# Patient Record
Sex: MALE | Race: BLACK OR AFRICAN AMERICAN | NOT HISPANIC OR LATINO | Employment: OTHER | URBAN - NONMETROPOLITAN AREA
[De-identification: names, ages, dates, MRNs, and addresses within clinical notes are randomized per-mention and may not be internally consistent; named-entity substitution may affect disease eponyms.]

---

## 2023-09-18 ENCOUNTER — HOSPITAL ENCOUNTER (EMERGENCY)
Facility: HOSPITAL | Age: 63
Discharge: ANOTHER HOSPITAL | End: 2023-09-18
Payer: MEDICARE

## 2023-09-18 VITALS
WEIGHT: 237 LBS | HEART RATE: 63 BPM | OXYGEN SATURATION: 100 % | SYSTOLIC BLOOD PRESSURE: 123 MMHG | DIASTOLIC BLOOD PRESSURE: 86 MMHG | HEIGHT: 72 IN | TEMPERATURE: 98 F | BODY MASS INDEX: 32.1 KG/M2 | RESPIRATION RATE: 12 BRPM

## 2023-09-18 DIAGNOSIS — I96 NECROSIS: Primary | ICD-10-CM

## 2023-09-18 PROCEDURE — 99285 PR EMERGENCY DEPT VISIT,LEVEL V: ICD-10-PCS | Mod: ,,, | Performed by: REGISTERED NURSE

## 2023-09-18 PROCEDURE — 99285 EMERGENCY DEPT VISIT HI MDM: CPT | Mod: ,,, | Performed by: REGISTERED NURSE

## 2023-09-18 PROCEDURE — 99285 EMERGENCY DEPT VISIT HI MDM: CPT

## 2023-09-18 RX ORDER — ASPIRIN 81 MG/1
81 TABLET ORAL
Status: ON HOLD | COMMUNITY
End: 2023-10-09 | Stop reason: HOSPADM

## 2023-09-18 RX ORDER — CARVEDILOL 6.25 MG/1
6.25 TABLET ORAL
Status: ON HOLD | COMMUNITY
Start: 2023-09-15 | End: 2023-10-09 | Stop reason: HOSPADM

## 2023-09-18 RX ORDER — SEVELAMER CARBONATE 800 MG/1
1 TABLET, FILM COATED ORAL 3 TIMES DAILY
COMMUNITY
Start: 2023-08-01

## 2023-09-18 RX ORDER — AMIODARONE HYDROCHLORIDE 200 MG/1
2 TABLET ORAL DAILY
Status: ON HOLD | COMMUNITY
End: 2023-10-09 | Stop reason: HOSPADM

## 2023-09-18 RX ORDER — ATORVASTATIN CALCIUM 40 MG/1
40 TABLET, FILM COATED ORAL NIGHTLY
COMMUNITY
Start: 2023-06-29

## 2023-09-18 RX ORDER — MIDODRINE HYDROCHLORIDE 10 MG/1
10 TABLET ORAL
Status: ON HOLD | COMMUNITY
Start: 2023-09-15 | End: 2023-10-09 | Stop reason: HOSPADM

## 2023-09-18 RX ORDER — PANTOPRAZOLE SODIUM 40 MG/1
40 TABLET, DELAYED RELEASE ORAL DAILY
Status: ON HOLD | COMMUNITY
End: 2023-10-09 | Stop reason: HOSPADM

## 2023-09-18 RX ORDER — IPRATROPIUM BROMIDE AND ALBUTEROL SULFATE 2.5; .5 MG/3ML; MG/3ML
3 SOLUTION RESPIRATORY (INHALATION) 2 TIMES DAILY
COMMUNITY

## 2023-09-18 RX ORDER — MONTELUKAST SODIUM 10 MG/1
10 TABLET ORAL
Status: ON HOLD | COMMUNITY
Start: 2023-06-29 | End: 2023-10-09 | Stop reason: HOSPADM

## 2023-09-18 RX ORDER — OLANZAPINE 2.5 MG/1
2.5 TABLET ORAL NIGHTLY
Status: ON HOLD | COMMUNITY
End: 2023-10-09 | Stop reason: HOSPADM

## 2023-09-18 NOTE — ED TRIAGE NOTES
Presents via [facility] from [facility] for evaluation of necrotic area to right forearm.  Spoke with sister who states he was suppose to go to [facility] for a vascular consult.  Patient has dialysis tomorrow (Tuesday) in [place].

## 2023-09-18 NOTE — ED NOTES
Updated patients sister , Ms. Macedo with plan to transfer to [place] to Dr. Espinoza.  Family agreeable

## 2023-09-18 NOTE — ED PROVIDER NOTES
Encounter Date: 9/18/2023       History     Chief Complaint   Patient presents with    Wound Check     Necrotic area right forearm     Yvan Rollins is a 63 y.o. Black or  /male presenting to ED via [facility] from local nursing home for vascular consultation for necrotic wound that overlies right AV dialysis graft. He was recently hospitalized at [facility] for dyspnea progressing to cardiac arrest with ROSC. Noted trouble dialyzing through graft during hospitalization so temporary catheter was placed and graft not being used. [facility] notes do note 2 cm defect overlying right AV graft with blood drainage noted on dressing but no further documentation or pictures found. He was evaluated by Vascular at that time and planned for OP f/u in clinic. Today he was seen by wound care at NH and they recommended return to [facility] for vascular to evaluate wound. However, EMS brought him to this ED. Large wound noted over right AV graft with small amt of blood drainage. Distal neurovascular intact. Currently in NAD. Initial BP low but patient noted to have hx of hypotension and on Midrin. However, second BP check was WNL. Otherwise, VSS at this time.      The history is provided by the patient, the nursing home and the EMS personnel.     Review of patient's allergies indicates:   Allergen Reactions    Ceftriaxone Swelling    Lisinopril Swelling and Rash     Facial swelling   Facial swelling       Past Medical History:   Diagnosis Date    CHF (congestive heart failure)     COPD (chronic obstructive pulmonary disease)     Coronary artery disease     GERD (gastroesophageal reflux disease)     Hypotension     Renal disorder      Past Surgical History:   Procedure Laterality Date    arm surgery Right     INSERTION OF PERMANENT PACEMAKER N/A     JOINT REPLACEMENT       History reviewed. No pertinent family history.  Social History     Tobacco Use    Smoking status: Every Day     Current packs/day: 0.50     Types:  Cigarettes    Smokeless tobacco: Current     Types: Snuff, Chew   Substance Use Topics    Alcohol use: Not Currently    Drug use: Not Currently     Review of Systems   Constitutional:  Positive for fatigue. Negative for chills and fever.   Respiratory:  Negative for cough and shortness of breath.    Cardiovascular:  Negative for chest pain and palpitations.   Musculoskeletal:  Positive for myalgias. Negative for arthralgias.   Skin:  Positive for wound (right forearm, left forearm).   Neurological:  Positive for weakness (generalized).   All other systems reviewed and are negative.      Physical Exam     Initial Vitals [09/18/23 1300]   BP Pulse Resp Temp SpO2   (!) 89/58 62 (!) 21 97.9 °F (36.6 °C) 100 %      MAP       --         Physical Exam    Nursing note and vitals reviewed.  Constitutional: He appears well-developed and well-nourished. No distress.   Cardiovascular:  Normal rate, regular rhythm, normal heart sounds and intact distal pulses.           Pulmonary/Chest: No respiratory distress (occasional scattered wheezing). He has wheezes.   Musculoskeletal:         General: Normal range of motion.     Neurological: He is alert and oriented to person, place, and time. GCS score is 15. GCS eye subscore is 4. GCS verbal subscore is 5. GCS motor subscore is 6.   Skin: Skin is warm and dry. Capillary refill takes less than 2 seconds.        4 cm X 2.5 cm wound with centralized necrotic tissue overlying right AV graft. Graft with no thrill or bruit noted. Small amount of sanguinous oozing from wound. Distal neurovascular intact. 3 cm X 2 cm wound to left forearm without drainage.         Medical Screening Exam   See Full Note    ED Course   Procedures  Labs Reviewed - No data to display       Imaging Results    None          Medications - No data to display  Medical Decision Making  Patient will require tsf to Vascular Surgery for surgical debridement of necrotic wound overlying right AV graft. Dr Campbell at [facility] has accepted patient.     Amount and/or Complexity of Data Reviewed  Independent Historian: EMS     Details: Yvan Rollins is a 63 y.o. Black or  /male presenting to ED via [facility] from local nursing home for vascular consultation for necrotic wound that overlies right AV dialysis graft. He was recently hospitalized at [facility] for dyspnea progressing to cardiac arrest with ROSC. Noted trouble dialyzing through graft so temporary catheter was placed and graft not being used. [facility] notes do note 2 cm defect overlying right AV graft with blood drainage noted on dressing but no further documentation or pictures found. He was evaluated by Vascular at that time and planned for OP f/u in clinic. Today he was seen by wound care at NH and they recommended return to [facility] for vascular to evaluate wound. However, EMS brought him to this ED. Large wound noted over right AV graft with small amt of blood drainage. Distal neurovascular intact. Currently in NAD. Initial BP low but patient noted to have hx of hypotension and on Midrin. However, second BP check was WNL. Otherwise, VSS at this time.                 ED Course as of 09/18/23 2209   Mon Sep 18, 2023   1411 Contacted [facility] patient placement to discuss tsf. Notes that they are on med surg advisory. Took patient information and attempted to contact me with intake but no answer. States that she left message and will call me back.  [LP]   1418 Contacted Dr Campbell. He is currently scrubbed into case and will return my call.  [LP]   1423 [facility] returned call. They refused noting no bed availability.  [LP]   1548 Case discussed with Dr Campbell. He has agreed to accept patient. Admit ot hospital medicine [LP]   1740 Dr Brown, [facility] has accepted patient. [LP]      ED Course User Index  [LP] Kayleen Richard, FNP                    Clinical Impression:   Final diagnoses:  [I96] Necrosis - Necrotic wound overlying AV graft of right  arm (Primary)        ED Disposition Condition    Transfer to Another Facility Stable                Tanya Ocasio NP-C  09/18/23 [id number]

## 2023-09-19 ENCOUNTER — HOSPITAL ENCOUNTER (INPATIENT)
Facility: HOSPITAL | Age: 63
LOS: 20 days | Discharge: NURSING, REHAB OR LONG-TERM CARE | DRG: 264 | End: 2023-10-09
Attending: FAMILY MEDICINE | Admitting: FAMILY MEDICINE
Payer: MEDICARE

## 2023-09-19 DIAGNOSIS — I50.22 CHRONIC SYSTOLIC CONGESTIVE HEART FAILURE: ICD-10-CM

## 2023-09-19 DIAGNOSIS — N18.6 CHRONIC KIDNEY DISEASE WITH END STAGE RENAL FAILURE ON DIALYSIS: ICD-10-CM

## 2023-09-19 DIAGNOSIS — L08.9 WOUND INFECTION: ICD-10-CM

## 2023-09-19 DIAGNOSIS — S41.101A ARM WOUND, RIGHT, INITIAL ENCOUNTER: Primary | ICD-10-CM

## 2023-09-19 DIAGNOSIS — I50.42 CHRONIC COMBINED SYSTOLIC AND DIASTOLIC CONGESTIVE HEART FAILURE: ICD-10-CM

## 2023-09-19 DIAGNOSIS — Z99.2 CHRONIC KIDNEY DISEASE WITH END STAGE RENAL FAILURE ON DIALYSIS: ICD-10-CM

## 2023-09-19 DIAGNOSIS — Z41.9 SURGERY, ELECTIVE: ICD-10-CM

## 2023-09-19 DIAGNOSIS — G93.1 CHRONIC ANOXIC ENCEPHALOPATHY: ICD-10-CM

## 2023-09-19 DIAGNOSIS — I25.10 CAD (CORONARY ARTERY DISEASE): ICD-10-CM

## 2023-09-19 DIAGNOSIS — T14.8XXA WOUND INFECTION: ICD-10-CM

## 2023-09-19 DIAGNOSIS — R78.81 BACTEREMIA: ICD-10-CM

## 2023-09-19 DIAGNOSIS — I50.9 CHF (CONGESTIVE HEART FAILURE): ICD-10-CM

## 2023-09-19 DIAGNOSIS — R07.9 CHEST PAIN: ICD-10-CM

## 2023-09-19 DIAGNOSIS — I50.20 HFREF (HEART FAILURE WITH REDUCED EJECTION FRACTION): ICD-10-CM

## 2023-09-19 PROBLEM — K21.9 GERD (GASTROESOPHAGEAL REFLUX DISEASE): Status: ACTIVE | Noted: 2023-09-19

## 2023-09-19 PROBLEM — Z74.09 IMPAIRED MOBILITY: Status: RESOLVED | Noted: 2023-09-19 | Resolved: 2023-09-19

## 2023-09-19 PROBLEM — S31.000A SACRAL WOUND: Status: RESOLVED | Noted: 2023-09-19 | Resolved: 2023-09-19

## 2023-09-19 PROBLEM — Z95.810 PRESENCE OF AUTOMATIC CARDIOVERTER/DEFIBRILLATOR (AICD): Status: RESOLVED | Noted: 2023-09-19 | Resolved: 2023-09-19

## 2023-09-19 PROBLEM — Z86.74 HISTORY OF CARDIAC ARREST: Status: ACTIVE | Noted: 2023-09-19

## 2023-09-19 PROBLEM — R13.10 DYSPHAGIA: Status: RESOLVED | Noted: 2023-09-19 | Resolved: 2023-09-19

## 2023-09-19 PROBLEM — J44.9 COPD (CHRONIC OBSTRUCTIVE PULMONARY DISEASE): Status: RESOLVED | Noted: 2023-09-19 | Resolved: 2023-09-19

## 2023-09-19 PROBLEM — Z95.810 PRESENCE OF AUTOMATIC CARDIOVERTER/DEFIBRILLATOR (AICD): Status: ACTIVE | Noted: 2023-09-19

## 2023-09-19 PROBLEM — J44.9 COPD (CHRONIC OBSTRUCTIVE PULMONARY DISEASE): Status: ACTIVE | Noted: 2023-09-19

## 2023-09-19 PROBLEM — E11.9 DIABETES: Status: ACTIVE | Noted: 2023-09-19

## 2023-09-19 PROBLEM — B19.20 HEPATITIS C: Status: ACTIVE | Noted: 2023-09-19

## 2023-09-19 PROBLEM — R13.10 DYSPHAGIA: Status: ACTIVE | Noted: 2023-09-19

## 2023-09-19 PROBLEM — I95.9 HYPOTENSION: Status: ACTIVE | Noted: 2023-09-19

## 2023-09-19 PROBLEM — I95.9 HYPOTENSION: Status: RESOLVED | Noted: 2023-09-19 | Resolved: 2023-09-19

## 2023-09-19 PROBLEM — B18.2 CHRONIC HEPATITIS C: Status: ACTIVE | Noted: 2023-09-19

## 2023-09-19 PROBLEM — I48.91 ATRIAL FIBRILLATION: Status: ACTIVE | Noted: 2023-09-19

## 2023-09-19 PROBLEM — S31.000A SACRAL WOUND: Status: ACTIVE | Noted: 2023-09-19

## 2023-09-19 PROBLEM — Z74.09 IMPAIRED MOBILITY: Status: ACTIVE | Noted: 2023-09-19

## 2023-09-19 PROBLEM — S41.102A ARM WOUND, LEFT, INITIAL ENCOUNTER: Status: ACTIVE | Noted: 2023-09-19

## 2023-09-19 LAB
ABORH RETYPE: NORMAL
ALBUMIN SERPL BCP-MCNC: 2.8 G/DL (ref 3.5–5)
ALBUMIN/GLOB SERPL: 0.7 {RATIO}
ALP SERPL-CCNC: 125 U/L (ref 45–115)
ALT SERPL W P-5'-P-CCNC: 43 U/L (ref 16–61)
ANION GAP SERPL CALCULATED.3IONS-SCNC: 19 MMOL/L (ref 7–16)
AORTIC ROOT ANNULUS: 3.9 CM
AORTIC VALVE CUSP SEPERATION: 3.01 CM
APTT PPP: 25.3 SECONDS (ref 25.2–37.3)
AST SERPL W P-5'-P-CCNC: 53 U/L (ref 15–37)
AV INDEX (PROSTH): 0.59
AV MEAN GRADIENT: 5 MMHG
AV PEAK GRADIENT: 8 MMHG
AV REGURGITATION PRESSURE HALF TIME: 1558.9 MS
AV VALVE AREA BY VELOCITY RATIO: 3.58 CM²
AV VALVE AREA: 3.43 CM²
AV VELOCITY RATIO: 0.61
BASOPHILS # BLD AUTO: 0.03 K/UL (ref 0–0.2)
BASOPHILS NFR BLD AUTO: 0.5 % (ref 0–1)
BILIRUB SERPL-MCNC: 1.2 MG/DL (ref ?–1.2)
BUN SERPL-MCNC: 49 MG/DL (ref 7–18)
BUN/CREAT SERPL: 4 (ref 6–20)
CALCIUM SERPL-MCNC: 9 MG/DL (ref 8.5–10.1)
CHLORIDE SERPL-SCNC: 103 MMOL/L (ref 98–107)
CO2 SERPL-SCNC: 24 MMOL/L (ref 21–32)
CREAT SERPL-MCNC: 11.7 MG/DL (ref 0.7–1.3)
CV ECHO LV RWT: 0.66 CM
DIFFERENTIAL METHOD BLD: ABNORMAL
DOP CALC AO PEAK VEL: 1.44 M/S
DOP CALC AO VTI: 27.6 CM
DOP CALC LVOT AREA: 5.9 CM2
DOP CALC LVOT DIAMETER: 2.73 CM
DOP CALC LVOT PEAK VEL: 0.88 M/S
DOP CALC LVOT STROKE VOLUME: 94.78 CM3
DOP CALCLVOT PEAK VEL VTI: 16.2 CM
E WAVE DECELERATION TIME: 166.67 MSEC
E/A RATIO: 0.66
E/E' RATIO: 8 M/S
ECHO LV POSTERIOR WALL: 2.14 CM (ref 0.6–1.1)
EGFR (NO RACE VARIABLE) (RUSH/TITUS): 4 ML/MIN/1.73M2
EJECTION FRACTION: 30 %
EOSINOPHIL # BLD AUTO: 0.06 K/UL (ref 0–0.5)
EOSINOPHIL NFR BLD AUTO: 1 % (ref 1–4)
ERYTHROCYTE [DISTWIDTH] IN BLOOD BY AUTOMATED COUNT: 14.5 % (ref 11.5–14.5)
EST. AVERAGE GLUCOSE BLD GHB EST-MCNC: 90 MG/DL
FRACTIONAL SHORTENING: 19 % (ref 28–44)
GLOBULIN SER-MCNC: 4.2 G/DL (ref 2–4)
GLUCOSE SERPL-MCNC: 115 MG/DL (ref 70–105)
GLUCOSE SERPL-MCNC: 80 MG/DL (ref 74–106)
GLUCOSE SERPL-MCNC: 83 MG/DL (ref 70–105)
GLUCOSE SERPL-MCNC: 95 MG/DL (ref 70–105)
HAV IGM SER QL: ABNORMAL
HBA1C MFR BLD HPLC: 5.3 % (ref 4.5–6.6)
HBV CORE IGM SER QL: ABNORMAL
HBV SURFACE AG SERPL QL IA: ABNORMAL
HCT VFR BLD AUTO: 37.2 % (ref 40–54)
HCV AB SER QL: REACTIVE
HGB BLD-MCNC: 11.9 G/DL (ref 13.5–18)
IMM GRANULOCYTES # BLD AUTO: 0.06 K/UL (ref 0–0.04)
IMM GRANULOCYTES NFR BLD: 1 % (ref 0–0.4)
INDIRECT COOMBS: NORMAL
INR BLD: 1.05
INTERVENTRICULAR SEPTUM: 2.26 CM (ref 0.6–1.1)
IVC DIAMETER: 1.42 CM
LA MAJOR: 4.04 CM
LEFT ATRIUM SIZE: 3.83 CM
LEFT INTERNAL DIMENSION IN SYSTOLE: 5.23 CM (ref 2.1–4)
LEFT VENTRICLE DIASTOLIC VOLUME: 211.16 ML
LEFT VENTRICLE SYSTOLIC VOLUME: 131.02 ML
LEFT VENTRICULAR INTERNAL DIMENSION IN DIASTOLE: 6.44 CM (ref 3.5–6)
LEFT VENTRICULAR MASS: 838.15 G
LV LATERAL E/E' RATIO: 6.67 M/S
LV SEPTAL E/E' RATIO: 10 M/S
LVOT MG: 1.84 MMHG
LVOT MV: 0.65 CM/S
LYMPHOCYTES # BLD AUTO: 0.82 K/UL (ref 1–4.8)
LYMPHOCYTES NFR BLD AUTO: 14 % (ref 27–41)
MAGNESIUM SERPL-MCNC: 2.4 MG/DL (ref 1.7–2.3)
MCH RBC QN AUTO: 32.4 PG (ref 27–31)
MCHC RBC AUTO-ENTMCNC: 32 G/DL (ref 32–36)
MCV RBC AUTO: 101.4 FL (ref 80–96)
MONOCYTES # BLD AUTO: 0.7 K/UL (ref 0–0.8)
MONOCYTES NFR BLD AUTO: 11.9 % (ref 2–6)
MPC BLD CALC-MCNC: 9.5 FL (ref 9.4–12.4)
MV PEAK A VEL: 0.61 M/S
MV PEAK E VEL: 0.4 M/S
MV STENOSIS PRESSURE HALF TIME: 48.34 MS
MV VALVE AREA P 1/2 METHOD: 4.55 CM2
NEUTROPHILS # BLD AUTO: 4.2 K/UL (ref 1.8–7.7)
NEUTROPHILS NFR BLD AUTO: 71.6 % (ref 53–65)
NRBC # BLD AUTO: 0 X10E3/UL
NRBC, AUTO (.00): 0 %
PHOSPHATE SERPL-MCNC: 4.2 MG/DL (ref 2.5–4.5)
PISA AR MAX VEL: 2.41 M/S
PISA TR MAX VEL: 2.8 M/S
PLATELET # BLD AUTO: 278 K/UL (ref 150–400)
POTASSIUM SERPL-SCNC: 4.6 MMOL/L (ref 3.5–5.1)
PROT SERPL-MCNC: 7 G/DL (ref 6.4–8.2)
PROTHROMBIN TIME: 13.6 SECONDS (ref 11.7–14.7)
PV PEAK GRADIENT: 3 MMHG
PV PEAK VELOCITY: 0.86 M/S
RA MAJOR: 4.54 CM
RA PRESSURE ESTIMATED: 3 MMHG
RBC # BLD AUTO: 3.67 M/UL (ref 4.6–6.2)
RH BLD: NORMAL
RIGHT VENTRICULAR END-DIASTOLIC DIMENSION: 4.11 CM
RV TB RVSP: 6 MMHG
SODIUM SERPL-SCNC: 141 MMOL/L (ref 136–145)
SPECIMEN OUTDATE: NORMAL
TDI LATERAL: 0.06 M/S
TDI SEPTAL: 0.04 M/S
TDI: 0.05 M/S
TR MAX PG: 31 MMHG
TRICUSPID ANNULAR PLANE SYSTOLIC EXCURSION: 1.9 CM
TSH SERPL DL<=0.005 MIU/L-ACNC: 1.12 UIU/ML (ref 0.36–3.74)
TV REST PULMONARY ARTERY PRESSURE: 34 MMHG
WBC # BLD AUTO: 5.87 K/UL (ref 4.5–11)

## 2023-09-19 PROCEDURE — 63600175 PHARM REV CODE 636 W HCPCS: Performed by: HOSPITALIST

## 2023-09-19 PROCEDURE — 93010 ELECTROCARDIOGRAM REPORT: CPT | Mod: ,,, | Performed by: INTERNAL MEDICINE

## 2023-09-19 PROCEDURE — 93005 ELECTROCARDIOGRAM TRACING: CPT

## 2023-09-19 PROCEDURE — 99223 PR INITIAL HOSPITAL CARE,LEVL III: ICD-10-PCS | Mod: ,,, | Performed by: NURSE PRACTITIONER

## 2023-09-19 PROCEDURE — 93010 EKG 12-LEAD: ICD-10-PCS | Mod: ,,, | Performed by: INTERNAL MEDICINE

## 2023-09-19 PROCEDURE — 82962 GLUCOSE BLOOD TEST: CPT

## 2023-09-19 PROCEDURE — 80053 COMPREHEN METABOLIC PANEL: CPT

## 2023-09-19 PROCEDURE — 86900 BLOOD TYPING SEROLOGIC ABO: CPT

## 2023-09-19 PROCEDURE — 99232 PR SUBSEQUENT HOSPITAL CARE,LEVL II: ICD-10-PCS | Mod: ,,, | Performed by: HOSPITALIST

## 2023-09-19 PROCEDURE — 87070 CULTURE OTHR SPECIMN AEROBIC: CPT

## 2023-09-19 PROCEDURE — 99223 PR INITIAL HOSPITAL CARE,LEVL III: ICD-10-PCS | Mod: ,,, | Performed by: INTERNAL MEDICINE

## 2023-09-19 PROCEDURE — 85025 COMPLETE CBC W/AUTO DIFF WBC: CPT

## 2023-09-19 PROCEDURE — 27000940

## 2023-09-19 PROCEDURE — 99232 SBSQ HOSP IP/OBS MODERATE 35: CPT | Mod: ,,, | Performed by: HOSPITALIST

## 2023-09-19 PROCEDURE — 99223 1ST HOSP IP/OBS HIGH 75: CPT | Mod: ,,, | Performed by: NURSE PRACTITIONER

## 2023-09-19 PROCEDURE — 25000003 PHARM REV CODE 250

## 2023-09-19 PROCEDURE — 25000003 PHARM REV CODE 250: Performed by: HOSPITALIST

## 2023-09-19 PROCEDURE — 97166 OT EVAL MOD COMPLEX 45 MIN: CPT

## 2023-09-19 PROCEDURE — 85610 PROTHROMBIN TIME: CPT

## 2023-09-19 PROCEDURE — 84100 ASSAY OF PHOSPHORUS: CPT

## 2023-09-19 PROCEDURE — 87149 DNA/RNA DIRECT PROBE: CPT

## 2023-09-19 PROCEDURE — 25000003 PHARM REV CODE 250: Performed by: FAMILY MEDICINE

## 2023-09-19 PROCEDURE — 97162 PT EVAL MOD COMPLEX 30 MIN: CPT

## 2023-09-19 PROCEDURE — 99223 1ST HOSP IP/OBS HIGH 75: CPT | Mod: ,,, | Performed by: INTERNAL MEDICINE

## 2023-09-19 PROCEDURE — 80074 ACUTE HEPATITIS PANEL: CPT

## 2023-09-19 PROCEDURE — 83036 HEMOGLOBIN GLYCOSYLATED A1C: CPT

## 2023-09-19 PROCEDURE — 84443 ASSAY THYROID STIM HORMONE: CPT

## 2023-09-19 PROCEDURE — 83735 ASSAY OF MAGNESIUM: CPT

## 2023-09-19 PROCEDURE — 99223 PR INITIAL HOSPITAL CARE,LEVL III: ICD-10-PCS | Mod: ,,, | Performed by: REGISTERED NURSE

## 2023-09-19 PROCEDURE — 94640 AIRWAY INHALATION TREATMENT: CPT

## 2023-09-19 PROCEDURE — 85730 THROMBOPLASTIN TIME PARTIAL: CPT

## 2023-09-19 PROCEDURE — 63600175 PHARM REV CODE 636 W HCPCS

## 2023-09-19 PROCEDURE — 99223 1ST HOSP IP/OBS HIGH 75: CPT | Mod: ,,, | Performed by: REGISTERED NURSE

## 2023-09-19 PROCEDURE — 94761 N-INVAS EAR/PLS OXIMETRY MLT: CPT

## 2023-09-19 PROCEDURE — 25000242 PHARM REV CODE 250 ALT 637 W/ HCPCS: Performed by: HOSPITALIST

## 2023-09-19 PROCEDURE — 87040 BLOOD CULTURE FOR BACTERIA: CPT

## 2023-09-19 PROCEDURE — 11000001 HC ACUTE MED/SURG PRIVATE ROOM

## 2023-09-19 PROCEDURE — 92610 EVALUATE SWALLOWING FUNCTION: CPT

## 2023-09-19 PROCEDURE — 87522 HEPATITIS C REVRS TRNSCRPJ: CPT | Mod: 90

## 2023-09-19 RX ORDER — IBUPROFEN 200 MG
24 TABLET ORAL
Status: DISCONTINUED | OUTPATIENT
Start: 2023-09-19 | End: 2023-09-19

## 2023-09-19 RX ORDER — ONDANSETRON 2 MG/ML
4 INJECTION INTRAMUSCULAR; INTRAVENOUS EVERY 8 HOURS PRN
Status: DISCONTINUED | OUTPATIENT
Start: 2023-09-19 | End: 2023-10-09 | Stop reason: HOSPADM

## 2023-09-19 RX ORDER — IPRATROPIUM BROMIDE AND ALBUTEROL SULFATE 2.5; .5 MG/3ML; MG/3ML
3 SOLUTION RESPIRATORY (INHALATION) 2 TIMES DAILY
Status: DISCONTINUED | OUTPATIENT
Start: 2023-09-19 | End: 2023-10-09 | Stop reason: HOSPADM

## 2023-09-19 RX ORDER — MONTELUKAST SODIUM 10 MG/1
10 TABLET ORAL NIGHTLY
Status: DISCONTINUED | OUTPATIENT
Start: 2023-09-19 | End: 2023-10-09 | Stop reason: HOSPADM

## 2023-09-19 RX ORDER — INSULIN ASPART 100 [IU]/ML
0-5 INJECTION, SOLUTION INTRAVENOUS; SUBCUTANEOUS EVERY 6 HOURS PRN
Status: DISCONTINUED | OUTPATIENT
Start: 2023-09-19 | End: 2023-10-09 | Stop reason: HOSPADM

## 2023-09-19 RX ORDER — SODIUM CHLORIDE 0.9 % (FLUSH) 0.9 %
10 SYRINGE (ML) INJECTION EVERY 12 HOURS PRN
Status: DISCONTINUED | OUTPATIENT
Start: 2023-09-19 | End: 2023-10-09 | Stop reason: HOSPADM

## 2023-09-19 RX ORDER — GLUCAGON 1 MG
1 KIT INJECTION
Status: DISCONTINUED | OUTPATIENT
Start: 2023-09-19 | End: 2023-09-19

## 2023-09-19 RX ORDER — AMIODARONE HYDROCHLORIDE 200 MG/1
200 TABLET ORAL DAILY
Status: DISCONTINUED | OUTPATIENT
Start: 2023-09-30 | End: 2023-10-09 | Stop reason: HOSPADM

## 2023-09-19 RX ORDER — MIDODRINE HYDROCHLORIDE 5 MG/1
10 TABLET ORAL
Status: DISCONTINUED | OUTPATIENT
Start: 2023-09-19 | End: 2023-10-06

## 2023-09-19 RX ORDER — OLANZAPINE 2.5 MG/1
2.5 TABLET ORAL NIGHTLY
Status: DISCONTINUED | OUTPATIENT
Start: 2023-09-19 | End: 2023-10-09 | Stop reason: HOSPADM

## 2023-09-19 RX ORDER — MUPIROCIN 20 MG/G
OINTMENT TOPICAL 2 TIMES DAILY
Status: COMPLETED | OUTPATIENT
Start: 2023-09-19 | End: 2023-09-23

## 2023-09-19 RX ORDER — IBUPROFEN 200 MG
16 TABLET ORAL
Status: DISCONTINUED | OUTPATIENT
Start: 2023-09-19 | End: 2023-09-19

## 2023-09-19 RX ORDER — SEVELAMER CARBONATE 800 MG/1
800 TABLET, FILM COATED ORAL 3 TIMES DAILY
Status: DISCONTINUED | OUTPATIENT
Start: 2023-09-19 | End: 2023-10-09 | Stop reason: HOSPADM

## 2023-09-19 RX ORDER — ATORVASTATIN CALCIUM 40 MG/1
40 TABLET, FILM COATED ORAL NIGHTLY
Status: DISCONTINUED | OUTPATIENT
Start: 2023-09-19 | End: 2023-10-09 | Stop reason: HOSPADM

## 2023-09-19 RX ORDER — PANTOPRAZOLE SODIUM 40 MG/1
40 TABLET, DELAYED RELEASE ORAL DAILY
Status: DISCONTINUED | OUTPATIENT
Start: 2023-09-19 | End: 2023-10-09 | Stop reason: HOSPADM

## 2023-09-19 RX ORDER — GLUCAGON 1 MG
1 KIT INJECTION
Status: DISCONTINUED | OUTPATIENT
Start: 2023-09-19 | End: 2023-10-09 | Stop reason: HOSPADM

## 2023-09-19 RX ORDER — CLINDAMYCIN PHOSPHATE 600 MG/50ML
600 INJECTION, SOLUTION INTRAVENOUS
Status: DISCONTINUED | OUTPATIENT
Start: 2023-09-19 | End: 2023-09-21

## 2023-09-19 RX ORDER — ASPIRIN 81 MG/1
81 TABLET ORAL DAILY
Status: DISCONTINUED | OUTPATIENT
Start: 2023-09-19 | End: 2023-10-09 | Stop reason: HOSPADM

## 2023-09-19 RX ORDER — NALOXONE HCL 0.4 MG/ML
0.02 VIAL (ML) INJECTION
Status: DISCONTINUED | OUTPATIENT
Start: 2023-09-19 | End: 2023-10-09 | Stop reason: HOSPADM

## 2023-09-19 RX ORDER — HYDROCODONE BITARTRATE AND ACETAMINOPHEN 5; 325 MG/1; MG/1
1 TABLET ORAL EVERY 6 HOURS PRN
Status: DISCONTINUED | OUTPATIENT
Start: 2023-09-19 | End: 2023-10-09 | Stop reason: HOSPADM

## 2023-09-19 RX ORDER — ACETAMINOPHEN 325 MG/1
650 TABLET ORAL EVERY 4 HOURS PRN
Status: DISCONTINUED | OUTPATIENT
Start: 2023-09-19 | End: 2023-10-09 | Stop reason: HOSPADM

## 2023-09-19 RX ORDER — AMIODARONE HYDROCHLORIDE 200 MG/1
400 TABLET ORAL DAILY
Status: DISPENSED | OUTPATIENT
Start: 2023-09-19 | End: 2023-09-30

## 2023-09-19 RX ORDER — CARVEDILOL 6.25 MG/1
6.25 TABLET ORAL 2 TIMES DAILY WITH MEALS
Status: DISCONTINUED | OUTPATIENT
Start: 2023-09-19 | End: 2023-10-05

## 2023-09-19 RX ORDER — IPRATROPIUM BROMIDE AND ALBUTEROL SULFATE 2.5; .5 MG/3ML; MG/3ML
3 SOLUTION RESPIRATORY (INHALATION) 2 TIMES DAILY
Status: DISCONTINUED | OUTPATIENT
Start: 2023-09-19 | End: 2023-09-19

## 2023-09-19 RX ADMIN — SEVELAMER CARBONATE 800 MG: 800 TABLET, FILM COATED ORAL at 04:09

## 2023-09-19 RX ADMIN — MIDODRINE HYDROCHLORIDE 10 MG: 5 TABLET ORAL at 06:09

## 2023-09-19 RX ADMIN — MUPIROCIN: 20 OINTMENT TOPICAL at 09:09

## 2023-09-19 RX ADMIN — AZTREONAM 500 MG: 1 INJECTION, POWDER, LYOPHILIZED, FOR SOLUTION INTRAMUSCULAR; INTRAVENOUS at 08:09

## 2023-09-19 RX ADMIN — CLINDAMYCIN PHOSPHATE 600 MG: 600 INJECTION, SOLUTION INTRAVENOUS at 05:09

## 2023-09-19 RX ADMIN — MUPIROCIN: 20 OINTMENT TOPICAL at 08:09

## 2023-09-19 RX ADMIN — MONTELUKAST SODIUM 10 MG: 10 TABLET, FILM COATED ORAL at 08:09

## 2023-09-19 RX ADMIN — VANCOMYCIN HYDROCHLORIDE 2250 MG: 500 INJECTION, POWDER, LYOPHILIZED, FOR SOLUTION INTRAVENOUS at 09:09

## 2023-09-19 RX ADMIN — CLINDAMYCIN PHOSPHATE 600 MG: 600 INJECTION, SOLUTION INTRAVENOUS at 08:09

## 2023-09-19 RX ADMIN — MIDODRINE HYDROCHLORIDE 10 MG: 5 TABLET ORAL at 01:09

## 2023-09-19 RX ADMIN — MIDODRINE HYDROCHLORIDE 10 MG: 5 TABLET ORAL at 09:09

## 2023-09-19 RX ADMIN — CLINDAMYCIN PHOSPHATE 600 MG: 600 INJECTION, SOLUTION INTRAVENOUS at 01:09

## 2023-09-19 RX ADMIN — SEVELAMER CARBONATE 800 MG: 800 TABLET, FILM COATED ORAL at 08:09

## 2023-09-19 RX ADMIN — OLANZAPINE 2.5 MG: 2.5 TABLET, FILM COATED ORAL at 08:09

## 2023-09-19 RX ADMIN — PANTOPRAZOLE SODIUM 40 MG: 40 TABLET, DELAYED RELEASE ORAL at 09:09

## 2023-09-19 RX ADMIN — AZTREONAM 1000 MG: 1 INJECTION, POWDER, LYOPHILIZED, FOR SOLUTION INTRAMUSCULAR; INTRAVENOUS at 05:09

## 2023-09-19 RX ADMIN — CARVEDILOL 6.25 MG: 6.25 TABLET, FILM COATED ORAL at 06:09

## 2023-09-19 RX ADMIN — SEVELAMER CARBONATE 800 MG: 800 TABLET, FILM COATED ORAL at 09:09

## 2023-09-19 RX ADMIN — ATORVASTATIN CALCIUM 40 MG: 40 TABLET, FILM COATED ORAL at 08:09

## 2023-09-19 RX ADMIN — CARVEDILOL 6.25 MG: 6.25 TABLET, FILM COATED ORAL at 09:09

## 2023-09-19 RX ADMIN — IPRATROPIUM BROMIDE AND ALBUTEROL SULFATE 3 ML: 2.5; .5 SOLUTION RESPIRATORY (INHALATION) at 07:09

## 2023-09-19 RX ADMIN — AMIODARONE HYDROCHLORIDE 400 MG: 200 TABLET ORAL at 09:09

## 2023-09-19 RX ADMIN — ASPIRIN 81 MG: 81 TABLET, COATED ORAL at 09:09

## 2023-09-19 NOTE — ASSESSMENT & PLAN NOTE
Necrotic wound present on patient's flexor side of the LT wrist. Likely to be secondary to vasopressor infiltration of IV site (mentioned in D/C summary from [facility]).     - Wound care consulted - appreciate recommendations

## 2023-09-19 NOTE — PLAN OF CARE
Problem: Occupational Therapy  Goal: Occupational Therapy Goal  Description: STG:  Pt will perform grooming with setup   Pt will bathe self with min assist  Pt will perform UB dressing with SBA  Pt will perform LB dressing with min assist  Pt will sit EOB x 10 min with SBA   Pt will transfer toilet/BSC with min assist  Pt will tolerate 15 minutes of tx with minimal fatigue      LT. Pt will achieve max level of independence with self care.    Outcome: Ongoing, Progressing

## 2023-09-19 NOTE — H&P (VIEW-ONLY)
[facility]  General Surgery  Consult Note    Patient Name: Yvan Rollins  MRN: [id number]  Code Status: Full Code  Admission Date: 9/19/2023  Hospital Length of Stay: 0 days  Attending Physician: Malinda Fontaine DO  Primary Care Provider: Eugene Funez MD    Patient information was obtained from ER records.     Inpatient consult to Vascular Surgery  Consult performed by: Danielle Islas ACNP  Consult ordered by: Mirna Ewing MD  Assessment/Recommendations: Incision drainage right upper extremity hematoma debridement bilateral upper extremities        Subjective:     Principal Problem: Arm wound, right, initial encounter    History of Present Illness: 63-year-old male nursing home resident recently was at [facility] last month cardiac arrest  He has pacemaker AICD cardiomyopathy depressed ejection fraction 30%  He has end-stage renal disease required insertion of tunneled hemodialysis catheter by Dr. Carty due to right upper extremity malfunction of right AV access was to follow-up with vascular surgery outpatient  Referred from wound care to emergency department for right upper extremity necrotic wound over right upper extremity dialysis access  Vascular surgery consulted      Current Facility-Administered Medications on File Prior to Encounter   Medication    [DISCONTINUED] acetaminophen oral solution    [DISCONTINUED] albumin human 25% bottle    [DISCONTINUED] amiodarone tablet    [DISCONTINUED] aspirin chewable tablet    [DISCONTINUED] atorvastatin tablet    [DISCONTINUED] carvediloL tablet    [DISCONTINUED] dextrose 50 % in water (D50W) injection    [DISCONTINUED] GENERIC EXTERNAL MEDICATION    [DISCONTINUED] GENERIC EXTERNAL MEDICATION    [DISCONTINUED] glucagon injection    [DISCONTINUED] haloperidol lactate injection    [DISCONTINUED] heparin (porcine) injection    [DISCONTINUED] heparin (porcine) injection    [DISCONTINUED] hydrALAZINE injection     [DISCONTINUED] labetalol 20 mg/4 mL (5 mg/mL) IV syring    [DISCONTINUED] midodrine tablet    [DISCONTINUED] montelukast tablet    [DISCONTINUED] pantoprazole EC tablet     Current Outpatient Medications on File Prior to Encounter   Medication Sig    albuterol-ipratropium (DUO-NEB) 2.5 mg-0.5 mg/3 mL nebulizer solution Take 3 mLs by nebulization 2 (two) times a day.    amiodarone (PACERONE) 200 MG Tab Take 2 tablets by mouth once daily. X 13 days    aspirin (ECOTRIN) 81 MG EC tablet Take 81 mg by mouth.    atorvastatin (LIPITOR) 40 MG tablet Take 40 mg by mouth every evening.    carvediloL (COREG) 6.25 MG tablet 6.25 mg.    midodrine (PROAMATINE) 10 MG tablet Take 10 mg by mouth.    montelukast (SINGULAIR) 10 mg tablet Take 10 mg by mouth.    OLANZapine (ZYPREXA) 2.5 MG tablet Take 2.5 mg by mouth every evening.    pantoprazole (PROTONIX) 40 MG tablet Take 40 mg by mouth once daily.    sevelamer carbonate (RENVELA) 800 mg Tab Take 1 tablet by mouth 3 (three) times daily.       Review of patient's allergies indicates:   Allergen Reactions    Ceftriaxone Swelling    Lisinopril Swelling and Rash     Facial swelling   Facial swelling         Past Medical History:   Diagnosis Date    Cardiac arrest     CHF (congestive heart failure)     EF 25-30%    COPD (chronic obstructive pulmonary disease)     Coronary artery disease     Diabetes     GERD (gastroesophageal reflux disease)     Hepatitis C     Hypotension     Requiring Midodrine    Neuropathy     Substance abuse      Past Surgical History:   Procedure Laterality Date    arm surgery Right     INSERTION OF PERMANENT PACEMAKER N/A 08/15/2018    JOINT REPLACEMENT Right     Knee surgery    LUMBAR SPINE SURGERY       Family History    None       Tobacco Use    Smoking status: Every Day     Current packs/day: 0.50     Types: Cigarettes    Smokeless tobacco: Current     Types: Snuff, Chew   Substance and Sexual Activity    Alcohol use: Not  Currently    Drug use: Not Currently    Sexual activity: Not on file     Review of Systems   Unable to perform ROS: Other     Objective:     Vital Signs (Most Recent):  Temp: 97.2 °F (36.2 °C) (09/19/23 0418)  Pulse: 68 (09/19/23 0418)  Resp: 16 (09/19/23 0418)  BP: (!) 90/59 (09/19/23 0912)  SpO2: 100 % (09/19/23 0418) Vital Signs (24h Range):  Temp:  [97.2 °F (36.2 °C)-97.9 °F (36.6 °C)] 97.2 °F (36.2 °C)  Pulse:  [60-90] 68  Resp:  [11-22] 16  SpO2:  [95 %-100 %] 100 %  BP: ()/(54-86) 90/59     Weight: 107.5 kg (237 lb)  Body mass index is 32.14 kg/m².     Physical Exam  Vitals and nursing note reviewed.   Constitutional:       Appearance: Normal appearance.   HENT:      Head: Normocephalic.      Mouth/Throat:      Mouth: Mucous membranes are moist.   Eyes:      Conjunctiva/sclera: Conjunctivae normal.   Cardiovascular:      Rate and Rhythm: Normal rate and regular rhythm.   Pulmonary:      Effort: Pulmonary effort is normal.   Abdominal:      Palpations: Abdomen is soft.   Skin:     General: Skin is warm and dry.      Comments:  eschar over right upper extremity dialysis access with expression of old blood consistent with hematoma  Right upper extremity  hematoma    Left wrist area partially intact eschar   Neurological:      Mental Status: He is alert. Mental status is at baseline. He is disoriented.      Comments: Person place   Psychiatric:         Mood and Affect: Mood normal.            I have reviewed all pertinent lab results within the past 24 hours.  CBC:   Recent Labs   Lab 09/19/23 0414   WBC 5.87   RBC 3.67*   HGB 11.9*   HCT 37.2*      .4*   MCH 32.4*   MCHC 32.0     BMP:   Recent Labs   Lab 09/19/23  0525   GLU 80      K 4.6      CO2 24   BUN 49*   CREATININE 11.70*   CALCIUM 9.0   MG 2.4*     CMP:   Recent Labs   Lab 09/19/23  0525   GLU 80   CALCIUM 9.0   ALBUMIN 2.8*   PROT 7.0      K 4.6   CO2 24      BUN 49*   CREATININE 11.70*   ALKPHOS 125*  "  ALT 43   AST 53*   BILITOT 1.2     LFTs:   Recent Labs   Lab 09/19/23  0525   ALT 43   AST 53*   ALKPHOS 125*   BILITOT 1.2   PROT 7.0   ALBUMIN 2.8*     Coagulation:   Recent Labs   Lab 09/19/23  0414   LABPROT 13.6   INR 1.05   APTT 25.3     Cardiac markers: No results for input(s): "CKMB", "CPKMB", "TROPONINT", "TROPONINI", "MYOGLOBIN" in the last 168 hours.    Significant Diagnostics:  I have reviewed all pertinent imaging results/findings within the past 24 hours.      Assessment/Plan:     Hematoma  Right upper extremity hematoma with 3-4 cm skin necrosis over occluded dialysis access  To OR tomorrow for incision drainage hematoma with Dr. Campbell debridement eschar bilateral upper extremities      VTE Risk Mitigation (From admission, onward)         Ordered     IP VTE HIGH RISK PATIENT  Once         09/19/23 0147     Place sequential compression device  Until discontinued         09/19/23 0147     Reason for No Pharmacological VTE Prophylaxis  Once        Question:  Reasons:  Answer:  Physician Provided (leave comment)    09/19/23 0147                Thank you for your consult. I will follow-up with patient. Please contact us if you have any additional questions.    Danielle Islas, ARLEN  General Surgery  [facility]  "

## 2023-09-19 NOTE — HPI
Patient is a 63 year old  male who was transferred to [facility] from [place] ED for vascular consultation for a necrotic wound overlying an AV access site in the RT arm. He presented to [place] ED via [facility] from [facility] (referenced to be [facility] from recent hospitalization at [facility] (8/15/2023)). He was recently hospitalized at [facility] after having dyspnea during dialysis that progressed to cardiac arrest with ROSC. The patient was mechanically intubated, started on pressors, and managed in the ICU. According to the discharge summary, the patient had an adverse reaction to surface and infiltration anesthetic and intravenous infiltration.     During hospitalization at [facility], the RT AV access site was unable to be used and a tunneled RT dialysis catheter was placed. He was evaluated by Vascular surgery during that admission with outpatient follow-up. Today, he was seen by wound care at the NH and they recommended return to [facility] for vascular evaluation of the wound. However, [facility] was on Diversion and he ended up at [place] and subsequently [facility].     During his hospitalization at [facility], cardiology was consulted. ECHO demonstrated EF of 25-30% and there was a reported 1 episode of nonsustained v-tach. Cardiology recommended resuming goal directed medical therapy, and there was no identified cardiac etiology for his arrest. He was also evaluated by neurology due to concern for anoxic brain injury.  CT of the brain demonstrated chronic ischemic microangiopathy. Neurology diagnosed his encephalopathy to be multifactorial - anoxic insult from prolonged cardiac arrest with metabolic derangement. His mental status improved, but not to baseline. Today, at [facility] he   is Aox1 and he is a poor historian. He has difficulty answering questions. History is obtained from medical records.     The patient has a PMH of nonischemic cardiomyopathy with EF 20-25%, S/P AICD placement, ESRD on HD TTS, COPD, HTN,  polysubstance abuse, and hepatitis C.

## 2023-09-19 NOTE — ASSESSMENT & PLAN NOTE
Necrotic wound present on patient's flexor side of the LT wrist. Likely to be secondary to vasopressor infiltration of IV site (mentioned in D/C summary from [facility]).     - Wound care consulted - appreciate recommendations    9/19: Per Vascular Surgery and wound care; on Clindamycin and Aztreonam; debridement pending; cultures pending

## 2023-09-19 NOTE — ASSESSMENT & PLAN NOTE
Patient has a necrotic area present over AV access site for dialysis. Patient was to follow-up outpatient at [facility] with vascular surgery, but hospital was on diversion     - Vascular surgery consulted - appreciate recommendations  - Cardiology consulted - appreciate recommendations for pre-op clearance   - Type and screen pending   - CXR pending   - EKG demonstrated: Atrial paced rhythmn with prolonged AV conduction, HR 65  - CBC and CMP pending   - Coagulation factors pending   - Wound culture pending   - Blood cultures pending   - Vancomycin IV, Clindamycin IV, and Aztreonam IV (to cover for MRSA, anaerobes, and gram - bacteria)     9/19: Patient is followed by Vascular Surgery; debridement pending; on Clindamycin and Aztreonam; cultures pending

## 2023-09-19 NOTE — PLAN OF CARE
Problem: Adult Inpatient Plan of Care  Goal: Plan of Care Review  Outcome: Ongoing, Progressing  Goal: Patient-Specific Goal (Individualized)  Outcome: Ongoing, Progressing  Goal: Absence of Hospital-Acquired Illness or Injury  Outcome: Ongoing, Progressing  Goal: Optimal Comfort and Wellbeing  Outcome: Ongoing, Progressing  Goal: Readiness for Transition of Care  Outcome: Ongoing, Progressing     Problem: Impaired Wound Healing  Goal: Optimal Wound Healing  Outcome: Ongoing, Progressing     Problem: Infection  Goal: Absence of Infection Signs and Symptoms  Outcome: Ongoing, Progressing     Problem: Skin Injury Risk Increased  Goal: Skin Health and Integrity  Outcome: Ongoing, Progressing     Problem: Diabetes Comorbidity  Goal: Blood Glucose Level Within Targeted Range  Outcome: Ongoing, Progressing

## 2023-09-19 NOTE — CONSULTS
[facility]  General Surgery  Consult Note    Patient Name: Yvan Rollins  MRN: [id number]  Code Status: Full Code  Admission Date: 9/19/2023  Hospital Length of Stay: 0 days  Attending Physician: Malinda Fontaine DO  Primary Care Provider: Eugene Funez MD    Patient information was obtained from ER records.     Inpatient consult to Vascular Surgery  Consult performed by: Danielle Islsa ACNP  Consult ordered by: Mirna Ewing MD  Assessment/Recommendations: Incision drainage right upper extremity hematoma debridement bilateral upper extremities        Subjective:     Principal Problem: Arm wound, right, initial encounter    History of Present Illness: 63-year-old male nursing home resident recently was at [facility] last month cardiac arrest  He has pacemaker AICD cardiomyopathy depressed ejection fraction 30%  He has end-stage renal disease required insertion of tunneled hemodialysis catheter by Dr. Carty due to right upper extremity malfunction of right AV access was to follow-up with vascular surgery outpatient  Referred from wound care to emergency department for right upper extremity necrotic wound over right upper extremity dialysis access  Vascular surgery consulted      Current Facility-Administered Medications on File Prior to Encounter   Medication    [DISCONTINUED] acetaminophen oral solution    [DISCONTINUED] albumin human 25% bottle    [DISCONTINUED] amiodarone tablet    [DISCONTINUED] aspirin chewable tablet    [DISCONTINUED] atorvastatin tablet    [DISCONTINUED] carvediloL tablet    [DISCONTINUED] dextrose 50 % in water (D50W) injection    [DISCONTINUED] GENERIC EXTERNAL MEDICATION    [DISCONTINUED] GENERIC EXTERNAL MEDICATION    [DISCONTINUED] glucagon injection    [DISCONTINUED] haloperidol lactate injection    [DISCONTINUED] heparin (porcine) injection    [DISCONTINUED] heparin (porcine) injection    [DISCONTINUED] hydrALAZINE injection     [DISCONTINUED] labetalol 20 mg/4 mL (5 mg/mL) IV syring    [DISCONTINUED] midodrine tablet    [DISCONTINUED] montelukast tablet    [DISCONTINUED] pantoprazole EC tablet     Current Outpatient Medications on File Prior to Encounter   Medication Sig    albuterol-ipratropium (DUO-NEB) 2.5 mg-0.5 mg/3 mL nebulizer solution Take 3 mLs by nebulization 2 (two) times a day.    amiodarone (PACERONE) 200 MG Tab Take 2 tablets by mouth once daily. X 13 days    aspirin (ECOTRIN) 81 MG EC tablet Take 81 mg by mouth.    atorvastatin (LIPITOR) 40 MG tablet Take 40 mg by mouth every evening.    carvediloL (COREG) 6.25 MG tablet 6.25 mg.    midodrine (PROAMATINE) 10 MG tablet Take 10 mg by mouth.    montelukast (SINGULAIR) 10 mg tablet Take 10 mg by mouth.    OLANZapine (ZYPREXA) 2.5 MG tablet Take 2.5 mg by mouth every evening.    pantoprazole (PROTONIX) 40 MG tablet Take 40 mg by mouth once daily.    sevelamer carbonate (RENVELA) 800 mg Tab Take 1 tablet by mouth 3 (three) times daily.       Review of patient's allergies indicates:   Allergen Reactions    Ceftriaxone Swelling    Lisinopril Swelling and Rash     Facial swelling   Facial swelling         Past Medical History:   Diagnosis Date    Cardiac arrest     CHF (congestive heart failure)     EF 25-30%    COPD (chronic obstructive pulmonary disease)     Coronary artery disease     Diabetes     GERD (gastroesophageal reflux disease)     Hepatitis C     Hypotension     Requiring Midodrine    Neuropathy     Substance abuse      Past Surgical History:   Procedure Laterality Date    arm surgery Right     INSERTION OF PERMANENT PACEMAKER N/A 08/15/2018    JOINT REPLACEMENT Right     Knee surgery    LUMBAR SPINE SURGERY       Family History    None       Tobacco Use    Smoking status: Every Day     Current packs/day: 0.50     Types: Cigarettes    Smokeless tobacco: Current     Types: Snuff, Chew   Substance and Sexual Activity    Alcohol use: Not  Currently    Drug use: Not Currently    Sexual activity: Not on file     Review of Systems   Unable to perform ROS: Other     Objective:     Vital Signs (Most Recent):  Temp: 97.2 °F (36.2 °C) (09/19/23 0418)  Pulse: 68 (09/19/23 0418)  Resp: 16 (09/19/23 0418)  BP: (!) 90/59 (09/19/23 0912)  SpO2: 100 % (09/19/23 0418) Vital Signs (24h Range):  Temp:  [97.2 °F (36.2 °C)-97.9 °F (36.6 °C)] 97.2 °F (36.2 °C)  Pulse:  [60-90] 68  Resp:  [11-22] 16  SpO2:  [95 %-100 %] 100 %  BP: ()/(54-86) 90/59     Weight: 107.5 kg (237 lb)  Body mass index is 32.14 kg/m².     Physical Exam  Vitals and nursing note reviewed.   Constitutional:       Appearance: Normal appearance.   HENT:      Head: Normocephalic.      Mouth/Throat:      Mouth: Mucous membranes are moist.   Eyes:      Conjunctiva/sclera: Conjunctivae normal.   Cardiovascular:      Rate and Rhythm: Normal rate and regular rhythm.   Pulmonary:      Effort: Pulmonary effort is normal.   Abdominal:      Palpations: Abdomen is soft.   Skin:     General: Skin is warm and dry.      Comments:  eschar over right upper extremity dialysis access with expression of old blood consistent with hematoma  Right upper extremity  hematoma    Left wrist area partially intact eschar   Neurological:      Mental Status: He is alert. Mental status is at baseline. He is disoriented.      Comments: Person place   Psychiatric:         Mood and Affect: Mood normal.            I have reviewed all pertinent lab results within the past 24 hours.  CBC:   Recent Labs   Lab 09/19/23 0414   WBC 5.87   RBC 3.67*   HGB 11.9*   HCT 37.2*      .4*   MCH 32.4*   MCHC 32.0     BMP:   Recent Labs   Lab 09/19/23  0525   GLU 80      K 4.6      CO2 24   BUN 49*   CREATININE 11.70*   CALCIUM 9.0   MG 2.4*     CMP:   Recent Labs   Lab 09/19/23  0525   GLU 80   CALCIUM 9.0   ALBUMIN 2.8*   PROT 7.0      K 4.6   CO2 24      BUN 49*   CREATININE 11.70*   ALKPHOS 125*  "  ALT 43   AST 53*   BILITOT 1.2     LFTs:   Recent Labs   Lab 09/19/23  0525   ALT 43   AST 53*   ALKPHOS 125*   BILITOT 1.2   PROT 7.0   ALBUMIN 2.8*     Coagulation:   Recent Labs   Lab 09/19/23  0414   LABPROT 13.6   INR 1.05   APTT 25.3     Cardiac markers: No results for input(s): "CKMB", "CPKMB", "TROPONINT", "TROPONINI", "MYOGLOBIN" in the last 168 hours.    Significant Diagnostics:  I have reviewed all pertinent imaging results/findings within the past 24 hours.      Assessment/Plan:     Hematoma  Right upper extremity hematoma with 3-4 cm skin necrosis over occluded dialysis access  To OR tomorrow for incision drainage hematoma with Dr. Campbell debridement eschar bilateral upper extremities      VTE Risk Mitigation (From admission, onward)         Ordered     IP VTE HIGH RISK PATIENT  Once         09/19/23 0147     Place sequential compression device  Until discontinued         09/19/23 0147     Reason for No Pharmacological VTE Prophylaxis  Once        Question:  Reasons:  Answer:  Physician Provided (leave comment)    09/19/23 0147                Thank you for your consult. I will follow-up with patient. Please contact us if you have any additional questions.    Danielle Islas, ARLEN  General Surgery  [facility]  "

## 2023-09-19 NOTE — NURSING
Spoke with TY Chávez at [place]. Informed of patient transfer to [facility] Room 455 when bed was ready.

## 2023-09-19 NOTE — PLAN OF CARE
[facility]  Initial Discharge Assessment       Primary Care Provider: Eugene Funez MD    Admission Diagnosis: Wound infection [T14.8XXA, L08.9]    Admission Date: 9/19/2023  Expected Discharge Date:     Transition of Care Barriers: None    Payor: [facility] MCARE / Plan: [facility] DUAL COMPLETE HMO SNP / Product Type: Medicare Advantage /     Extended Emergency Contact Information  Primary Emergency Contact: Eugene Macedo  Mobile Phone: [phone]  Relation: Sister    Discharge Plan A: Home with family  Discharge Plan B: Home with family, Home Health      [facility] [address]  [address] [facility] [address]  Phone: [phone] Fax: [phone]      Initial Assessment (most recent)       Adult Discharge Assessment - 09/19/23 1516          Discharge Assessment    Assessment Type Discharge Planning Assessment     Confirmed/corrected address, phone number and insurance Yes     Source of Information patient     Communicated SANDRA with patient/caregiver Date not available/Unable to determine     People in Home sibling(s)     Do you expect to return to your current living situation? Yes     Do you have help at home or someone to help you manage your care at home? Yes     Who are your caregiver(s) and their phone number(s)? sister     Prior to hospitilization cognitive status: Unable to Assess     Equipment Currently Used at Home none     Patient currently being followed by outpatient case management? No     Do you currently have service(s) that help you manage your care at home? No     Do you take prescription medications? Yes     Do you have prescription coverage? Yes     Coverage [facility]     Do you have any problems affording any of your prescribed medications? No     Is the patient taking medications as prescribed? yes     Who is going to help you get home at discharge? sister     How do you get to doctors appointments? car, drives self     Are you  on dialysis? Yes     Dialysis Name and Scheduled days t-t-[id number]     DME Needed Upon Discharge  none     Discharge Plan discussed with: Patient     Transition of Care Barriers None     Discharge Plan A Home with family     Discharge Plan B Home with family;Home Health        Physical Activity    On average, how many days per week do you engage in moderate to strenuous exercise (like a brisk walk)? 1 day     On average, how many minutes do you engage in exercise at this level? 10 min        Financial Resource Strain    How hard is it for you to pay for the very basics like food, housing, medical care, and heating? Not hard at all        Housing Stability    In the last 12 months, was there a time when you were not able to pay the mortgage or rent on time? No     In the last 12 months, how many places have you lived? 1     In the last 12 months, was there a time when you did not have a steady place to sleep or slept in a shelter (including now)? No        Transportation Needs    In the past 12 months, has lack of transportation kept you from medical appointments or from getting medications? No     In the past 12 months, has lack of transportation kept you from meetings, work, or from getting things needed for daily living? No        Food Insecurity    Within the past 12 months, you worried that your food would run out before you got the money to buy more. Never true     Within the past 12 months, the food you bought just didn't last and you didn't have money to get more. Never true        Stress    Do you feel stress - tense, restless, nervous, or anxious, or unable to sleep at night because your mind is troubled all the time - these days? Not at all        Social Connections    In a typical week, how many times do you talk on the phone with family, friends, or neighbors? More than three times a week     How often do you get together with friends or relatives? More than three times a week     Do you belong to any clubs or  organizations such as [employer] groups, unions, fraternal or athletic groups, or school groups? No     How often do you attend meetings of the clubs or organizations you belong to? Never                      Ss spoke with pt and pt stated he lives at home with sister and plans to return at d/c. No d/c needs stated at this time. Ss following.

## 2023-09-19 NOTE — PROGRESS NOTES
Pharmacist Renal Dose Adjustment Note    Yvan Rollins is a 63 y.o. male being treated with the medication azactam    Patient Data:    Vital Signs (Most Recent):  Temp: 97.3 °F (36.3 °C) (09/19/23 1155)  Pulse: 62 (09/19/23 1155)  Resp: 16 (09/19/23 1155)  BP: (!) 90/55 (09/19/23 1155)  SpO2: 100 % (09/19/23 1155) Vital Signs (72h Range):  Temp:  [97.2 °F (36.2 °C)-97.9 °F (36.6 °C)]   Pulse:  [60-90]   Resp:  [11-22]   BP: ()/(54-86)   SpO2:  [95 %-100 %]      Recent Labs   Lab 09/19/23  0525   CREATININE 11.70*     Serum creatinine: 11.7 mg/dL (H) 09/19/23 0525  Estimated creatinine clearance: 8.4 mL/min (A)    Medication:azactam dose: 1000mg frequency Q12H will be changed to medication:azactam dose:500mg frequency:Q8H    Pharmacist's Name: Emma Pelaez  Pharmacist's Extension: [phone]

## 2023-09-19 NOTE — PT/OT/SLP EVAL
Occupational Therapy   Evaluation    Name: Yvan Rollins  MRN: [id number]  Admitting Diagnosis: Arm wound, right, initial encounter  Recent Surgery: Procedure(s) (LRB):  INCISION AND DRAINAGE, HEMATOMA (Right)  IRRIGATION AND DEBRIDEMENT (Left)      Recommendations:     Discharge Recommendations: nursing facility, skilled  Discharge Equipment Recommendations:  to be determined by next level of care  Barriers to discharge:  Inaccessible home environment, Decreased caregiver support    Assessment:     Yvan Rollins is a 63 y.o. male with a medical diagnosis of Arm wound, right, initial encounter.  He presents with the following performance deficits affecting function: weakness, impaired endurance, impaired self care skills, impaired functional mobility, gait instability, impaired balance, decreased coordination.  Patient supine in bed on therapist arrival and agreeable to OT eval. Patient reports he was living at home by himself and was independent with ADLs and mobility prior to recent medical decline. Patient appears to present with some confusion, was oriented to day of the week but not oriented to year or location (states we are in [place]) and is unable to answer additional orientation questions. Per medical record, patient was previously at SNF for rehab and was nonambulatory and required total care for ADLs.     Rehab Prognosis: Fair; patient would benefit from acute skilled OT services to address these deficits and reach maximum level of function.       Plan:     Patient to be seen 5 x/week to address the above listed problems via self-care/home management, therapeutic activities, therapeutic exercises, neuromuscular re-education  Plan of Care Expires:    Plan of Care Reviewed with: patient    Subjective     Chief Complaint: R arm wound  Patient/Family Comments/goals: Agreeable to OT eval    Occupational Profile:  Living Environment: unclear at time of eval, patient is a poor historian  Previous level of  function: Patient reports he was independent with ADLs and functional mobility  Equipment Used at Home: none  Assistance upon Discharge: Unknown at time of eval    Pain/Comfort:  Pain Rating 1: 0/10    Patients cultural, spiritual, Advent conflicts given the current situation:      Objective:     Communicated with: CHANDA Solorzano RN prior to session.  Patient found supine with SCD, peripheral IV, telemetry upon OT entry to room.    General Precautions: Standard, fall  Orthopedic Precautions:    Braces:    Respiratory Status: Room air    Occupational Performance:    Bed Mobility:    Patient completed Supine to Sit with minimum assistance  Patient completed Sit to Supine with minimum assistance    Functional Mobility/Transfers:  DNT     Activities of Daily Living:  Upper Body Dressing: minimum assistance    Lower Body Dressing: moderate assistance      Cognitive/Visual Perceptual:  Cognitive/Psychosocial Skills:     -       Oriented to: Person   -       Follows Commands/attention:Follows one-step commands  -       Safety awareness/insight to disability: impaired     Physical Exam:  Upper Extremity Range of Motion:     -       Right Upper Extremity: WNL  -       Left Upper Extremity: WNL  Upper Extremity Strength:    -       Right Upper Extremity: 3+/5  -       Left Upper Extremity: 3+/5    AMPAC 6 Click ADL:  AMPAC Total Score: 15    Treatment & Education:  Pt educated on OT role/POC.   Importance of OOB activity with staff assistance.  Importance of sitting up in the chair throughout the day as tolerated, especially for meals   Safety during functional t/f and mobility  Importance of assisting with self-care activities    Patient left supine with all lines intact and call button in reach, RN notified    GOALS:   Multidisciplinary Problems       Occupational Therapy Goals          Problem: Occupational Therapy    Goal Priority Disciplines Outcome Interventions   Occupational Therapy Goal     OT, PT/OT Ongoing,  Progressing    Description: STG:  Pt will perform grooming with setup   Pt will bathe self with min assist  Pt will perform UB dressing with SBA  Pt will perform LB dressing with min assist  Pt will sit EOB x 10 min with SBA   Pt will transfer toilet/BSC with min assist  Pt will tolerate 15 minutes of tx with minimal fatigue      LT. Pt will achieve max level of independence with self care.                         History:     Past Medical History:   Diagnosis Date    Cardiac arrest     CHF (congestive heart failure)     EF 25-30%    COPD (chronic obstructive pulmonary disease)     Coronary artery disease     Diabetes     GERD (gastroesophageal reflux disease)     Hepatitis C     Hypotension     Requiring Midodrine    Neuropathy     Substance abuse          Past Surgical History:   Procedure Laterality Date    arm surgery Right     INSERTION OF PERMANENT PACEMAKER N/A 08/15/2018    JOINT REPLACEMENT Right     Knee surgery    LUMBAR SPINE SURGERY         Time Tracking:     OT Date of Treatment: 23  OT Start Time: 928  OT Stop Time: 938  OT Total Time (min): 10 min    Billable Minutes:Evaluation 10    2023

## 2023-09-19 NOTE — ASSESSMENT & PLAN NOTE
Patient has a necrotic area present over AV access site for dialysis. Patient was to follow-up outpatient at [facility] with vascular surgery, but hospital was on diversion     - Vascular surgery consulted - appreciate recommendations  - Cardiology consulted - appreciate recommendations for pre-op clearance   - Type and screen pending   - CXR pending   - EKG demonstrated: Atrial paced rhythmn with prolonged AV conduction, HR 65  - CBC and CMP pending   - Coagulation factors pending

## 2023-09-19 NOTE — PLAN OF CARE
Problem: Physical Therapy  Goal: Physical Therapy Goal  Description: Short Term Goals  Ambulate Mod - 10 feet with rolling walker assistive device.   Supine to sit contact guard  Sit to stand contact guard  SPT contact guard  5. Independent with HEP    Long Term Goals  Ambulate CGA - 20 feet with rolling walker assistive device.   Supine to sit stand-by  Sit to stand stand-by  SPT stand-by  Needed equipment for home.         Outcome: Ongoing, Progressing

## 2023-09-19 NOTE — ASSESSMENT & PLAN NOTE
Patient has a history of hypotension requiring Midodrine 10mg TID. Continue medication     - Continue to monitor BP

## 2023-09-19 NOTE — CONSULTS
Pharmacy consulted to dose Vancomycin for necrotic wound.    No renal labs yet resulted at time of consult.    Will do Vancomcyin 2250 mg IV x 1 now this AM and schedule further doses as indicated based on labs.    Pharmacy to follow daily and make necessary adjustments.    Thank you.

## 2023-09-19 NOTE — ASSESSMENT & PLAN NOTE
Right upper extremity hematoma with 3-4 cm skin necrosis over occluded dialysis access  To OR tomorrow for incision drainage hematoma with Dr. Campbell debridement eschar bilateral upper extremities

## 2023-09-19 NOTE — ASSESSMENT & PLAN NOTE
Patient was recently taken from dialysis to [facility] in [place], [place] due to SOB. Patient went into cardiac arrest and was hospitalized in the ICU (8/15/2023)    - Continue home medication ASA 81mg QD  - Continue home medication Atorvastatin 40mg QHS   - Carvedilol 6.25mg BID   - Cardiac monitoring   - Cardiology consulted - appreciate recommendations     9/19: has AICD; continue current meds; BP in the 90's; will monitor

## 2023-09-19 NOTE — PROGRESS NOTES
[facility]  [facility] Medicine  Progress Note    Patient Name: Yvan Rollins  MRN: [id number]  Patient Class: IP- Inpatient   Admission Date: 9/19/2023  Length of Stay: 0 days  Attending Physician: Malinda Fontaine DO  Primary Care Provider: Eugene Funez MD        Subjective:     Principal Problem:Arm wound, right, initial encounter        HPI:  Patient is a 63 year old  male who was transferred to [facility] from [place] [facility] for vascular consultation for a necrotic wound overlying an AV access site in the RT arm. He presented to [place] ED via [facility] from [facility] (referenced to be [facility] from recent hospitalization at [facility] (8/15/2023)). He was recently hospitalized at [facility] after having dyspnea during dialysis that progressed to cardiac arrest with ROSC. The patient was mechanically intubated, started on pressors, and managed in the ICU. According to the discharge summary, the patient had an adverse reaction to surface and infiltration anesthetic and intravenous infiltration.     During hospitalization at [facility], the RT AV access site was unable to be used and a tunneled RT dialysis catheter was placed. He was evaluated by Vascular surgery during that admission with outpatient follow-up. Today, he was seen by wound care at the NH and they recommended return to [facility] for vascular evaluation of the wound. However, [facility] was on Diversion and he ended up at [place] and subsequently [facility].     During his hospitalization at [facility], cardiology was consulted. ECHO demonstrated EF of 25-30% and there was a reported 1 episode of nonsustained v-tach. Cardiology recommended resuming goal directed medical therapy, and there was no identified cardiac etiology for his arrest. He was also evaluated by neurology due to concern for anoxic brain injury.  CT of the brain demonstrated chronic ischemic microangiopathy. Neurology diagnosed his encephalopathy to be multifactorial -  anoxic insult from prolonged cardiac arrest with metabolic derangement. His mental status improved, but not to baseline. Today, at [facility] he   is Aox1 and he is a poor historian. He has difficulty answering questions. History is obtained from medical records.     The patient has a PMH of nonischemic cardiomyopathy with EF 20-25%, S/P AICD placement, ESRD on HD TTS, COPD, HTN, polysubstance abuse, and hepatitis C.         Overview/Hospital Course:  9/19: Patient presents with necrotic wound of AV fistula site; debridement pending per Vascular; Patient denies chest pain, shortness of breath, nausea, vomiting, or diarrhea.        Vitals:    09/19/23 1155   BP: (!) 90/55   Pulse: 62   Resp: 16   Temp: 97.3 °F (36.3 °C)         PHYSICAL EXAM:    GEN: NAD; alert and oriented x 3  CVS: irregular rate and rhythm; no murmurs  RESP: clear to auscultation bilaterally; no rhonchi, rales, or wheezes noted  GI: soft, non-tender, non-distended; + bowel sounds  EXTR: no clubbing, cyanosis, or edema  SKIN: wounds as stated in H&P      Assessment/Plan:      * Arm wound, right, initial encounter  Patient has a necrotic area present over AV access site for dialysis. Patient was to follow-up outpatient at [facility] with vascular surgery, but hospital was on diversion     - Vascular surgery consulted - appreciate recommendations  - Cardiology consulted - appreciate recommendations for pre-op clearance   - Type and screen pending   - CXR pending   - EKG demonstrated: Atrial paced rhythmn with prolonged AV conduction, HR 65  - CBC and CMP pending   - Coagulation factors pending   - Wound culture pending   - Blood cultures pending   - Vancomycin IV, Clindamycin IV, and Aztreonam IV (to cover for MRSA, anaerobes, and gram - bacteria)     9/19: Patient is followed by Vascular Surgery; debridement pending; on Clindamycin and Aztreonam; cultures pending      Arm wound, left, initial encounter  Necrotic wound present on patient's flexor side of the  LT wrist. Likely to be secondary to vasopressor infiltration of IV site (mentioned in D/C summary from [facility]).     - Wound care consulted - appreciate recommendations    9/19: Per Vascular Surgery and wound care; on Clindamycin and Aztreonam; debridement pending; cultures pending      History of cardiac arrest  Patient was recently taken from dialysis to [facility] in [place], [place] due to SOB. Patient went into cardiac arrest and was hospitalized in the ICU (8/15/2023)    - Continue home medication ASA 81mg QD  - Continue home medication Atorvastatin 40mg QHS   - Carvedilol 6.25mg BID   - Cardiac monitoring   - Cardiology consulted - appreciate recommendations     9/19: has AICD; continue current meds; BP in the 90's; will monitor    Diabetes  Patient's FSGs are uncontrolled due to hyperglycemia on current medication regimen.  Current correctional scale  Low  Maintain anti-hyperglycemic dose as follows-   Antihyperglycemics (From admission, onward)    Start     Stop Route Frequency Ordered    09/19/23 0355  insulin aspart U-100 injection 0-5 Units         -- SubQ Every 6 hours PRN 09/19/23 0255        Hold Oral hypoglycemics while patient is in the hospital.    Patient was recently in the hospital where the diabetic educator and diabetic management team was consulted. No HBA1c on file and patient does not come from the NH with an DM medications     - HBA1c pending       9/19: Does not appear to be on any home meds for DM; will monitor BS levels; BS stable    Atrial fibrillation  Patient with atrial fibrillation which is uncontrolled currently with Beta Blocker and Amiodarone. Patient is currently in atrial fibrillation.[id number] Score: 2. HASBLED Score: 2. Anticoagulation not indicated due to impending surgery .    - TSH pending   - Per orders from [place], patient is to have Amiodarone 400mg QD until 9/29/2023  - Then Amiodarone 200mg QD there after    9/19: On Amiodarone and Coreg; appreciate Cardiology consult; TSH  1.12    HFrEF (heart failure with reduced ejection fraction)  According to last ECHO at [facility], EF 25-30%.     - Repeat ECHO pending   - Cardiology consulted for pre-op clearance   - Carvedilol 6.25mg BID  - No ACE/ARB/ARNI secondary to hypotension requiring Milrinone     ECHO from [facility]  The left ventricular ejection fraction is severely decreased (25 - 30%).  The left ventricle is moderately dilated.  The left atrium is severely dilated.  The right atrium is moderately dilated.  There is mild aortic valve regurgitation.  There is trace mitral regurgitation present.  There is mild tricuspid regurgitation present.  The estimated central venous pressure is markedly elevated (15 mm Hg).    9/19: s/p cardiac arrest at OSH; will monitor; has afib and AICD; on Coreg      Chronic hepatitis C  Patient has a history of substance use. Diagnosis listed on recent D/C summary from [facility] 8/2023    - Hepatitis panel pending     9/19: Hepatitis panel + Hep C    GERD (gastroesophageal reflux disease)  - PPI (home medication)     9/19: On Protonix      ESRD (end stage renal disease)  Patient has a tunneled dialysis catheter in the RT chest. Patient has AV access in the RT forearm but necrotic tissue is overlying the area. Dialysis T/TH/SAT    - Nephrology consulted for dialysis  - Mg and Phos daily   - Continue home medication Sevelamer    9/19: Nephrology consulted for dialysis; has AV fistula with necrotic wound; continue home meds      VTE Risk Mitigation (From admission, onward)         Ordered     IP VTE HIGH RISK PATIENT  Once         09/19/23 0147     Place sequential compression device  Until discontinued         09/19/23 0147     Reason for No Pharmacological VTE Prophylaxis  Once        Question:  Reasons:  Answer:  Physician Provided (leave comment)    09/19/23 0147                Discharge Planning   SANDRA:      Code Status: Full Code   Is the patient medically ready for discharge?:     Reason for patient still in  hospital (select all that apply): Treatment                     Malinda Fontaine, DO  Department of Hospital Medicine   [facility]

## 2023-09-19 NOTE — ASSESSMENT & PLAN NOTE
Altered skin integrity of sacral area; no open wound or infection noted     - Wound care consulted - appreciate recommendations

## 2023-09-19 NOTE — SUBJECTIVE & OBJECTIVE
Past Medical History:   Diagnosis Date    Cardiac arrest     CHF (congestive heart failure)     COPD (chronic obstructive pulmonary disease)     Coronary artery disease     GERD (gastroesophageal reflux disease)     Hepatitis C     Hypotension     MI (myocardial infarction)     Neuropathy     Renal disorder     Substance abuse        Past Surgical History:   Procedure Laterality Date    arm surgery Right     INSERTION OF PERMANENT PACEMAKER N/A 08/15/2018    JOINT REPLACEMENT Right     Knee surgery    LUMBAR SPINE SURGERY         Review of patient's allergies indicates:   Allergen Reactions    Ceftriaxone Swelling    Lisinopril Swelling and Rash     Facial swelling   Facial swelling         Current Facility-Administered Medications on File Prior to Encounter   Medication    [DISCONTINUED] acetaminophen oral solution    [DISCONTINUED] albumin human 25% bottle    [DISCONTINUED] amiodarone tablet    [DISCONTINUED] aspirin chewable tablet    [DISCONTINUED] atorvastatin tablet    [DISCONTINUED] carvediloL tablet    [DISCONTINUED] dextrose 50 % in water (D50W) injection    [DISCONTINUED] GENERIC EXTERNAL MEDICATION    [DISCONTINUED] GENERIC EXTERNAL MEDICATION    [DISCONTINUED] glucagon injection    [DISCONTINUED] haloperidol lactate injection    [DISCONTINUED] heparin (porcine) injection    [DISCONTINUED] heparin (porcine) injection    [DISCONTINUED] hydrALAZINE injection    [DISCONTINUED] labetalol 20 mg/4 mL (5 mg/mL) IV syring    [DISCONTINUED] midodrine tablet    [DISCONTINUED] montelukast tablet    [DISCONTINUED] pantoprazole EC tablet     Current Outpatient Medications on File Prior to Encounter   Medication Sig    albuterol-ipratropium (DUO-NEB) 2.5 mg-0.5 mg/3 mL nebulizer solution Take 3 mLs by nebulization 2 (two) times a day.    amiodarone (PACERONE) 200 MG Tab Take 2 tablets by mouth once daily. X 13 days    aspirin (ECOTRIN) 81 MG EC tablet Take 81 mg by mouth.    atorvastatin (LIPITOR) 40 MG tablet Take 40  mg by mouth every evening.    carvediloL (COREG) 6.25 MG tablet 6.25 mg.    midodrine (PROAMATINE) 10 MG tablet Take 10 mg by mouth.    montelukast (SINGULAIR) 10 mg tablet Take 10 mg by mouth.    OLANZapine (ZYPREXA) 2.5 MG tablet Take 2.5 mg by mouth every evening.    pantoprazole (PROTONIX) 40 MG tablet Take 40 mg by mouth once daily.    sevelamer carbonate (RENVELA) 800 mg Tab Take 1 tablet by mouth 3 (three) times daily.     Family History    None       Tobacco Use    Smoking status: Every Day     Current packs/day: 0.50     Types: Cigarettes    Smokeless tobacco: Current     Types: Snuff, Chew   Substance and Sexual Activity    Alcohol use: Not Currently    Drug use: Not Currently    Sexual activity: Not on file     Review of Systems   Unable to perform ROS: Other     Objective:     Vital Signs (Most Recent):  Temp: 97.5 °F (36.4 °C) (09/18/23 2315)  Pulse: 90 (09/18/23 2315)  Resp: (!) 22 (09/18/23 2315)  BP: 115/75 (09/18/23 2315)  SpO2: 97 % (09/18/23 2315) Vital Signs (24h Range):  Temp:  [97.5 °F (36.4 °C)-97.9 °F (36.6 °C)] 97.5 °F (36.4 °C)  Pulse:  [60-90] 90  Resp:  [11-22] 22  SpO2:  [95 %-100 %] 97 %  BP: ()/(54-86) 115/75     Weight: 107.5 kg (237 lb)  Body mass index is 32.14 kg/m².     Physical Exam  Constitutional:       General: He is not in acute distress.     Appearance: Normal appearance.   HENT:      Head: Normocephalic and atraumatic.      Right Ear: External ear normal.      Left Ear: External ear normal.      Nose: Nose normal.      Mouth/Throat:      Mouth: Mucous membranes are moist.   Eyes:      Extraocular Movements: Extraocular movements intact.      Conjunctiva/sclera: Conjunctivae normal.      Pupils: Pupils are equal, round, and reactive to light.   Cardiovascular:      Rate and Rhythm: Normal rate and regular rhythm.      Pulses: Normal pulses.      Heart sounds: Normal heart sounds.   Pulmonary:      Effort: Pulmonary effort is normal.      Breath sounds: Normal breath  sounds.   Abdominal:      General: Bowel sounds are normal.      Palpations: Abdomen is soft.   Musculoskeletal:         General: Normal range of motion.      Right lower leg: No edema.      Left lower leg: No edema.   Skin:     General: Skin is warm.             Comments: AICD present in the LT anterior chest     Tunneled dialysis catheter in RT anterior chest     Necrotic wound over AV acess in RT arm     Necrotic wound over flexor surface of LT wrist    Skin breakdown to sacral area     Hyperpigmentation of bilateral lower extremities    Neurological:      General: No focal deficit present.      Mental Status: He is alert.      Cranial Nerves: Cranial nerves 2-12 are intact.      Sensory: Sensation is intact.      Motor: Motor function is intact.      Comments: RT and LT arm are neurovascularly intact     Sensation and Motor present in bilateral UE    Patient's mentation is slowed and he is sometimes unable to answer questions. AOx1   Psychiatric:         Mood and Affect: Mood normal.         Behavior: Behavior normal.         Thought Content: Thought content normal.         Judgment: Judgment normal.              CRANIAL NERVES     CN III, IV, VI   Pupils are equal, round, and reactive to light.       Significant Labs: All pertinent labs within the past 24 hours have been reviewed.    Significant Imaging: I have reviewed all pertinent imaging results/findings within the past 24 hours.

## 2023-09-19 NOTE — PT/OT/SLP EVAL
Physical Therapy Evaluation    Patient Name:  Yvan Rollins   MRN:  [id number]    Recommendations:     Discharge Recommendations:     Discharge Equipment Recommendations:     Barriers to discharge:  limited mobility,     Assessment:     Yvan Rollins is a 63 y.o. male admitted with a medical diagnosis of Arm wound, right, initial encounter.  He presents with the following impairments/functional limitations:   Patient moving all extremities. He is able to come to sit with min assist. He is pleasantly confused. Oriented to self. Patient following commands. Patient unable to stand due to lack of motor planning. Will try to facilitate standing and ambulation by increasing LE coordination. .    Rehab Prognosis: Fair; patient would benefit from acute skilled PT services to address these deficits and reach maximum level of function.    Recent Surgery: Procedure(s) (LRB):  INCISION AND DRAINAGE, HEMATOMA (Right)  IRRIGATION AND DEBRIDEMENT (Left)      Plan:     During this hospitalization, patient to be seen   to address the identified rehab impairments via   and progress toward the following goals:    Plan of Care Expires:       Subjective     Chief Complaint: ams  Patient/Family Comments/goals: Patient answers questions appropriately but only oriented x self  Pain/Comfort:       Patients cultural, spiritual, [employer] conflicts given the current situation:      Living Environment:    Prior to admission, patients level of function was independent.  Equipment used at home:  .  DME owned (not currently used): none.  Upon discharge, patient will have assistance from unknown.    Objective:     Communicated with nurse prior to session.  Patient found supine with    upon PT entry to room.    General Precautions: Standard,    Orthopedic Precautions:    Braces:    Respiratory Status: Room air    Exams:  RLE ROM: WNL  RLE Strength: 3/5  LLE ROM: WFL  LLE Strength: 3/5    Functional Mobility:  Bed Mobility:     Supine to  Sit: minimum assistance  Sit to Supine: minimum assistance  Transfers:     Sit to Stand:  unable with no AD  Gait: unable      AM-PAC 6 CLICK MOBILITY  Total Score:12       Treatment & Education:  Sat eob x 5 minutes cga    Patient left HOB elevated with all lines intact.    GOALS:   Multidisciplinary Problems       Physical Therapy Goals          Problem: Physical Therapy    Goal Priority Disciplines Outcome Goal Variances Interventions   Physical Therapy Goal     PT, PT/OT Ongoing, Progressing     Description: Short Term Goals  Ambulate Mod - 10 feet with rolling walker assistive device.   Supine to sit contact guard  Sit to stand contact guard  SPT contact guard  5. Independent with HEP    Long Term Goals  Ambulate CGA - 20 feet with rolling walker assistive device.   Supine to sit stand-by  Sit to stand stand-by  SPT stand-by  Needed equipment for home.                              History:     Past Medical History:   Diagnosis Date    Cardiac arrest     CHF (congestive heart failure)     EF 25-30%    COPD (chronic obstructive pulmonary disease)     Coronary artery disease     Diabetes     GERD (gastroesophageal reflux disease)     Hepatitis C     Hypotension     Requiring Midodrine    Neuropathy     Substance abuse        Past Surgical History:   Procedure Laterality Date    arm surgery Right     INSERTION OF PERMANENT PACEMAKER N/A 08/15/2018    JOINT REPLACEMENT Right     Knee surgery    LUMBAR SPINE SURGERY         Time Tracking:     PT Received On: 09/19/23  PT Start Time: 1105     PT Stop Time: 1125  PT Total Time (min): 20 min     Billable Minutes: Evaluation 20 09/19/2023

## 2023-09-19 NOTE — HPI
62 y/o male with a past medical hx of recent cardiac arrest (8/15/23), CHF, CAD, NICM (EF 20-25%, s/p AICD), ESRD, Hep C, polysubstance use who is seen in consult today for preoperative clearance and risk stratification. Patient was admitted for a necrotic wound of his right AV fistula. Mr. Rollins had a recent hospitalization at [facility] and suffered a cardiac arrest. According to old records this was a PEA arrest with ROSC after 10 minutes.  Cardiology saw the patient in consult. ECHO demonstrated EF of 25-30% and there was a reported 1 episode of nonsustained v-tach. Cardiology recommended resuming goal directed medical therapy, and there was no identified cardiac etiology for his arrest.

## 2023-09-19 NOTE — ASSESSMENT & PLAN NOTE
Patient has a history of substance use. Diagnosis listed on recent D/C summary from [facility] 8/2023    - Hepatitis panel pending

## 2023-09-19 NOTE — PROGRESS NOTES
09/19/23 1354   Wound Care Follow Up   Wound Care Follow-up? No     Surgical debridement of LUE.  Superficial ASI buttocks.

## 2023-09-19 NOTE — CONSULTS
[facility]  Cardiology  Consult Note    Patient Name: Yvan Rollins  MRN: [id number]  Admission Date: 9/19/2023  Hospital Length of Stay: 0 days  Code Status: Full Code   Attending Provider: Malinda Fontaine DO   Consulting Provider: ARLEN Garcia  Primary Care Physician: Eugene Funez MD  Principal Problem:Arm wound, right, initial encounter    Patient information was obtained from past medical records and ER records.     Inpatient consult to Cardiology  Consult performed by: Edgardo Cole ACNP  Consult ordered by: Mirna Ewing MD  Reason for consult: preoperative clearance and risk stratification.        Subjective:     Chief Complaint:  Necrotic wound right AV fistula     HPI:   64 y/o male with a past medical hx of recent cardiac arrest (8/15/23), CHF, CAD, NICM (EF 20-25%, s/p AICD), ESRD, Hep C, polysubstance use who is seen in consult today for preoperative clearance and risk stratification. Patient was admitted for a necrotic wound of his right AV fistula. Mr. Rollins had a recent hospitalization at [facility] and suffered a cardiac arrest. According to old records this was a PEA arrest with ROSC after 10 minutes.  Cardiology saw the patient in consult. ECHO demonstrated EF of 25-30% and there was a reported 1 episode of nonsustained v-tach. Cardiology recommended resuming goal directed medical therapy, and there was no identified cardiac etiology for his arrest.      Past Medical History:   Diagnosis Date    Cardiac arrest     CHF (congestive heart failure)     EF 25-30%    COPD (chronic obstructive pulmonary disease)     Coronary artery disease     Diabetes     GERD (gastroesophageal reflux disease)     Hepatitis C     Hypotension     Requiring Midodrine    Neuropathy     Substance abuse        Past Surgical History:   Procedure Laterality Date    arm surgery Right     INSERTION OF PERMANENT PACEMAKER N/A 08/15/2018    JOINT REPLACEMENT Right      Knee surgery    LUMBAR SPINE SURGERY         Review of patient's allergies indicates:   Allergen Reactions    Ceftriaxone Swelling    Lisinopril Swelling and Rash     Facial swelling   Facial swelling         No current facility-administered medications on file prior to encounter.     Current Outpatient Medications on File Prior to Encounter   Medication Sig    albuterol-ipratropium (DUO-NEB) 2.5 mg-0.5 mg/3 mL nebulizer solution Take 3 mLs by nebulization 2 (two) times a day.    amiodarone (PACERONE) 200 MG Tab Take 2 tablets by mouth once daily. X 13 days    aspirin (ECOTRIN) 81 MG EC tablet Take 81 mg by mouth.    atorvastatin (LIPITOR) 40 MG tablet Take 40 mg by mouth every evening.    carvediloL (COREG) 6.25 MG tablet 6.25 mg.    midodrine (PROAMATINE) 10 MG tablet Take 10 mg by mouth.    montelukast (SINGULAIR) 10 mg tablet Take 10 mg by mouth.    OLANZapine (ZYPREXA) 2.5 MG tablet Take 2.5 mg by mouth every evening.    pantoprazole (PROTONIX) 40 MG tablet Take 40 mg by mouth once daily.    sevelamer carbonate (RENVELA) 800 mg Tab Take 1 tablet by mouth 3 (three) times daily.     Family History    None       Tobacco Use    Smoking status: Every Day     Current packs/day: 0.50     Types: Cigarettes    Smokeless tobacco: Current     Types: Snuff, Chew   Substance and Sexual Activity    Alcohol use: Not Currently    Drug use: Not Currently    Sexual activity: Not on file     Review of Systems   Unable to perform ROS: Other (answers simple questions. recent cardiac arrest)     Objective:     Vital Signs (Most Recent):  Temp: 97.3 °F (36.3 °C) (09/19/23 1155)  Pulse: 62 (09/19/23 1155)  Resp: 16 (09/19/23 1155)  BP: (!) 90/55 (09/19/23 1155)  SpO2: 100 % (09/19/23 1155) Vital Signs (24h Range):  Temp:  [97.2 °F (36.2 °C)-97.5 °F (36.4 °C)] 97.3 °F (36.3 °C)  Pulse:  [60-90] 62  Resp:  [11-22] 16  SpO2:  [95 %-100 %] 100 %  BP: ()/(54-86) 90/55     Weight: 107.5 kg (237 lb)  Body mass index  is 32.14 kg/m².    SpO2: 100 %       No intake or output data in the 24 hours ending 09/19/23 1319    Lines/Drains/Airways       Central Venous Catheter Line  Duration                  Hemodialysis AV Graft 09/19/23 0022 Right forearm <1 day         Hemodialysis Catheter 09/19/23 0020 right subclavian <1 day              Peripheral Intravenous Line  Duration                  Peripheral IV - Single Lumen 09/19/23 0430 22 G Left;Posterior Hand <1 day         Peripheral IV - Single Lumen 09/19/23 0445 20 G Anterior;Left;Proximal Forearm <1 day                     Physical Exam  Vitals reviewed.   Constitutional:       General: He is not in acute distress.     Appearance: He is obese.   HENT:      Head: Normocephalic and atraumatic.      Mouth/Throat:      Mouth: Mucous membranes are moist.   Eyes:      Extraocular Movements: Extraocular movements intact.   Cardiovascular:      Rate and Rhythm: Normal rate and regular rhythm.      Pulses: Normal pulses.      Heart sounds: Normal heart sounds.   Pulmonary:      Effort: Pulmonary effort is normal.   Abdominal:      General: Bowel sounds are normal. There is no distension.      Palpations: Abdomen is soft.      Tenderness: There is no abdominal tenderness.   Musculoskeletal:         General: No swelling. Normal range of motion.      Cervical back: Normal range of motion and neck supple.   Skin:     General: Skin is warm and dry.      Capillary Refill: Capillary refill takes less than 2 seconds.      Comments: AICD present in the LT anterior chest      Tunneled dialysis catheter in RT anterior chest      Necrotic wound over AV acess in RT arm      Necrotic wound over flexor surface of LT wrist     Skin breakdown to sacral area    Neurological:      General: No focal deficit present.      Mental Status: He is alert.      Cranial Nerves: No cranial nerve deficit.      Sensory: No sensory deficit.      Comments: Mentation is poor   Psychiatric:         Mood and Affect: Mood  normal.          Significant Labs: All pertinent lab results from the last 24 hours have been reviewed.    Significant Imaging: Echocardiogram: Transthoracic echo (TTE) complete (Cupid Only):   Results for orders placed or performed during the hospital encounter of 09/19/23   Echo   Result Value Ref Range    LVOT stroke volume 94.78 cm3    LVIDd 6.44 (A) 3.5 - 6.0 cm    LV Systolic Volume 131.02 mL    LVIDs 5.23 (A) 2.1 - 4.0 cm    LV Diastolic Volume 211.16 mL    IVS 2.26 (A) 0.6 - 1.1 cm    LVOT diameter 2.73 cm    LVOT area 5.9 cm2    FS 19 (A) 28 - 44 %    Left Ventricle Relative Wall Thickness 0.66 cm    Posterior Wall 2.14 (A) 0.6 - 1.1 cm    LV mass 838.15 g    MV Peak E Sadiq 0.40 m/s    TDI LATERAL 0.06 m/s    TDI SEPTAL 0.04 m/s    E/E' ratio 8.00 m/s    MV Peak A Sadiq 0.61 m/s    TR Max Sadiq 2.80 m/s    E/A ratio 0.66     E wave deceleration time 166.67 msec    LV SEPTAL E/E' RATIO 10.00 m/s    LV LATERAL E/E' RATIO 6.67 m/s    LVOT peak sadiq 0.88 m/s    Left Ventricular Outflow Tract Mean Velocity 0.65 cm/s    Left Ventricular Outflow Tract Mean Gradient 1.84 mmHg    LA size 3.83 cm    Left Atrium Major Axis 4.04 cm    RVDD 4.11 cm    TAPSE 1.90 cm    RA Major Axis 4.54 cm    AV regurgitation pressure 1/2 time 1,558.691225542277038 ms    AR Max Sadiq 2.41 m/s    AV mean gradient 5 mmHg    AV peak gradient 8 mmHg    Ao peak sadiq 1.44 m/s    Ao VTI 27.60 cm    LVOT peak VTI 16.20 cm    AV valve area 3.43 cm²    AV Velocity Ratio 0.61     AV index (prosthetic) 0.59     TRAVIS by Velocity Ratio 3.58 cm²    MV stenosis pressure 1/2 time 48.34 ms    MV valve area p 1/2 method 4.55 cm2    Triscuspid Valve Regurgitation Peak Gradient 31 mmHg    PV PEAK VELOCITY 0.86 m/s    PV peak gradient 3 mmHg    Ao root annulus 3.90 cm    IVC diameter 1.42 cm    Mean e' 0.05 m/s    AORTIC VALVE CUSP SEPERATION 3.01 cm    TV resting pulmonary artery pressure 34 mmHg    RV TB RVSP 6 mmHg    Est. RA pres 3 mmHg     Assessment and Plan:      History of cardiac arrest  9/19/23  - past medical hx of recent cardiac arrest (8/15/23), CHF, CAD, NICM (EF 20-25%, s/p AICD), ESRD, Hep C, polysubstance use - No cardiac etiology identified as cause of arrest.  - Beltran 7.6% - Cardiac risk index >15%  - Patient is a moderate to high risk surgical risk given his hx  - Given his present risk in setting of necrotic wound and risk for sepsis it is prudent to go forward with surgery  - resume GDMT as soon as possible after surgical intervention        VTE Risk Mitigation (From admission, onward)         Ordered     IP VTE HIGH RISK PATIENT  Once         09/19/23 0147     Place sequential compression device  Until discontinued         09/19/23 0147     Reason for No Pharmacological VTE Prophylaxis  Once        Question:  Reasons:  Answer:  Physician Provided (leave comment)    09/19/23 0147                Thank you for your consult. I will sign off. Please contact us if you have any additional questions.    Edgardo Cole, YENNIP  Cardiology   [facility]

## 2023-09-19 NOTE — ASSESSMENT & PLAN NOTE
Patient has a tunneled dialysis catheter in the RT chest. Patient has AV access in the RT forearm but necrotic tissue is overlying the area. Dialysis T/TH/SAT    - Nephrology consulted for dialysis  - Mg and Phos daily   - Continue home medication Sevelamer    9/19: Nephrology consulted for dialysis; has AV fistula with necrotic wound; continue home meds

## 2023-09-19 NOTE — ASSESSMENT & PLAN NOTE
Patient's FSGs are uncontrolled due to hyperglycemia on current medication regimen.  Current correctional scale  Low  Maintain anti-hyperglycemic dose as follows-   Antihyperglycemics (From admission, onward)    Start     Stop Route Frequency Ordered    09/19/23 0355  insulin aspart U-100 injection 0-5 Units         -- SubQ Every 6 hours PRN 09/19/23 0255        Hold Oral hypoglycemics while patient is in the hospital.    Patient was recently in the hospital where the diabetic educator and diabetic management team was consulted. No HBA1c on file and patient does not come from the NH with an DM medications     - HBA1c pending

## 2023-09-19 NOTE — ASSESSMENT & PLAN NOTE
According to last ECHO at [facility], EF 25-30%.     - Repeat ECHO pending   - Cardiology consulted for pre-op clearance   - Carvedilol 6.25mg BID  - No ACE/ARB/ARNI secondary to hypotension requiring Milrinone     ECHO from [facility]  The left ventricular ejection fraction is severely decreased (25 - 30%).  The left ventricle is moderately dilated.  The left atrium is severely dilated.  The right atrium is moderately dilated.  There is mild aortic valve regurgitation.  There is trace mitral regurgitation present.  There is mild tricuspid regurgitation present.  The estimated central venous pressure is markedly elevated (15 mm Hg).    9/19: s/p cardiac arrest at OSH; will monitor; has afib and AICD; on Coreg

## 2023-09-19 NOTE — ASSESSMENT & PLAN NOTE
Patient with atrial fibrillation which is uncontrolled currently with Beta Blocker and Amiodarone. Patient is currently in atrial fibrillation.[id number] Score: 1. HASBLED Score: 2. Anticoagulation not indicated due to impending surgery .    - TSH pending   - Per orders from [facility], patient is to have Amiodarone 400mg QD until 9/29/2023  - Then Amiodarone 200mg QD there after

## 2023-09-19 NOTE — HPI
63-year-old male nursing home resident recently was at [facility] last month cardiac arrest  He has pacemaker AICD cardiomyopathy depressed ejection fraction 30%  He has end-stage renal disease required insertion of tunneled hemodialysis catheter by Dr. Carty due to right upper extremity malfunction of right AV access was to follow-up with vascular surgery outpatient  Referred from wound care to emergency department for right upper extremity necrotic wound over right upper extremity dialysis access  Vascular surgery consulted

## 2023-09-19 NOTE — ASSESSMENT & PLAN NOTE
Patient was recently taken from dialysis to [facility] in [place], [place] due to SOB. Patient went into cardiac arrest and was hospitalized in the ICU (8/15/2023)    - Continue home medication ASA 81mg QD  - Continue home medication Atorvastatin 40mg QHS   - Carvedilol 6.25mg BID   - Cardiac monitoring   - Cardiology consulted - appreciate recommendations

## 2023-09-19 NOTE — SUBJECTIVE & OBJECTIVE
Current Facility-Administered Medications on File Prior to Encounter   Medication    [DISCONTINUED] acetaminophen oral solution    [DISCONTINUED] albumin human 25% bottle    [DISCONTINUED] amiodarone tablet    [DISCONTINUED] aspirin chewable tablet    [DISCONTINUED] atorvastatin tablet    [DISCONTINUED] carvediloL tablet    [DISCONTINUED] dextrose 50 % in water (D50W) injection    [DISCONTINUED] GENERIC EXTERNAL MEDICATION    [DISCONTINUED] GENERIC EXTERNAL MEDICATION    [DISCONTINUED] glucagon injection    [DISCONTINUED] haloperidol lactate injection    [DISCONTINUED] heparin (porcine) injection    [DISCONTINUED] heparin (porcine) injection    [DISCONTINUED] hydrALAZINE injection    [DISCONTINUED] labetalol 20 mg/4 mL (5 mg/mL) IV syring    [DISCONTINUED] midodrine tablet    [DISCONTINUED] montelukast tablet    [DISCONTINUED] pantoprazole EC tablet     Current Outpatient Medications on File Prior to Encounter   Medication Sig    albuterol-ipratropium (DUO-NEB) 2.5 mg-0.5 mg/3 mL nebulizer solution Take 3 mLs by nebulization 2 (two) times a day.    amiodarone (PACERONE) 200 MG Tab Take 2 tablets by mouth once daily. X 13 days    aspirin (ECOTRIN) 81 MG EC tablet Take 81 mg by mouth.    atorvastatin (LIPITOR) 40 MG tablet Take 40 mg by mouth every evening.    carvediloL (COREG) 6.25 MG tablet 6.25 mg.    midodrine (PROAMATINE) 10 MG tablet Take 10 mg by mouth.    montelukast (SINGULAIR) 10 mg tablet Take 10 mg by mouth.    OLANZapine (ZYPREXA) 2.5 MG tablet Take 2.5 mg by mouth every evening.    pantoprazole (PROTONIX) 40 MG tablet Take 40 mg by mouth once daily.    sevelamer carbonate (RENVELA) 800 mg Tab Take 1 tablet by mouth 3 (three) times daily.       Review of patient's allergies indicates:   Allergen Reactions    Ceftriaxone Swelling    Lisinopril Swelling and Rash     Facial swelling   Facial swelling         Past Medical History:   Diagnosis Date    Cardiac arrest     CHF (congestive heart failure)     EF  25-30%    COPD (chronic obstructive pulmonary disease)     Coronary artery disease     Diabetes     GERD (gastroesophageal reflux disease)     Hepatitis C     Hypotension     Requiring Midodrine    Neuropathy     Substance abuse      Past Surgical History:   Procedure Laterality Date    arm surgery Right     INSERTION OF PERMANENT PACEMAKER N/A 08/15/2018    JOINT REPLACEMENT Right     Knee surgery    LUMBAR SPINE SURGERY       Family History    None       Tobacco Use    Smoking status: Every Day     Current packs/day: 0.50     Types: Cigarettes    Smokeless tobacco: Current     Types: Snuff, Chew   Substance and Sexual Activity    Alcohol use: Not Currently    Drug use: Not Currently    Sexual activity: Not on file     Review of Systems   Unable to perform ROS: Other     Objective:     Vital Signs (Most Recent):  Temp: 97.2 °F (36.2 °C) (09/19/23 0418)  Pulse: 68 (09/19/23 0418)  Resp: 16 (09/19/23 0418)  BP: (!) 90/59 (09/19/23 0912)  SpO2: 100 % (09/19/23 0418) Vital Signs (24h Range):  Temp:  [97.2 °F (36.2 °C)-97.9 °F (36.6 °C)] 97.2 °F (36.2 °C)  Pulse:  [60-90] 68  Resp:  [11-22] 16  SpO2:  [95 %-100 %] 100 %  BP: ()/(54-86) 90/59     Weight: 107.5 kg (237 lb)  Body mass index is 32.14 kg/m².     Physical Exam  Vitals and nursing note reviewed.   Constitutional:       Appearance: Normal appearance.   HENT:      Head: Normocephalic.      Mouth/Throat:      Mouth: Mucous membranes are moist.   Eyes:      Conjunctiva/sclera: Conjunctivae normal.   Cardiovascular:      Rate and Rhythm: Normal rate and regular rhythm.   Pulmonary:      Effort: Pulmonary effort is normal.   Abdominal:      Palpations: Abdomen is soft.   Skin:     General: Skin is warm and dry.      Comments:  eschar over right upper extremity dialysis access with expression of old blood consistent with hematoma  Right upper extremity  hematoma    Left wrist area partially intact eschar   Neurological:      Mental Status: He is alert. Mental  "status is at baseline. He is disoriented.      Comments: Person place   Psychiatric:         Mood and Affect: Mood normal.            I have reviewed all pertinent lab results within the past 24 hours.  CBC:   Recent Labs   Lab 09/19/23 0414   WBC 5.87   RBC 3.67*   HGB 11.9*   HCT 37.2*      .4*   MCH 32.4*   MCHC 32.0     BMP:   Recent Labs   Lab 09/19/23  0525   GLU 80      K 4.6      CO2 24   BUN 49*   CREATININE 11.70*   CALCIUM 9.0   MG 2.4*     CMP:   Recent Labs   Lab 09/19/23  0525   GLU 80   CALCIUM 9.0   ALBUMIN 2.8*   PROT 7.0      K 4.6   CO2 24      BUN 49*   CREATININE 11.70*   ALKPHOS 125*   ALT 43   AST 53*   BILITOT 1.2     LFTs:   Recent Labs   Lab 09/19/23  0525   ALT 43   AST 53*   ALKPHOS 125*   BILITOT 1.2   PROT 7.0   ALBUMIN 2.8*     Coagulation:   Recent Labs   Lab 09/19/23 0414   LABPROT 13.6   INR 1.05   APTT 25.3     Cardiac markers: No results for input(s): "CKMB", "CPKMB", "TROPONINT", "TROPONINI", "MYOGLOBIN" in the last 168 hours.    Significant Diagnostics:  I have reviewed all pertinent imaging results/findings within the past 24 hours.    "

## 2023-09-19 NOTE — SUBJECTIVE & OBJECTIVE
Past Medical History:   Diagnosis Date    Cardiac arrest     CHF (congestive heart failure)     EF 25-30%    COPD (chronic obstructive pulmonary disease)     Coronary artery disease     Diabetes     GERD (gastroesophageal reflux disease)     Hepatitis C     Hypotension     Requiring Midodrine    Neuropathy     Substance abuse        Past Surgical History:   Procedure Laterality Date    arm surgery Right     INSERTION OF PERMANENT PACEMAKER N/A 08/15/2018    JOINT REPLACEMENT Right     Knee surgery    LUMBAR SPINE SURGERY         Review of patient's allergies indicates:   Allergen Reactions    Ceftriaxone Swelling    Lisinopril Swelling and Rash     Facial swelling   Facial swelling         No current facility-administered medications on file prior to encounter.     Current Outpatient Medications on File Prior to Encounter   Medication Sig    albuterol-ipratropium (DUO-NEB) 2.5 mg-0.5 mg/3 mL nebulizer solution Take 3 mLs by nebulization 2 (two) times a day.    amiodarone (PACERONE) 200 MG Tab Take 2 tablets by mouth once daily. X 13 days    aspirin (ECOTRIN) 81 MG EC tablet Take 81 mg by mouth.    atorvastatin (LIPITOR) 40 MG tablet Take 40 mg by mouth every evening.    carvediloL (COREG) 6.25 MG tablet 6.25 mg.    midodrine (PROAMATINE) 10 MG tablet Take 10 mg by mouth.    montelukast (SINGULAIR) 10 mg tablet Take 10 mg by mouth.    OLANZapine (ZYPREXA) 2.5 MG tablet Take 2.5 mg by mouth every evening.    pantoprazole (PROTONIX) 40 MG tablet Take 40 mg by mouth once daily.    sevelamer carbonate (RENVELA) 800 mg Tab Take 1 tablet by mouth 3 (three) times daily.     Family History    None       Tobacco Use    Smoking status: Every Day     Current packs/day: 0.50     Types: Cigarettes    Smokeless tobacco: Current     Types: Snuff, Chew   Substance and Sexual Activity    Alcohol use: Not Currently    Drug use: Not Currently    Sexual activity: Not on file     Review of Systems   Unable to perform ROS: Other (answers  simple questions. recent cardiac arrest)     Objective:     Vital Signs (Most Recent):  Temp: 97.3 °F (36.3 °C) (09/19/23 1155)  Pulse: 62 (09/19/23 1155)  Resp: 16 (09/19/23 1155)  BP: (!) 90/55 (09/19/23 1155)  SpO2: 100 % (09/19/23 1155) Vital Signs (24h Range):  Temp:  [97.2 °F (36.2 °C)-97.5 °F (36.4 °C)] 97.3 °F (36.3 °C)  Pulse:  [60-90] 62  Resp:  [11-22] 16  SpO2:  [95 %-100 %] 100 %  BP: ()/(54-86) 90/55     Weight: 107.5 kg (237 lb)  Body mass index is 32.14 kg/m².    SpO2: 100 %       No intake or output data in the 24 hours ending 09/19/23 1319    Lines/Drains/Airways       Central Venous Catheter Line  Duration                  Hemodialysis AV Graft 09/19/23 0022 Right forearm <1 day         Hemodialysis Catheter 09/19/23 0020 right subclavian <1 day              Peripheral Intravenous Line  Duration                  Peripheral IV - Single Lumen 09/19/23 0430 22 G Left;Posterior Hand <1 day         Peripheral IV - Single Lumen 09/19/23 0445 20 G Anterior;Left;Proximal Forearm <1 day                     Physical Exam  Vitals reviewed.   Constitutional:       General: He is not in acute distress.     Appearance: He is obese.   HENT:      Head: Normocephalic and atraumatic.      Mouth/Throat:      Mouth: Mucous membranes are moist.   Eyes:      Extraocular Movements: Extraocular movements intact.   Cardiovascular:      Rate and Rhythm: Normal rate and regular rhythm.      Pulses: Normal pulses.      Heart sounds: Normal heart sounds.   Pulmonary:      Effort: Pulmonary effort is normal.   Abdominal:      General: Bowel sounds are normal. There is no distension.      Palpations: Abdomen is soft.      Tenderness: There is no abdominal tenderness.   Musculoskeletal:         General: No swelling. Normal range of motion.      Cervical back: Normal range of motion and neck supple.   Skin:     General: Skin is warm and dry.      Capillary Refill: Capillary refill takes less than 2 seconds.      Comments:  AICD present in the LT anterior chest      Tunneled dialysis catheter in RT anterior chest      Necrotic wound over AV acess in RT arm      Necrotic wound over flexor surface of LT wrist     Skin breakdown to sacral area    Neurological:      General: No focal deficit present.      Mental Status: He is alert.      Cranial Nerves: No cranial nerve deficit.      Sensory: No sensory deficit.      Comments: Mentation is poor   Psychiatric:         Mood and Affect: Mood normal.          Significant Labs: All pertinent lab results from the last 24 hours have been reviewed.    Significant Imaging: Echocardiogram: Transthoracic echo (TTE) complete (Cupid Only):   Results for orders placed or performed during the hospital encounter of 09/19/23   Echo   Result Value Ref Range    LVOT stroke volume 94.78 cm3    LVIDd 6.44 (A) 3.5 - 6.0 cm    LV Systolic Volume 131.02 mL    LVIDs 5.23 (A) 2.1 - 4.0 cm    LV Diastolic Volume 211.16 mL    IVS 2.26 (A) 0.6 - 1.1 cm    LVOT diameter 2.73 cm    LVOT area 5.9 cm2    FS 19 (A) 28 - 44 %    Left Ventricle Relative Wall Thickness 0.66 cm    Posterior Wall 2.14 (A) 0.6 - 1.1 cm    LV mass 838.15 g    MV Peak E Sadiq 0.40 m/s    TDI LATERAL 0.06 m/s    TDI SEPTAL 0.04 m/s    E/E' ratio 8.00 m/s    MV Peak A Sadiq 0.61 m/s    TR Max Sadiq 2.80 m/s    E/A ratio 0.66     E wave deceleration time 166.67 msec    LV SEPTAL E/E' RATIO 10.00 m/s    LV LATERAL E/E' RATIO 6.67 m/s    LVOT peak sadiq 0.88 m/s    Left Ventricular Outflow Tract Mean Velocity 0.65 cm/s    Left Ventricular Outflow Tract Mean Gradient 1.84 mmHg    LA size 3.83 cm    Left Atrium Major Axis 4.04 cm    RVDD 4.11 cm    TAPSE 1.90 cm    RA Major Axis 4.54 cm    AV regurgitation pressure 1/2 time 1,558.364377709951288 ms    AR Max Sadiq 2.41 m/s    AV mean gradient 5 mmHg    AV peak gradient 8 mmHg    Ao peak sadiq 1.44 m/s    Ao VTI 27.60 cm    LVOT peak VTI 16.20 cm    AV valve area 3.43 cm²    AV Velocity Ratio 0.61     AV index  (prosthetic) 0.59     TRAVIS by Velocity Ratio 3.58 cm²    MV stenosis pressure 1/2 time 48.34 ms    MV valve area p 1/2 method 4.55 cm2    Triscuspid Valve Regurgitation Peak Gradient 31 mmHg    PV PEAK VELOCITY 0.86 m/s    PV peak gradient 3 mmHg    Ao root annulus 3.90 cm    IVC diameter 1.42 cm    Mean e' 0.05 m/s    AORTIC VALVE CUSP SEPERATION 3.01 cm    TV resting pulmonary artery pressure 34 mmHg    RV TB RVSP 6 mmHg    Est. RA pres 3 mmHg

## 2023-09-19 NOTE — ASSESSMENT & PLAN NOTE
Patient has a tunneled dialysis catheter in the RT chest. Patient has AV access in the RT forearm but necrotic tissue is overlying the area. Dialysis T/TH/SAT    - Nephrology consulted for dialysis  - Mg and Phos daily   - Continue home medication Sevelamer

## 2023-09-19 NOTE — H&P
[facility]  [facility] Medicine  History & Physical    Patient Name: Yvan Rollins  MRN: [id number]  Patient Class: IP- Inpatient  Admission Date: 9/19/2023  Attending Physician: Danielle Castro DO   Primary Care Provider: Eugene Funez MD         Patient information was obtained from patient, past medical records and ER records.     Subjective:     Principal Problem:Arm wound, right, initial encounter    Chief Complaint:   Chief Complaint   Patient presents with    Wound Infection        HPI: Patient is a 63 year old  male who was transferred to [facility] from [place] ED for vascular consultation for a necrotic wound overlying an AV access site in the RT arm. He presented to [place] ED via [facility] from [facility] (referenced to be [facility] from recent hospitalization at [facility] (8/15/2023)). He was recently hospitalized at [facility] after having dyspnea during dialysis that progressed to cardiac arrest with ROSC. The patient was mechanically intubated, started on pressors, and managed in the ICU. According to the discharge summary, the patient had an adverse reaction to surface and infiltration anesthetic and intravenous infiltration.     During hospitalization at [facility], the RT AV access site was unable to be used and a tunneled RT dialysis catheter was placed. He was evaluated by Vascular surgery during that admission with outpatient follow-up. Today, he was seen by wound care at the NH and they recommended return to [facility] for vascular evaluation of the wound. However, [facility] was on Diversion and he ended up at [place] and subsequently [facility].     During his hospitalization at [facility], cardiology was consulted. ECHO demonstrated EF of 25-30% and there was a reported 1 episode of nonsustained v-tach. Cardiology recommended resuming goal directed medical therapy, and there was no identified cardiac etiology for his arrest. He was also evaluated by neurology due to concern for  anoxic brain injury.  CT of the brain demonstrated chronic ischemic microangiopathy. Neurology diagnosed his encephalopathy to be multifactorial - anoxic insult from prolonged cardiac arrest with metabolic derangement. His mental status improved, but not to baseline. Today, at [facility] he   is Aox1 and he is a poor historian. He has difficulty answering questions. History is obtained from medical records.     The patient has a PMH of nonischemic cardiomyopathy with EF 20-25%, S/P AICD placement, ESRD on HD TTS, COPD, HTN, polysubstance abuse, and hepatitis C.         Past Medical History:   Diagnosis Date    Cardiac arrest     CHF (congestive heart failure)     COPD (chronic obstructive pulmonary disease)     Coronary artery disease     GERD (gastroesophageal reflux disease)     Hepatitis C     Hypotension     MI (myocardial infarction)     Neuropathy     Renal disorder     Substance abuse        Past Surgical History:   Procedure Laterality Date    arm surgery Right     INSERTION OF PERMANENT PACEMAKER N/A 08/15/2018    JOINT REPLACEMENT Right     Knee surgery    LUMBAR SPINE SURGERY         Review of patient's allergies indicates:   Allergen Reactions    Ceftriaxone Swelling    Lisinopril Swelling and Rash     Facial swelling   Facial swelling         Current Facility-Administered Medications on File Prior to Encounter   Medication    [DISCONTINUED] acetaminophen oral solution    [DISCONTINUED] albumin human 25% bottle    [DISCONTINUED] amiodarone tablet    [DISCONTINUED] aspirin chewable tablet    [DISCONTINUED] atorvastatin tablet    [DISCONTINUED] carvediloL tablet    [DISCONTINUED] dextrose 50 % in water (D50W) injection    [DISCONTINUED] GENERIC EXTERNAL MEDICATION    [DISCONTINUED] GENERIC EXTERNAL MEDICATION    [DISCONTINUED] glucagon injection    [DISCONTINUED] haloperidol lactate injection    [DISCONTINUED] heparin (porcine) injection    [DISCONTINUED] heparin (porcine) injection    [DISCONTINUED] hydrALAZINE  injection    [DISCONTINUED] labetalol 20 mg/4 mL (5 mg/mL) IV syring    [DISCONTINUED] midodrine tablet    [DISCONTINUED] montelukast tablet    [DISCONTINUED] pantoprazole EC tablet     Current Outpatient Medications on File Prior to Encounter   Medication Sig    albuterol-ipratropium (DUO-NEB) 2.5 mg-0.5 mg/3 mL nebulizer solution Take 3 mLs by nebulization 2 (two) times a day.    amiodarone (PACERONE) 200 MG Tab Take 2 tablets by mouth once daily. X 13 days    aspirin (ECOTRIN) 81 MG EC tablet Take 81 mg by mouth.    atorvastatin (LIPITOR) 40 MG tablet Take 40 mg by mouth every evening.    carvediloL (COREG) 6.25 MG tablet 6.25 mg.    midodrine (PROAMATINE) 10 MG tablet Take 10 mg by mouth.    montelukast (SINGULAIR) 10 mg tablet Take 10 mg by mouth.    OLANZapine (ZYPREXA) 2.5 MG tablet Take 2.5 mg by mouth every evening.    pantoprazole (PROTONIX) 40 MG tablet Take 40 mg by mouth once daily.    sevelamer carbonate (RENVELA) 800 mg Tab Take 1 tablet by mouth 3 (three) times daily.     Family History    None       Tobacco Use    Smoking status: Every Day     Current packs/day: 0.50     Types: Cigarettes    Smokeless tobacco: Current     Types: Snuff, Chew   Substance and Sexual Activity    Alcohol use: Not Currently    Drug use: Not Currently    Sexual activity: Not on file     Review of Systems   Unable to perform ROS: Other     Objective:     Vital Signs (Most Recent):  Temp: 97.5 °F (36.4 °C) (09/18/23 2315)  Pulse: 90 (09/18/23 2315)  Resp: (!) 22 (09/18/23 2315)  BP: 115/75 (09/18/23 2315)  SpO2: 97 % (09/18/23 2315) Vital Signs (24h Range):  Temp:  [97.5 °F (36.4 °C)-97.9 °F (36.6 °C)] 97.5 °F (36.4 °C)  Pulse:  [60-90] 90  Resp:  [11-22] 22  SpO2:  [95 %-100 %] 97 %  BP: ()/(54-86) 115/75     Weight: 107.5 kg (237 lb)  Body mass index is 32.14 kg/m².     Physical Exam  Constitutional:       General: He is not in acute distress.     Appearance: Normal appearance.   HENT:      Head: Normocephalic and  atraumatic.      Right Ear: External ear normal.      Left Ear: External ear normal.      Nose: Nose normal.      Mouth/Throat:      Mouth: Mucous membranes are moist.   Eyes:      Extraocular Movements: Extraocular movements intact.      Conjunctiva/sclera: Conjunctivae normal.      Pupils: Pupils are equal, round, and reactive to light.   Cardiovascular:      Rate and Rhythm: Normal rate and regular rhythm.      Pulses: Normal pulses.      Heart sounds: Normal heart sounds.   Pulmonary:      Effort: Pulmonary effort is normal.      Breath sounds: Normal breath sounds.   Abdominal:      General: Bowel sounds are normal.      Palpations: Abdomen is soft.   Musculoskeletal:         General: Normal range of motion.      Right lower leg: No edema.      Left lower leg: No edema.   Skin:     General: Skin is warm.             Comments: AICD present in the LT anterior chest     Tunneled dialysis catheter in RT anterior chest     Necrotic wound over AV acess in RT arm     Necrotic wound over flexor surface of LT wrist    Skin breakdown to sacral area     Hyperpigmentation of bilateral lower extremities    Neurological:      General: No focal deficit present.      Mental Status: He is alert.      Cranial Nerves: Cranial nerves 2-12 are intact.      Sensory: Sensation is intact.      Motor: Motor function is intact.      Comments: RT and LT arm are neurovascularly intact     Sensation and Motor present in bilateral UE    Patient's mentation is slowed and he is sometimes unable to answer questions. AOx1   Psychiatric:         Mood and Affect: Mood normal.         Behavior: Behavior normal.         Thought Content: Thought content normal.         Judgment: Judgment normal.              CRANIAL NERVES     CN III, IV, VI   Pupils are equal, round, and reactive to light.       Significant Labs: All pertinent labs within the past 24 hours have been reviewed.    Significant Imaging: I have reviewed all pertinent imaging  results/findings within the past 24 hours.    Assessment/Plan:     * Arm wound, right, initial encounter  Patient has a necrotic area present over AV access site for dialysis. Patient was to follow-up outpatient at [facility] with vascular surgery, but hospital was on diversion     - Vascular surgery consulted - appreciate recommendations  - Cardiology consulted - appreciate recommendations for pre-op clearance   - Type and screen pending   - CXR pending   - EKG demonstrated: Atrial paced rhythmn with prolonged AV conduction, HR 65  - CBC and CMP pending   - Coagulation factors pending   - Wound culture pending   - Blood cultures pending   - Vancomycin IV, Clindamycin IV, and Aztreonam IV (to cover for MRSA, anaerobes, and gram - bacteria)         Arm wound, left, initial encounter  Necrotic wound present on patient's flexor side of the LT wrist. Likely to be secondary to vasopressor infiltration of IV site (mentioned in D/C summary from [facility]).     - Wound care consulted - appreciate recommendations      HFrEF (heart failure with reduced ejection fraction)  According to last ECHO at [facility], EF 25-30%.     - Repeat ECHO pending   - Cardiology consulted for pre-op clearance   - Carvedilol 6.25mg BID  - No ACE/ARB/ARNI secondary to hypotension requiring Milrinone     ECHO from [facility]  The left ventricular ejection fraction is severely decreased (25 - 30%).  The left ventricle is moderately dilated.  The left atrium is severely dilated.  The right atrium is moderately dilated.  There is mild aortic valve regurgitation.  There is trace mitral regurgitation present.  There is mild tricuspid regurgitation present.  The estimated central venous pressure is markedly elevated (15 mm Hg).      ESRD (end stage renal disease)  Patient has a tunneled dialysis catheter in the RT chest. Patient has AV access in the RT forearm but necrotic tissue is overlying the area. Dialysis T/TH/SAT    - Nephrology consulted for dialysis  - Mg  and Phos daily   - Continue home medication Sevelamer      History of cardiac arrest  Patient was recently taken from dialysis to [facility] in [place], [place] due to SOB. Patient went into cardiac arrest and was hospitalized in the ICU (8/15/2023)    - Continue home medication ASA 81mg QD  - Continue home medication Atorvastatin 40mg QHS   - Carvedilol 6.25mg BID   - Cardiac monitoring   - Cardiology consulted - appreciate recommendations     Chronic hepatitis C  Patient has a history of substance use. Diagnosis listed on recent D/C summary from [facility] 8/2023    - Hepatitis panel pending     Atrial fibrillation  Patient with atrial fibrillation which is uncontrolled currently with Beta Blocker and Amiodarone. Patient is currently in atrial fibrillation.[id number] Score: 1. HASBLED Score: 2. Anticoagulation not indicated due to impending surgery .    - TSH pending   - Per orders from [facility], patient is to have Amiodarone 400mg QD until 9/29/2023  - Then Amiodarone 200mg QD there after    Diabetes  Patient's FSGs are uncontrolled due to hyperglycemia on current medication regimen.  Current correctional scale  Low  Maintain anti-hyperglycemic dose as follows-   Antihyperglycemics (From admission, onward)      Start     Stop Route Frequency Ordered    09/19/23 0355  insulin aspart U-100 injection 0-5 Units         -- SubQ Every 6 hours PRN 09/19/23 0255          Hold Oral hypoglycemics while patient is in the hospital.    Patient was recently in the hospital where the diabetic educator and diabetic management team was consulted. No HBA1c on file and patient does not come from the NH with an DM medications     - HBA1c pending     Hypotension  Patient has a history of hypotension requiring Midodrine 10mg TID. Continue medication     - Continue to monitor BP     Presence of automatic cardioverter/defibrillator (AICD)  Patient has a history of AICD placement. Can be palpated in LT anterior chest       COPD (chronic obstructive  pulmonary disease)  Not in exacerbation.     - Duonebs  - Montelukast      Dysphagia  - Swallow eval pending   - Patient is currently NPO due to possible surgery       Sacral wound  Altered skin integrity of sacral area; no open wound or infection noted     - Wound care consulted - appreciate recommendations       GERD (gastroesophageal reflux disease)  - PPI (home medication)       Impaired mobility  Patient is dependent on a wheelchair for mobility.     - PT/OT consulted         VTE Risk Mitigation (From admission, onward)           Ordered     IP VTE HIGH RISK PATIENT  Once         09/19/23 0147     Place sequential compression device  Until discontinued         09/19/23 0147     Reason for No Pharmacological VTE Prophylaxis  Once        Question:  Reasons:  Answer:  Physician Provided (leave comment)    09/19/23 0147                               Mirna Ewing MD  Department of Hospital Medicine  [facility]

## 2023-09-19 NOTE — CONSULTS
[facility]  Adult Nutrition  Consult Note         Reason for Assessment  Reason For Assessment: consult (wounds/ difficulty chewing and swalowing)   Nutrition Risk Screen: large or nonhealing wound, burn or pressure injury, difficulty chewing/swallowing     Consult received and appreciated. Consult for wounds chewing/swallowing issues. ST to eval today. Recommend advance diet per ST recommendations to a renal high protein diabetic diet.   Recommend addition of Novasource Renal with poor po intake.     Recommend Charlie BID to aid in wound healing. Consider addition of MVI daily, Vit C 500mg BID and ZnS04 220mg BID.       RD visited patient this morning to perform NFPE.     Patient is a weight loss of 30#/11.2% x 1 month, 63#/21% x 8 months and 77#/24.5% x 1 year. Weight loss is severe.     Patient meets ASPEN criteria for Moderate-severe protein calorie malnutrition.     Unhealed wounds, weight loss and muscle and fat wasting noted.     See muscle and fat loss severity below.     Malnutrition  Is Patient Malnourished: Yes Malnutrition Assessment  Malnutrition Context: chronic illness  Malnutrition Level: moderate (moderate to severe)  Skin (Micronutrient): wounds unhealed       Weight Loss (Malnutrition): greater than 20% in 1 year  Subcutaneous Fat (Malnutrition): moderate depletion  Muscle Mass (Malnutrition): severe depletion   Orbital Region (Subcutaneous Fat Loss): mild depletion  Upper Arm Region (Subcutaneous Fat Loss): severe depletion  Thoracic and Lumbar Region: moderate depletion   [place] Region (Muscle Loss): severe depletion  Clavicle Bone Region (Muscle Loss): severe depletion  Clavicle and Acromion Bone Region (Muscle Loss): moderate depletion  Scapular Bone Region (Muscle Loss): moderate depletion  Patellar Region (Muscle Loss): moderate depletion  Anterior Thigh Region (Muscle Loss): moderate depletion                 Skin Integrity  Delfino Risk Assessment  Sensory Perception:  3-->slightly limited  Moisture: 3-->occasionally moist  Activity: 1-->bedfast  Mobility: 2-->very limited  Nutrition: 3-->adequate  Friction and Shear: 2-->potential problem  Delfino Score: 14    Nutrition Diagnosis  Malnutrition (Moderate)   related to Fat wasting and Muscle wasting as evidenced by sig weight loss with visible muscle and fat wasting    Nutrition Diagnosis Status: Chronic/ continues      Nutrition Risk  Level of Risk/Frequency of Follow-up: high    Recent Labs   Lab 09/19/23  0525   GLU 80     Comments on Glucose: dx of diabetes  Nutrition Prescription / Recommendations  Recommendation/Intervention: Recommend ST be consulted for difficulty swallowing and chewing and advance diet as medically appropriate per ST recommendations. Recommend addition of Novasource Renal TID with meals. Recommend adddition of Charlie BID to aid in wound healing. Consider addition of MVI daily, Vit C 500mg BID and ZnS04 220 mg BID to aid in wound healing.  Goals: weight maintenance, wound healng  Nutrition Goal Status: new  Current Diet Order: NPO  Chewing or Swallowing Difficulty?: pending ST eval  Recommended Diet: renal diabetic  Recommended Oral Supplement: Novasource renal  Is Nutrition Support Recommended: No  Is Education Recommended: No  Monitor and Evaluation  % current Intake: Unknown/ No P.O. intake documented  % intake to meet estimated needs: P.O. + Supplements  Food and Nutrient Intake: energy intake  Anthropometric Measurements: weight, weight change, body mass index  Biochemical Data, Medical Tests and Procedures: electrolyte and renal panel, gastrointestinal profile, glucose/endocrine profile, inflammatory profile, lipid profile  Nutrition-Focused Physical Findings: overall appearance, extremities, muscles and bones, head and eyes, skin     Current Medical Diagnosis and Past Medical History     Past Medical History:   Diagnosis Date    Cardiac arrest     CHF (congestive heart failure)     EF 25-30%    COPD  (chronic obstructive pulmonary disease)     Coronary artery disease     Diabetes     GERD (gastroesophageal reflux disease)     Hepatitis C     Hypotension     Requiring Midodrine    Neuropathy     Substance abuse      Nutrition/Diet History  Food Allergies: NKFA  Factors Affecting Nutritional Intake: NPO, difficulty/impaired swallowing  Lab/Procedures/Meds  Recent Labs   Lab 09/19/23  0525      K 4.6   BUN 49*   CREATININE 11.70*   CALCIUM 9.0   ALBUMIN 2.8*      ALT 43   AST 53*   PHOS 4.2     Last A1c:   Lab Results   Component Value Date    HGBA1C 5.3 09/19/2023     Lab Results   Component Value Date    RBC 3.67 (L) 09/19/2023    HGB 11.9 (L) 09/19/2023    HCT 37.2 (L) 09/19/2023    .4 (H) 09/19/2023    MCH 32.4 (H) 09/19/2023    MCHC 32.0 09/19/2023     Pertinent Labs Reviewed: reviewed  Pertinent Labs Comments: Sodium: 141  Potassium: 4.6  Chloride: 103  CO2: 24  Anion Gap: 19 (H)  BUN: 49 (H)  Creatinine: 11.70 (H)  BUN/CREAT RATIO: 4 (L)  eGFR: 4 (L)  Glucose: 80  Calcium: 9.0  Phosphorus: 4.2  Magnesium : 2.4 (H)  Alkaline Phosphatase: 125 (H)  PROTEIN TOTAL: 7.0  Albumin: 2.8 (L)  Albumin/Globulin Ratio: 0.7  BILIRUBIN TOTAL: 1.2  AST: 53 (H)  ALT: 43  Globulin, Total: 4.2 (H)      (H): Data is abnormally high  (L): Data is abnormally low  Pertinent Medications Reviewed: reviewed  Pertinent Medications Comments: amiodarone, albuterol, vanc, carvedilol, azacatem, clindamycin, atrovastatin, monetlusk, olanzapine  Anthropometrics  Temp: 97.2 °F (36.2 °C)  Height: 6' (182.9 cm)  Height (inches): 72 in  Weight Method: Bed Scale  Weight: 107.5 kg (237 lb)  Weight (lb): 237 lb  Ideal Body Weight (IBW), Male: 178 lb  % Ideal Body Weight, Male (lb): 133.15 %  BMI (Calculated): 32.1  BMI Grade: 30 - 34.9- obesity - grade I     Estimated/Assessed Needs  Weight Used For Calorie Calculations: 87.6 kg (193 lb 2 oz)   Energy Need Method: Kcal/kg Energy Calorie Requirements (kcal):  8322-6467  Weight Used For Protein Calculations: 87.6 kg (193 lb 2 oz)  Protein Requirements: 105-122  Estimated Fluid Requirement Method: RDA Method    RDA Method (mL): 2190     Nutrition by Nursing              Last Bowel Movement: 09/18/23              Nutrition Follow-Up  RD Follow-up?: Yes

## 2023-09-19 NOTE — ASSESSMENT & PLAN NOTE
Patient's FSGs are uncontrolled due to hyperglycemia on current medication regimen.  Current correctional scale  Low  Maintain anti-hyperglycemic dose as follows-   Antihyperglycemics (From admission, onward)    Start     Stop Route Frequency Ordered    09/19/23 0355  insulin aspart U-100 injection 0-5 Units         -- SubQ Every 6 hours PRN 09/19/23 0255        Hold Oral hypoglycemics while patient is in the hospital.    Patient was recently in the hospital where the diabetic educator and diabetic management team was consulted. No HBA1c on file and patient does not come from the NH with an DM medications     - HBA1c pending       9/19: Does not appear to be on any home meds for DM; will monitor BS levels; BS stable

## 2023-09-19 NOTE — PT/OT/SLP EVAL
Speech Language Pathology Evaluation  Bedside Swallow    Patient Name:  Yvan Rollins   MRN:  [id number]  Admitting Diagnosis: Arm wound, right, initial encounter    Recommendations:                 General Recommendations:  Follow-up not indicated  Diet recommendations:  Regular, Thin   Aspiration Precautions:  Assistance with meal setup, 1 bite/sip at a time, Alternating bites/sips, HOB to 90 degrees, Remain upright 30 minutes post meal, Small bites/sips, and Standard aspiration precautions   General Precautions: Standard,    Communication strategies:  provide increased time to answer    Assessment:     Yvan Rollins is a 63 y.o. male with an SLP diagnosis of  possible dysphagia .  He presents with no difficulties during BEDSIDE SWALLOW EVALUATION.    History:     Past Medical History:   Diagnosis Date    Cardiac arrest     CHF (congestive heart failure)     EF 25-30%    COPD (chronic obstructive pulmonary disease)     Coronary artery disease     Diabetes     GERD (gastroesophageal reflux disease)     Hepatitis C     Hypotension     Requiring Midodrine    Neuropathy     Substance abuse        Past Surgical History:   Procedure Laterality Date    arm surgery Right     INSERTION OF PERMANENT PACEMAKER N/A 08/15/2018    JOINT REPLACEMENT Right     Knee surgery    LUMBAR SPINE SURGERY         Social History: Patient currently lives in a nursing home .    Prior Intubation HX:  n/a    Modified Barium Swallow: n/a    Chest X-Rays: see chart    Prior diet: Patient reports regular consistency.    Occupation/hobbies/homemaking: not stated.    Subjective     Patient lying in bed awake and alert. Patient's nurse present at bedside changing dressing on patient's arm. Patient agreeable to BEDSIDE SWALLOW EVALUATION.  Patient goals: not stated     Pain/Comfort:       Respiratory Status: Room air    Objective:     Oral Musculature Evaluation  Oral Musculature: WFL  Dentition: present and adequate  Secretion Management:  adequate  Mucosal Quality: adequate  Oral Labial Strength and Mobility: WFL  Lingual Strength and Mobility: WFL    Bedside Swallow Eval:   Consistencies Assessed:  Thin liquids No difficulties noted with sips of water via a straw. No overt s/s of aspiration noted on any trials.   Puree No difficulties noted with small bites of pudding. No overt s/s of aspiration noted with any trials given.   Soft solids No difficulties noted with small bites of a jeronimo cracker. No overt s/s of aspiration noted with any trial.       Oral Phase:   WFL    Pharyngeal Phase:   no overt clinical signs/symptoms of aspiration  no overt clinical signs/symptoms of pharyngeal dysphagia    Compensatory Strategies  None    Treatment: Rec a regular consistency diet with thin liquids as tolerated. Skilled SPEECH THERAPY not indicated.     Goals:   Multidisciplinary Problems       SLP Goals       Not on file                    Plan:     Patient to be seen:      Plan of Care expires:     Plan of Care reviewed with:      SLP Follow-Up:  No       Discharge recommendations:      Barriers to Discharge:  Level of Skilled Assistance Needed nursing home    Time Tracking:     SLP Treatment Date:      Speech Start Time:  1015  Speech Stop Time:  1035     Speech Total Time (min):  20 min    Billable Minutes: Eval Swallow and Oral Function 20 09/19/2023

## 2023-09-19 NOTE — PLAN OF CARE
Problem: Adult Inpatient Plan of Care  Goal: Plan of Care Review  Outcome: Ongoing, Not Progressing  Goal: Patient-Specific Goal (Individualized)  Outcome: Ongoing, Not Progressing  Goal: Absence of Hospital-Acquired Illness or Injury  Outcome: Ongoing, Not Progressing  Goal: Optimal Comfort and Wellbeing  Outcome: Ongoing, Not Progressing  Goal: Readiness for Transition of Care  Outcome: Ongoing, Not Progressing     Problem: Impaired Wound Healing  Goal: Optimal Wound Healing  Outcome: Ongoing, Not Progressing     Problem: Infection  Goal: Absence of Infection Signs and Symptoms  Outcome: Ongoing, Not Progressing     Problem: Skin Injury Risk Increased  Goal: Skin Health and Integrity  Outcome: Ongoing, Not Progressing

## 2023-09-19 NOTE — ASSESSMENT & PLAN NOTE
Patient with atrial fibrillation which is uncontrolled currently with Beta Blocker and Amiodarone. Patient is currently in atrial fibrillation.[id number] Score: 2. HASBLED Score: 2. Anticoagulation not indicated due to impending surgery .    - TSH pending   - Per orders from [facility], patient is to have Amiodarone 400mg QD until 9/29/2023  - Then Amiodarone 200mg QD there after    9/19: On Amiodarone and Coreg; appreciate Cardiology consult; TSH 1.12

## 2023-09-19 NOTE — ASSESSMENT & PLAN NOTE
9/19/23  - past medical hx of recent cardiac arrest (8/15/23), CHF, CAD, NICM (EF 20-25%, s/p AICD), ESRD, Hep C, polysubstance use - No cardiac etiology identified as cause of arrest.  - Beltran 7.6% - Cardiac risk index >15%  - Patient is a moderate to high risk surgical risk given his hx  - Given his present risk in setting of necrotic wound and risk for sepsis it is prudent to go forward with surgery  - resume GDMT as soon as possible after surgical intervention

## 2023-09-19 NOTE — HOSPITAL COURSE
63 year old male presents with necrotic wound of AV fistula site s/p debridement.  He is being treated for a Staph bacteremia.  He is alert.  Patient denies chest pain, shortness of breath, nausea, vomiting, or diarrhea.    Chief Complaint   Patient presents with    Wound Infection         HPI: Patient is a 63 year old  male who was transferred to [facility] from [facility] for vascular consultation for a necrotic wound overlying an AV access site in the RT arm. He presented to [facility] ED via [facility] from [facility] (referenced to be [facility] from recent hospitalization at [facility] (8/15/2023)). He was recently hospitalized at [facility] after having dyspnea during dialysis that progressed to cardiac arrest with ROSC. The patient was mechanically intubated, started on pressors, and managed in the ICU. According to the discharge summary, the patient had an adverse reaction to surface and infiltration anesthetic and intravenous infiltration.      During hospitalization at [facility], the RT AV access site was unable to be used and a tunneled RT dialysis catheter was placed. He was evaluated by Vascular surgery during that admission with outpatient follow-up. Today, he was seen by wound care at the NH and they recommended return to [facility] for vascular evaluation of the wound. However, [facility] was on Diversion and he ended up at [facility] and subsequently [facility].      During his hospitalization at [facility], cardiology was consulted. ECHO demonstrated EF of 25-30% and there was a reported 1 episode of nonsustained v-tach. Cardiology recommended resuming goal directed medical therapy, and there was no identified cardiac etiology for his arrest. He was also evaluated by neurology due to concern for anoxic brain injury.  CT of the brain demonstrated chronic ischemic microangiopathy. Neurology diagnosed his encephalopathy to be multifactorial - anoxic insult from prolonged cardiac arrest with metabolic derangement. His mental  status improved, but not to baseline. Today, at [facility] he   is Aox1 and he is a poor historian. He has difficulty answering questions. History is obtained from medical records.      The patient has a PMH of nonischemic cardiomyopathy with EF 20-25%, S/P AICD placement, ESRD on HD TTS, COPD, HTN, polysubstance abuse, and hepatitis C.                 Past Medical History:   Diagnosis Date    Cardiac arrest      CHF (congestive heart failure)      COPD (chronic obstructive pulmonary disease)      Coronary artery disease      GERD (gastroesophageal reflux disease)      Hepatitis C      Hypotension      MI (myocardial infarction)      Neuropathy      Renal disorder      Substance abuse                 Past Surgical History:   Procedure Laterality Date    arm surgery Right      INSERTION OF PERMANENT PACEMAKER N/A 08/15/2018    JOINT REPLACEMENT Right       Knee surgery    LUMBAR SPINE SURGERY                   Review of patient's allergies indicates:   Allergen Reactions    Ceftriaxone Swelling    Lisinopril Swelling and Rash       Facial swelling   Facial swelling                Current Facility-Administered Medications on File Prior to Encounter   Medication    [DISCONTINUED] acetaminophen oral solution    [DISCONTINUED] albumin human 25% bottle    [DISCONTINUED] amiodarone tablet    [DISCONTINUED] aspirin chewable tablet    [DISCONTINUED] atorvastatin tablet    [DISCONTINUED] carvediloL tablet    [DISCONTINUED] dextrose 50 % in water (D50W) injection    [DISCONTINUED] GENERIC EXTERNAL MEDICATION    [DISCONTINUED] GENERIC EXTERNAL MEDICATION    [DISCONTINUED] glucagon injection    [DISCONTINUED] haloperidol lactate injection    [DISCONTINUED] heparin (porcine) injection    [DISCONTINUED] heparin (porcine) injection    [DISCONTINUED] hydrALAZINE injection    [DISCONTINUED] labetalol 20 mg/4 mL (5 mg/mL) IV syring    [DISCONTINUED] midodrine tablet    [DISCONTINUED] montelukast tablet    [DISCONTINUED] pantoprazole EC  tablet           Current Outpatient Medications on File Prior to Encounter   Medication Sig    albuterol-ipratropium (DUO-NEB) 2.5 mg-0.5 mg/3 mL nebulizer solution Take 3 mLs by nebulization 2 (two) times a day.    amiodarone (PACERONE) 200 MG Tab Take 2 tablets by mouth once daily. X 13 days    aspirin (ECOTRIN) 81 MG EC tablet Take 81 mg by mouth.    atorvastatin (LIPITOR) 40 MG tablet Take 40 mg by mouth every evening.    carvediloL (COREG) 6.25 MG tablet 6.25 mg.    midodrine (PROAMATINE) 10 MG tablet Take 10 mg by mouth.    montelukast (SINGULAIR) 10 mg tablet Take 10 mg by mouth.    OLANZapine (ZYPREXA) 2.5 MG tablet Take 2.5 mg by mouth every evening.    pantoprazole (PROTONIX) 40 MG tablet Take 40 mg by mouth once daily.    sevelamer carbonate (RENVELA) 800 mg Tab Take 1 tablet by mouth 3 (three) times daily.      Family History    None               Tobacco Use    Smoking status: Every Day       Current packs/day: 0.50       Types: Cigarettes    Smokeless tobacco: Current       Types: Snuff, Chew   Substance and Sexual Activity    Alcohol use: Not Currently    Drug use: Not Currently    Sexual activity: Not on file      Review of Systems   Unable to perform ROS: Other     Hospital course:  Records reviewed.  See detailed HPI above.  Patient previously dialyzing in [facility].  Patient transferred here because of hematoma with eschar over old access site in patient's right forearm.  Now status post drainage of hematoma and excision of eschar.  Continues to be dialyzed in right IJ access.  Patient patient now accepted to swing bed.  Continue to monitor right forearm wound and to receive rehab from therapy team.  Patient is able to converse but confused.  Told me the year was 1920, was 40 years old and he was at hospital in [place].  No family in room.  With patient not looking acutely ill assume his confusion is related to previous not sick injury from code.  As stated above has seen Neurology in [place].    Echo  noted with combined systolic and diastolic dysfunction.  No evidence of decompensation at this time.  Allergic to ACE inhibitor and will not be able to give ARB.  Use guided target therapy for his heart failure as much as able with his allergy to ACE inhibitors and his renal failure.  Will have him follow-up with Cardiology outpatient.    Discharged.

## 2023-09-20 ENCOUNTER — ANESTHESIA EVENT (OUTPATIENT)
Dept: SURGERY | Facility: HOSPITAL | Age: 63
DRG: 264 | End: 2023-09-20
Payer: MEDICARE

## 2023-09-20 ENCOUNTER — ANESTHESIA (OUTPATIENT)
Dept: SURGERY | Facility: HOSPITAL | Age: 63
DRG: 264 | End: 2023-09-20
Payer: MEDICARE

## 2023-09-20 PROBLEM — R78.81 BACTEREMIA: Status: ACTIVE | Noted: 2023-09-20

## 2023-09-20 PROBLEM — R36.9 PENILE DISCHARGE: Status: ACTIVE | Noted: 2023-09-20

## 2023-09-20 PROBLEM — I50.20 HFREF (HEART FAILURE WITH REDUCED EJECTION FRACTION): Status: RESOLVED | Noted: 2023-09-19 | Resolved: 2023-09-20

## 2023-09-20 LAB
GLUCOSE SERPL-MCNC: 103 MG/DL (ref 70–105)
GLUCOSE SERPL-MCNC: 107 MG/DL (ref 70–105)
GLUCOSE SERPL-MCNC: 76 MG/DL (ref 70–105)
GLUCOSE SERPL-MCNC: 82 MG/DL (ref 70–105)
HCV RNA SERPL NAA+PROBE-ACNC: ABNORMAL IU/ML
VERIGENE RESULT: ABNORMAL

## 2023-09-20 PROCEDURE — 11000001 HC ACUTE MED/SURG PRIVATE ROOM

## 2023-09-20 PROCEDURE — 35860 PR EXPLOR POSTOP BLEED,INFEC,CLOT-EXTR: ICD-10-PCS | Mod: ,,, | Performed by: SURGERY

## 2023-09-20 PROCEDURE — 27000510 HC BLANKET BAIR HUGGER ANY SIZE: Performed by: NURSE ANESTHETIST, CERTIFIED REGISTERED

## 2023-09-20 PROCEDURE — 63600175 PHARM REV CODE 636 W HCPCS

## 2023-09-20 PROCEDURE — 25000242 PHARM REV CODE 250 ALT 637 W/ HCPCS: Performed by: HOSPITALIST

## 2023-09-20 PROCEDURE — D9220A PRA ANESTHESIA: ICD-10-PCS | Mod: CRNA,,, | Performed by: NURSE ANESTHETIST, CERTIFIED REGISTERED

## 2023-09-20 PROCEDURE — 71000033 HC RECOVERY, INTIAL HOUR: Performed by: SURGERY

## 2023-09-20 PROCEDURE — 82962 GLUCOSE BLOOD TEST: CPT

## 2023-09-20 PROCEDURE — 63600175 PHARM REV CODE 636 W HCPCS: Performed by: SURGERY

## 2023-09-20 PROCEDURE — 37000008 HC ANESTHESIA 1ST 15 MINUTES: Performed by: SURGERY

## 2023-09-20 PROCEDURE — 97110 THERAPEUTIC EXERCISES: CPT

## 2023-09-20 PROCEDURE — 25000003 PHARM REV CODE 250: Performed by: FAMILY MEDICINE

## 2023-09-20 PROCEDURE — 27000284 HC CANNULA NASAL: Performed by: NURSE ANESTHETIST, CERTIFIED REGISTERED

## 2023-09-20 PROCEDURE — 88304 TISSUE EXAM BY PATHOLOGIST: CPT | Mod: 26,,, | Performed by: PATHOLOGY

## 2023-09-20 PROCEDURE — D9220A PRA ANESTHESIA: ICD-10-PCS | Mod: ANES,,, | Performed by: ANESTHESIOLOGY

## 2023-09-20 PROCEDURE — 97112 NEUROMUSCULAR REEDUCATION: CPT

## 2023-09-20 PROCEDURE — 35860 EXPLORE LIMB VESSELS: CPT | Mod: ,,, | Performed by: SURGERY

## 2023-09-20 PROCEDURE — D9220A PRA ANESTHESIA: Mod: CRNA,,, | Performed by: NURSE ANESTHETIST, CERTIFIED REGISTERED

## 2023-09-20 PROCEDURE — 27000221 HC OXYGEN, UP TO 24 HOURS

## 2023-09-20 PROCEDURE — 94640 AIRWAY INHALATION TREATMENT: CPT

## 2023-09-20 PROCEDURE — 99232 PR SUBSEQUENT HOSPITAL CARE,LEVL II: ICD-10-PCS | Mod: ,,, | Performed by: HOSPITALIST

## 2023-09-20 PROCEDURE — 25000003 PHARM REV CODE 250

## 2023-09-20 PROCEDURE — 94761 N-INVAS EAR/PLS OXIMETRY MLT: CPT

## 2023-09-20 PROCEDURE — D9220A PRA ANESTHESIA: Mod: ANES,,, | Performed by: ANESTHESIOLOGY

## 2023-09-20 PROCEDURE — 99900035 HC TECH TIME PER 15 MIN (STAT)

## 2023-09-20 PROCEDURE — 87491 CHLMYD TRACH DNA AMP PROBE: CPT | Performed by: HOSPITALIST

## 2023-09-20 PROCEDURE — 97116 GAIT TRAINING THERAPY: CPT

## 2023-09-20 PROCEDURE — 99232 SBSQ HOSP IP/OBS MODERATE 35: CPT | Mod: ,,, | Performed by: HOSPITALIST

## 2023-09-20 PROCEDURE — 36000706: Performed by: SURGERY

## 2023-09-20 PROCEDURE — 88304 TISSUE EXAM BY PATHOLOGIST: CPT | Mod: TC,SUR | Performed by: SURGERY

## 2023-09-20 PROCEDURE — 63600175 PHARM REV CODE 636 W HCPCS: Performed by: NURSE ANESTHETIST, CERTIFIED REGISTERED

## 2023-09-20 PROCEDURE — 36000707: Performed by: SURGERY

## 2023-09-20 PROCEDURE — 25000003 PHARM REV CODE 250: Performed by: NURSE ANESTHETIST, CERTIFIED REGISTERED

## 2023-09-20 PROCEDURE — 88304 SURGICAL PATHOLOGY: ICD-10-PCS | Mod: 26,,, | Performed by: PATHOLOGY

## 2023-09-20 PROCEDURE — 27000716 HC OXISENSOR PROBE, ANY SIZE: Performed by: NURSE ANESTHETIST, CERTIFIED REGISTERED

## 2023-09-20 PROCEDURE — 37000009 HC ANESTHESIA EA ADD 15 MINS: Performed by: SURGERY

## 2023-09-20 RX ORDER — ONDANSETRON 2 MG/ML
4 INJECTION INTRAMUSCULAR; INTRAVENOUS DAILY PRN
Status: DISCONTINUED | OUTPATIENT
Start: 2023-09-20 | End: 2023-09-20 | Stop reason: HOSPADM

## 2023-09-20 RX ORDER — MORPHINE SULFATE 10 MG/ML
4 INJECTION INTRAMUSCULAR; INTRAVENOUS; SUBCUTANEOUS EVERY 5 MIN PRN
Status: DISCONTINUED | OUTPATIENT
Start: 2023-09-20 | End: 2023-09-20 | Stop reason: HOSPADM

## 2023-09-20 RX ORDER — FENTANYL CITRATE 50 UG/ML
INJECTION, SOLUTION INTRAMUSCULAR; INTRAVENOUS
Status: DISCONTINUED | OUTPATIENT
Start: 2023-09-20 | End: 2023-09-20

## 2023-09-20 RX ORDER — ONDANSETRON 2 MG/ML
INJECTION INTRAMUSCULAR; INTRAVENOUS
Status: DISCONTINUED | OUTPATIENT
Start: 2023-09-20 | End: 2023-09-20

## 2023-09-20 RX ORDER — DIPHENHYDRAMINE HYDROCHLORIDE 50 MG/ML
25 INJECTION INTRAMUSCULAR; INTRAVENOUS EVERY 6 HOURS PRN
Status: DISCONTINUED | OUTPATIENT
Start: 2023-09-20 | End: 2023-09-20 | Stop reason: HOSPADM

## 2023-09-20 RX ORDER — PROPOFOL 10 MG/ML
INJECTION, EMULSION INTRAVENOUS
Status: DISCONTINUED | OUTPATIENT
Start: 2023-09-20 | End: 2023-09-20

## 2023-09-20 RX ORDER — LIDOCAINE HYDROCHLORIDE 20 MG/ML
INJECTION, SOLUTION EPIDURAL; INFILTRATION; INTRACAUDAL; PERINEURAL
Status: DISCONTINUED | OUTPATIENT
Start: 2023-09-20 | End: 2023-09-20

## 2023-09-20 RX ORDER — BUPIVACAINE HYDROCHLORIDE 2.5 MG/ML
INJECTION, SOLUTION EPIDURAL; INFILTRATION; INTRACAUDAL
Status: DISCONTINUED | OUTPATIENT
Start: 2023-09-20 | End: 2023-09-20 | Stop reason: HOSPADM

## 2023-09-20 RX ORDER — PHENYLEPHRINE HYDROCHLORIDE 10 MG/ML
INJECTION INTRAVENOUS
Status: DISCONTINUED | OUTPATIENT
Start: 2023-09-20 | End: 2023-09-20

## 2023-09-20 RX ORDER — GLYCOPYRROLATE 0.2 MG/ML
INJECTION INTRAMUSCULAR; INTRAVENOUS
Status: DISCONTINUED | OUTPATIENT
Start: 2023-09-20 | End: 2023-09-20

## 2023-09-20 RX ORDER — HYDROCODONE BITARTRATE AND ACETAMINOPHEN 10; 325 MG/1; MG/1
1 TABLET ORAL EVERY 6 HOURS PRN
Status: DISCONTINUED | OUTPATIENT
Start: 2023-09-20 | End: 2023-10-09 | Stop reason: HOSPADM

## 2023-09-20 RX ORDER — ETOMIDATE 2 MG/ML
INJECTION INTRAVENOUS
Status: DISCONTINUED | OUTPATIENT
Start: 2023-09-20 | End: 2023-09-20

## 2023-09-20 RX ORDER — IPRATROPIUM BROMIDE AND ALBUTEROL SULFATE 2.5; .5 MG/3ML; MG/3ML
3 SOLUTION RESPIRATORY (INHALATION) ONCE
Status: DISCONTINUED | OUTPATIENT
Start: 2023-09-20 | End: 2023-09-20 | Stop reason: HOSPADM

## 2023-09-20 RX ORDER — HYDROMORPHONE HYDROCHLORIDE 2 MG/ML
0.5 INJECTION, SOLUTION INTRAMUSCULAR; INTRAVENOUS; SUBCUTANEOUS EVERY 5 MIN PRN
Status: DISCONTINUED | OUTPATIENT
Start: 2023-09-20 | End: 2023-09-20 | Stop reason: HOSPADM

## 2023-09-20 RX ORDER — MEPERIDINE HYDROCHLORIDE 25 MG/ML
25 INJECTION INTRAMUSCULAR; INTRAVENOUS; SUBCUTANEOUS EVERY 10 MIN PRN
Status: DISCONTINUED | OUTPATIENT
Start: 2023-09-20 | End: 2023-09-20 | Stop reason: HOSPADM

## 2023-09-20 RX ADMIN — MONTELUKAST SODIUM 10 MG: 10 TABLET, FILM COATED ORAL at 08:09

## 2023-09-20 RX ADMIN — AZTREONAM 500 MG: 1 INJECTION, POWDER, LYOPHILIZED, FOR SOLUTION INTRAMUSCULAR; INTRAVENOUS at 03:09

## 2023-09-20 RX ADMIN — PHENYLEPHRINE HYDROCHLORIDE 100 MCG: 10 INJECTION INTRAVENOUS at 02:09

## 2023-09-20 RX ADMIN — PHENYLEPHRINE HYDROCHLORIDE 150 MCG: 10 INJECTION INTRAVENOUS at 02:09

## 2023-09-20 RX ADMIN — GLYCOPYRROLATE 0.2 MG: 0.2 INJECTION INTRAMUSCULAR; INTRAVENOUS at 02:09

## 2023-09-20 RX ADMIN — ETOMIDATE 2 MG: 2 INJECTION, SOLUTION INTRAVENOUS at 02:09

## 2023-09-20 RX ADMIN — AZTREONAM 500 MG: 1 INJECTION, POWDER, LYOPHILIZED, FOR SOLUTION INTRAMUSCULAR; INTRAVENOUS at 08:09

## 2023-09-20 RX ADMIN — FENTANYL CITRATE 25 MCG: 50 INJECTION INTRAMUSCULAR; INTRAVENOUS at 02:09

## 2023-09-20 RX ADMIN — MIDODRINE HYDROCHLORIDE 10 MG: 5 TABLET ORAL at 05:09

## 2023-09-20 RX ADMIN — CLINDAMYCIN PHOSPHATE 600 MG: 600 INJECTION, SOLUTION INTRAVENOUS at 04:09

## 2023-09-20 RX ADMIN — MUPIROCIN: 20 OINTMENT TOPICAL at 08:09

## 2023-09-20 RX ADMIN — IPRATROPIUM BROMIDE AND ALBUTEROL SULFATE 3 ML: 2.5; .5 SOLUTION RESPIRATORY (INHALATION) at 08:09

## 2023-09-20 RX ADMIN — SEVELAMER CARBONATE 800 MG: 800 TABLET, FILM COATED ORAL at 08:09

## 2023-09-20 RX ADMIN — PROPOFOL 20 MG: 10 INJECTION, EMULSION INTRAVENOUS at 02:09

## 2023-09-20 RX ADMIN — MUPIROCIN: 20 OINTMENT TOPICAL at 10:09

## 2023-09-20 RX ADMIN — IPRATROPIUM BROMIDE AND ALBUTEROL SULFATE 3 ML: 2.5; .5 SOLUTION RESPIRATORY (INHALATION) at 07:09

## 2023-09-20 RX ADMIN — ONDANSETRON 4 MG: 2 INJECTION INTRAMUSCULAR; INTRAVENOUS at 02:09

## 2023-09-20 RX ADMIN — ETOMIDATE 4 MG: 2 INJECTION, SOLUTION INTRAVENOUS at 02:09

## 2023-09-20 RX ADMIN — CARVEDILOL 6.25 MG: 6.25 TABLET, FILM COATED ORAL at 05:09

## 2023-09-20 RX ADMIN — AZTREONAM 500 MG: 1 INJECTION, POWDER, LYOPHILIZED, FOR SOLUTION INTRAMUSCULAR; INTRAVENOUS at 12:09

## 2023-09-20 RX ADMIN — SODIUM CHLORIDE: 9 INJECTION, SOLUTION INTRAVENOUS at 02:09

## 2023-09-20 RX ADMIN — MIDODRINE HYDROCHLORIDE 10 MG: 5 TABLET ORAL at 12:09

## 2023-09-20 RX ADMIN — ETOMIDATE 6 MG: 2 INJECTION, SOLUTION INTRAVENOUS at 02:09

## 2023-09-20 RX ADMIN — OLANZAPINE 2.5 MG: 2.5 TABLET, FILM COATED ORAL at 08:09

## 2023-09-20 RX ADMIN — LIDOCAINE HYDROCHLORIDE 50 MG: 20 INJECTION, SOLUTION INTRAVENOUS at 02:09

## 2023-09-20 RX ADMIN — ATORVASTATIN CALCIUM 40 MG: 40 TABLET, FILM COATED ORAL at 08:09

## 2023-09-20 RX ADMIN — PROPOFOL 30 MG: 10 INJECTION, EMULSION INTRAVENOUS at 02:09

## 2023-09-20 NOTE — ASSESSMENT & PLAN NOTE
According to last ECHO at [facility], EF 25-30%.     - Repeat ECHO pending   - Cardiology consulted for pre-op clearance   - Carvedilol 6.25mg BID  - No ACE/ARB/ARNI secondary to hypotension requiring Milrinone     ECHO from [facility]  The left ventricular ejection fraction is severely decreased (25 - 30%).  The left ventricle is moderately dilated.  The left atrium is severely dilated.  The right atrium is moderately dilated.  There is mild aortic valve regurgitation.  There is trace mitral regurgitation present.  There is mild tricuspid regurgitation present.  The estimated central venous pressure is markedly elevated (15 mm Hg).    9/19: s/p cardiac arrest at OSH; will monitor; has afib and AICD; on Coreg    9/20: s/p cardiac arrest at OSH; will monitor; has afib and AICD; on Coreg

## 2023-09-20 NOTE — ASSESSMENT & PLAN NOTE
Patient has a history of substance use. Diagnosis listed on recent D/C summary from [facility] 8/2023    - Hepatitis panel pending     9/20: no active issues; stable

## 2023-09-20 NOTE — ASSESSMENT & PLAN NOTE
Patient with atrial fibrillation which is uncontrolled currently with Beta Blocker and Amiodarone. Patient is currently in atrial fibrillation.[id number] Score: 2. HASBLED Score: 2. Anticoagulation not indicated due to impending surgery .    - TSH pending   - Per orders from [place], patient is to have Amiodarone 400mg QD until 9/29/2023  - Then Amiodarone 200mg QD there after    9/19: On Amiodarone and Coreg; appreciate Cardiology consult; TSH 1.12    9/20: On Amiodarone and Coreg; followed by Cardiology

## 2023-09-20 NOTE — ASSESSMENT & PLAN NOTE
Patient has a necrotic area present over AV access site for dialysis. Patient was to follow-up outpatient at [facility] with vascular surgery, but hospital was on diversion     - Vascular surgery consulted - appreciate recommendations  - Cardiology consulted - appreciate recommendations for pre-op clearance   - Type and screen pending   - CXR pending   - EKG demonstrated: Atrial paced rhythmn with prolonged AV conduction, HR 65  - CBC and CMP pending   - Coagulation factors pending   - Wound culture pending   - Blood cultures pending   - Vancomycin IV, Clindamycin IV, and Aztreonam IV (to cover for MRSA, anaerobes, and gram - bacteria)     9/19: Patient is followed by Vascular Surgery; debridement pending; on Clindamycin and Aztreonam; cultures pending  9/20: debridement pending; continue abx

## 2023-09-20 NOTE — TRANSFER OF CARE
Anesthesia Transfer of Care Note    Patient: Yvan Rollins    Procedure(s) Performed: Procedure(s) (LRB):  INCISION AND DRAINAGE, HEMATOMA (Right)  IRRIGATION AND DEBRIDEMENT (Left)    Patient location: PACU    Anesthesia Type: general and MAC    Transport from OR: Transported from OR on 2-3 L/min O2 by NC with adequate spontaneous ventilation    Post pain: adequate analgesia    Post assessment: no apparent anesthetic complications and tolerated procedure well    Post vital signs: stable    Level of consciousness: alert and awake    Nausea/Vomiting: no nausea/vomiting    Complications: none    Transfer of care protocol was followed      Last vitals:   Visit Vitals  BP 97/60   Pulse 71   Temp 36.3 °C (97.4 °F)   Resp 13   Ht 6' (1.829 m)   Wt 107.5 kg (237 lb)   SpO2 96%   BMI 32.14 kg/m²

## 2023-09-20 NOTE — OP NOTE
[facility] - Periop Services  Surgery Department  Operative Note    SUMMARY     Date of Procedure: 9/20/2023     Procedure: Procedure(s) (LRB):  INCISION AND DRAINAGE, HEMATOMA (Right)  IRRIGATION AND DEBRIDEMENT (Left)     Surgeon(s) and Role:     * Lexis Campbell MD - Primary    Assisting Surgeon: None    Pre-Operative Diagnosis: Arm wound, right, initial encounter [S41.101A]    Post-Operative Diagnosis: Post-Op Diagnosis Codes:     * Arm wound, right, initial encounter [S41.101A]    Anesthesia: General/MAC    Operative Findings (including complications, if any):  Large hematoma pride 300 +cc of old blood in the mid forearm right arm, necrotic eschar 4x4s cm left volar wrist    Description of Technical Procedures:  Patient was taken operative suite started with right arm.  We excised proxy 4 x 4 cm worth of scab and eschar the medial warm on the right entered a large hematoma this was evacuated.  Just superior to this there was a area that was very fluctuant med small 1 cm incision opened it not brisk but did have some red blood this was closed with running nylon did not appear to communicate with the hematoma hematoma was copiously irrigated packed with Kerlix and then wrapped with Kerlix and Ace wrap.  We next used scalpel excise full-thickness 4x4s cm with a scalpel down to the level of the fascia of the left rest excisional biopsy down to fascial level    Significant Surgical Tasks Conducted by the Assistant(s), if Applicable:  None    Estimated Blood Loss (EBL): 30 mL           Implants: * No implants in log *    Specimens:   Specimen (24h ago, onward)       Start     Ordered    09/20/23 1455  Surgical Pathology  RELEASE UPON ORDERING         09/20/23 1455                            Condition: Good    Disposition: PACU - hemodynamically stable.    Attestation: I was present and scrubbed for the entire procedure.

## 2023-09-20 NOTE — PLAN OF CARE
Problem: Adult Inpatient Plan of Care  Goal: Optimal Comfort and Wellbeing  Outcome: Ongoing, Progressing     Problem: Impaired Wound Healing  Goal: Optimal Wound Healing  Outcome: Ongoing, Progressing     Problem: Infection  Goal: Absence of Infection Signs and Symptoms  Outcome: Ongoing, Progressing

## 2023-09-20 NOTE — ASSESSMENT & PLAN NOTE
9/20:  Continue current meds; followed by Cardiology    ECHO 9/19:    Left Ventricle: The left ventricle is moderately dilated. Moderately increased wall thickness. Septal motion is consistent with pacing. There is moderately reduced systolic function. Ejection fraction by visual approximation is 30%. There is diastolic dysfunction.    Left Atrium: Left atrium is mildly dilated.    Right Ventricle: Mild right ventricular enlargement.    Right Atrium: Right atrium is mildly dilated.    Aortic Valve: The aortic valve is a trileaflet valve. There is mild aortic valve sclerosis. Mildly calcified cusps. There is mild aortic regurgitation with an eccentrically directed jet.    Tricuspid Valve: There is mild regurgitation.    IVC/SVC: Normal venous pressure at 3 mmHg.    Pericardium: There is a trivial effusion.

## 2023-09-20 NOTE — ASSESSMENT & PLAN NOTE
Patient was recently taken from dialysis to [facility] in [place], [place] due to SOB. Patient went into cardiac arrest and was hospitalized in the ICU (8/15/2023)    - Continue home medication ASA 81mg QD  - Continue home medication Atorvastatin 40mg QHS   - Carvedilol 6.25mg BID   - Cardiac monitoring   - Cardiology consulted - appreciate recommendations     9/19: has AICD; continue current meds; BP in the 90's; will monitor    9/20: has AICD; continue current meds; BP stable; will monitor

## 2023-09-20 NOTE — PROGRESS NOTES
[facility]  [facility] Medicine  Progress Note    Patient Name: Yvan Rollins  MRN: [id number]  Patient Class: IP- Inpatient   Admission Date: 9/19/2023  Length of Stay: 1 days  Attending Physician: Malinda Fontaine DO  Primary Care Provider: Eugene Funez MD        Subjective:     Principal Problem:Bacteremia        HPI:  Patient is a 63 year old  male who was transferred to [facility] from [place] ED for vascular consultation for a necrotic wound overlying an AV access site in the RT arm. He presented to [place] ED via [facility] from [facility] (referenced to be [facility] from recent hospitalization at [facility] [place], operated by [facility] (8/15/2023)). He was recently hospitalized at [facility] [place], operated by [facility] after having dyspnea during dialysis that progressed to cardiac arrest with ROSC. The patient was mechanically intubated, started on pressors, and managed in the ICU. According to the discharge summary, the patient had an adverse reaction to surface and infiltration anesthetic and intravenous infiltration.     During hospitalization at [facility] [place], operated by [facility], the RT AV access site was unable to be used and a tunneled RT dialysis catheter was placed. He was evaluated by Vascular surgery during that admission with outpatient follow-up. Today, he was seen by wound care at the NH and they recommended return to [facility] [place], operated by [facility] for vascular evaluation of the wound. However, [facility] [place] operated by [facility] was on Diversion and he ended up at [place] and subsequently [facility].     During his hospitalization at [facility] [place] operated by [facility], cardiology was consulted. ECHO demonstrated EF of 25-30% and there was a reported 1 episode of nonsustained v-tach. Cardiology recommended resuming goal directed medical therapy, and there was no identified cardiac etiology for his arrest. He was also evaluated by neurology due to concern for anoxic brain injury.  CT of the brain demonstrated chronic ischemic microangiopathy. Neurology diagnosed his encephalopathy to be multifactorial - anoxic insult from  prolonged cardiac arrest with metabolic derangement. His mental status improved, but not to baseline. Today, at [facility] he   is Aox1 and he is a poor historian. He has difficulty answering questions. History is obtained from medical records.     The patient has a PMH of nonischemic cardiomyopathy with EF 20-25%, S/P AICD placement, ESRD on HD TTS, COPD, HTN, polysubstance abuse, and hepatitis C.         Overview/Hospital Course:  9/19: Patient presents with necrotic wound of AV fistula site; debridement pending per Vascular; Patient denies chest pain, shortness of breath, nausea, vomiting, or diarrhea.    9/20: Patient denies chest pain, shortness of breath, nausea, vomiting, or diarrhea.        Vitals:    09/20/23 0746   BP:    Pulse: 60   Resp: 18   Temp:          Assessment/Plan:      * Bacteremia  9/20: Blood culture shows MRSE; on Clindamycin      Arm wound, left, initial encounter  Necrotic wound present on patient's flexor side of the LT wrist. Likely to be secondary to vasopressor infiltration of IV site (mentioned in D/C summary from [facility]).     - Wound care consulted - appreciate recommendations    9/19: Per Vascular Surgery and wound care; on Clindamycin and Aztreonam; debridement pending; cultures pending    9/20: debridement pending; continue abx      Arm wound, right, initial encounter  Patient has a necrotic area present over AV access site for dialysis. Patient was to follow-up outpatient at [facility] with vascular surgery, but hospital was on diversion     - Vascular surgery consulted - appreciate recommendations  - Cardiology consulted - appreciate recommendations for pre-op clearance   - Type and screen pending   - CXR pending   - EKG demonstrated: Atrial paced rhythmn with prolonged AV conduction, HR 65  - CBC and CMP pending   - Coagulation factors pending   - Wound culture pending   - Blood cultures pending   - Vancomycin IV, Clindamycin IV, and Aztreonam IV (to cover for MRSA, anaerobes, and gram  - bacteria)     9/19: Patient is followed by Vascular Surgery; debridement pending; on Clindamycin and Aztreonam; cultures pending  9/20: debridement pending; continue abx      History of cardiac arrest  Patient was recently taken from dialysis to [facility] in [place], [place] due to SOB. Patient went into cardiac arrest and was hospitalized in the ICU (8/15/2023)    - Continue home medication ASA 81mg QD  - Continue home medication Atorvastatin 40mg QHS   - Carvedilol 6.25mg BID   - Cardiac monitoring   - Cardiology consulted - appreciate recommendations     9/19: has AICD; continue current meds; BP in the 90's; will monitor    9/20: has AICD; continue current meds; BP stable; will monitor      Penile discharge  9/20: will check Chlamydia and N. gonorrhea      Chronic combined systolic and diastolic congestive heart failure  9/20:  Continue current meds; followed by Cardiology    ECHO 9/19:    Left Ventricle: The left ventricle is moderately dilated. Moderately increased wall thickness. Septal motion is consistent with pacing. There is moderately reduced systolic function. Ejection fraction by visual approximation is 30%. There is diastolic dysfunction.    Left Atrium: Left atrium is mildly dilated.    Right Ventricle: Mild right ventricular enlargement.    Right Atrium: Right atrium is mildly dilated.    Aortic Valve: The aortic valve is a trileaflet valve. There is mild aortic valve sclerosis. Mildly calcified cusps. There is mild aortic regurgitation with an eccentrically directed jet.    Tricuspid Valve: There is mild regurgitation.    IVC/SVC: Normal venous pressure at 3 mmHg.    Pericardium: There is a trivial effusion.      Diabetes  Patient's FSGs are uncontrolled due to hyperglycemia on current medication regimen.  Current correctional scale  Low  Maintain anti-hyperglycemic dose as follows-   Antihyperglycemics (From admission, onward)    Start     Stop Route Frequency Ordered    09/19/23 0945  insulin  aspart U-100 injection 0-5 Units         -- SubQ Every 6 hours PRN 09/19/23 0255        Hold Oral hypoglycemics while patient is in the hospital.    Patient was recently in the hospital where the diabetic educator and diabetic management team was consulted. No HBA1c on file and patient does not come from the NH with an DM medications     - HBA1c pending       9/19: Does not appear to be on any home meds for DM; will monitor BS levels; BS stable    Atrial fibrillation  Patient with atrial fibrillation which is uncontrolled currently with Beta Blocker and Amiodarone. Patient is currently in atrial fibrillation.[id number] Score: 2. HASBLED Score: 2. Anticoagulation not indicated due to impending surgery .    - TSH pending   - Per orders from [facility], patient is to have Amiodarone 400mg QD until 9/29/2023  - Then Amiodarone 200mg QD there after    9/19: On Amiodarone and Coreg; appreciate Cardiology consult; TSH 1.12    9/20: On Amiodarone and Coreg; followed by Cardiology      Chronic hepatitis C  Patient has a history of substance use. Diagnosis listed on recent D/C summary from [facility] 8/2023    - Hepatitis panel pending     9/20: no active issues; stable    GERD (gastroesophageal reflux disease)  - PPI (home medication)     9/20: on Protonix      ESRD (end stage renal disease)  Patient has a tunneled dialysis catheter in the RT chest. Patient has AV access in the RT forearm but necrotic tissue is overlying the area. Dialysis T/TH/SAT    - Nephrology consulted for dialysis  - Mg and Phos daily   - Continue home medication Sevelamer    9/19: Nephrology consulted for dialysis; has AV fistula with necrotic wound; continue home meds      VTE Risk Mitigation (From admission, onward)         Ordered     IP VTE HIGH RISK PATIENT  Once         09/19/23 0147     Place sequential compression device  Until discontinued         09/19/23 0147     Reason for No Pharmacological VTE Prophylaxis  Once        Question:  Reasons:   Answer:  Physician Provided (leave comment)    09/19/23 0147                Discharge Planning   SANDRA:      Code Status: Full Code   Is the patient medically ready for discharge?:     Reason for patient still in hospital (select all that apply): Treatment  Discharge Plan A: Home with family                  Malinda Fontaine DO  Department of Hospital Medicine   [facility]

## 2023-09-20 NOTE — PROGRESS NOTES
1503 RECEIVED TO RR ASLEEP, EASILY AROUSED. O2 VIA FM. NO RESP. DISTRESS NOTED. DIALYSIS CATH RIGHT UPPER CHEST. DRESSING RIGHT ARM D/I, RIGHT RADIAL PULSE STRONG, HAND WARM. DRESSING LEFT WRIST D/I. IV INFUSING WELL LEFT HAND 22G. CATH. INT LINE LEFT FOREARM, CAPPED. NO DISTRESS NOTED, NO C/O PAIN. BLOOD SUGAR 76. OBSERVING CLOSELY. SEE FLOW SHEET.    1530 B/P LOW FLUID BOLUS IN PROGRESS.      1545 MORE AWAKE, B/P BETTER SEE FLOW SHEET. DRESSINGS D/I TO BOTH ARMS. RADIAL PULSE POSITIVE BILATERALLY. NO DISTRESS NOTED. TRANSFERRED TO ROOM.

## 2023-09-20 NOTE — PLAN OF CARE
1000 Chart reviewed. SS following for any dc needs.     [facility]. Updates faxed per Junaid's request.

## 2023-09-20 NOTE — PT/OT/SLP PROGRESS
Occupational Therapy   Treatment    Name: Yvan Rollins  MRN: [id number]  Admitting Diagnosis:  Bacteremia  Day of Surgery    Recommendations:     Discharge Recommendations: nursing facility, skilled  Discharge Equipment Recommendations:  to be determined by next level of care  Barriers to discharge:  Inaccessible home environment, Decreased caregiver support    Assessment:     Yvan Rollins is a 63 y.o. male with a medical diagnosis of Bacteremia.  He presents with the following performance deficits affecting function are weakness, impaired endurance, impaired self care skills, impaired functional mobility, gait instability, impaired balance, decreased coordination.     Rehab Prognosis:  Good; patient would benefit from acute skilled OT services to address these deficits and reach maximum level of function.       Plan:     Patient to be seen 5 x/week to address the above listed problems via self-care/home management, therapeutic activities, therapeutic exercises, neuromuscular re-education  Plan of Care Expires:    Plan of Care Reviewed with: patient    Subjective     Chief Complaint: arm wound, right  Patient/Family Comments/goals: Agreeable to OT tx  Pain/Comfort:  Pain Rating 1: 0/10    Objective:     Communicated with: CHANDA Solorzano RN prior to session.  Patient found supine with peripheral IV, SCD upon OT entry to room.    General Precautions: Standard, fall    Orthopedic Precautions:   Braces:    Respiratory Status: Room air     Occupational Performance:     Bed Mobility:    Patient completed Supine to Sit with minimum assistance     Functional Mobility/Transfers:  Patient completed Sit <> Stand Transfer with minimum assistance, moderate assistance, and of 2 persons  with  rolling walker   Functional Mobility: Patient ambulated approx 5 feet x 2 trials with mod-max assist x 2 persons with rolling walker secondary to ataxia    Activities of Daily Living:        [facility] 6 Click ADL: 12    Treatment &  Education:  Patient performed activities as depicted above. While seated in recliner patient performed functional reaching neuro re-ed activity focusing on grasp and release of items, crossing midline, hand to hand transfer and locating/reaching for items through all planes. Patient required significant verbal and visual cues and intermittent [place] assistance secondary to ataxia.     Patient left up in chair with all lines intact, call button in reach, and RN notified    GOALS:   Multidisciplinary Problems       Occupational Therapy Goals          Problem: Occupational Therapy    Goal Priority Disciplines Outcome Interventions   Occupational Therapy Goal     OT, PT/OT Ongoing, Progressing    Description: STG:  Pt will perform grooming with setup   Pt will bathe self with min assist  Pt will perform UB dressing with SBA  Pt will perform LB dressing with min assist  Pt will sit EOB x 10 min with SBA   Pt will transfer toilet/BSC with min assist  Pt will tolerate 15 minutes of tx with minimal fatigue      LT. Pt will achieve max level of independence with self care.                         Time Tracking:     OT Date of Treatment: 23  OT Start Time: 1115  OT Stop Time: 1139  OT Total Time (min): 24 min    Billable Minutes:Neuromuscular Re-education 16               2023

## 2023-09-20 NOTE — PT/OT/SLP PROGRESS
Physical Therapy Treatment    Patient Name:  Yvan Rollins   MRN:  [id number]    Recommendations:     Discharge Recommendations:  snf, rehab facility  Discharge Equipment Recommendations:    Barriers to discharge:  limited self care ability    Assessment:     Yvan Rollins is a 63 y.o. male admitted with a medical diagnosis of Bacteremia.  He presents with the following impairments/functional limitations:   Patient able to follow 1 step commands today. Patient remains oriented x 1. Able to progress to limited ambulation. Very ataxic. .    Rehab Prognosis: Good; patient would benefit from acute skilled PT services to address these deficits and reach maximum level of function.    Recent Surgery: Procedure(s) (LRB):  INCISION AND DRAINAGE, HEMATOMA (Right)  IRRIGATION AND DEBRIDEMENT (Left) Day of Surgery    Plan:     During this hospitalization, patient to be seen   to address the identified rehab impairments via   and progress toward the following goals:    Plan of Care Expires:       Subjective     Chief Complaint: ams  Patient/Family Comments/goals: Patient with minimal verbalization, States he is in a hospital.   Pain/Comfort:  Pain Rating 1: 0/10      Objective:     Communicated with nurse prior to session.  Patient found supine with   upon PT entry to room.     General Precautions: Standard,    Orthopedic Precautions:    Braces:    Respiratory Status: Room air     Functional Mobility:  Bed Mobility:     Supine to Sit: minimum assistance  Sit to Supine: minimum assistance  Transfers:     Sit to Stand:  moderate assistance with rolling walker  Gait: ambulated 5 feet max assist x 2, ataxic gait,  rashard a few times needing assist to prevent fall, difficulty with le advancement right>left.      AM-PAC 6 CLICK MOBILITY  Turning over in bed (including adjusting bedclothes, sheets and blankets)?: 3  Sitting down on and standing up from a chair with arms (e.g., wheelchair, bedside commode, etc.): 3  Moving from  lying on back to sitting on the side of the bed?: 3  Moving to and from a bed to a chair (including a wheelchair)?: 3  Need to walk in hospital room?: 3  Climbing 3-5 steps with a railing?: 3  Basic Mobility Total Score: 18       Treatment & Education:  BLE: aps, hs, saq, abd-add, slr aarom 3x10,   Sat up in chair at bedside x 1 hour.     Patient left supine with call button in reach..    GOALS:   Multidisciplinary Problems       Physical Therapy Goals          Problem: Physical Therapy    Goal Priority Disciplines Outcome Goal Variances Interventions   Physical Therapy Goal     PT, PT/OT Ongoing, Progressing     Description: Short Term Goals  Ambulate Mod - 10 feet with rolling walker assistive device.   Supine to sit contact guard  Sit to stand contact guard  SPT contact guard  5. Independent with HEP    Long Term Goals  Ambulate CGA - 20 feet with rolling walker assistive device.   Supine to sit stand-by  Sit to stand stand-by  SPT stand-by  Needed equipment for home.                              Time Tracking:     PT Received On: 09/20/23  PT Start Time: 1045     PT Stop Time: 1110  PT Total Time (min): 25 min     Billable Minutes: Gait Training 10 and Therapeutic Exercise 15    Treatment Type: Treatment  PT/PTA: PT           09/20/2023

## 2023-09-20 NOTE — ASSESSMENT & PLAN NOTE
Necrotic wound present on patient's flexor side of the LT wrist. Likely to be secondary to vasopressor infiltration of IV site (mentioned in D/C summary from [facility]).     - Wound care consulted - appreciate recommendations    9/19: Per Vascular Surgery and wound care; on Clindamycin and Aztreonam; debridement pending; cultures pending    9/20: debridement pending; continue abx

## 2023-09-20 NOTE — ANESTHESIA PREPROCEDURE EVALUATION
09/20/2023  Yvan Rollins is a 63 y.o., male.      Pre-op Assessment    I have reviewed the Patient Summary Reports.     I have reviewed the Nursing Notes. I have reviewed the NPO Status.   I have reviewed the Medications.     Review of Systems  Anesthesia Hx:  Denies Family Hx of Anesthesia complications.   Denies Personal Hx of Anesthesia complications.   Social:  Non-Smoker, No Alcohol Use    Hematology/Oncology:  Hematology Normal   Oncology Normal     EENT/Dental:EENT/Dental Normal   Cardiovascular:   Exercise tolerance: poor Pacemaker CAD asymptomatic  CHF ECG has been reviewed.    Pulmonary:   COPD    Renal/:   Chronic Renal Disease, ESRD    Hepatic/GI:   GERD Hepatitis, C    Musculoskeletal:  Musculoskeletal Normal    Neurological:  Neurology Normal    Endocrine:   Diabetes    Dermatological:  Skin Normal    Psych:  Psychiatric Normal           Physical Exam  General: Well nourished, Cooperative, Alert and Oriented    Airway:  Mallampati: II / II  Mouth Opening: Normal  TM Distance: Normal  Neck ROM: Normal ROM    Dental:  Intact    Chest/Lungs:  Clear to auscultation    Heart:  Rate: Normal  Rhythm: Regular Rhythm  Sounds: Normal        Chemistry        Component Value Date/Time     09/19/2023 0525    K 4.6 09/19/2023 0525     09/19/2023 0525    CO2 24 09/19/2023 0525    BUN 49 (H) 09/19/2023 0525    BUN 35.0 (H) 04/12/2021 1548    CREATININE 11.70 (H) 09/19/2023 0525    GLU 80 09/19/2023 0525        Component Value Date/Time    CALCIUM 9.0 09/19/2023 0525    ALKPHOS 125 (H) 09/19/2023 0525    AST 53 (H) 09/19/2023 0525    ALT 43 09/19/2023 0525    BILITOT 1.2 09/19/2023 0525        Lab Results   Component Value Date    WBC 5.87 09/19/2023    HGB 11.9 (L) 09/19/2023    HCT 37.2 (L) 09/19/2023     09/19/2023     Results for orders placed or performed during the hospital  encounter of 09/19/23   EKG 12-lead    Collection Time: 09/19/23  1:03 AM    Narrative    Test Reason : Z41.9,    Vent. Rate : 065 BPM     Atrial Rate : 065 BPM     P-R Int : 224 ms          QRS Dur : 170 ms      QT Int : 550 ms       P-R-T Axes : 000 -47 016 degrees     QTc Int : 572 ms    Atrial-paced rhythm with prolonged AV conduction  Right bundle branch block  Left anterior fascicular block   Bifascicular block   T wave abnormality, consider lateral ischemia  Abnormal ECG  No previous ECGs available  Confirmed by Idalia MALIK, Sophia AGUSTIN ([id number]) on 9/19/2023 10:31:15 AM    Referred By: JIMMY FERNANDEZ           Confirmed By:Sophia Friedman MD   Echo Summary 2023         Left Ventricle: The left ventricle is moderately dilated. Moderately increased wall thickness. Septal motion is consistent with pacing. There is moderately reduced systolic function. Ejection fraction by visual approximation is 30%. There is diastolic dysfunction.    Left Atrium: Left atrium is mildly dilated.    Right Ventricle: Mild right ventricular enlargement.    Right Atrium: Right atrium is mildly dilated.    Aortic Valve: The aortic valve is a trileaflet valve. There is mild aortic valve sclerosis. Mildly calcified cusps. There is mild aortic regurgitation with an eccentrically directed jet.    Tricuspid Valve: There is mild regurgitation.    IVC/SVC: Normal venous pressure at 3 mmHg.    Pericardium: There is a trivial effusion.           Anesthesia Plan  Type of Anesthesia, risks & benefits discussed:    Anesthesia Type: MAC  Informed Consent: Informed consent signed with the Patient and all parties understand the risks and agree with anesthesia plan.  All questions answered.   ASA Score: 4  Day of Surgery Review of History & Physical: H&P Update referred to the surgeon/provider.I have interviewed and examined the patient. I have reviewed the patient's H&P dated:     Ready For Surgery From Anesthesia Perspective.     .

## 2023-09-21 LAB
ESTROGEN SERPL-MCNC: NORMAL PG/ML
GLUCOSE SERPL-MCNC: 106 MG/DL (ref 70–105)
GLUCOSE SERPL-MCNC: 153 MG/DL (ref 70–105)
GLUCOSE SERPL-MCNC: 166 MG/DL (ref 70–105)
INSULIN SERPL-ACNC: NORMAL U[IU]/ML
LAB AP GROSS DESCRIPTION: NORMAL
LAB AP LABORATORY NOTES: NORMAL
MICROORGANISM SPEC CULT: ABNORMAL
T3RU NFR SERPL: NORMAL %
VANCOMYCIN SERPL-MCNC: 30.8 ΜG/ML (ref 0–20)

## 2023-09-21 PROCEDURE — 90935 HEMODIALYSIS ONE EVALUATION: CPT

## 2023-09-21 PROCEDURE — 80202 ASSAY OF VANCOMYCIN: CPT | Performed by: HOSPITALIST

## 2023-09-21 PROCEDURE — 25000003 PHARM REV CODE 250

## 2023-09-21 PROCEDURE — 87086 URINE CULTURE/COLONY COUNT: CPT | Performed by: HOSPITALIST

## 2023-09-21 PROCEDURE — 82962 GLUCOSE BLOOD TEST: CPT

## 2023-09-21 PROCEDURE — 27000221 HC OXYGEN, UP TO 24 HOURS

## 2023-09-21 PROCEDURE — 87491 CHLMYD TRACH DNA AMP PROBE: CPT | Performed by: HOSPITALIST

## 2023-09-21 PROCEDURE — 94761 N-INVAS EAR/PLS OXIMETRY MLT: CPT

## 2023-09-21 PROCEDURE — 99232 SBSQ HOSP IP/OBS MODERATE 35: CPT | Mod: ,,, | Performed by: HOSPITALIST

## 2023-09-21 PROCEDURE — 63600175 PHARM REV CODE 636 W HCPCS

## 2023-09-21 PROCEDURE — 99232 PR SUBSEQUENT HOSPITAL CARE,LEVL II: ICD-10-PCS | Mod: ,,, | Performed by: HOSPITALIST

## 2023-09-21 PROCEDURE — 87040 BLOOD CULTURE FOR BACTERIA: CPT | Performed by: HOSPITALIST

## 2023-09-21 PROCEDURE — 87591 N.GONORRHOEAE DNA AMP PROB: CPT | Performed by: HOSPITALIST

## 2023-09-21 PROCEDURE — 25000242 PHARM REV CODE 250 ALT 637 W/ HCPCS: Performed by: HOSPITALIST

## 2023-09-21 PROCEDURE — 99900035 HC TECH TIME PER 15 MIN (STAT)

## 2023-09-21 PROCEDURE — 11000001 HC ACUTE MED/SURG PRIVATE ROOM

## 2023-09-21 PROCEDURE — 25500020 PHARM REV CODE 255: Performed by: HOSPITALIST

## 2023-09-21 PROCEDURE — 94640 AIRWAY INHALATION TREATMENT: CPT

## 2023-09-21 RX ORDER — SODIUM CHLORIDE 9 MG/ML
INJECTION, SOLUTION INTRAVENOUS
Status: DISCONTINUED | OUTPATIENT
Start: 2023-09-21 | End: 2023-10-09 | Stop reason: HOSPADM

## 2023-09-21 RX ORDER — HEPARIN SODIUM 1000 [USP'U]/ML
4000 INJECTION, SOLUTION INTRAVENOUS; SUBCUTANEOUS
Status: DISCONTINUED | OUTPATIENT
Start: 2023-09-21 | End: 2023-10-09 | Stop reason: HOSPADM

## 2023-09-21 RX ORDER — SODIUM CHLORIDE 9 MG/ML
INJECTION, SOLUTION INTRAVENOUS
Status: COMPLETED
Start: 2023-09-21 | End: 2023-09-21

## 2023-09-21 RX ADMIN — IPRATROPIUM BROMIDE AND ALBUTEROL SULFATE 3 ML: 2.5; .5 SOLUTION RESPIRATORY (INHALATION) at 08:09

## 2023-09-21 RX ADMIN — MIDODRINE HYDROCHLORIDE 10 MG: 5 TABLET ORAL at 08:09

## 2023-09-21 RX ADMIN — CLINDAMYCIN PHOSPHATE 600 MG: 600 INJECTION, SOLUTION INTRAVENOUS at 08:09

## 2023-09-21 RX ADMIN — MUPIROCIN: 20 OINTMENT TOPICAL at 10:09

## 2023-09-21 RX ADMIN — AMIODARONE HYDROCHLORIDE 400 MG: 200 TABLET ORAL at 08:09

## 2023-09-21 RX ADMIN — PANTOPRAZOLE SODIUM 40 MG: 40 TABLET, DELAYED RELEASE ORAL at 08:09

## 2023-09-21 RX ADMIN — AZTREONAM 500 MG: 1 INJECTION, POWDER, LYOPHILIZED, FOR SOLUTION INTRAMUSCULAR; INTRAVENOUS at 03:09

## 2023-09-21 RX ADMIN — SODIUM CHLORIDE: 9 INJECTION, SOLUTION INTRAVENOUS at 12:09

## 2023-09-21 RX ADMIN — ATORVASTATIN CALCIUM 40 MG: 40 TABLET, FILM COATED ORAL at 10:09

## 2023-09-21 RX ADMIN — CLINDAMYCIN PHOSPHATE 600 MG: 600 INJECTION, SOLUTION INTRAVENOUS at 12:09

## 2023-09-21 RX ADMIN — IPRATROPIUM BROMIDE AND ALBUTEROL SULFATE 3 ML: 2.5; .5 SOLUTION RESPIRATORY (INHALATION) at 07:09

## 2023-09-21 RX ADMIN — SEVELAMER CARBONATE 800 MG: 800 TABLET, FILM COATED ORAL at 08:09

## 2023-09-21 RX ADMIN — ASPIRIN 81 MG: 81 TABLET, COATED ORAL at 08:09

## 2023-09-21 RX ADMIN — MUPIROCIN: 20 OINTMENT TOPICAL at 08:09

## 2023-09-21 RX ADMIN — MONTELUKAST SODIUM 10 MG: 10 TABLET, FILM COATED ORAL at 10:09

## 2023-09-21 RX ADMIN — SEVELAMER CARBONATE 800 MG: 800 TABLET, FILM COATED ORAL at 10:09

## 2023-09-21 RX ADMIN — AZTREONAM 500 MG: 1 INJECTION, POWDER, LYOPHILIZED, FOR SOLUTION INTRAMUSCULAR; INTRAVENOUS at 08:09

## 2023-09-21 RX ADMIN — OLANZAPINE 2.5 MG: 2.5 TABLET, FILM COATED ORAL at 10:09

## 2023-09-21 RX ADMIN — IOPAMIDOL 100 ML: 755 INJECTION, SOLUTION INTRAVENOUS at 10:09

## 2023-09-21 RX ADMIN — MIDODRINE HYDROCHLORIDE 10 MG: 5 TABLET ORAL at 06:09

## 2023-09-21 NOTE — PROGRESS NOTES
Patient not seen this date secondary to patient off the floor for CT and dialysis. Will follow up 9/22/23

## 2023-09-21 NOTE — PROGRESS NOTES
[facility]  [facility] Medicine  Progress Note    Patient Name: Yvan Rollins  MRN: [id number]  Patient Class: IP- Inpatient   Admission Date: 9/19/2023  Length of Stay: 2 days  Attending Physician: Malinda Fontaine DO  Primary Care Provider: Eugene Funez MD        Subjective:     Principal Problem:Bacteremia        HPI:  Patient is a 63 year old  male who was transferred to [facility] from [place] ED for vascular consultation for a necrotic wound overlying an AV access site in the RT arm. He presented to [place] ED via [facility] from [facility] (referenced to be [facility] from recent hospitalization at [facility] (8/15/2023)). He was recently hospitalized at [facility] after having dyspnea during dialysis that progressed to cardiac arrest with ROSC. The patient was mechanically intubated, started on pressors, and managed in the ICU. According to the discharge summary, the patient had an adverse reaction to surface and infiltration anesthetic and intravenous infiltration.     During hospitalization at [facility], the RT AV access site was unable to be used and a tunneled RT dialysis catheter was placed. He was evaluated by Vascular surgery during that admission with outpatient follow-up. Today, he was seen by wound care at the NH and they recommended return to [facility] for vascular evaluation of the wound. However, [facility] was on Diversion and he ended up at [place] and subsequently [facility].     During his hospitalization at [facility], cardiology was consulted. ECHO demonstrated EF of 25-30% and there was a reported 1 episode of nonsustained v-tach. Cardiology recommended resuming goal directed medical therapy, and there was no identified cardiac etiology for his arrest. He was also evaluated by neurology due to concern for anoxic brain injury.  CT of the brain demonstrated chronic ischemic microangiopathy. Neurology diagnosed his encephalopathy to be multifactorial - anoxic insult from  prolonged cardiac arrest with metabolic derangement. His mental status improved, but not to baseline. Today, at [facility] he   is Aox1 and he is a poor historian. He has difficulty answering questions. History is obtained from medical records.     The patient has a PMH of nonischemic cardiomyopathy with EF 20-25%, S/P AICD placement, ESRD on HD TTS, COPD, HTN, polysubstance abuse, and hepatitis C.         Overview/Hospital Course:  63 year old male with an extensive PMH presents with bacteremia and arm wounds; he is not very talkative    Vitals:    09/21/23 1330   BP: (!) 100/57   Pulse: 60   Resp: 17   Temp:        ECHO 9/19:    Left Ventricle: The left ventricle is moderately dilated. Moderately increased wall thickness. Septal motion is consistent with pacing. There is moderately reduced systolic function. Ejection fraction by visual approximation is 30%. There is diastolic dysfunction.    Left Atrium: Left atrium is mildly dilated.    Right Ventricle: Mild right ventricular enlargement.    Right Atrium: Right atrium is mildly dilated.    Aortic Valve: The aortic valve is a trileaflet valve. There is mild aortic valve sclerosis. Mildly calcified cusps. There is mild aortic regurgitation with an eccentrically directed jet.    Tricuspid Valve: There is mild regurgitation.    IVC/SVC: Normal venous pressure at 3 mmHg.    Pericardium: There is a trivial effusion.      Assessment/Plan:      * Bacteremia  Blood culture shows MRSE; on Clindamycin      Arm wound, left and right, initial encounter  S/P debridement; this was due to vasopressor infiltration at [facility]; on Clindamycin and Aztreonam    History of cardiac arrest  has AICD; continue current meds; BP low at times;he is on Coreg and Amiodarone; he is s/p cardiac arrest from dialysis at [facility] in [place], [place] in August      Penile discharge  Pending Chlamydia and N. Gonorrhea results      Chronic combined systolic and diastolic congestive heart failure  9/20:   Continue current meds; followed by Cardiology      Diabetes  Does not appear to be on any home meds for DM; will monitor BS levels; BS stable    Atrial fibrillation  On Amiodarone and Coreg; followed by Cardiology      Chronic hepatitis C  no active issues; stable    GERD (gastroesophageal reflux disease)  on Protonix      ESRD (end stage renal disease)  Appreciate consult from Nephrology-- consulted for dialysis          Discharge Planning   SANDRA:      Code Status: Full Code   Is the patient medically ready for discharge?:     Reason for patient still in hospital (select all that apply): Treatment  Discharge Plan A: Home with family                  Malinda Fontaine DO  Department of Hospital Medicine   [facility]

## 2023-09-21 NOTE — ANESTHESIA POSTPROCEDURE EVALUATION
Anesthesia Post Evaluation    Patient: Yvan Rollins    Procedure(s) Performed: Procedure(s) (LRB):  INCISION AND DRAINAGE, HEMATOMA (Right)  IRRIGATION AND DEBRIDEMENT (Left)    Final Anesthesia Type: general      Patient location during evaluation: PACU  Patient participation: Yes- Able to Participate  Level of consciousness: awake and alert  Post-procedure vital signs: reviewed and stable  Pain management: adequate  Airway patency: patent  MIGUE mitigation strategies: Multimodal analgesia  PONV status at discharge: No PONV  Anesthetic complications: no      Cardiovascular status: blood pressure returned to baseline  Respiratory status: unassisted  Hydration status: euvolemic  Follow-up not needed.          Vitals Value Taken Time   BP 77/50 09/21/23 0850   Temp 37 °C (98.6 °F) 09/21/23 0600   Pulse 87 09/21/23 0736   Resp 20 09/21/23 0736   SpO2 93 % 09/21/23 0736         Event Time   Out of Recovery 09/20/2023 15:45:00         Pain/Cirilo Score: Cirilo Score: 9 (9/20/2023  3:45 PM)

## 2023-09-21 NOTE — CONSULTS
[facility]  Nephrology  Consult Note    Patient Name: Yvan Rollins  MRN: [id number]  Admission Date: 9/19/2023  Hospital Length of Stay: 2 days  Attending Provider: Malinda Fontaine DO   Primary Care Physician: Eugene Funez MD  Principal Problem:Bacteremia    Consults  Subjective:     HPI: Consult received today for Mr. Rollins who has ESRD and dialyzes in [place]. He was in [facility] in [place] following a cardiac arrest, had to abandon an unusable  right forearm dialysis access and a catheter was placed in the right IJ. He's here now after developing a hematoma with eschar over the old access site. He's now s/p drainage of the hematoma and excision of the eschar.   Called today regarding the need for ongoing dialysis.  Post cardiac arrest he has an encephalopathy and history from him is unavailable though he is awake and offers somewhat inaccurate responses to questions.    Past Medical History:   Diagnosis Date    Cardiac arrest     CHF (congestive heart failure)     EF 25-30%    COPD (chronic obstructive pulmonary disease)     Coronary artery disease     Diabetes     GERD (gastroesophageal reflux disease)     Hepatitis C     Hypotension     Requiring Midodrine    Neuropathy     Substance abuse        Past Surgical History:   Procedure Laterality Date    arm surgery Right     INCISION AND DRAINAGE OF HEMATOMA Right 9/20/2023    Procedure: INCISION AND DRAINAGE, HEMATOMA;  Surgeon: Lexis Campbell MD;  Location: [facility];  Service: General;  Laterality: Right;    INSERTION OF PERMANENT PACEMAKER N/A 08/15/2018    IRRIGATION AND DEBRIDEMENT Left 9/20/2023    Procedure: IRRIGATION AND DEBRIDEMENT;  Surgeon: Lexis Campbell MD;  Location: [facility];  Service: General;  Laterality: Left;  LUE    JOINT REPLACEMENT Right     Knee surgery    LUMBAR SPINE SURGERY         Review of patient's allergies indicates:   Allergen Reactions    Ceftriaxone Swelling    Lisinopril  Swelling and Rash     Facial swelling   Facial swelling       Current Facility-Administered Medications   Medication Frequency    0.9%  NaCl infusion PRN    acetaminophen tablet 650 mg Q4H PRN    albuterol-ipratropium 2.5 mg-0.5 mg/3 mL nebulizer solution 3 mL BID    [START ON 9/30/2023] amiodarone tablet 200 mg Daily    amiodarone tablet 400 mg Daily    aspirin EC tablet 81 mg Daily    atorvastatin tablet 40 mg QHS    aztreonam (AZACTAM) 500 mg in dextrose 5 % (D5W) 100 mL IVPB Q8H    carvediloL tablet 6.25 mg BID WM    clindamycin in D5W 600 mg/50 mL IVPB 600 mg Q8H    dextrose 10% bolus 125 mL 125 mL PRN    dextrose 10% bolus 250 mL 250 mL PRN    glucagon (human recombinant) injection 1 mg PRN    heparin (porcine) injection 4,000 Units PRN    HYDROcodone-acetaminophen  mg per tablet 1 tablet Q6H PRN    HYDROcodone-acetaminophen 5-325 mg per tablet 1 tablet Q6H PRN    insulin aspart U-100 injection 0-5 Units Q6H PRN    midodrine tablet 10 mg TID WM    montelukast tablet 10 mg QHS    mupirocin 2 % ointment BID    naloxone 0.4 mg/mL injection 0.02 mg PRN    OLANZapine tablet 2.5 mg QHS    ondansetron injection 4 mg Q8H PRN    pantoprazole EC tablet 40 mg Daily    sevelamer carbonate tablet 800 mg TID    sodium chloride 0.9% flush 10 mL Q12H PRN     Family History    None       Tobacco Use    Smoking status: Every Day     Current packs/day: 0.50     Types: Cigarettes    Smokeless tobacco: Current     Types: Snuff, Chew   Substance and Sexual Activity    Alcohol use: Not Currently    Drug use: Not Currently    Sexual activity: Not on file     Review of Systems  Objective:     Vital Signs (Most Recent):  Temp: 97.7 °F (36.5 °C) (09/21/23 1115)  Pulse: 63 (09/21/23 1200)  Resp: 18 (09/21/23 1200)  BP: 136/84 (09/21/23 1200)  SpO2: 99 % (09/21/23 1100) Vital Signs (24h Range):  Temp:  [96.5 °F (35.8 °C)-98.7 °F (37.1 °C)] 97.7 °F (36.5 °C)  Pulse:  [59-93] 63  Resp:  [11-20] 18  SpO2:  [91 %-100 %] 99 %  BP:  ()/(50-84) 136/84     Weight: 107.5 kg (237 lb) (09/18/23 2315)  Body mass index is 32.14 kg/m².  Body surface area is 2.34 meters squared.    I/O last 3 completed shifts:  In: 760 [P.O.:360; I.V.:100; IV Piggyback:300]  Out: 30 [Blood:30]    Physical Exam Awake and alert. Inaccurate answers to questions though he is not agitated. No edema. Chest clear    Significant Labs:  BMP:   Recent Labs   Lab 09/19/23  0525   GLU 80      K 4.6      CO2 24   BUN 49*   CREATININE 11.70*   CALCIUM 9.0   MG 2.4*     CBC:   Recent Labs   Lab 09/19/23  0414   WBC 5.87   RBC 3.67*   HGB 11.9*   HCT 37.2*      .4*   MCH 32.4*   MCHC 32.0     All labs within the past 24 hours have been reviewed.    Significant Imaging:  Labs: Reviewed    Assessment/Plan:     Active Diagnoses:    Diagnosis Date Noted POA    PRINCIPAL PROBLEM:  Bacteremia [R78.81] 09/20/2023 Unknown    Penile discharge [R36.9] 09/20/2023 Unknown    ESRD (end stage renal disease) [N18.6] 09/19/2023 Yes    GERD (gastroesophageal reflux disease) [K21.9] 09/19/2023 Yes    Chronic hepatitis C [B18.2] 09/19/2023 Yes    History of cardiac arrest [Z86.74] 09/19/2023 Not Applicable    Atrial fibrillation [I48.91] 09/19/2023 Yes    Arm wound, right, initial encounter [S41.101A] 09/19/2023 Yes    Arm wound, left, initial encounter [S41.102A] 09/19/2023 Yes    Diabetes [E11.9] 09/19/2023 Yes    Chronic combined systolic and diastolic congestive heart failure [I50.42] 09/19/2023 Yes      Problems Resolved During this Admission:    Diagnosis Date Noted Date Resolved POA    Dysphagia [R13.10] 09/19/2023 09/19/2023 Yes    Presence of automatic cardioverter/defibrillator (AICD) [Z95.810] 09/19/2023 09/19/2023 Yes    CAD (coronary artery disease) [I25.10] 09/19/2023 09/19/2023 Unknown    HFrEF (heart failure with reduced ejection fraction) [I50.20] 09/19/2023 09/20/2023 Yes    Hypotension [I95.9] 09/19/2023 09/19/2023 Yes    Impaired mobility [Z74.09]  09/19/2023 09/19/2023 Yes    Sacral wound [S31.000A] 09/19/2023 09/19/2023 Yes    COPD (chronic obstructive pulmonary disease) [J44.9] 09/19/2023 09/19/2023 Yes    Hematoma [T14.8XXA] 09/19/2023 09/19/2023 Yes       Continue hemodialysis while here.    Thank you for your consult. I will follow-up with patient. Please contact us if you have any additional questions.    Alfred Valdez MD  Nephrology  [facility]

## 2023-09-21 NOTE — PLAN OF CARE
Problem: Adult Inpatient Plan of Care  Goal: Absence of Hospital-Acquired Illness or Injury  Outcome: Ongoing, Not Progressing     Problem: Impaired Wound Healing  Goal: Optimal Wound Healing  Outcome: Ongoing, Not Progressing     Problem: Infection  Goal: Absence of Infection Signs and Symptoms  Outcome: Ongoing, Not Progressing

## 2023-09-21 NOTE — PT/OT/SLP PROGRESS
Physical Therapy      Patient Name:  Yvan Rollins   MRN:  [id number]    Patient not seen today secondary to gone to CT then gone to dialyisis . Will follow-up 9/22/23.

## 2023-09-21 NOTE — PROGRESS NOTES
[facility]  Adult Nutrition  Consult Note         Reason for Assessment  Reason For Assessment: RD follow-up   Nutrition Risk Screen: no indicators present     9/21/2023 RD follow up. Patient seen by ST and recommended a regular diet. Current diet is Renal diet and appropriate for patient due to ESRD. Recommend Charlie BID to aid in wound healing. Consider addition of MVI daily, Vit C 500mg BID and ZnS04 220mg BID. Recommend addition of Novasource Renal due to poor po intake at 25% per flow sheets.       9/19/2023 RD note:Consult received and appreciated. Consult for wounds chewing/swallowing issues. ST to eval today. Recommend advance diet per ST recommendations to a renal high protein diabetic diet.   Recommend addition of Novasource Renal with poor po intake.       RD visited patient this morning to perform NFPE.     Patient is a weight loss of 30#/11.2% x 1 month, 63#/21% x 8 months and 77#/24.5% x 1 year. Weight loss is severe.     Patient meets ASPEN criteria for Moderate-severe protein calorie malnutrition.     Unhealed wounds, weight loss and muscle and fat wasting noted.     See muscle and fat loss severity below.     Malnutrition  Is Patient Malnourished: Yes Malnutrition Assessment  Malnutrition Context: chronic illness  Malnutrition Level: moderate (moderate to severe)  Skin (Micronutrient): wounds unhealed       Weight Loss (Malnutrition): greater than 20% in 1 year  Subcutaneous Fat (Malnutrition): moderate depletion  Muscle Mass (Malnutrition): severe depletion   Orbital Region (Subcutaneous Fat Loss): mild depletion  Upper Arm Region (Subcutaneous Fat Loss): severe depletion  Thoracic and Lumbar Region: moderate depletion   [place] Region (Muscle Loss): severe depletion  Clavicle Bone Region (Muscle Loss): severe depletion  Clavicle and Acromion Bone Region (Muscle Loss): moderate depletion  Scapular Bone Region (Muscle Loss): moderate depletion  Patellar Region (Muscle Loss):  moderate depletion  Anterior Thigh Region (Muscle Loss): moderate depletion                 Skin Integrity  Delfino Risk Assessment  Sensory Perception: 3-->slightly limited  Moisture: 3-->occasionally moist  Activity: 1-->bedfast  Mobility: 2-->very limited  Nutrition: 3-->adequate  Friction and Shear: 2-->potential problem  Delfino Score: 14    Nutrition Diagnosis  Malnutrition (Moderate)   related to Fat wasting and Muscle wasting as evidenced by sig weight loss with visible muscle and fat wasting    Nutrition Diagnosis Status: Chronic/ continues      Nutrition Risk  Level of Risk/Frequency of Follow-up: high    Recent Labs   Lab 09/19/23  0525 09/19/23  0851 09/20/23 2028   GLU 80  --   --    POCGLU  --    < > 103    < > = values in this interval not displayed.       Comments on Glucose: dx of diabetes  Nutrition Prescription / Recommendations  Recommendation/Intervention: Recommend continue with Renal diet. Recommend addition of Novasource renal and Charlie BID to aid in wound helaing and increase protein intake.  Goals: weight maintenance, wound healng  Nutrition Goal Status: progressing towards goal  Current Diet Order: Renal diet  Chewing or Swallowing Difficulty?: pending ST eval  Recommended Diet: renal diabetic  Recommended Oral Supplement:  Novasource renal  Is Nutrition Support Recommended: No  Is Education Recommended: No  Monitor and Evaluation  % current Intake: Unknown/ No P.O. intake documented  % intake to meet estimated needs: P.O. + Supplements  Food and Nutrient Intake: energy intake  Food and Nutrient Adminstration: diet order  Anthropometric Measurements: weight, weight change, body mass index  Biochemical Data, Medical Tests and Procedures: electrolyte and renal panel, gastrointestinal profile, glucose/endocrine profile, inflammatory profile  Nutrition-Focused Physical Findings: overall appearance, extremities, muscles and bones, head and eyes, skin     Current Medical Diagnosis and Past  Medical History     Past Medical History:   Diagnosis Date    Cardiac arrest     CHF (congestive heart failure)     EF 25-30%    COPD (chronic obstructive pulmonary disease)     Coronary artery disease     Diabetes     GERD (gastroesophageal reflux disease)     Hepatitis C     Hypotension     Requiring Midodrine    Neuropathy     Substance abuse      Nutrition/Diet History  Spiritual, Cultural Beliefs, Rastafarian Practices, Values that Affect Care: no  Food Allergies: NKFA  Factors Affecting Nutritional Intake: NPO, difficulty/impaired swallowing  Lab/Procedures/Meds  Recent Labs   Lab 09/19/23  0525      K 4.6   BUN 49*   CREATININE 11.70*   CALCIUM 9.0   ALBUMIN 2.8*      ALT 43   AST 53*   PHOS 4.2     Note: BUN and crea elevated with ESRD, albumin low- wounds and poor intake with wound infection.  Last A1c:   Lab Results   Component Value Date    HGBA1C 5.3 09/19/2023     Lab Results   Component Value Date    RBC 3.67 (L) 09/19/2023    HGB 11.9 (L) 09/19/2023    HCT 37.2 (L) 09/19/2023    .4 (H) 09/19/2023    MCH 32.4 (H) 09/19/2023    MCHC 32.0 09/19/2023     Pertinent Labs Reviewed: reviewed  Pertinent Labs Comments: Sodium: 141  Potassium: 4.6  Chloride: 103  CO2: 24  Anion Gap: 19 (H)  BUN: 49 (H)  Creatinine: 11.70 (H)  BUN/CREAT RATIO: 4 (L)  eGFR: 4 (L)  Glucose: 80  Calcium: 9.0  Phosphorus: 4.2  Magnesium : 2.4 (H)  Alkaline Phosphatase: 125 (H)  PROTEIN TOTAL: 7.0  Albumin: 2.8 (L)  Albumin/Globulin Ratio: 0.7  BILIRUBIN TOTAL: 1.2  AST: 53 (H)  ALT: 43  Globulin, Total: 4.2 (H)      (H): Data is abnormally high  (L): Data is abnormally low  Pertinent Medications Reviewed: reviewed  Pertinent Medications Comments: amiodarone, albuterol, vanc, carvedilol, azacatem, clindamycin, atrovastatin, monetlusk, olanzapine  Anthropometrics  Temp: 97.7 °F (36.5 °C)  Height: 6' (182.9 cm)  Height (inches): 72 in  Weight Method: Bed Scale  Weight: 107.5 kg (237 lb)  Weight (lb): 237 lb  Ideal Body  Weight (IBW), Male: 178 lb  % Ideal Body Weight, Male (lb): 133.15 %  BMI (Calculated): 32.1  BMI Grade: 30 - 34.9- obesity - grade I     Estimated/Assessed Needs  Weight Used For Calorie Calculations: 87.6 kg (193 lb 2 oz)   Energy Need Method: Kcal/kg Energy Calorie Requirements (kcal): 8505-6826  Weight Used For Protein Calculations: 87.6 kg (193 lb 2 oz)  Protein Requirements: 105-122  Estimated Fluid Requirement Method: RDA Method    RDA Method (mL): 2190     Nutrition by Nursing     Intake (%): 25%        Last Bowel Movement: 09/20/23              Nutrition Follow-Up  RD Follow-up?: Yes

## 2023-09-22 PROBLEM — G93.41 ACUTE METABOLIC ENCEPHALOPATHY: Status: ACTIVE | Noted: 2023-09-22

## 2023-09-22 LAB
AMMONIA PLAS-SCNC: 22 ΜMOL/L (ref 11–32)
BACTERIA BLD CULT: ABNORMAL
GLUCOSE SERPL-MCNC: 132 MG/DL (ref 70–105)
GLUCOSE SERPL-MCNC: 176 MG/DL (ref 70–105)
GLUCOSE SERPL-MCNC: 91 MG/DL (ref 70–105)
GLUCOSE SERPL-MCNC: 93 MG/DL (ref 70–105)
GRAM STN SPEC: ABNORMAL
HCO3 UR-SCNC: 25.4 MMOL/L (ref 21–28)
PCO2 BLDA: 54 MMHG (ref 35–48)
PH SMN: 7.28 [PH] (ref 7.35–7.45)
PO2 BLDA: 82 MMHG (ref 83–108)
POC BASE EXCESS: -2 MMOL/L (ref -2–3)
POC SATURATED O2: 94 % (ref 95–98)

## 2023-09-22 PROCEDURE — 82962 GLUCOSE BLOOD TEST: CPT

## 2023-09-22 PROCEDURE — 99900035 HC TECH TIME PER 15 MIN (STAT)

## 2023-09-22 PROCEDURE — 63600175 PHARM REV CODE 636 W HCPCS

## 2023-09-22 PROCEDURE — 99232 PR SUBSEQUENT HOSPITAL CARE,LEVL II: ICD-10-PCS | Mod: ,,, | Performed by: HOSPITALIST

## 2023-09-22 PROCEDURE — 82140 ASSAY OF AMMONIA: CPT | Performed by: HOSPITALIST

## 2023-09-22 PROCEDURE — 97530 THERAPEUTIC ACTIVITIES: CPT | Mod: CQ

## 2023-09-22 PROCEDURE — 25000003 PHARM REV CODE 250: Performed by: FAMILY MEDICINE

## 2023-09-22 PROCEDURE — 99232 SBSQ HOSP IP/OBS MODERATE 35: CPT | Mod: ,,, | Performed by: HOSPITALIST

## 2023-09-22 PROCEDURE — 94761 N-INVAS EAR/PLS OXIMETRY MLT: CPT

## 2023-09-22 PROCEDURE — 11000001 HC ACUTE MED/SURG PRIVATE ROOM

## 2023-09-22 PROCEDURE — 25000242 PHARM REV CODE 250 ALT 637 W/ HCPCS: Performed by: HOSPITALIST

## 2023-09-22 PROCEDURE — 27000221 HC OXYGEN, UP TO 24 HOURS

## 2023-09-22 PROCEDURE — 97530 THERAPEUTIC ACTIVITIES: CPT

## 2023-09-22 PROCEDURE — 97110 THERAPEUTIC EXERCISES: CPT | Mod: CQ

## 2023-09-22 PROCEDURE — 25000003 PHARM REV CODE 250

## 2023-09-22 PROCEDURE — 82803 BLOOD GASES ANY COMBINATION: CPT

## 2023-09-22 PROCEDURE — 94640 AIRWAY INHALATION TREATMENT: CPT

## 2023-09-22 PROCEDURE — 25000003 PHARM REV CODE 250: Performed by: NURSE PRACTITIONER

## 2023-09-22 PROCEDURE — 36600 WITHDRAWAL OF ARTERIAL BLOOD: CPT

## 2023-09-22 PROCEDURE — 97112 NEUROMUSCULAR REEDUCATION: CPT

## 2023-09-22 RX ADMIN — SEVELAMER CARBONATE 800 MG: 800 TABLET, FILM COATED ORAL at 09:09

## 2023-09-22 RX ADMIN — AZTREONAM 500 MG: 1 INJECTION, POWDER, LYOPHILIZED, FOR SOLUTION INTRAMUSCULAR; INTRAVENOUS at 11:09

## 2023-09-22 RX ADMIN — MONTELUKAST SODIUM 10 MG: 10 TABLET, FILM COATED ORAL at 08:09

## 2023-09-22 RX ADMIN — ATORVASTATIN CALCIUM 40 MG: 40 TABLET, FILM COATED ORAL at 08:09

## 2023-09-22 RX ADMIN — SEVELAMER CARBONATE 800 MG: 800 TABLET, FILM COATED ORAL at 08:09

## 2023-09-22 RX ADMIN — HYDROCODONE BITARTRATE AND ACETAMINOPHEN 1 TABLET: 10; 325 TABLET ORAL at 08:09

## 2023-09-22 RX ADMIN — ASPIRIN 81 MG: 81 TABLET, COATED ORAL at 09:09

## 2023-09-22 RX ADMIN — MIDODRINE HYDROCHLORIDE 10 MG: 5 TABLET ORAL at 05:09

## 2023-09-22 RX ADMIN — SEVELAMER CARBONATE 800 MG: 800 TABLET, FILM COATED ORAL at 04:09

## 2023-09-22 RX ADMIN — AZTREONAM 500 MG: 1 INJECTION, POWDER, LYOPHILIZED, FOR SOLUTION INTRAMUSCULAR; INTRAVENOUS at 04:09

## 2023-09-22 RX ADMIN — MUPIROCIN: 20 OINTMENT TOPICAL at 09:09

## 2023-09-22 RX ADMIN — PANTOPRAZOLE SODIUM 40 MG: 40 TABLET, DELAYED RELEASE ORAL at 09:09

## 2023-09-22 RX ADMIN — MUPIROCIN: 20 OINTMENT TOPICAL at 08:09

## 2023-09-22 RX ADMIN — CARVEDILOL 6.25 MG: 6.25 TABLET, FILM COATED ORAL at 09:09

## 2023-09-22 RX ADMIN — MIDODRINE HYDROCHLORIDE 10 MG: 5 TABLET ORAL at 11:09

## 2023-09-22 RX ADMIN — AMIODARONE HYDROCHLORIDE 400 MG: 200 TABLET ORAL at 09:09

## 2023-09-22 RX ADMIN — IPRATROPIUM BROMIDE AND ALBUTEROL SULFATE 3 ML: 2.5; .5 SOLUTION RESPIRATORY (INHALATION) at 07:09

## 2023-09-22 RX ADMIN — AZTREONAM 500 MG: 1 INJECTION, POWDER, LYOPHILIZED, FOR SOLUTION INTRAMUSCULAR; INTRAVENOUS at 08:09

## 2023-09-22 RX ADMIN — OLANZAPINE 2.5 MG: 2.5 TABLET, FILM COATED ORAL at 08:09

## 2023-09-22 RX ADMIN — MIDODRINE HYDROCHLORIDE 10 MG: 5 TABLET ORAL at 09:09

## 2023-09-22 NOTE — ASSESSMENT & PLAN NOTE
Has AICD; continue current meds; BP low at times;he is on Coreg and Amiodarone; he is s/p cardiac arrest from dialysis at [facility] in [place], [place] in August

## 2023-09-22 NOTE — PROGRESS NOTES
Vascular surgery      Status post drainage of right upper extremity hematoma approximally 300 cc old blood evacuated at area of cephalic radial AV fistula with debridement of necrotic eschar left wrist  Dr. Campbell reviewed CT a of right upper extremity 3.3 cm pseudoaneurysm at cephalic vein  Repack right upper extremity daily wet-to-dry Kerlix done today no bleeding   Dr. Campbell [facility] due to high risk of bleeding  Clean left wrist wound with Vashe redressed with bandages daily

## 2023-09-22 NOTE — PT/OT/SLP PROGRESS
Occupational Therapy   Treatment    Name: Yvan Rollins  MRN: [id number]  Admitting Diagnosis:  Bacteremia  2 Days Post-Op    Recommendations:     Discharge Recommendations: nursing facility, skilled  Discharge Equipment Recommendations:  to be determined by next level of care  Barriers to discharge:  Inaccessible home environment, Decreased caregiver support    Assessment:     Yvan Rollins is a 63 y.o. male with a medical diagnosis of Bacteremia.  He presents with the following performance deficits affecting function are weakness, impaired endurance, impaired self care skills, impaired functional mobility, gait instability, impaired balance, decreased coordination. Patient presents with a significant improvement in motor planning, following commands and visual motor coordination this date. Patient presents with decreased ataxic movement patterns this date compared to previous tx session.     Rehab Prognosis:  Good; patient would benefit from acute skilled OT services to address these deficits and reach maximum level of function.       Plan:     Patient to be seen 5 x/week to address the above listed problems via self-care/home management, therapeutic activities, therapeutic exercises, neuromuscular re-education  Plan of Care Expires:    Plan of Care Reviewed with: patient    Subjective     Chief Complaint: Bacteremia  Patient/Family Comments/goals: Agreeable to OT tx   Pain/Comfort:  Pain Rating 1: 0/10    Objective:     Communicated with: RN prior to session.  Patient found up in chair with peripheral IV, SCD upon OT entry to room.    General Precautions: Standard, fall    Orthopedic Precautions:   Braces:    Respiratory Status: Room air     Occupational Performance:     Bed Mobility:         Functional Mobility/Transfers:  Patient completed Sit <> Stand Transfer with minimum assistance  with  rolling walker     Activities of Daily Living:      AMPAC 6 Click ADL: 15    Treatment & Education:  Patient  performed sit to stand transfers x 4 reps with min assist and verbal cues for technique. Patient required SBA-CGA for static stand x 10-15 seconds each rep.  While seated in recliner patient performed functional reaching neuro re-ed activity focusing on grasp and release of items, crossing midline, hand to hand transfer and locating/reaching for items through all planes. Patient required significantly fewer verbal and tactile cues this date compared to previous tx session.     Patient left up in chair with all lines intact, call button in reach, and chair alarm on    GOALS:   Multidisciplinary Problems       Occupational Therapy Goals          Problem: Occupational Therapy    Goal Priority Disciplines Outcome Interventions   Occupational Therapy Goal     OT, PT/OT Ongoing, Progressing    Description: STG:  Pt will perform grooming with setup   Pt will bathe self with min assist  Pt will perform UB dressing with SBA  Pt will perform LB dressing with min assist  Pt will sit EOB x 10 min with SBA   Pt will transfer toilet/BSC with min assist  Pt will tolerate 15 minutes of tx with minimal fatigue      LT. Pt will achieve max level of independence with self care.                         Time Tracking:     OT Date of Treatment: 23  OT Start Time: 1120  OT Stop Time: 1143  OT Total Time (min): 23 min    Billable Minutes:Therapeutic Activity 10  Neuromuscular Re-education 13            2023

## 2023-09-22 NOTE — PLAN OF CARE
YOVANA consulted for M. [employer] Medicare Adv is not in network [facility]. Physician informed, YOVANA will continue to follow for dc needs.

## 2023-09-22 NOTE — PT/OT/SLP PROGRESS
"Physical Therapy Treatment    Patient Name:  Yvan Rollins   MRN:  [id number]    Recommendations:     Discharge Recommendations: nursing facility, skilled  Discharge Equipment Recommendations: to be determined by next level of care  Barriers to discharge:  ongoing medical treatment    Assessment:     Yvan Rollins is a 63 y.o. male admitted with a medical diagnosis of Bacteremia.  He presents with the following impairments/functional limitations: weakness, impaired endurance, impaired self care skills, impaired functional mobility, gait instability, edema, impaired skin.    Pt able to assist with activities much better than anticipation. Pt does present with increased "jerky" motions during EOB sitting and transfers with knees buckling during transfer to recliner chair    PT POC discussed with Anne Marie Bobo DPT    Rehab Prognosis: Fair; patient would benefit from acute skilled PT services to address these deficits and reach maximum level of function.    Recent Surgery: Procedure(s) (LRB):  INCISION AND DRAINAGE, HEMATOMA (Right)  IRRIGATION AND DEBRIDEMENT (Left) 2 Days Post-Op    Plan:     During this hospitalization, patient to be seen 5 x/week to address the identified rehab impairments via gait training, therapeutic activities, therapeutic exercises, neuromuscular re-education and progress toward the following goals:    Plan of Care Expires:       Subjective     Chief Complaint: bacteremia  Patient/Family Comments/goals: pt agreeable  Pain/Comfort:         Objective:     Communicated with Dea Mosher RN prior to session.  Patient found HOB elevated with telemetry, SCD, peripheral IV upon PT entry to room.     General Precautions: Standard, fall  Orthopedic Precautions:    Braces:    Respiratory Status: Nasal cannula, flow 2 L/min     Functional Mobility:  Bed Mobility:     Supine to Sit: minimum assistance and of 1-2 persons  Transfers:     Sit to Stand:  minimum assistance and of 2 persons with rolling " walker  Bed to Chair: moderate assistance, maximal assistance, and of 2 persons with  rolling walker  using  Step Transfer and with knees buckling  Gait: 4-5 steps around to recliner chair with RW and moderate assist x 2 to maximum assist x 2 due to knees buckling      AM-PAC 6 CLICK MOBILITY  Turning over in bed (including adjusting bedclothes, sheets and blankets)?: 3  Sitting down on and standing up from a chair with arms (e.g., wheelchair, bedside commode, etc.): 3  Moving from lying on back to sitting on the side of the bed?: 3  Moving to and from a bed to a chair (including a wheelchair)?: 2  Need to walk in hospital room?: 2  Climbing 3-5 steps with a railing?: 1  Basic Mobility Total Score: 14       Treatment & Education:  Pt performed 2 x 15 reps (B) LE exercises: ap, quad set, glut set, straight leg raise, hip ab/adduction, long arc quad, heel slide with assist and rest as needed    Contact guard assist to standby assist sitting EOB x 5 minutes    Minimum assist x 2 sit to stand x 2 trials with RW    Patient left up in chair with all lines intact, call button in reach, and CNA present..    GOALS:   Multidisciplinary Problems       Physical Therapy Goals          Problem: Physical Therapy    Goal Priority Disciplines Outcome Goal Variances Interventions   Physical Therapy Goal     PT, PT/OT Ongoing, Progressing     Description: Short Term Goals  Ambulate Mod - 10 feet with rolling walker assistive device.   Supine to sit contact guard  Sit to stand contact guard  SPT contact guard  5. Independent with HEP    Long Term Goals  Ambulate CGA - 20 feet with rolling walker assistive device.   Supine to sit stand-by  Sit to stand stand-by  SPT stand-by  Needed equipment for home.                              Time Tracking:     PT Received On: 09/22/23  PT Start Time: 1003     PT Stop Time: 1033  PT Total Time (min): 30 min     Billable Minutes: Therapeutic Activity 10 and Therapeutic Exercise 15    Treatment Type:  Treatment  PT/PTA: PTA     Number of PTA visits since last PT visit: 1 09/22/2023

## 2023-09-22 NOTE — PLAN OF CARE
Problem: Adult Inpatient Plan of Care  Goal: Plan of Care Review  Outcome: Ongoing, Progressing  Goal: Patient-Specific Goal (Individualized)  Outcome: Ongoing, Progressing  Goal: Absence of Hospital-Acquired Illness or Injury  Outcome: Ongoing, Progressing  Goal: Optimal Comfort and Wellbeing  Outcome: Ongoing, Progressing  Goal: Readiness for Transition of Care  Outcome: Ongoing, Progressing     Problem: Impaired Wound Healing  Goal: Optimal Wound Healing  Outcome: Ongoing, Progressing     Problem: Infection  Goal: Absence of Infection Signs and Symptoms  Outcome: Ongoing, Progressing     Problem: Skin Injury Risk Increased  Goal: Skin Health and Integrity  Outcome: Ongoing, Progressing     Problem: Diabetes Comorbidity  Goal: Blood Glucose Level Within Targeted Range  Outcome: Ongoing, Progressing     Problem: Device-Related Complication Risk (Hemodialysis)  Goal: Safe, Effective Therapy Delivery  Outcome: Ongoing, Progressing     Problem: Hemodynamic Instability (Hemodialysis)  Goal: Effective Tissue Perfusion  Outcome: Ongoing, Progressing     Problem: Infection (Hemodialysis)  Goal: Absence of Infection Signs and Symptoms  Outcome: Ongoing, Progressing     Problem: Gas Exchange Impaired  Goal: Optimal Gas Exchange  Outcome: Ongoing, Progressing     Problem: Airway Clearance Ineffective  Goal: Effective Airway Clearance  Outcome: Ongoing, Progressing

## 2023-09-22 NOTE — ASSESSMENT & PLAN NOTE
He is more alert today but confused; probably at baseline; CT brain did not show acute abnormality

## 2023-09-22 NOTE — ASSESSMENT & PLAN NOTE
S/P I&D right hematoma and left I&D of wounds in upper extremities; followed by Surgery; this was due to vasopressor infiltration at [facility]; on Aztreonam and Vancomycin

## 2023-09-22 NOTE — INTERVAL H&P NOTE
The patient has been examined and the H&P has been reviewed:    I concur with the findings and no changes have occurred since H&P was written.    Anesthesia/Surgery risks, benefits and alternative options discussed and understood by patient/family.          Active Hospital Problems    Diagnosis  POA    *Bacteremia [R78.81]  Unknown    Penile discharge [R36.9]  Unknown    ESRD (end stage renal disease) [N18.6]  Yes    GERD (gastroesophageal reflux disease) [K21.9]  Yes    Chronic hepatitis C [B18.2]  Yes    History of cardiac arrest [Z86.74]  Not Applicable    Atrial fibrillation [I48.91]  Yes    Arm wound, right, initial encounter [S41.101A]  Yes    Arm wound, left, initial encounter [S41.102A]  Yes    Diabetes [E11.9]  Yes    Chronic combined systolic and diastolic congestive heart failure [I50.42]  Yes      Resolved Hospital Problems    Diagnosis Date Resolved POA    Dysphagia [R13.10] 09/19/2023 Yes    Presence of automatic cardioverter/defibrillator (AICD) [Z95.810] 09/19/2023 Yes    CAD (coronary artery disease) [I25.10] 09/19/2023 Unknown    HFrEF (heart failure with reduced ejection fraction) [I50.20] 09/20/2023 Yes    Hypotension [I95.9] 09/19/2023 Yes    Impaired mobility [Z74.09] 09/19/2023 Yes    Sacral wound [S31.000A] 09/19/2023 Yes    COPD (chronic obstructive pulmonary disease) [J44.9] 09/19/2023 Yes    Hematoma [T14.8XXA] 09/19/2023 Yes

## 2023-09-22 NOTE — PROGRESS NOTES
[facility]  [facility] Medicine  Progress Note    Patient Name: Yvan Rollins  MRN: [id number]  Patient Class: IP- Inpatient   Admission Date: 9/19/2023  Length of Stay: 3 days  Attending Physician: Malinda Fontaine DO  Primary Care Provider: Eugene Funez MD        Subjective:     Principal Problem:Bacteremia        HPI:  Patient is a 63 year old  male who was transferred to [facility] from [place] [facility] ED for vascular consultation for a necrotic wound overlying an AV access site in the RT arm. He presented to [place] [facility] ED via [facility] from [facility] (referenced to be [facility] from recent hospitalization at [facility] (8/15/2023)). He was recently hospitalized at [facility] after having dyspnea during dialysis that progressed to cardiac arrest with ROSC. The patient was mechanically intubated, started on pressors, and managed in the ICU. According to the discharge summary, the patient had an adverse reaction to surface and infiltration anesthetic and intravenous infiltration.     During hospitalization at [facility], the RT AV access site was unable to be used and a tunneled RT dialysis catheter was placed. He was evaluated by Vascular surgery during that admission with outpatient follow-up. Today, he was seen by wound care at the NH and they recommended return to [facility] for vascular evaluation of the wound. However, [facility] was on Diversion and he ended up at [facility] and subsequently [facility].     During his hospitalization at [facility], cardiology was consulted. ECHO demonstrated EF of 25-30% and there was a reported 1 episode of nonsustained v-tach. Cardiology recommended resuming goal directed medical therapy, and there was no identified cardiac etiology for his arrest. He was also evaluated by neurology due to concern for anoxic brain injury.  CT of the brain demonstrated chronic ischemic microangiopathy. Neurology diagnosed his encephalopathy to be multifactorial - anoxic insult from  prolonged cardiac arrest with metabolic derangement. His mental status improved, but not to baseline. Today, at [facility] he   is Aox1 and he is a poor historian. He has difficulty answering questions. History is obtained from medical records.     The patient has a PMH of nonischemic cardiomyopathy with EF 20-25%, S/P AICD placement, ESRD on HD TTS, COPD, HTN, polysubstance abuse, and hepatitis C.         Overview/Hospital Course:  63 year old male presents with necrotic wound of AV fistula site s/p debridement.  He is being treated for a Staph bacteremia.  He is asleep on rounds.      Vital Signs (Most Recent):  Temp: 97.5 °F (36.4 °C) (09/18/23 2315)  Pulse: 90 (09/18/23 2315)  Resp: (!) 22 (09/18/23 2315)  BP: 115/75 (09/18/23 2315)  SpO2: 97 % (09/18/23 2315) Vital Signs (24h Range):  Temp:  [97.5 °F (36.4 °C)-97.9 °F (36.6 °C)] 97.5 °F (36.4 °C)  Pulse:  [60-90] 90  Resp:  [11-22] 22  SpO2:  [95 %-100 %] 97 %  BP: ()/(54-86) 115/75         Assessment/Plan:      * Bacteremia  Blood culture shows MRSE; on Vancomycin; repeat blood cultures pending      Arm wound, left, initial encounter  S/P I&D right hematoma and left I&D of wounds in upper extremities; followed by Surgery; this was due to vasopressor infiltration at [facility]; on Aztreonam and Vancomycin      Arm wound, right, initial encounter  S/P I&D right hematoma and left I&D of wounds in upper extremities; followed by Surgery; this was due to vasopressor infiltration at [facility]; on Aztreonam and Vancomycin    History of cardiac arrest  Has AICD; continue current meds; BP low at times;he is on Coreg and Amiodarone; he is s/p cardiac arrest from dialysis at [facility] in [place], [place] in August    Acute metabolic encephalopathy  Unknown baseline; will check CT brain, ABG, ammonia level; urine culture pending    Penile discharge  Pending Chlamydia and N. gonorrhea      Chronic combined systolic and diastolic congestive heart failure  Continue current  meds      Diabetes  Does not appear to be on any home meds for DM; will monitor BS levels; BS stable    Atrial fibrillation  On Amiodarone and Coreg; followed by Cardiology      Chronic hepatitis C  Stable    GERD (gastroesophageal reflux disease)  On Protonix      ESRD (end stage renal disease)  On dialysis               Discharge Planning   SANDRA:      Code Status: Full Code   Is the patient medically ready for discharge?:     Reason for patient still in hospital (select all that apply): Treatment  Discharge Plan A: Home with family                  Malinda Fontaine DO  Department of Hospital Medicine   [facility]

## 2023-09-22 NOTE — PROGRESS NOTES
[facility]  Nephrology  Progress Note    Patient Name: Yvan Rollins  MRN: [id number]  Admission Date: 9/19/2023  Hospital Length of Stay: 3 days  Attending Provider: Malinda Fontaine DO   Primary Care Physician: Eugene Funez MD  Principal Problem:Bacteremia    Consults  Subjective:     Interval History: Mr. Rollins is seen in f/u of his ESRD. He is easily awakened today. Dressing over right forearm is dry. He complains of no pain. Dialyzed yesterday.    Review of patient's allergies indicates:   Allergen Reactions    Ceftriaxone Swelling    Lisinopril Swelling and Rash     Facial swelling   Facial swelling       Current Facility-Administered Medications   Medication Frequency    0.9%  NaCl infusion PRN    acetaminophen tablet 650 mg Q4H PRN    albuterol-ipratropium 2.5 mg-0.5 mg/3 mL nebulizer solution 3 mL BID    [START ON 9/30/2023] amiodarone tablet 200 mg Daily    amiodarone tablet 400 mg Daily    aspirin EC tablet 81 mg Daily    atorvastatin tablet 40 mg QHS    aztreonam (AZACTAM) 500 mg in dextrose 5 % (D5W) 100 mL IVPB Q8H    carvediloL tablet 6.25 mg BID WM    dextrose 10% bolus 125 mL 125 mL PRN    dextrose 10% bolus 250 mL 250 mL PRN    glucagon (human recombinant) injection 1 mg PRN    heparin (porcine) injection 4,000 Units PRN    HYDROcodone-acetaminophen  mg per tablet 1 tablet Q6H PRN    HYDROcodone-acetaminophen 5-325 mg per tablet 1 tablet Q6H PRN    insulin aspart U-100 injection 0-5 Units Q6H PRN    midodrine tablet 10 mg TID WM    montelukast tablet 10 mg QHS    mupirocin 2 % ointment BID    naloxone 0.4 mg/mL injection 0.02 mg PRN    OLANZapine tablet 2.5 mg QHS    ondansetron injection 4 mg Q8H PRN    pantoprazole EC tablet 40 mg Daily    sevelamer carbonate tablet 800 mg TID    sodium chloride 0.9% flush 10 mL Q12H PRN    vancomycin - pharmacy to dose pharmacy to manage frequency       Objective:     Vital Signs (Most Recent):  Temp: 97.8 °F (36.6 °C)  (09/22/23 0609)  Pulse: 60 (09/22/23 0737)  Resp: 20 (09/22/23 0737)  BP: 115/72 (09/22/23 0609)  SpO2: 96 % (09/22/23 0737) Vital Signs (24h Range):  Temp:  [97.5 °F (36.4 °C)-98.7 °F (37.1 °C)] 97.8 °F (36.6 °C)  Pulse:  [48-92] 60  Resp:  [16-20] 20  SpO2:  [92 %-99 %] 96 %  BP: ()/(53-84) 115/72     Weight: 107.5 kg (237 lb) (09/18/23 2315)  Body mass index is 32.14 kg/m².  Body surface area is 2.34 meters squared.    I/O last 3 completed shifts:  In: -   Out: 1800 [Other:1800]    Physical Exam No edema and chest clear.     Significant Labs:sureBMP:   Recent Labs   Lab 09/19/23  0525   GLU 80      K 4.6      CO2 24   BUN 49*   CREATININE 11.70*   CALCIUM 9.0   MG 2.4*     CBC:   Recent Labs   Lab 09/19/23  0414   WBC 5.87   RBC 3.67*   HGB 11.9*   HCT 37.2*      .4*   MCH 32.4*   MCHC 32.0     All labs within the past 24 hours have been reviewed.    Significant Imaging:  Labs: Reviewed    Assessment/Plan:     Active Diagnoses:    Diagnosis Date Noted POA    PRINCIPAL PROBLEM:  Bacteremia [R78.81] 09/20/2023 Unknown    Penile discharge [R36.9] 09/20/2023 Unknown    ESRD (end stage renal disease) [N18.6] 09/19/2023 Yes    GERD (gastroesophageal reflux disease) [K21.9] 09/19/2023 Yes    Chronic hepatitis C [B18.2] 09/19/2023 Yes    History of cardiac arrest [Z86.74] 09/19/2023 Not Applicable    Atrial fibrillation [I48.91] 09/19/2023 Yes    Arm wound, right, initial encounter [S41.101A] 09/19/2023 Yes    Arm wound, left, initial encounter [S41.102A] 09/19/2023 Yes    Diabetes [E11.9] 09/19/2023 Yes    Chronic combined systolic and diastolic congestive heart failure [I50.42] 09/19/2023 Yes      Problems Resolved During this Admission:    Diagnosis Date Noted Date Resolved POA    Dysphagia [R13.10] 09/19/2023 09/19/2023 Yes    Presence of automatic cardioverter/defibrillator (AICD) [Z95.810] 09/19/2023 09/19/2023 Yes    CAD (coronary artery disease) [I25.10] 09/19/2023 09/19/2023  Unknown    HFrEF (heart failure with reduced ejection fraction) [I50.20] 09/19/2023 09/20/2023 Yes    Hypotension [I95.9] 09/19/2023 09/19/2023 Yes    Impaired mobility [Z74.09] 09/19/2023 09/19/2023 Yes    Sacral wound [S31.000A] 09/19/2023 09/19/2023 Yes    COPD (chronic obstructive pulmonary disease) [J44.9] 09/19/2023 09/19/2023 Yes    Hematoma [T14.8XXA] 09/19/2023 09/19/2023 Yes       Dialysis tomorrow is planned. Should be able to go back to [place] soon and continue dialysis in [place] as before.    Thank you for your consult. I will follow-up with patient. Please contact us if you have any additional questions.    Alfred Valdez MD  Nephrology  [facility]

## 2023-09-22 NOTE — ASSESSMENT & PLAN NOTE
S/P I&D right hematoma and left I&D of wounds in upper extremities; followed by Surgery; this was due to vasopressor infiltration at [facility]; on Aztreonam and Vancomycin

## 2023-09-23 LAB
CHLAMYDIA BY PCR: NEGATIVE
GLUCOSE SERPL-MCNC: 112 MG/DL (ref 70–105)
GLUCOSE SERPL-MCNC: 136 MG/DL (ref 70–105)
GLUCOSE SERPL-MCNC: 84 MG/DL (ref 70–105)
N. GONORRHOEAE (GC) BY PCR: NEGATIVE
UA COMPLETE W REFLEX CULTURE PNL UR: NO GROWTH
VANCOMYCIN SERPL-MCNC: 26.8 ΜG/ML (ref 0–20)

## 2023-09-23 PROCEDURE — 25000242 PHARM REV CODE 250 ALT 637 W/ HCPCS: Performed by: HOSPITALIST

## 2023-09-23 PROCEDURE — 80202 ASSAY OF VANCOMYCIN: CPT | Performed by: HOSPITALIST

## 2023-09-23 PROCEDURE — 99232 PR SUBSEQUENT HOSPITAL CARE,LEVL II: ICD-10-PCS | Mod: ,,, | Performed by: HOSPITALIST

## 2023-09-23 PROCEDURE — 11000001 HC ACUTE MED/SURG PRIVATE ROOM

## 2023-09-23 PROCEDURE — 99900035 HC TECH TIME PER 15 MIN (STAT)

## 2023-09-23 PROCEDURE — 63600175 PHARM REV CODE 636 W HCPCS: Performed by: INTERNAL MEDICINE

## 2023-09-23 PROCEDURE — 63600175 PHARM REV CODE 636 W HCPCS

## 2023-09-23 PROCEDURE — 25000003 PHARM REV CODE 250: Performed by: INTERNAL MEDICINE

## 2023-09-23 PROCEDURE — 25000003 PHARM REV CODE 250: Performed by: FAMILY MEDICINE

## 2023-09-23 PROCEDURE — 94640 AIRWAY INHALATION TREATMENT: CPT

## 2023-09-23 PROCEDURE — 27000221 HC OXYGEN, UP TO 24 HOURS

## 2023-09-23 PROCEDURE — 25000003 PHARM REV CODE 250

## 2023-09-23 PROCEDURE — 99232 SBSQ HOSP IP/OBS MODERATE 35: CPT | Mod: ,,, | Performed by: HOSPITALIST

## 2023-09-23 PROCEDURE — 90935 HEMODIALYSIS ONE EVALUATION: CPT

## 2023-09-23 PROCEDURE — 94761 N-INVAS EAR/PLS OXIMETRY MLT: CPT

## 2023-09-23 PROCEDURE — 82962 GLUCOSE BLOOD TEST: CPT

## 2023-09-23 RX ADMIN — ATORVASTATIN CALCIUM 40 MG: 40 TABLET, FILM COATED ORAL at 10:09

## 2023-09-23 RX ADMIN — MIDODRINE HYDROCHLORIDE 10 MG: 5 TABLET ORAL at 01:09

## 2023-09-23 RX ADMIN — MUPIROCIN: 20 OINTMENT TOPICAL at 09:09

## 2023-09-23 RX ADMIN — CARVEDILOL 6.25 MG: 6.25 TABLET, FILM COATED ORAL at 06:09

## 2023-09-23 RX ADMIN — AMIODARONE HYDROCHLORIDE 400 MG: 200 TABLET ORAL at 09:09

## 2023-09-23 RX ADMIN — AZTREONAM 500 MG: 1 INJECTION, POWDER, LYOPHILIZED, FOR SOLUTION INTRAMUSCULAR; INTRAVENOUS at 01:09

## 2023-09-23 RX ADMIN — AZTREONAM 500 MG: 1 INJECTION, POWDER, LYOPHILIZED, FOR SOLUTION INTRAMUSCULAR; INTRAVENOUS at 04:09

## 2023-09-23 RX ADMIN — SODIUM CHLORIDE: 9 INJECTION, SOLUTION INTRAVENOUS at 09:09

## 2023-09-23 RX ADMIN — HEPARIN SODIUM 4000 UNITS: 1000 INJECTION, SOLUTION INTRAVENOUS; SUBCUTANEOUS at 12:09

## 2023-09-23 RX ADMIN — AZTREONAM 500 MG: 1 INJECTION, POWDER, LYOPHILIZED, FOR SOLUTION INTRAMUSCULAR; INTRAVENOUS at 10:09

## 2023-09-23 RX ADMIN — IPRATROPIUM BROMIDE AND ALBUTEROL SULFATE 3 ML: 2.5; .5 SOLUTION RESPIRATORY (INHALATION) at 08:09

## 2023-09-23 RX ADMIN — OLANZAPINE 2.5 MG: 2.5 TABLET, FILM COATED ORAL at 10:09

## 2023-09-23 RX ADMIN — MONTELUKAST SODIUM 10 MG: 10 TABLET, FILM COATED ORAL at 10:09

## 2023-09-23 RX ADMIN — MIDODRINE HYDROCHLORIDE 10 MG: 5 TABLET ORAL at 06:09

## 2023-09-23 RX ADMIN — SEVELAMER CARBONATE 800 MG: 800 TABLET, FILM COATED ORAL at 02:09

## 2023-09-23 RX ADMIN — ASPIRIN 81 MG: 81 TABLET, COATED ORAL at 09:09

## 2023-09-23 RX ADMIN — SEVELAMER CARBONATE 800 MG: 800 TABLET, FILM COATED ORAL at 10:09

## 2023-09-23 RX ADMIN — PANTOPRAZOLE SODIUM 40 MG: 40 TABLET, DELAYED RELEASE ORAL at 09:09

## 2023-09-23 RX ADMIN — MUPIROCIN: 20 OINTMENT TOPICAL at 10:09

## 2023-09-23 RX ADMIN — IPRATROPIUM BROMIDE AND ALBUTEROL SULFATE 3 ML: 2.5; .5 SOLUTION RESPIRATORY (INHALATION) at 07:09

## 2023-09-23 RX ADMIN — SEVELAMER CARBONATE 800 MG: 800 TABLET, FILM COATED ORAL at 09:09

## 2023-09-23 NOTE — PROGRESS NOTES
Vancomycin random level 26.8 this morning. Patient is on dialysis. Will check another random on Tuesday.

## 2023-09-23 NOTE — PROGRESS NOTES
[facility]  [facility] Medicine  Progress Note    Patient Name: Yvan Rollins  MRN: [id number]  Patient Class: IP- Inpatient   Admission Date: 9/19/2023  Length of Stay: 4 days  Attending Physician: Malinda Fontaine DO  Primary Care Provider: Eugene Funez MD        Subjective:     Principal Problem:Bacteremia        HPI:  Patient is a 63 year old  male who was transferred to [facility] from [place] ED for vascular consultation for a necrotic wound overlying an AV access site in the RT arm. He presented to [place] ED via [facility] from [facility] (referenced to be [facility] from recent hospitalization at [facility] (8/15/2023)). He was recently hospitalized at [facility] after having dyspnea during dialysis that progressed to cardiac arrest with ROSC. The patient was mechanically intubated, started on pressors, and managed in the ICU. According to the discharge summary, the patient had an adverse reaction to surface and infiltration anesthetic and intravenous infiltration.     During hospitalization at [facility], the RT AV access site was unable to be used and a tunneled RT dialysis catheter was placed. He was evaluated by Vascular surgery during that admission with outpatient follow-up. Today, he was seen by wound care at the NH and they recommended return to [facility] for vascular evaluation of the wound. However, [facility] was on Diversion and he ended up at [place] and subsequently [facility].     During his hospitalization at [facility], cardiology was consulted. ECHO demonstrated EF of 25-30% and there was a reported 1 episode of nonsustained v-tach. Cardiology recommended resuming goal directed medical therapy, and there was no identified cardiac etiology for his arrest. He was also evaluated by neurology due to concern for anoxic brain injury.  CT of the brain demonstrated chronic ischemic microangiopathy. Neurology diagnosed his encephalopathy to be multifactorial - anoxic insult from  prolonged cardiac arrest with metabolic derangement. His mental status improved, but not to baseline. Today, at [facility] he   is Aox1 and he is a poor historian. He has difficulty answering questions. History is obtained from medical records.     The patient has a PMH of nonischemic cardiomyopathy with EF 20-25%, S/P AICD placement, ESRD on HD TTS, COPD, HTN, polysubstance abuse, and hepatitis C.         Overview/Hospital Course:  63 year old male presents with necrotic wound of AV fistula site s/p debridement.  He is being treated for a Staph bacteremia.  He is asleep on rounds.      Vitals:    09/23/23 0036 09/23/23 0422 09/23/23 0649 09/23/23 0809   BP: 123/73 130/87 125/75    Pulse: 60 62 61 63   Resp: 16 16 16 20   Temp: 97 °F (36.1 °C) 97.2 °F (36.2 °C) 98 °F (36.7 °C)    TempSrc:       SpO2: 100% 99% 96% 96%   Weight:       Height:           GEN: NAD; alert but confused      Assessment/Plan:      * Bacteremia  Blood culture shows MRSE; on Vancomycin; repeat blood cultures pending      Acute metabolic encephalopathy  He is more alert today but confused; probably at baseline; CT brain did not show acute abnormality      Arm wound, left, initial encounter  S/P I&D right hematoma and left I&D of wounds in upper extremities; followed by Surgery; this was due to vasopressor infiltration at [facility]; on Aztreonam and Vancomycin      Arm wound, right, initial encounter  S/P I&D right hematoma and left I&D of wounds in upper extremities; followed by Surgery; this was due to vasopressor infiltration at [facility]; on Aztreonam and Vancomycin    History of cardiac arrest  Has AICD; continue current meds; BP low at times;he is on Coreg and Amiodarone; he is s/p cardiac arrest from dialysis at [facility] in [place], [place] in August    Penile discharge  Pending Chlamydia and N. gonorrhea      Chronic combined systolic and diastolic congestive heart failure  Continue current meds      Diabetes  Does not appear to be on any home meds for  DM; will monitor BS levels; BS stable    Atrial fibrillation  On Amiodarone and Coreg; followed by Cardiology      Chronic hepatitis C  Stable    GERD (gastroesophageal reflux disease)  On Protonix      ESRD (end stage renal disease)  On dialysis             Discharge Planning   SANDRA:      Code Status: Full Code   Is the patient medically ready for discharge?:     Reason for patient still in hospital (select all that apply): Treatment  Discharge Plan A: Home with family                  Malinda Fontaine DO  Department of Hospital Medicine   [facility]

## 2023-09-23 NOTE — PROGRESS NOTES
Patient is seen on hemodialysis, he is tolerating this well, we will continue his treatment unchanged.    Assessment/plan 1.  ESRD-continue HD support   2. Remote cardiac arrest  3.  Anoxic brain injury-patient was interacting in a fairly appropriate fashion today greeting me appropriately  4.  Av fistula hematoma  5.  Anemia-this is mild patient's hematocrit was 37% a few days ago   6.  Chronic Hypotension-patient's systolic blood pressure around 95, he is taking midodrine.  7.  Hyperlipidemia-continue Lipitor  8.  Secondary hyperparathyroidism-continue Renvela

## 2023-09-24 LAB
BACTERIA BLD CULT: NORMAL
GLUCOSE SERPL-MCNC: 144 MG/DL (ref 70–105)
GLUCOSE SERPL-MCNC: 173 MG/DL (ref 70–105)
GLUCOSE SERPL-MCNC: 288 MG/DL (ref 70–105)
VANCOMYCIN SERPL-MCNC: 24.9 ΜG/ML (ref 0–20)

## 2023-09-24 PROCEDURE — 99900035 HC TECH TIME PER 15 MIN (STAT)

## 2023-09-24 PROCEDURE — 94761 N-INVAS EAR/PLS OXIMETRY MLT: CPT

## 2023-09-24 PROCEDURE — 82962 GLUCOSE BLOOD TEST: CPT

## 2023-09-24 PROCEDURE — 80202 ASSAY OF VANCOMYCIN: CPT | Performed by: HOSPITALIST

## 2023-09-24 PROCEDURE — 11000001 HC ACUTE MED/SURG PRIVATE ROOM

## 2023-09-24 PROCEDURE — 25000003 PHARM REV CODE 250: Performed by: HOSPITALIST

## 2023-09-24 PROCEDURE — 94640 AIRWAY INHALATION TREATMENT: CPT

## 2023-09-24 PROCEDURE — 63600175 PHARM REV CODE 636 W HCPCS: Performed by: HOSPITALIST

## 2023-09-24 PROCEDURE — 25000242 PHARM REV CODE 250 ALT 637 W/ HCPCS: Performed by: HOSPITALIST

## 2023-09-24 PROCEDURE — 25000003 PHARM REV CODE 250

## 2023-09-24 PROCEDURE — 27000221 HC OXYGEN, UP TO 24 HOURS

## 2023-09-24 PROCEDURE — 63600175 PHARM REV CODE 636 W HCPCS

## 2023-09-24 PROCEDURE — 99232 PR SUBSEQUENT HOSPITAL CARE,LEVL II: ICD-10-PCS | Mod: ,,, | Performed by: HOSPITALIST

## 2023-09-24 PROCEDURE — 99232 SBSQ HOSP IP/OBS MODERATE 35: CPT | Mod: ,,, | Performed by: HOSPITALIST

## 2023-09-24 RX ADMIN — IPRATROPIUM BROMIDE AND ALBUTEROL SULFATE 3 ML: 2.5; .5 SOLUTION RESPIRATORY (INHALATION) at 07:09

## 2023-09-24 RX ADMIN — AZTREONAM 500 MG: 1 INJECTION, POWDER, LYOPHILIZED, FOR SOLUTION INTRAMUSCULAR; INTRAVENOUS at 05:09

## 2023-09-24 RX ADMIN — MIDODRINE HYDROCHLORIDE 10 MG: 5 TABLET ORAL at 09:09

## 2023-09-24 RX ADMIN — CARVEDILOL 6.25 MG: 6.25 TABLET, FILM COATED ORAL at 09:09

## 2023-09-24 RX ADMIN — SEVELAMER CARBONATE 800 MG: 800 TABLET, FILM COATED ORAL at 09:09

## 2023-09-24 RX ADMIN — MIDODRINE HYDROCHLORIDE 10 MG: 5 TABLET ORAL at 11:09

## 2023-09-24 RX ADMIN — SEVELAMER CARBONATE 800 MG: 800 TABLET, FILM COATED ORAL at 02:09

## 2023-09-24 RX ADMIN — CARVEDILOL 6.25 MG: 6.25 TABLET, FILM COATED ORAL at 04:09

## 2023-09-24 RX ADMIN — PANTOPRAZOLE SODIUM 40 MG: 40 TABLET, DELAYED RELEASE ORAL at 09:09

## 2023-09-24 RX ADMIN — ASPIRIN 81 MG: 81 TABLET, COATED ORAL at 09:09

## 2023-09-24 RX ADMIN — INSULIN ASPART 3 UNITS: 100 INJECTION, SOLUTION INTRAVENOUS; SUBCUTANEOUS at 04:09

## 2023-09-24 RX ADMIN — OLANZAPINE 2.5 MG: 2.5 TABLET, FILM COATED ORAL at 09:09

## 2023-09-24 RX ADMIN — ATORVASTATIN CALCIUM 40 MG: 40 TABLET, FILM COATED ORAL at 09:09

## 2023-09-24 RX ADMIN — MONTELUKAST SODIUM 10 MG: 10 TABLET, FILM COATED ORAL at 09:09

## 2023-09-24 RX ADMIN — AMIODARONE HYDROCHLORIDE 400 MG: 200 TABLET ORAL at 09:09

## 2023-09-24 RX ADMIN — MEROPENEM 500 MG: 500 INJECTION, POWDER, FOR SOLUTION INTRAVENOUS at 04:09

## 2023-09-24 RX ADMIN — MIDODRINE HYDROCHLORIDE 10 MG: 5 TABLET ORAL at 04:09

## 2023-09-24 NOTE — ASSESSMENT & PLAN NOTE
Blood culture x 1 from 9/19 shows MRSE; on Vancomycin; repeat blood cultures from 9/21 negative to date but pending

## 2023-09-24 NOTE — ASSESSMENT & PLAN NOTE
S/P I&D right hematoma and left I&D of wounds in upper extremities; followed by Surgery; this was due to vasopressor infiltration at [facility]; on Merrem

## 2023-09-24 NOTE — PROGRESS NOTES
Patient denies shortness of breath.  Review of systems GI he denies nausea or vomiting     Physical exam general patient in no acute distress, he has no pitting edema.    Assessment/plan 1.  ESRD-continue HD support   2. Remote cardiac arrest  3.  Anoxic brain injury-patient seem to respond to questions appropriately.  4.  Av fistula hematoma  5.  Anemia-this is mild patient's hematocrit was 37% a few days ago   6.  Chronic Hypotension-patient's systolic blood pressure around 95, he is taking midodrine.  7.  Hyperlipidemia-continue Lipitor  8.  Secondary hyperparathyroidism-continue Renvela

## 2023-09-24 NOTE — PROGRESS NOTES
[facility]  [facility] Medicine  Progress Note    Patient Name: Yvan Rollins  MRN: [id number]  Patient Class: IP- Inpatient   Admission Date: 9/19/2023  Length of Stay: 5 days  Attending Physician: Malinda Fontaine DO  Primary Care Provider: Eugene Funez MD        Subjective:     Principal Problem:Bacteremia        HPI:  Patient is a 63 year old  male who was transferred to [facility] from [place] [facility] for vascular consultation for a necrotic wound overlying an AV access site in the RT arm. He presented to [place] ED via [facility] from [facility] (referenced to be [facility] from recent hospitalization at [facility] (8/15/2023)). He was recently hospitalized at [facility] after having dyspnea during dialysis that progressed to cardiac arrest with ROSC. The patient was mechanically intubated, started on pressors, and managed in the ICU. According to the discharge summary, the patient had an adverse reaction to surface and infiltration anesthetic and intravenous infiltration.     During hospitalization at [facility], the RT AV access site was unable to be used and a tunneled RT dialysis catheter was placed. He was evaluated by Vascular surgery during that admission with outpatient follow-up. Today, he was seen by wound care at the NH and they recommended return to [facility] for vascular evaluation of the wound. However, [facility] was on Diversion and he ended up at [place] and subsequently [facility].     During his hospitalization at [facility], cardiology was consulted. ECHO demonstrated EF of 25-30% and there was a reported 1 episode of nonsustained v-tach. Cardiology recommended resuming goal directed medical therapy, and there was no identified cardiac etiology for his arrest. He was also evaluated by neurology due to concern for anoxic brain injury.  CT of the brain demonstrated chronic ischemic microangiopathy. Neurology diagnosed his encephalopathy to be multifactorial - anoxic insult from  prolonged cardiac arrest with metabolic derangement. His mental status improved, but not to baseline. Today, at [facility] he   is Aox1 and he is a poor historian. He has difficulty answering questions. History is obtained from medical records.     The patient has a PMH of nonischemic cardiomyopathy with EF 20-25%, S/P AICD placement, ESRD on HD TTS, COPD, HTN, polysubstance abuse, and hepatitis C.         Overview/Hospital Course:  63 year old male presents with necrotic wound of AV fistula site s/p debridement.  He is being treated for a Staph bacteremia.  He is alert.  Patient denies chest pain, shortness of breath, nausea, vomiting, or diarrhea.        Vitals:    09/24/23 0000 09/24/23 0400 09/24/23 0710 09/24/23 0754   BP: 129/78 110/71 121/75    Pulse: 63 60 63 60   Resp: 18 18 16 17   Temp: 98 °F (36.7 °C) 98 °F (36.7 °C) 97.7 °F (36.5 °C)    TempSrc:   Oral    SpO2: 96% 97% 96% 95%   Weight:       Height:           PHYSICAL EXAM:    GEN: NAD; alert   CVS: irregular rate and rhythm; no murmurs  RESP: clear to auscultation bilaterally; no rhonchi, rales, or wheezes noted  GI: soft, non-tender, non-distended; + bowel sounds  EXTR: no clubbing, cyanosis, or edema  SKIN: wounds as stated in H&P    Assessment/Plan:      * Bacteremia  Blood culture x 1 from 9/19 shows MRSE; on Vancomycin; repeat blood cultures from 9/21 negative to date but pending      Acute metabolic encephalopathy  He is more alert today but confused; probably at baseline; CT brain did not show acute abnormality      Arm wound, left, initial encounter  S/P I&D right hematoma and left I&D of wounds in upper extremities; followed by Surgery; this was due to vasopressor infiltration at [facility]; on Merrem      Arm wound, right, initial encounter  S/P I&D right hematoma and left I&D of wounds in upper extremities; followed by Surgery; this was due to vasopressor infiltration at [facility]; was placed on Aztreonam on admission--will change this to Merrem; wound  culture shows Klebsiella    History of cardiac arrest  Has AICD; continue current meds; BP low at times;he is on Coreg and Amiodarone; he is s/p cardiac arrest from dialysis at [facility] in [place], [place] in August    Penile discharge  Negative Chlamydia and N. gonorrhea; negative urine culture      Chronic combined systolic and diastolic congestive heart failure  Continue current meds      Diabetes  Does not appear to be on any home meds for DM; will monitor BS levels; BS stable    Atrial fibrillation  On Amiodarone and Coreg; followed by Cardiology      Chronic hepatitis C  Stable    GERD (gastroesophageal reflux disease)  On Protonix      ESRD (end stage renal disease)  On dialysis        Discharge Planning     Reason for patient still in hospital (select all that apply): Treatment  Discharge Plan A: Home with family                  Malinda Fontaine DO  Department of Hospital Medicine   [facility]

## 2023-09-24 NOTE — ASSESSMENT & PLAN NOTE
S/P I&D right hematoma and left I&D of wounds in upper extremities; followed by Surgery; this was due to vasopressor infiltration at [facility]; was placed on Aztreonam on admission--will change this to Merrem; wound culture shows Klebsiella

## 2023-09-24 NOTE — PLAN OF CARE
Problem: Adult Inpatient Plan of Care  Goal: Plan of Care Review  Outcome: Ongoing, Progressing  Goal: Patient-Specific Goal (Individualized)  Outcome: Ongoing, Progressing  Goal: Absence of Hospital-Acquired Illness or Injury  Outcome: Ongoing, Progressing  Goal: Optimal Comfort and Wellbeing  Outcome: Ongoing, Progressing  Intervention: Monitor Pain and Promote Comfort  Flowsheets (Taken 9/24/2023 1434)  Pain Management Interventions:   relaxation techniques promoted   quiet environment facilitated   pillow support provided   position adjusted   care clustered  Intervention: Provide Person-Centered Care  Flowsheets (Taken 9/24/2023 1434)  Trust Relationship/Rapport:   care explained   reassurance provided   choices provided   thoughts/feelings acknowledged   emotional support provided   empathic listening provided   questions answered   questions encouraged  Goal: Readiness for Transition of Care  Outcome: Ongoing, Progressing     Problem: Impaired Wound Healing  Goal: Optimal Wound Healing  Outcome: Ongoing, Progressing     Problem: Infection  Goal: Absence of Infection Signs and Symptoms  Outcome: Ongoing, Progressing     Problem: Skin Injury Risk Increased  Goal: Skin Health and Integrity  Outcome: Ongoing, Progressing     Problem: Diabetes Comorbidity  Goal: Blood Glucose Level Within Targeted Range  Outcome: Ongoing, Progressing  Intervention: Monitor and Manage Glycemia  Flowsheets (Taken 9/24/2023 1434)  Glycemic Management: blood glucose monitored     Problem: Device-Related Complication Risk (Hemodialysis)  Goal: Safe, Effective Therapy Delivery  Outcome: Ongoing, Progressing     Problem: Hemodynamic Instability (Hemodialysis)  Goal: Effective Tissue Perfusion  Outcome: Ongoing, Progressing     Problem: Infection (Hemodialysis)  Goal: Absence of Infection Signs and Symptoms  Outcome: Ongoing, Progressing     Problem: Gas Exchange Impaired  Goal: Optimal Gas Exchange  Outcome: Ongoing, Progressing      Problem: Airway Clearance Ineffective  Goal: Effective Airway Clearance  Outcome: Ongoing, Progressing

## 2023-09-25 LAB
GLUCOSE SERPL-MCNC: 128 MG/DL (ref 70–105)
GLUCOSE SERPL-MCNC: 144 MG/DL (ref 70–105)
GLUCOSE SERPL-MCNC: 87 MG/DL (ref 70–105)
GLUCOSE SERPL-MCNC: 91 MG/DL (ref 70–105)
GLUCOSE SERPL-MCNC: 97 MG/DL (ref 70–105)
VANCOMYCIN SERPL-MCNC: 23.9 ΜG/ML (ref 0–20)

## 2023-09-25 PROCEDURE — 25000003 PHARM REV CODE 250

## 2023-09-25 PROCEDURE — 99900035 HC TECH TIME PER 15 MIN (STAT)

## 2023-09-25 PROCEDURE — 94761 N-INVAS EAR/PLS OXIMETRY MLT: CPT

## 2023-09-25 PROCEDURE — 99232 PR SUBSEQUENT HOSPITAL CARE,LEVL II: ICD-10-PCS | Mod: ,,, | Performed by: FAMILY MEDICINE

## 2023-09-25 PROCEDURE — 63600175 PHARM REV CODE 636 W HCPCS: Performed by: HOSPITALIST

## 2023-09-25 PROCEDURE — 97110 THERAPEUTIC EXERCISES: CPT

## 2023-09-25 PROCEDURE — 27000221 HC OXYGEN, UP TO 24 HOURS

## 2023-09-25 PROCEDURE — 80202 ASSAY OF VANCOMYCIN: CPT | Performed by: HOSPITALIST

## 2023-09-25 PROCEDURE — 82962 GLUCOSE BLOOD TEST: CPT

## 2023-09-25 PROCEDURE — 97530 THERAPEUTIC ACTIVITIES: CPT | Mod: CQ

## 2023-09-25 PROCEDURE — 25000242 PHARM REV CODE 250 ALT 637 W/ HCPCS: Performed by: HOSPITALIST

## 2023-09-25 PROCEDURE — 11000001 HC ACUTE MED/SURG PRIVATE ROOM

## 2023-09-25 PROCEDURE — 94640 AIRWAY INHALATION TREATMENT: CPT

## 2023-09-25 PROCEDURE — 25000003 PHARM REV CODE 250: Performed by: HOSPITALIST

## 2023-09-25 PROCEDURE — 99232 SBSQ HOSP IP/OBS MODERATE 35: CPT | Mod: ,,, | Performed by: FAMILY MEDICINE

## 2023-09-25 PROCEDURE — C1752 CATH,HEMODIALYSIS,SHORT-TERM: HCPCS

## 2023-09-25 RX ADMIN — IPRATROPIUM BROMIDE AND ALBUTEROL SULFATE 3 ML: 2.5; .5 SOLUTION RESPIRATORY (INHALATION) at 08:09

## 2023-09-25 RX ADMIN — ATORVASTATIN CALCIUM 40 MG: 40 TABLET, FILM COATED ORAL at 09:09

## 2023-09-25 RX ADMIN — CARVEDILOL 6.25 MG: 6.25 TABLET, FILM COATED ORAL at 04:09

## 2023-09-25 RX ADMIN — CARVEDILOL 6.25 MG: 6.25 TABLET, FILM COATED ORAL at 09:09

## 2023-09-25 RX ADMIN — SEVELAMER CARBONATE 800 MG: 800 TABLET, FILM COATED ORAL at 02:09

## 2023-09-25 RX ADMIN — ASPIRIN 81 MG: 81 TABLET, COATED ORAL at 09:09

## 2023-09-25 RX ADMIN — OLANZAPINE 2.5 MG: 2.5 TABLET, FILM COATED ORAL at 09:09

## 2023-09-25 RX ADMIN — MIDODRINE HYDROCHLORIDE 10 MG: 5 TABLET ORAL at 12:09

## 2023-09-25 RX ADMIN — SEVELAMER CARBONATE 800 MG: 800 TABLET, FILM COATED ORAL at 09:09

## 2023-09-25 RX ADMIN — MONTELUKAST SODIUM 10 MG: 10 TABLET, FILM COATED ORAL at 09:09

## 2023-09-25 RX ADMIN — IPRATROPIUM BROMIDE AND ALBUTEROL SULFATE 3 ML: 2.5; .5 SOLUTION RESPIRATORY (INHALATION) at 07:09

## 2023-09-25 RX ADMIN — MIDODRINE HYDROCHLORIDE 10 MG: 5 TABLET ORAL at 04:09

## 2023-09-25 RX ADMIN — MEROPENEM 500 MG: 500 INJECTION, POWDER, FOR SOLUTION INTRAVENOUS at 04:09

## 2023-09-25 RX ADMIN — PANTOPRAZOLE SODIUM 40 MG: 40 TABLET, DELAYED RELEASE ORAL at 09:09

## 2023-09-25 RX ADMIN — AMIODARONE HYDROCHLORIDE 400 MG: 200 TABLET ORAL at 09:09

## 2023-09-25 NOTE — PROGRESS NOTES
[facility]  Nephrology  Progress Note    Patient Name: Yvan Rollins  MRN: [id number]  Admission Date: 9/19/2023  Hospital Length of Stay: 6 days  Attending Provider: Phil Hernandez Jr., MD   Primary Care Physician: Eugene Funez MD  Principal Problem:Bacteremia    Consults  Subjective:     Interval History: Mr. Rollins is seen in f/u of his ESRD. He's    Review of patient's allergies indicates:   Allergen Reactions    Ceftriaxone Swelling    Lisinopril Swelling and Rash     Facial swelling   Facial swelling       Current Facility-Administered Medications   Medication Frequency    0.9%  NaCl infusion PRN    acetaminophen tablet 650 mg Q4H PRN    albuterol-ipratropium 2.5 mg-0.5 mg/3 mL nebulizer solution 3 mL BID    [START ON 9/30/2023] amiodarone tablet 200 mg Daily    amiodarone tablet 400 mg Daily    aspirin EC tablet 81 mg Daily    atorvastatin tablet 40 mg QHS    carvediloL tablet 6.25 mg BID WM    dextrose 10% bolus 125 mL 125 mL PRN    dextrose 10% bolus 250 mL 250 mL PRN    glucagon (human recombinant) injection 1 mg PRN    heparin (porcine) injection 4,000 Units PRN    HYDROcodone-acetaminophen  mg per tablet 1 tablet Q6H PRN    HYDROcodone-acetaminophen 5-325 mg per tablet 1 tablet Q6H PRN    insulin aspart U-100 injection 0-5 Units Q6H PRN    meropenem (MERREM) 500 mg in sodium chloride 0.9 % 100 mL IVPB (MB+) Daily    midodrine tablet 10 mg TID WM    montelukast tablet 10 mg QHS    naloxone 0.4 mg/mL injection 0.02 mg PRN    OLANZapine tablet 2.5 mg QHS    ondansetron injection 4 mg Q8H PRN    pantoprazole EC tablet 40 mg Daily    sevelamer carbonate tablet 800 mg TID    sodium chloride 0.9% flush 10 mL Q12H PRN    vancomycin - pharmacy to dose pharmacy to manage frequency       Objective:     Vital Signs (Most Recent):  Temp: 98.1 °F (36.7 °C) (09/25/23 1200)  Pulse: 60 (09/25/23 1200)  Resp: 20 (09/25/23 1200)  BP: 113/73 (09/25/23 1200)  SpO2: (!) 94 % (09/25/23  1200) Vital Signs (24h Range):  Temp:  [97 °F (36.1 °C)-98.1 °F (36.7 °C)] 98.1 °F (36.7 °C)  Pulse:  [60-72] 60  Resp:  [16-20] 20  SpO2:  [94 %-99 %] 94 %  BP: (113-134)/(66-82) 113/73     Weight: 107.5 kg (237 lb) (09/18/23 2315)  Body mass index is 32.14 kg/m².  Body surface area is 2.34 meters squared.    I/O last 3 completed shifts:  In: 240 [P.O.:240]  Out: -     Physical Exam Not alert today. Chest clear.     Significant Labs:sureBMP:   Recent Labs   Lab 09/19/23  0525   GLU 80      K 4.6      CO2 24   BUN 49*   CREATININE 11.70*   CALCIUM 9.0   MG 2.4*     CBC:   Recent Labs   Lab 09/19/23  0414   WBC 5.87   RBC 3.67*   HGB 11.9*   HCT 37.2*      .4*   MCH 32.4*   MCHC 32.0     Microbiology Results (last 7 days)       Procedure Component Value Units Date/Time    Blood culture (site 1) [[id number]] Collected: 09/21/23 1452    Order Status: Completed Specimen: Blood Updated: 09/25/23 0853     Culture, Blood No Growth At 72 Hours    Blood culture (site 2) [[id number]] Collected: 09/21/23 1459    Order Status: Completed Specimen: Blood Updated: 09/25/23 0853     Culture, Blood No Growth At 72 Hours    Blood culture (site 2) [[id number]] Collected: 09/19/23 0423    Order Status: Completed Specimen: Blood Updated: 09/24/23 0824     Culture, Blood No Growth at 5 days    Chlamydia/GC, PCR [[id number]]  (Normal) Collected: 09/21/23 0954    Order Status: Completed Specimen: Urine Updated: 09/23/23 1419     Chlamydia by PCR Negative     N. gonorrhoeae (GC) by PCR Negative    Urine culture [[id number]] Collected: 09/21/23 0954    Order Status: Completed Specimen: Urine, Catheterized Updated: 09/23/23 0656     Culture, Urine No Growth    Blood culture (site 1) [[id number]]  (Abnormal)  (Susceptibility) Collected: 09/19/23 0414    Order Status: Completed Specimen: Blood Updated: 09/22/23 0813     Culture, Blood Staphylococcus epidermidis     Gram Stain Result Gram positive cocci     Comment:  From Aerobic Bottle       Culture, Wound [[id number]]  (Abnormal)  (Susceptibility) Collected: 09/19/23 1053    Order Status: Completed Specimen: Wound from Arm, Right Updated: 09/21/23 0956     Culture, Wound/Abscess Light Growth Klebsiella pneumoniae    Chlamydia/GC, PCR [[id number]] Collected: 09/20/23 1652    Order Status: Canceled Specimen: Genital from Penis Updated: 09/20/23 1719    Verigene Gram-positive Blood Culture Test [[id number]]  (Abnormal) Collected: 09/19/23 0414    Order Status: Completed Specimen: Blood Updated: 09/20/23 1152     Verigene Result Methicillin Resistant Staphylococcus epidermidis    Culture, Wound [[id number]] Collected: 09/19/23 0435    Order Status: Canceled Specimen: Wound from Arm, Right Updated: 09/19/23 0443          All labs within the past 24 hours have been reviewed.    Significant Imaging:  Labs: Reviewed    Assessment/Plan:     Active Diagnoses:    Diagnosis Date Noted POA    PRINCIPAL PROBLEM:  Bacteremia [R78.81] 09/20/2023 Unknown    Acute metabolic encephalopathy [G93.41] 09/22/2023 Unknown    Penile discharge [R36.9] 09/20/2023 Unknown    ESRD (end stage renal disease) [N18.6] 09/19/2023 Yes    GERD (gastroesophageal reflux disease) [K21.9] 09/19/2023 Yes    Chronic hepatitis C [B18.2] 09/19/2023 Yes    History of cardiac arrest [Z86.74] 09/19/2023 Not Applicable    Atrial fibrillation [I48.91] 09/19/2023 Yes    Arm wound, right, initial encounter [S41.101A] 09/19/2023 Yes    Arm wound, left, initial encounter [S41.102A] 09/19/2023 Yes    Diabetes [E11.9] 09/19/2023 Yes    Chronic combined systolic and diastolic congestive heart failure [I50.42] 09/19/2023 Yes      Problems Resolved During this Admission:    Diagnosis Date Noted Date Resolved POA    Dysphagia [R13.10] 09/19/2023 09/19/2023 Yes    Presence of automatic cardioverter/defibrillator (AICD) [Z95.810] 09/19/2023 09/19/2023 Yes    CAD (coronary artery disease) [I25.10] 09/19/2023 09/19/2023 Unknown    HFrEF  (heart failure with reduced ejection fraction) [I50.20] 09/19/2023 09/20/2023 Yes    Hypotension [I95.9] 09/19/2023 09/19/2023 Yes    Impaired mobility [Z74.09] 09/19/2023 09/19/2023 Yes    Sacral wound [S31.000A] 09/19/2023 09/19/2023 Yes    COPD (chronic obstructive pulmonary disease) [J44.9] 09/19/2023 09/19/2023 Yes    Hematoma [T14.8XXA] 09/19/2023 09/19/2023 Yes       On Vanc for MRSE in one of two blood cultures from 9-19. ? Contaminate. Cultures negative since.  Plan dialysis tomorrow.    Thank you for your consult. I will follow-up with patient. Please contact us if you have any additional questions.    Alfred Valdez MD  Nephrology  [facility]

## 2023-09-25 NOTE — PROGRESS NOTES
"Pharmacokinetic Assessment Follow Up: IV Vancomycin    Vancomycin serum concentration assessment(s):    The random level was drawn correctly and can be used to guide therapy at this time. The measurement is above the desired definitive target range of 15 to 20 mcg/mL.    Vancomycin Regimen Plan:    Pharmacy will not re-dose vanc at this time.  A random vanc level has been ordered for 9/26 at 0400.    Drug levels (last 3 results):  Recent Labs   Lab Result Units 09/23/23  0451 09/24/23  0619 09/25/23  0632   Vancomycin, Random µg/mL 26.8* 24.9* 23.9*       Pharmacy will continue to follow and monitor vancomycin.    Please contact pharmacy at extension [phone] for questions regarding this assessment.    Patient brief summary:  Yvan Rollisn is a 63 y.o. male initiated on antimicrobial therapy with IV Vancomycin for treatment of skin & soft tissue infection    Drug Allergies:   Review of patient's allergies indicates:   Allergen Reactions    Ceftriaxone Swelling    Lisinopril Swelling and Rash     Facial swelling   Facial swelling         Actual Body Weight:   107.5 kg    Renal Function:   Estimated Creatinine Clearance: 8.4 mL/min (A) (based on SCr of 11.7 mg/dL (H)).,     Dialysis Method (if applicable):  intermittent HD    CBC (last 72 hours):  No results for input(s): "WHITE BLOOD CELL COUNT", "HEMOGLOBIN", "HEMATOCRIT", "PLATELETS", "GRAN%", "LYMPH%", "MONO%", "EOSINOPHIL%", "BASOPHIL%", "DIFFERENTIAL METHOD" in the last 72 hours.    Metabolic Panel (last 72 hours):  No results for input(s): "SODIUM", "POTASSIUM", "CHLORIDE", "CO2", "GLUCOSE", "BUN BLD", "CREATININE", "ALBUMIN", "BILIRUBIN TOTAL", "ALK PHOS", "AST", "ALT", "MAGNESIUM", "PHOSPHORUS" in the last 72 hours.    Vancomycin Administrations:  vancomycin given in the last 96 hours        No antibiotic orders with administrations found.                    Microbiologic Results:  Microbiology Results (last 7 days)       Procedure Component Value Units " Date/Time    Blood culture (site 1) [[id number]] Collected: 09/21/23 1452    Order Status: Completed Specimen: Blood Updated: 09/25/23 0853     Culture, Blood No Growth At 72 Hours    Blood culture (site 2) [[id number]] Collected: 09/21/23 1459    Order Status: Completed Specimen: Blood Updated: 09/25/23 0853     Culture, Blood No Growth At 72 Hours    Blood culture (site 2) [[id number]] Collected: 09/19/23 0423    Order Status: Completed Specimen: Blood Updated: 09/24/23 0824     Culture, Blood No Growth at 5 days    Chlamydia/GC, PCR [[id number]]  (Normal) Collected: 09/21/23 0954    Order Status: Completed Specimen: Urine Updated: 09/23/23 1419     Chlamydia by PCR Negative     N. gonorrhoeae (GC) by PCR Negative    Urine culture [[id number]] Collected: 09/21/23 0954    Order Status: Completed Specimen: Urine, Catheterized Updated: 09/23/23 0656     Culture, Urine No Growth    Blood culture (site 1) [[id number]]  (Abnormal)  (Susceptibility) Collected: 09/19/23 0414    Order Status: Completed Specimen: Blood Updated: 09/22/23 0813     Culture, Blood Staphylococcus epidermidis     Gram Stain Result Gram positive cocci     Comment: From Aerobic Bottle       Culture, Wound [[id number]]  (Abnormal)  (Susceptibility) Collected: 09/19/23 1053    Order Status: Completed Specimen: Wound from Arm, Right Updated: 09/21/23 0956     Culture, Wound/Abscess Light Growth Klebsiella pneumoniae    Chlamydia/GC, PCR [[id number]] Collected: 09/20/23 1652    Order Status: Canceled Specimen: Genital from Penis Updated: 09/20/23 1719    Verigene Gram-positive Blood Culture Test [[id number]]  (Abnormal) Collected: 09/19/23 0414    Order Status: Completed Specimen: Blood Updated: 09/20/23 1152     Verigene Result Methicillin Resistant Staphylococcus epidermidis    Culture, Wound [[id number]] Collected: 09/19/23 0435    Order Status: Canceled Specimen: Wound from Arm, Right Updated: 09/19/23 0443

## 2023-09-25 NOTE — ASSESSMENT & PLAN NOTE
Blood culture x 1 from 9/19 shows MRSE; on Vancomycin; repeat blood cultures from 9/21 negative to date but pending.     Completing Vanco

## 2023-09-25 NOTE — PT/OT/SLP PROGRESS
Occupational Therapy   Treatment    Name: Yvan Rollins  MRN: [id number]  Admitting Diagnosis:  Bacteremia  5 Days Post-Op    Recommendations:     Discharge Recommendations: nursing facility, skilled  Discharge Equipment Recommendations:   (to be determined)  Barriers to discharge:  None    Assessment:     Yvan Rollins is a 63 y.o. male with a medical diagnosis of Bacteremia.  He presents with no complaints. Pt agreed to OT treatment. Performance deficits affecting function are weakness, impaired endurance, decreased upper extremity function, decreased coordination, impaired skin.     Rehab Prognosis:  Good; patient would benefit from acute skilled OT services to address these deficits and reach maximum level of function.       Plan:     Patient to be seen 5 x/week to address the above listed problems via self-care/home management, therapeutic activities, therapeutic exercises  Plan of Care Expires:    Plan of Care Reviewed with: patient    Subjective     Chief Complaint: Bacteremia  Patient/Family Comments/goals: Swing Bed  Pain/Comfort:  Pain Rating 1: 0/10  Pain Rating Post-Intervention 1: 0/10    Objective:     Communicated with: TY Hudson prior to session.  Patient found HOB elevated with pressure relief boots, peripheral IV, telemetry upon OT entry to room.    General Precautions: Standard, fall    Orthopedic Precautions:N/A  Braces: N/A  Respiratory Status: Nasal cannula, flow 2 L/min     Occupational Performance:     Bed Mobility:         Functional Mobility/Transfers:    Functional Mobility:     Activities of Daily Living:        [facility] 6 Click ADL:      Treatment & Education:  Pt performed AAROM exercises on (R) UE x 2 sets of 15 reps for shoulder flexion/extension, adduction/abduction and elbow and wrist flexion/extension and forearm supination/pronation. Pt performed opposition exercises with (R) hand x 5 sets with verbal/tactile cues.(R) UE positioning in elevation due to edema. Pt  compensating with trunk while attempting exercises on (R).   Self ROM exercises x 10 reps with hands clasped shoulder, elbow and wrist flexion/extension with CGA.  2 lb hand wt  on (L) x 2 sets of 10 reps all planes with CGA.    Pt participated well with tx, but required constant cues to stay on task.        Patient left HOB elevated with all lines intact    GOALS:   Multidisciplinary Problems       Occupational Therapy Goals          Problem: Occupational Therapy    Goal Priority Disciplines Outcome Interventions   Occupational Therapy Goal     OT, PT/OT Ongoing, Progressing    Description: STG:  Pt will perform grooming with setup   Pt will bathe self with min assist  Pt will perform UB dressing with SBA  Pt will perform LB dressing with min assist  Pt will sit EOB x 10 min with SBA   Pt will transfer toilet/BSC with min assist  Pt will tolerate 15 minutes of tx with minimal fatigue      LT. Pt will achieve max level of independence with self care.                         Time Tracking:     OT Date of Treatment: 23  OT Start Time: 1701  OT Stop Time: 1726  OT Total Time (min): 25 min    Billable Minutes:Therapeutic Exercise 24    OT/SHERRON: OT          2023

## 2023-09-25 NOTE — PT/OT/SLP PROGRESS
Physical Therapy Treatment    Patient Name:  Yvan Rollins   MRN:  [id number]    Recommendations:     Discharge Recommendations: nursing facility, skilled  Discharge Equipment Recommendations: to be determined by next level of care  Barriers to discharge:  ongoing medical treatment    Assessment:     Yvan Rollins is a 63 y.o. male admitted with a medical diagnosis of Bacteremia.  He presents with the following impairments/functional limitations: weakness, impaired endurance, impaired self care skills, impaired functional mobility, impaired balance, impaired skin.    Pt very lethargic this date with three attempts made for PT.  Pt rousing only minimally during EOB sitting, however, unsafe to attempt transfer to recliner chair.  RN made aware and stated that patient had not been given any meds that would have cause his drowsiness, pt had not eaten breakfast nor lunch     Rehab Prognosis: Good and Fair; patient would benefit from acute skilled PT services to address these deficits and reach maximum level of function.    Recent Surgery: Procedure(s) (LRB):  INCISION AND DRAINAGE, HEMATOMA (Right)  IRRIGATION AND DEBRIDEMENT (Left) 5 Days Post-Op    Plan:     During this hospitalization, patient to be seen 5 x/week to address the identified rehab impairments via gait training, therapeutic activities, therapeutic exercises, neuromuscular re-education and progress toward the following goals:    Plan of Care Expires:       Subjective     Chief Complaint: bacteremia  Patient/Family Comments/goals: pt very drowsy this date   Pain/Comfort:         Objective:     Communicated with Maura Solorzano RN prior to session.  Patient found HOB elevated with telemetry, peripheral IV, pressure relief boots upon PT entry to room.     General Precautions: Standard, fall  Orthopedic Precautions:    Braces:    Respiratory Status: Room air     Functional Mobility:  Bed Mobility:     Scooting: maximal assistance and of 2 persons to move  up in bed  Supine to Sit: moderate assistance, maximal assistance, and of 2 persons  Sit to Supine: maximal assistance, of 2 persons, and with B LE management      AM-PAC 6 CLICK MOBILITY  Turning over in bed (including adjusting bedclothes, sheets and blankets)?: 3  Sitting down on and standing up from a chair with arms (e.g., wheelchair, bedside commode, etc.): 3  Moving from lying on back to sitting on the side of the bed?: 3  Moving to and from a bed to a chair (including a wheelchair)?: 2  Need to walk in hospital room?: 2  Climbing 3-5 steps with a railing?: 1  Basic Mobility Total Score: 14       Treatment & Education:  Minimum assist sitting EOB x 7 minutes - slumped posture    Patient left left sidelying with all lines intact and call button in reach..    GOALS:   Multidisciplinary Problems       Physical Therapy Goals          Problem: Physical Therapy    Goal Priority Disciplines Outcome Goal Variances Interventions   Physical Therapy Goal     PT, PT/OT Ongoing, Progressing     Description: Short Term Goals  Ambulate Mod - 10 feet with rolling walker assistive device.   Supine to sit contact guard  Sit to stand contact guard  SPT contact guard  5. Independent with HEP    Long Term Goals  Ambulate CGA - 20 feet with rolling walker assistive device.   Supine to sit stand-by  Sit to stand stand-by  SPT stand-by  Needed equipment for home.                              Time Tracking:     PT Received On: 09/25/23  PT Start Time: 1352     PT Stop Time: 1405  PT Total Time (min): 13 min     Billable Minutes: Therapeutic Activity 10    Treatment Type: Treatment  PT/PTA: PTA     Number of PTA visits since last PT visit: 2     09/25/2023

## 2023-09-25 NOTE — PROGRESS NOTES
[facility]  [facility] Medicine  Progress Note    Patient Name: Yvan Rollins  MRN: [id number]  Patient Class: IP- Inpatient   Admission Date: 9/19/2023  Length of Stay: 6 days  Attending Physician: Phil Hernandez Jr., MD  Primary Care Provider: Eugene Funez MD        Subjective:     Principal Problem:Bacteremia        HPI:  Patient is a 63 year old  male who was transferred to [facility] from [place] ED for vascular consultation for a necrotic wound overlying an AV access site in the RT arm. He presented to [place] ED via [facility] from [facility] (referenced to be [facility] from recent hospitalization at [facility] (8/15/2023)). He was recently hospitalized at [facility] after having dyspnea during dialysis that progressed to cardiac arrest with ROSC. The patient was mechanically intubated, started on pressors, and managed in the ICU. According to the discharge summary, the patient had an adverse reaction to surface and infiltration anesthetic and intravenous infiltration.     During hospitalization at [facility], the RT AV access site was unable to be used and a tunneled RT dialysis catheter was placed. He was evaluated by Vascular surgery during that admission with outpatient follow-up. Today, he was seen by wound care at the NH and they recommended return to [facility] for vascular evaluation of the wound. However, [facility] was on Diversion and he ended up at [place] and subsequently [facility].     During his hospitalization at [facility], cardiology was consulted. ECHO demonstrated EF of 25-30% and there was a reported 1 episode of nonsustained v-tach. Cardiology recommended resuming goal directed medical therapy, and there was no identified cardiac etiology for his arrest. He was also evaluated by neurology due to concern for anoxic brain injury.  CT of the brain demonstrated chronic ischemic microangiopathy. Neurology diagnosed his encephalopathy to be multifactorial - anoxic insult from  prolonged cardiac arrest with metabolic derangement. His mental status improved, but not to baseline. Today, at [facility] he   is Aox1 and he is a poor historian. He has difficulty answering questions. History is obtained from medical records.     The patient has a PMH of nonischemic cardiomyopathy with EF 20-25%, S/P AICD placement, ESRD on HD TTS, COPD, HTN, polysubstance abuse, and hepatitis C.         Overview/Hospital Course:  63 year old male presents with necrotic wound of AV fistula site s/p debridement.  He is being treated for a Staph bacteremia.  He is alert.  Patient denies chest pain, shortness of breath, nausea, vomiting, or diarrhea.        Interval History: Non verbal with me today. My first visit.     Review of Systems  Objective:     Vital Signs (Most Recent):  Temp: 98 °F (36.7 °C) (09/25/23 1600)  Pulse: 72 (09/25/23 1600)  Resp: 20 (09/25/23 1600)  BP: 121/74 (09/25/23 1600)  SpO2: 95 % (09/25/23 1600) Vital Signs (24h Range):  Temp:  [97 °F (36.1 °C)-98.1 °F (36.7 °C)] 98 °F (36.7 °C)  Pulse:  [60-72] 72  Resp:  [16-20] 20  SpO2:  [94 %-99 %] 95 %  BP: (113-134)/(66-82) 121/74     Weight: 107.5 kg (237 lb)  Body mass index is 32.14 kg/m².    Intake/Output Summary (Last 24 hours) at 9/25/2023 1856  Last data filed at 9/25/2023 1840  Gross per 24 hour   Intake 340 ml   Output 0 ml   Net 340 ml         Physical Exam  Vitals reviewed.   Constitutional:       General: He is not in acute distress.     Appearance: He is not toxic-appearing.   HENT:      Head: Normocephalic.   Cardiovascular:      Rate and Rhythm: Normal rate and regular rhythm.   Pulmonary:      Effort: Pulmonary effort is normal. No respiratory distress.      Breath sounds: Normal breath sounds.   Abdominal:      General: Abdomen is flat. Bowel sounds are normal. There is no distension.      Palpations: Abdomen is soft.   Skin:     General: Skin is warm and dry.             Significant Labs: All pertinent labs within the past 24 hours have  "been reviewed.  BMP: No results for input(s): "GLU", "NA", "K", "CL", "CO2", "BUN", "CREATININE", "CALCIUM", "MG" in the last 48 hours.  CBC: No results for input(s): "WBC", "HGB", "HCT", "PLT" in the last 48 hours.    Significant Imaging: I have reviewed all pertinent imaging results/findings within the past 24 hours.      Assessment/Plan:      * Bacteremia  Blood culture x 1 from 9/19 shows MRSE; on Vancomycin; repeat blood cultures from 9/21 negative to date but pending.     Completing Vanco      Acute metabolic encephalopathy  He is more alert today but confused; probably at baseline; CT brain did not show acute abnormality      Penile discharge  Negative Chlamydia and N. gonorrhea; negative urine culture      Chronic combined systolic and diastolic congestive heart failure  Continue current meds      Diabetes  Does not appear to be on any home meds for DM; will monitor BS levels; BS stable    Arm wound, left, initial encounter  S/P I&D right hematoma and left I&D of wounds in upper extremities; followed by Surgery; this was due to vasopressor infiltration at [facility]; on Merrem      Arm wound, right, initial encounter  S/P I&D right hematoma and left I&D of wounds in upper extremities; followed by Surgery; this was due to vasopressor infiltration at [facility]; was placed on Aztreonam on admission--will change this to Merrem; wound culture shows Klebsiella    Atrial fibrillation  On Amiodarone and Coreg; followed by Cardiology      History of cardiac arrest  Has AICD; continue current meds; BP low at times;he is on Coreg and Amiodarone; he is s/p cardiac arrest from dialysis at [facility] in [place] in August    Chronic hepatitis C  Stable    GERD (gastroesophageal reflux disease)  On Protonix      ESRD (end stage renal disease)  On dialysis        VTE Risk Mitigation (From admission, onward)         Ordered     heparin (porcine) injection 4,000 Units  As needed (PRN)         09/21/23 1135     IP VTE HIGH RISK " PATIENT  Once         09/19/23 0147     Place sequential compression device  Until discontinued         09/19/23 0147     Reason for No Pharmacological VTE Prophylaxis  Once        Question:  Reasons:  Answer:  Physician Provided (leave comment)    09/19/23 0147                Discharge Planning   SANDRA:      Code Status: Full Code   Is the patient medically ready for discharge?:     Reason for patient still in hospital (select all that apply): Treatment  Discharge Plan A: Home with family                  Phil Hernandez Jr, MD  Department of Hospital Medicine   [facility]

## 2023-09-25 NOTE — SUBJECTIVE & OBJECTIVE
"Interval History: Non verbal with me today. My first visit.     Review of Systems  Objective:     Vital Signs (Most Recent):  Temp: 98 °F (36.7 °C) (09/25/23 1600)  Pulse: 72 (09/25/23 1600)  Resp: 20 (09/25/23 1600)  BP: 121/74 (09/25/23 1600)  SpO2: 95 % (09/25/23 1600) Vital Signs (24h Range):  Temp:  [97 °F (36.1 °C)-98.1 °F (36.7 °C)] 98 °F (36.7 °C)  Pulse:  [60-72] 72  Resp:  [16-20] 20  SpO2:  [94 %-99 %] 95 %  BP: (113-134)/(66-82) 121/74     Weight: 107.5 kg (237 lb)  Body mass index is 32.14 kg/m².    Intake/Output Summary (Last 24 hours) at 9/25/2023 1856  Last data filed at 9/25/2023 1840  Gross per 24 hour   Intake 340 ml   Output 0 ml   Net 340 ml         Physical Exam  Vitals reviewed.   Constitutional:       General: He is not in acute distress.     Appearance: He is not toxic-appearing.   HENT:      Head: Normocephalic.   Cardiovascular:      Rate and Rhythm: Normal rate and regular rhythm.   Pulmonary:      Effort: Pulmonary effort is normal. No respiratory distress.      Breath sounds: Normal breath sounds.   Abdominal:      General: Abdomen is flat. Bowel sounds are normal. There is no distension.      Palpations: Abdomen is soft.   Skin:     General: Skin is warm and dry.             Significant Labs: All pertinent labs within the past 24 hours have been reviewed.  BMP: No results for input(s): "GLU", "NA", "K", "CL", "CO2", "BUN", "CREATININE", "CALCIUM", "MG" in the last 48 hours.  CBC: No results for input(s): "WBC", "HGB", "HCT", "PLT" in the last 48 hours.    Significant Imaging: I have reviewed all pertinent imaging results/findings within the past 24 hours.  "

## 2023-09-26 LAB
GLUCOSE SERPL-MCNC: 147 MG/DL (ref 70–105)
GLUCOSE SERPL-MCNC: 81 MG/DL (ref 70–105)
GLUCOSE SERPL-MCNC: 93 MG/DL (ref 70–105)
VANCOMYCIN SERPL-MCNC: 22.2 ΜG/ML (ref 0–20)

## 2023-09-26 PROCEDURE — 25000242 PHARM REV CODE 250 ALT 637 W/ HCPCS: Performed by: HOSPITALIST

## 2023-09-26 PROCEDURE — 97530 THERAPEUTIC ACTIVITIES: CPT | Mod: CQ

## 2023-09-26 PROCEDURE — 99232 PR SUBSEQUENT HOSPITAL CARE,LEVL II: ICD-10-PCS | Mod: ,,, | Performed by: FAMILY MEDICINE

## 2023-09-26 PROCEDURE — 94640 AIRWAY INHALATION TREATMENT: CPT

## 2023-09-26 PROCEDURE — 97110 THERAPEUTIC EXERCISES: CPT | Mod: CQ

## 2023-09-26 PROCEDURE — 63600175 PHARM REV CODE 636 W HCPCS: Performed by: HOSPITALIST

## 2023-09-26 PROCEDURE — 90935 HEMODIALYSIS ONE EVALUATION: CPT

## 2023-09-26 PROCEDURE — 94761 N-INVAS EAR/PLS OXIMETRY MLT: CPT

## 2023-09-26 PROCEDURE — 80202 ASSAY OF VANCOMYCIN: CPT | Performed by: FAMILY MEDICINE

## 2023-09-26 PROCEDURE — 25000003 PHARM REV CODE 250: Performed by: INTERNAL MEDICINE

## 2023-09-26 PROCEDURE — 82962 GLUCOSE BLOOD TEST: CPT

## 2023-09-26 PROCEDURE — 99900035 HC TECH TIME PER 15 MIN (STAT)

## 2023-09-26 PROCEDURE — 97110 THERAPEUTIC EXERCISES: CPT

## 2023-09-26 PROCEDURE — 27000221 HC OXYGEN, UP TO 24 HOURS

## 2023-09-26 PROCEDURE — 11000001 HC ACUTE MED/SURG PRIVATE ROOM

## 2023-09-26 PROCEDURE — 25000003 PHARM REV CODE 250: Performed by: HOSPITALIST

## 2023-09-26 PROCEDURE — 63600175 PHARM REV CODE 636 W HCPCS: Performed by: INTERNAL MEDICINE

## 2023-09-26 PROCEDURE — 99232 SBSQ HOSP IP/OBS MODERATE 35: CPT | Mod: ,,, | Performed by: FAMILY MEDICINE

## 2023-09-26 PROCEDURE — 25000003 PHARM REV CODE 250

## 2023-09-26 RX ADMIN — CARVEDILOL 6.25 MG: 6.25 TABLET, FILM COATED ORAL at 09:09

## 2023-09-26 RX ADMIN — SODIUM CHLORIDE: 9 INJECTION, SOLUTION INTRAVENOUS at 11:09

## 2023-09-26 RX ADMIN — MIDODRINE HYDROCHLORIDE 10 MG: 5 TABLET ORAL at 09:09

## 2023-09-26 RX ADMIN — OLANZAPINE 2.5 MG: 2.5 TABLET, FILM COATED ORAL at 09:09

## 2023-09-26 RX ADMIN — IPRATROPIUM BROMIDE AND ALBUTEROL SULFATE 3 ML: 2.5; .5 SOLUTION RESPIRATORY (INHALATION) at 07:09

## 2023-09-26 RX ADMIN — ATORVASTATIN CALCIUM 40 MG: 40 TABLET, FILM COATED ORAL at 09:09

## 2023-09-26 RX ADMIN — MONTELUKAST SODIUM 10 MG: 10 TABLET, FILM COATED ORAL at 09:09

## 2023-09-26 RX ADMIN — ASPIRIN 81 MG: 81 TABLET, COATED ORAL at 09:09

## 2023-09-26 RX ADMIN — SEVELAMER CARBONATE 800 MG: 800 TABLET, FILM COATED ORAL at 09:09

## 2023-09-26 RX ADMIN — HEPARIN SODIUM 4000 UNITS: 1000 INJECTION, SOLUTION INTRAVENOUS; SUBCUTANEOUS at 11:09

## 2023-09-26 RX ADMIN — MIDODRINE HYDROCHLORIDE 10 MG: 5 TABLET ORAL at 05:09

## 2023-09-26 RX ADMIN — IPRATROPIUM BROMIDE AND ALBUTEROL SULFATE 3 ML: 2.5; .5 SOLUTION RESPIRATORY (INHALATION) at 08:09

## 2023-09-26 RX ADMIN — PANTOPRAZOLE SODIUM 40 MG: 40 TABLET, DELAYED RELEASE ORAL at 09:09

## 2023-09-26 RX ADMIN — MEROPENEM 500 MG: 500 INJECTION, POWDER, FOR SOLUTION INTRAVENOUS at 05:09

## 2023-09-26 RX ADMIN — SEVELAMER CARBONATE 800 MG: 800 TABLET, FILM COATED ORAL at 02:09

## 2023-09-26 RX ADMIN — AMIODARONE HYDROCHLORIDE 400 MG: 200 TABLET ORAL at 09:09

## 2023-09-26 NOTE — PT/OT/SLP PROGRESS
"Occupational Therapy   Treatment    Name: Yvan Rollins  MRN: [id number]  Admitting Diagnosis:  Bacteremia  6 Days Post-Op    Recommendations:     Discharge Recommendations: nursing facility, skilled  Discharge Equipment Recommendations:   (to be determined)  Barriers to discharge:  Inaccessible home environment, Decreased caregiver support    Assessment:     Yvan Rollins is a 63 y.o. male with a medical diagnosis of Bacteremia.  He presents with the following performance deficits affecting function are weakness, impaired endurance, decreased upper extremity function, decreased coordination, impaired skin. Patient reports he feels "disoriented" this date, but presents with good participation in OT tx.     Rehab Prognosis:  Good; patient would benefit from acute skilled OT services to address these deficits and reach maximum level of function.       Plan:     Patient to be seen 5 x/week to address the above listed problems via self-care/home management, therapeutic activities, therapeutic exercises  Plan of Care Expires:    Plan of Care Reviewed with: patient    Subjective     Chief Complaint: bacteremia  Patient/Family Comments/goals: Agreeable to OT tx  Pain/Comfort:  Pain Rating 1: 0/10    Objective:     Communicated with: CHANDA Solorzano RN prior to session.  Patient found supine with peripheral IV, SCD upon OT entry to room.    General Precautions: Standard, fall    Orthopedic Precautions:N/A  Braces: N/A  Respiratory Status: Room air     Occupational Performance:     Bed Mobility:    Patient completed Supine to Sit with minimum assistance  Patient completed Sit to Supine with stand by assistance     Functional Mobility/Transfers:      Activities of Daily Living:      [facility] 6 Click ADL: 17    Treatment & Education:  Patient performed bed mobility as depicted above. Patient performed dynamic sitting balance activity while seated EOB with SBA reaching through all planes and crossing midline with no SOB and 95% " accuracy.  Pt performed L UE strengthening exercises to include:   Shoulder flexion   Chest press    Elbow flexion   Elbow extension  All exercises performed 2x15 reps with 2# weight. Patient with difficulty performed RUE exercises this date secondary to fatigue and weakness.      Patient left HOB elevated with all lines intact, call button in reach, and RN notified    GOALS:   Multidisciplinary Problems       Occupational Therapy Goals          Problem: Occupational Therapy    Goal Priority Disciplines Outcome Interventions   Occupational Therapy Goal     OT, PT/OT Ongoing, Progressing    Description: STG:  Pt will perform grooming with setup   Pt will bathe self with min assist  Pt will perform UB dressing with SBA  Pt will perform LB dressing with min assist  Pt will sit EOB x 10 min with SBA   Pt will transfer toilet/BSC with min assist  Pt will tolerate 15 minutes of tx with minimal fatigue      LT. Pt will achieve max level of independence with self care.                         Time Tracking:     OT Date of Treatment: 23  OT Start Time: 929  OT Stop Time: 949  OT Total Time (min): 20 min    Billable Minutes:Therapeutic Exercise 18         2023

## 2023-09-26 NOTE — PT/OT/SLP PROGRESS
"Physical Therapy Treatment    Patient Name:  Yvan Rollins   MRN:  [id number]    Recommendations:     Discharge Recommendations: nursing facility, skilled  Discharge Equipment Recommendations: other (see comments) (to be determined)  Barriers to discharge:  ongoing medical treatment    Assessment:     Yvan Rollins is a 63 y.o. male admitted with a medical diagnosis of Bacteremia.  He presents with the following impairments/functional limitations: weakness, impaired endurance, impaired self care skills, impaired functional mobility, impaired cognition, impaired skin.    Pt more alert, however, appeared more confused this PM.  Pt better able to assist with activities as compared to previous treatment session , he does cont to present with episode of "jerky" motion and knees buckling during transfers    Rehab Prognosis: Fair; patient would benefit from acute skilled PT services to address these deficits and reach maximum level of function.    Recent Surgery: Procedure(s) (LRB):  INCISION AND DRAINAGE, HEMATOMA (Right)  IRRIGATION AND DEBRIDEMENT (Left) 6 Days Post-Op    Plan:     During this hospitalization, patient to be seen 5 x/week to address the identified rehab impairments via gait training, therapeutic activities, therapeutic exercises, neuromuscular re-education and progress toward the following goals:    Plan of Care Expires:       Subjective     Chief Complaint: bacteremia  Patient/Family Comments/goals: pt agreeable  Pain/Comfort:         Objective:     Communicated with Maura Solorzano RN prior to session.  Patient found HOB elevated with telemetry, pressure relief boots, oxygen, SCD upon PT entry to room.     General Precautions: Standard, fall, contact  Orthopedic Precautions:    Braces:    Respiratory Status: Nasal cannula, flow 2 L/min     Functional Mobility:  Bed Mobility:     Supine to Sit: modified independence and supervision  Transfers:     Sit to Stand:  minimum assistance and of 2 persons " with rolling walker  Bed to Chair: moderate assistance and of 1-2 persons with  rolling walker  using  Step Transfer      AM-PAC 6 CLICK MOBILITY  Turning over in bed (including adjusting bedclothes, sheets and blankets)?: 3  Sitting down on and standing up from a chair with arms (e.g., wheelchair, bedside commode, etc.): 3  Moving from lying on back to sitting on the side of the bed?: 3  Moving to and from a bed to a chair (including a wheelchair)?: 2  Need to walk in hospital room?: 2  Climbing 3-5 steps with a railing?: 1  Basic Mobility Total Score: 14       Treatment & Education:  Pt performed 2 x 15 reps (B) LE exercises: ap, quad set, glut set, straight leg raise, hip ab/adduction, long arc quad, heel slide with assist and rest as needed     Standby assist to modified independent sitting EOB x 8 minutes    Patient left up in chair with all lines intact, call button in reach, and chair alarm on..    GOALS:   Multidisciplinary Problems       Physical Therapy Goals          Problem: Physical Therapy    Goal Priority Disciplines Outcome Goal Variances Interventions   Physical Therapy Goal     PT, PT/OT Ongoing, Progressing     Description: Short Term Goals  Ambulate Mod - 10 feet with rolling walker assistive device.   Supine to sit contact guard  Sit to stand contact guard  SPT contact guard  5. Independent with HEP    Long Term Goals  Ambulate CGA - 20 feet with rolling walker assistive device.   Supine to sit stand-by  Sit to stand stand-by  SPT stand-by  Needed equipment for home.                              Time Tracking:     PT Received On: 09/26/23  PT Start Time: 1428     PT Stop Time: 1453  PT Total Time (min): 25 min     Billable Minutes: Therapeutic Activity 10 and Therapeutic Exercise 15    Treatment Type: Treatment  PT/PTA: PTA     Number of PTA visits since last PT visit: 3     09/26/2023

## 2023-09-26 NOTE — PROGRESS NOTES
Pharmacy assisting in the management of vancomycin for this patient for: bacteremia    Clinical info: dialysis patient being treated for bacteremia    Vancomycin level: 22.2 (random level this morning)  Continue to hold dosing, check random again on 9/28    Pharmacy will continue to monitor daily and make adjustments as needed.    Sammie Wiley, PharmD  [id number]

## 2023-09-26 NOTE — PLAN OF CARE
Problem: Impaired Wound Healing  Goal: Optimal Wound Healing  Outcome: Ongoing, Progressing     Problem: Infection  Goal: Absence of Infection Signs and Symptoms  Outcome: Ongoing, Progressing

## 2023-09-26 NOTE — PROGRESS NOTES
[facility]  Nephrology  Progress Note    Patient Name: Yvan Rollins  MRN: [id number]  Admission Date: 9/19/2023  Hospital Length of Stay: 7 days  Attending Provider: Phil Hernandez Jr., MD   Primary Care Physician: Eugene Funez MD  Principal Problem:Bacteremia    Consults  Subjective:     Interval History: Mr. Rollins is seen in f/u of his ESRD and bacteremia. Grew MRSE on one of two BC's on admit, none since, no fever. Klebsiella from wound. MRSE may have been contaminate given it grew from only one BC and no fever present.     Review of patient's allergies indicates:   Allergen Reactions    Ceftriaxone Swelling    Lisinopril Swelling and Rash     Facial swelling   Facial swelling       Current Facility-Administered Medications   Medication Frequency    0.9%  NaCl infusion PRN    acetaminophen tablet 650 mg Q4H PRN    albuterol-ipratropium 2.5 mg-0.5 mg/3 mL nebulizer solution 3 mL BID    [START ON 9/30/2023] amiodarone tablet 200 mg Daily    amiodarone tablet 400 mg Daily    aspirin EC tablet 81 mg Daily    atorvastatin tablet 40 mg QHS    carvediloL tablet 6.25 mg BID WM    dextrose 10% bolus 125 mL 125 mL PRN    dextrose 10% bolus 250 mL 250 mL PRN    glucagon (human recombinant) injection 1 mg PRN    heparin (porcine) injection 4,000 Units PRN    HYDROcodone-acetaminophen  mg per tablet 1 tablet Q6H PRN    HYDROcodone-acetaminophen 5-325 mg per tablet 1 tablet Q6H PRN    insulin aspart U-100 injection 0-5 Units Q6H PRN    meropenem (MERREM) 500 mg in sodium chloride 0.9 % 100 mL IVPB (MB+) Daily    midodrine tablet 10 mg TID WM    montelukast tablet 10 mg QHS    naloxone 0.4 mg/mL injection 0.02 mg PRN    OLANZapine tablet 2.5 mg QHS    ondansetron injection 4 mg Q8H PRN    pantoprazole EC tablet 40 mg Daily    sevelamer carbonate tablet 800 mg TID    sodium chloride 0.9% flush 10 mL Q12H PRN    vancomycin - pharmacy to dose pharmacy to manage frequency       Objective:  "    Vital Signs (Most Recent):  Temp: 97.7 °F (36.5 °C) (09/26/23 1322)  Pulse: (!) 59 (09/26/23 1322)  Resp: 18 (09/26/23 1322)  BP: 114/72 (09/26/23 1322)  SpO2: (!) 92 % (09/26/23 0724) Vital Signs (24h Range):  Temp:  [97.6 °F (36.4 °C)-98.1 °F (36.7 °C)] 97.7 °F (36.5 °C)  Pulse:  [58-72] 59  Resp:  [16-20] 18  SpO2:  [92 %-99 %] 92 %  BP: ()/(45-91) 114/72     Weight: 107.5 kg (237 lb) (09/18/23 2315)  Body mass index is 32.14 kg/m².  Body surface area is 2.34 meters squared.    I/O last 3 completed shifts:  In: 340 [P.O.:240; IV Piggyback:100]  Out: 0     Physical Exam Sitting in chair and more conversant today. 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  CBC: No results for input(s): "WBC", "RBC", "HGB", "HCT", "PLT", "MCV", "MCH", "MCHC" in the last 168 hours.  Microbiology Results (last 7 days)       Procedure Component Value Units Date/Time    Blood culture (site 1) [[id number]] Collected: 09/21/23 1452    Order Status: Completed Specimen: Blood Updated: 09/26/23 0612     Culture, Blood No Growth At 72 Hours    Blood culture (site 2) [[id number]] Collected: 09/21/23 1459    Order Status: Completed Specimen: Blood Updated: 09/26/23 0612     Culture, Blood No Growth At 72 Hours    Blood culture (site 2) [[id number]] Collected: 09/19/23 0423    Order Status: Completed Specimen: Blood Updated: 09/24/23 0824     Culture, Blood No Growth at 5 days    Chlamydia/GC, PCR [[id number]]  (Normal) Collected: 09/21/23 0954    Order Status: Completed Specimen: Urine Updated: 09/23/23 1419     Chlamydia by PCR Negative     N. gonorrhoeae (GC) by PCR Negative    Urine culture [[id number]] Collected: 09/21/23 0954    Order Status: Completed Specimen: Urine, Catheterized Updated: 09/23/23 0656     Culture, Urine No Growth    Blood " culture (site 1) [[id number]]  (Abnormal)  (Susceptibility) Collected: 09/19/23 0414    Order Status: Completed Specimen: Blood Updated: 09/22/23 0813     Culture, Blood Staphylococcus epidermidis     Gram Stain Result Gram positive cocci     Comment: From Aerobic Bottle       Culture, Wound [[id number]]  (Abnormal)  (Susceptibility) Collected: 09/19/23 1053    Order Status: Completed Specimen: Wound from Arm, Right Updated: 09/21/23 0956     Culture, Wound/Abscess Light Growth Klebsiella pneumoniae    Chlamydia/GC, PCR [[id number]] Collected: 09/20/23 1652    Order Status: Canceled Specimen: Genital from Penis Updated: 09/20/23 1719    Verigene Gram-positive Blood Culture Test [[id number]]  (Abnormal) Collected: 09/19/23 0414    Order Status: Completed Specimen: Blood Updated: 09/20/23 1152     Verigene Result Methicillin Resistant Staphylococcus epidermidis          All labs within the past 24 hours have been reviewed.    Significant Imaging:  Labs: Reviewed    Assessment/Plan:     Active Diagnoses:    Diagnosis Date Noted POA    PRINCIPAL PROBLEM:  Bacteremia [R78.81] 09/20/2023 Unknown    Acute metabolic encephalopathy [G93.41] 09/22/2023 Yes    Penile discharge [R36.9] 09/20/2023 Yes    ESRD (end stage renal disease) [N18.6] 09/19/2023 Yes    History of cardiac arrest [Z86.74] 09/19/2023 Not Applicable    Atrial fibrillation [I48.91] 09/19/2023 Yes    Arm wound, right, initial encounter [S41.101A] 09/19/2023 Yes    Arm wound, left, initial encounter [S41.102A] 09/19/2023 Yes    Diabetes [E11.9] 09/19/2023 Yes    Chronic combined systolic and diastolic congestive heart failure [I50.42] 09/19/2023 Yes      Problems Resolved During this Admission:    Diagnosis Date Noted Date Resolved POA    Dysphagia [R13.10] 09/19/2023 09/19/2023 Yes    Presence of automatic cardioverter/defibrillator (AICD) [Z95.810] 09/19/2023 09/19/2023 Yes    CAD (coronary artery disease) [I25.10] 09/19/2023 09/19/2023 Unknown    HFrEF  (heart failure with reduced ejection fraction) [I50.20] 09/19/2023 09/20/2023 Yes    Hypotension [I95.9] 09/19/2023 09/19/2023 Yes    Impaired mobility [Z74.09] 09/19/2023 09/19/2023 Yes    Sacral wound [S31.000A] 09/19/2023 09/19/2023 Yes    COPD (chronic obstructive pulmonary disease) [J44.9] 09/19/2023 09/19/2023 Yes    Hematoma [T14.8XXA] 09/19/2023 09/19/2023 Yes       Could complete IV therapy if necessary with doses given on his regular dialysis in [place]. If stays here, we'll continue TTS dialysis schedule.    Thank you for your consult. I will follow-up with patient. Please contact us if you have any additional questions.    Alfred Valdez MD  Nephrology  [facility]

## 2023-09-26 NOTE — PROGRESS NOTES
[facility]  Adult Nutrition  Consult Note         Reason for Assessment  Reason For Assessment: RD follow-up   Nutrition Risk Screen: no indicators present     9/26/2023 RD follow up. Patient continues on a renal diet with Charlie and Noversource renal BID. Patients po intake is 0-100% per flow sheets. Consider addition of MVI daily, Vit C 500mg BID and ZnS04 220mg BID.    9/21/2023 RD follow up. Patient seen by ST and recommended a regular diet. Current diet is Renal diet and appropriate for patient due to ESRD. Recommend Charlie BID to aid in wound healing. Consider addition of MVI daily, Vit C 500mg BID and ZnS04 220mg BID. Recommend addition of Novasource Renal due to poor po intake at 25% per flow sheets.       9/19/2023 RD note:Consult received and appreciated. Consult for wounds chewing/swallowing issues. ST to eval today. Recommend advance diet per ST recommendations to a renal high protein diabetic diet.   Recommend addition of Novasource Renal with poor po intake.       RD visited patient this morning to perform NFPE.     Patient is a weight loss of 30#/11.2% x 1 month, 63#/21% x 8 months and 77#/24.5% x 1 year. Weight loss is severe.     Patient meets ASPEN criteria for Moderate-severe protein calorie malnutrition.     Unhealed wounds, weight loss and muscle and fat wasting noted.     See muscle and fat loss severity below.     Malnutrition  Is Patient Malnourished: Yes Malnutrition Assessment  Malnutrition Context: chronic illness  Malnutrition Level: moderate (moderate to severe)  Skin (Micronutrient): wounds unhealed       Weight Loss (Malnutrition): greater than 20% in 1 year  Subcutaneous Fat (Malnutrition): moderate depletion  Muscle Mass (Malnutrition): severe depletion   Orbital Region (Subcutaneous Fat Loss): mild depletion  Upper Arm Region (Subcutaneous Fat Loss): severe depletion  Thoracic and Lumbar Region: moderate depletion   Worship Region (Muscle Loss): severe  depletion  Clavicle Bone Region (Muscle Loss): severe depletion  Clavicle and Acromion Bone Region (Muscle Loss): moderate depletion  Scapular Bone Region (Muscle Loss): moderate depletion  Patellar Region (Muscle Loss): moderate depletion  Anterior Thigh Region (Muscle Loss): moderate depletion                 Skin Integrity  Delfino Risk Assessment  Sensory Perception: 2-->very limited  Moisture: 3-->occasionally moist  Activity: 1-->bedfast  Mobility: 1-->completely immobile  Nutrition: 2-->probably inadequate  Friction and Shear: 2-->potential problem  Delfino Score: 11    Nutrition Diagnosis  Malnutrition (Moderate)   related to Fat wasting and Muscle wasting as evidenced by sig weight loss with visible muscle and fat wasting    Nutrition Diagnosis Status: Chronic/ continues      Nutrition Risk  Level of Risk/Frequency of Follow-up: high    Recent Labs   Lab 09/26/23  0728   POCGLU 81       Comments on Glucose: dx of diabetes  Nutrition Prescription / Recommendations  Recommendation/Intervention: Recommend continue with renal diet + Charlie and Novasource renal BID.  Goals: weight maintenance, wound healng  Nutrition Goal Status: progressing towards goal  Current Diet Order: Renal diet  Oral Nutrition Supplement: Novasource Renal + Charlie BID  Chewing or Swallowing Difficulty?: pending ST eval  Recommended Diet: renal diabetic  Recommended Oral Supplement:  Novasource renal  Is Nutrition Support Recommended: No  Is Education Recommended: No  Monitor and Evaluation  % current Intake: Unknown/ No P.O. intake documented  % intake to meet estimated needs: P.O. + Supplements  Food and Nutrient Intake: food and beverage intake, energy intake  Food and Nutrient Adminstration: diet order  Anthropometric Measurements: weight, weight change, body mass index  Biochemical Data, Medical Tests and Procedures: electrolyte and renal panel, gastrointestinal profile, glucose/endocrine profile, inflammatory profile, lipid  "profile  Nutrition-Focused Physical Findings: overall appearance     Current Medical Diagnosis and Past Medical History     Past Medical History:   Diagnosis Date    Cardiac arrest     CHF (congestive heart failure)     EF 25-30%    COPD (chronic obstructive pulmonary disease)     Coronary artery disease     Diabetes     GERD (gastroesophageal reflux disease)     Hepatitis C     Hypotension     Requiring Midodrine    Neuropathy     Substance abuse      Nutrition/Diet History  Spiritual, Cultural Beliefs, Islam Practices, Values that Affect Care: no  Food Allergies: NKFA  Factors Affecting Nutritional Intake: NPO, difficulty/impaired swallowing  Lab/Procedures/Meds  No results for input(s): "NA", "K", "BUN", "CREATININE", "CALCIUM", "ALBUMIN", "CL", "ALT", "AST", "PHOS" in the last 72 hours.  Note: BUN and crea elevated with ESRD, albumin low- wounds and poor intake with wound infection.  Last A1c:   Lab Results   Component Value Date    HGBA1C 5.3 09/19/2023     Lab Results   Component Value Date    RBC 3.67 (L) 09/19/2023    HGB 11.9 (L) 09/19/2023    HCT 37.2 (L) 09/19/2023    .4 (H) 09/19/2023    MCH 32.4 (H) 09/19/2023    MCHC 32.0 09/19/2023     Pertinent Labs Reviewed: reviewed  Pertinent Labs Comments: Sodium: 141  Potassium: 4.6  Chloride: 103  CO2: 24  Anion Gap: 19 (H)  BUN: 49 (H)  Creatinine: 11.70 (H)  BUN/CREAT RATIO: 4 (L)  eGFR: 4 (L)  Glucose: 80  Calcium: 9.0  Phosphorus: 4.2  Magnesium : 2.4 (H)  Alkaline Phosphatase: 125 (H)  PROTEIN TOTAL: 7.0  Albumin: 2.8 (L)  Albumin/Globulin Ratio: 0.7  BILIRUBIN TOTAL: 1.2  AST: 53 (H)  ALT: 43  Globulin, Total: 4.2 (H)      (H): Data is abnormally high  (L): Data is abnormally low  Pertinent Medications Reviewed: reviewed  Pertinent Medications Comments: amiodarone, albuterol, vanc, carvedilol, azacatem, clindamycin, atrovastatin, monetlusk, olanzapine  Anthropometrics  Temp: 97.6 °F (36.4 °C)  Height: 6' (182.9 cm)  Height (inches): 72 " in  Weight Method: Bed Scale  Weight: 107.5 kg (237 lb)  Weight (lb): 237 lb  Ideal Body Weight (IBW), Male: 178 lb  % Ideal Body Weight, Male (lb): 133.15 %  BMI (Calculated): 32.1  BMI Grade: 30 - 34.9- obesity - grade I     Estimated/Assessed Needs  Weight Used For Calorie Calculations: 87.6 kg (193 lb 2 oz)   Energy Need Method: Kcal/kg Energy Calorie Requirements (kcal): 7178-5123  Weight Used For Protein Calculations: 87.6 kg (193 lb 2 oz)  Protein Requirements: 105-122  Estimated Fluid Requirement Method: RDA Method    RDA Method (mL): 2190     Nutrition by Nursing     Intake (%): 100%        Last Bowel Movement: 09/22/23              Nutrition Follow-Up  RD Follow-up?: Yes

## 2023-09-27 PROBLEM — R78.81 BACTEREMIA: Status: RESOLVED | Noted: 2023-09-20 | Resolved: 2023-09-27

## 2023-09-27 LAB
BACTERIA BLD CULT: NORMAL
BACTERIA BLD CULT: NORMAL
GLUCOSE SERPL-MCNC: 101 MG/DL (ref 70–105)
GLUCOSE SERPL-MCNC: 116 MG/DL (ref 70–105)
GLUCOSE SERPL-MCNC: 94 MG/DL (ref 70–105)
GLUCOSE SERPL-MCNC: 95 MG/DL (ref 70–105)

## 2023-09-27 PROCEDURE — 25000003 PHARM REV CODE 250: Performed by: HOSPITALIST

## 2023-09-27 PROCEDURE — 97110 THERAPEUTIC EXERCISES: CPT

## 2023-09-27 PROCEDURE — 25000003 PHARM REV CODE 250

## 2023-09-27 PROCEDURE — 11000001 HC ACUTE MED/SURG PRIVATE ROOM

## 2023-09-27 PROCEDURE — 97116 GAIT TRAINING THERAPY: CPT

## 2023-09-27 PROCEDURE — 27000221 HC OXYGEN, UP TO 24 HOURS

## 2023-09-27 PROCEDURE — 99232 SBSQ HOSP IP/OBS MODERATE 35: CPT | Mod: ,,, | Performed by: FAMILY MEDICINE

## 2023-09-27 PROCEDURE — 94761 N-INVAS EAR/PLS OXIMETRY MLT: CPT

## 2023-09-27 PROCEDURE — 97112 NEUROMUSCULAR REEDUCATION: CPT

## 2023-09-27 PROCEDURE — 63600175 PHARM REV CODE 636 W HCPCS: Performed by: HOSPITALIST

## 2023-09-27 PROCEDURE — 99232 PR SUBSEQUENT HOSPITAL CARE,LEVL II: ICD-10-PCS | Mod: ,,, | Performed by: FAMILY MEDICINE

## 2023-09-27 PROCEDURE — 94640 AIRWAY INHALATION TREATMENT: CPT

## 2023-09-27 PROCEDURE — 82962 GLUCOSE BLOOD TEST: CPT

## 2023-09-27 PROCEDURE — 99900035 HC TECH TIME PER 15 MIN (STAT)

## 2023-09-27 PROCEDURE — 25000242 PHARM REV CODE 250 ALT 637 W/ HCPCS: Performed by: HOSPITALIST

## 2023-09-27 RX ADMIN — MONTELUKAST SODIUM 10 MG: 10 TABLET, FILM COATED ORAL at 09:09

## 2023-09-27 RX ADMIN — AMIODARONE HYDROCHLORIDE 400 MG: 200 TABLET ORAL at 08:09

## 2023-09-27 RX ADMIN — ATORVASTATIN CALCIUM 40 MG: 40 TABLET, FILM COATED ORAL at 09:09

## 2023-09-27 RX ADMIN — OLANZAPINE 2.5 MG: 2.5 TABLET, FILM COATED ORAL at 09:09

## 2023-09-27 RX ADMIN — MIDODRINE HYDROCHLORIDE 10 MG: 5 TABLET ORAL at 01:09

## 2023-09-27 RX ADMIN — MIDODRINE HYDROCHLORIDE 10 MG: 5 TABLET ORAL at 08:09

## 2023-09-27 RX ADMIN — SEVELAMER CARBONATE 800 MG: 800 TABLET, FILM COATED ORAL at 08:09

## 2023-09-27 RX ADMIN — IPRATROPIUM BROMIDE AND ALBUTEROL SULFATE 3 ML: 2.5; .5 SOLUTION RESPIRATORY (INHALATION) at 07:09

## 2023-09-27 RX ADMIN — ASPIRIN 81 MG: 81 TABLET, COATED ORAL at 08:09

## 2023-09-27 RX ADMIN — PANTOPRAZOLE SODIUM 40 MG: 40 TABLET, DELAYED RELEASE ORAL at 08:09

## 2023-09-27 RX ADMIN — MIDODRINE HYDROCHLORIDE 10 MG: 5 TABLET ORAL at 04:09

## 2023-09-27 RX ADMIN — SEVELAMER CARBONATE 800 MG: 800 TABLET, FILM COATED ORAL at 09:09

## 2023-09-27 RX ADMIN — CARVEDILOL 6.25 MG: 6.25 TABLET, FILM COATED ORAL at 08:09

## 2023-09-27 RX ADMIN — SEVELAMER CARBONATE 800 MG: 800 TABLET, FILM COATED ORAL at 04:09

## 2023-09-27 RX ADMIN — MEROPENEM 500 MG: 500 INJECTION, POWDER, FOR SOLUTION INTRAVENOUS at 04:09

## 2023-09-27 RX ADMIN — CARVEDILOL 6.25 MG: 6.25 TABLET, FILM COATED ORAL at 04:09

## 2023-09-27 NOTE — ASSESSMENT & PLAN NOTE
S/P I&D right hematoma and left I&D of wounds in upper extremities; followed by Surgery; this was due to vasopressor infiltration at [facility]; was placed on Aztreonam on admission--will change this to Merrem; wound culture shows Klebsiella    Debrided by surgery. 9/27 - High risk of rebleeding till closure. Will explore swing bed options closer to his vascular surgeon.  Only came here because [facility] was on bypass.

## 2023-09-27 NOTE — PHYSICIAN QUERY
PT Name: Yvan Rollins  MR #: [id number]    DOCUMENTATION CLARIFICATION     CDS/: Penelope Gonzales RN CDIS            Contact information:  [email or web address]      This form is a permanent document in the medical record.     Query Date: September 27, 2023    By submitting this query, we are merely seeking further clarification of documentation.. Please utilize your independent clinical judgment when addressing the question(s) below.    The medical record contains the following:   Indicators  Supporting Clinical Findings Location in Medical Record   x Registered Dietician Diagnosis Malnutrition Context: chronic illness  Malnutrition Level: moderate (moderate to severe) RD note 9/26     Energy Intake     x Weight Loss Weight Loss (Malnutrition): greater than 20% in 1 year RD note 9/26    x Fat Loss Subcutaneous Fat (Malnutrition): moderate depletion  Orbital Region (Subcutaneous Fat Loss): mild depletion  Upper Arm Region (Subcutaneous Fat Loss): severe depletion RD note 9/26    x Muscle Loss Muscle Mass (Malnutrition): severe depletion   Taoist Region (Muscle Loss): severe depletion  Clavicle Bone Region (Muscle Loss): severe depletion  Clavicle and Acromion Bone Region (Muscle Loss): moderate depletion  Scapular Bone Region (Muscle Loss): moderate depletion  Patellar Region (Muscle Loss): moderate depletion  Anterior Thigh Region (Muscle Loss): moderate depletion  RD note 9/26     Edema/Fluid Accumulation      Reduced  Strength (by dynamometer)     x Weight, BMI, Usual Body Weight Weight Method: Bed Scale  Weight: 107.5 kg (237 lb)  BMI (Calculated): 32.1 RD note 9/26    x Delayed Wound Healing Skin (Micronutrient): wounds unhealed  RD note 9/26     Acute or Chronic Illness      Social or Environmental Circumstances     x Treatment Recommendation/Intervention: Recommend continue with renal diet + Charlie and Novasource renal BID. RD note 9/26     Other       Academy of Nutrition and Dietetics (Academy)  and the American Society for Parenteral and Enteral Nutrition (A.S.P.E.N.) Clinical Characteristics to support Malnutrition      Criteria for mild malnutrition is defined as 1 characteristic outlined above within the established moderate or severe parameters.  A minimum of 2 out of the 6 characteristics noted above are recommended for a diagnosis of moderate or severe malnutrition.  Chronic illness/injury is a disease/condition lasting 3 months or longer.    The noted clinical guidelines are only system guidelines and do not replace the providers clinical judgment.    Provider, please specify diagnosis or diagnoses associated with above clinical findings.    [ x ] Moderate Malnutrition - a minimum of 2 of the 6 moderate malnutrition characteristics noted above    [  ] Other Nutritional Diagnosis (please specify): _______       Please document in your progress notes daily for the duration of treatment until resolved and  include in your discharge summary.      References:    MARE Streeter, & CHANDA Coronado (2022, April). Assessment and management of anorexia and cachexia in palliative care. Retrieved May 23, 2022, from https://www.[employer]/contents/assessment-and-management-of-anorexia-and-cachexia-in-palliative-care?[email or web address]&source=see_link     OSMEL Vann, PhD, RD, RAQUEL, EBONIE Quinones, PhD, RN, MIKALA Manning MD, PhD, Eleuterio BEASLEY A., MS, RD, [facility], PRETTY Vaughn, MS, RD, The Academy Malnutrition Work Group, The A.S.P.E.N. Board of Directors. (2012). Consensus Statement: Academy of Nutrition and Dietetics and American Society for Parenteral and Enteral Nutrition: Characteristics Recommended for the Identification and Documentation of Adult Malnutrition (Undernutrition). Journal of Parenteral and Enteral Nutrition, 36(3), 275-283. doi:10.1177/0843667195726008     Form No. [id number]

## 2023-09-27 NOTE — PHYSICIAN QUERY
"PT Name: Yvan Rollins  MR #: [id number]    DOCUMENTATION CLARIFICATION     CDS/: Penelope CHAMPION              Contact information: [email or web address]  Query signed Sarah CHAMPION   This form is a permanent document in the medical record.    Query Date: September 27, 2023  By submitting this query, we are merely seeking further clarification of documentation. Please utilize your independent clinical judgment when addressing the question(s) below.    The Medical Record contains the following:   Indicator Supporting Clinical Findings Location in Medical Record   x Documentation of "Debridement" IRRIGATION AND DEBRIDEMENT (Left)   Operative findings: necrotic eschar 4x4s cm left volar wrist  We next used scalpel excise full-thickness 4x4s cm with a scalpel down to the level of the fascia of the left rest excisional biopsy down to fascial level Op note 9/20     Documentation of "I&D"      Other       Excisional debridement is the surgical removal or cutting away of such tissue, necrosis, or slough and is classified to the root operation "Excision." Use of a sharp instrument does not always indicate that an excisional debridement was performed. Minor removal of loose fragments with scissors or using a sharp instrument to scrape away tissue is not an excisional debridement. Excisional debridement involves the use of a scalpel to remove devitalized tissue.  Nonexcisional debridement is the nonoperative brushing, irrigating, scrubbing, or washing of devitalized tissue, necrosis, slough, or foreign material. Most nonexcisional debridement procedures are classified to the root operation "Extraction" (pulling or stripping out or off all or a portion of a body part by the use of force).     Provider, please provide further clarification on the procedure performed on _L wrist  :    [   ] Excisional Debridement of subcutaneous tissue and/or fascia        [   ] Non-excisional Debridement of subcutaneous tissue " and/or fascia     [   ] Incision and Drainage to depth of subcutaneous and fascia     [   ] Other Procedure (please specify): _____________     Reference:    ICD-10-CM/PCS Coding Clinic Third Quarter ICD-10, Effective with discharges: October 7, 2015 [facility] § Excisional and nonexcisional debridement (2015).    Form No. [id number]

## 2023-09-27 NOTE — PROGRESS NOTES
[facility]  Nephrology  Progress Note    Patient Name: Yvan Rollins  MRN: [id number]  Admission Date: 9/19/2023  Hospital Length of Stay: 8 days  Attending Provider: Phil Hernandez Jr., MD   Primary Care Physician: Eugene Funez MD  Principal Problem:Bacteremia    Consults  Subjective:     Interval History: Mr. Rollins is seen with his ESRD and arm wound. He's alert and up in chair today. Again seems to be improving daily.    Review of patient's allergies indicates:   Allergen Reactions    Ceftriaxone Swelling    Lisinopril Swelling and Rash     Facial swelling   Facial swelling       Current Facility-Administered Medications   Medication Frequency    0.9%  NaCl infusion PRN    acetaminophen tablet 650 mg Q4H PRN    albuterol-ipratropium 2.5 mg-0.5 mg/3 mL nebulizer solution 3 mL BID    [START ON 9/30/2023] amiodarone tablet 200 mg Daily    amiodarone tablet 400 mg Daily    aspirin EC tablet 81 mg Daily    atorvastatin tablet 40 mg QHS    carvediloL tablet 6.25 mg BID WM    dextrose 10% bolus 125 mL 125 mL PRN    dextrose 10% bolus 250 mL 250 mL PRN    glucagon (human recombinant) injection 1 mg PRN    heparin (porcine) injection 4,000 Units PRN    HYDROcodone-acetaminophen  mg per tablet 1 tablet Q6H PRN    HYDROcodone-acetaminophen 5-325 mg per tablet 1 tablet Q6H PRN    insulin aspart U-100 injection 0-5 Units Q6H PRN    meropenem (MERREM) 500 mg in sodium chloride 0.9 % 100 mL IVPB (MB+) Daily    midodrine tablet 10 mg TID WM    montelukast tablet 10 mg QHS    naloxone 0.4 mg/mL injection 0.02 mg PRN    OLANZapine tablet 2.5 mg QHS    ondansetron injection 4 mg Q8H PRN    pantoprazole EC tablet 40 mg Daily    sevelamer carbonate tablet 800 mg TID    sodium chloride 0.9% flush 10 mL Q12H PRN       Objective:     Vital Signs (Most Recent):  Temp: 96.4 °F (35.8 °C) (09/27/23 1200)  Pulse: 93 (09/27/23 1200)  Resp: 18 (09/27/23 1200)  BP: (!) 97/55 (09/27/23 1200)  SpO2: (!) 93  "% (09/27/23 1200) Vital Signs (24h Range):  Temp:  [96.4 °F (35.8 °C)-98.5 °F (36.9 °C)] 96.4 °F (35.8 °C)  Pulse:  [60-93] 93  Resp:  [16-20] 18  SpO2:  [93 %-100 %] 93 %  BP: ()/(55-83) 97/55     Weight: 107.5 kg (237 lb) (09/18/23 2315)  Body mass index is 32.14 kg/m².  Body surface area is 2.34 meters squared.    I/O last 3 completed shifts:  In: 320 [P.O.:220; IV Piggyback:100]  Out: 526 [Other:526]    Physical Exam No edema. Chest clear. Alert and appropriate. Still some difficulty with other than short answers and statements d/t his underlying encephalopathy.    Significant Labs:sureBMP: No results for input(s): "GLU", "NA", "K", "CL", "CO2", "BUN", "CREATININE", "CALCIUM", "MG" in the last 168 hours.  All labs within the past 24 hours have been reviewed.    Significant Imaging:  Labs: Reviewed    Assessment/Plan:     Active Diagnoses:    Diagnosis Date Noted POA    PRINCIPAL PROBLEM:  Bacteremia [R78.81] 09/20/2023 Unknown    Acute metabolic encephalopathy [G93.41] 09/22/2023 Yes    Penile discharge [R36.9] 09/20/2023 Yes    ESRD (end stage renal disease) [N18.6] 09/19/2023 Yes    History of cardiac arrest [Z86.74] 09/19/2023 Not Applicable    Atrial fibrillation [I48.91] 09/19/2023 Yes    Arm wound, right, initial encounter [S41.101A] 09/19/2023 Yes    Arm wound, left, initial encounter [S41.102A] 09/19/2023 Yes    Diabetes [E11.9] 09/19/2023 Yes    Chronic combined systolic and diastolic congestive heart failure [I50.42] 09/19/2023 Yes      Problems Resolved During this Admission:    Diagnosis Date Noted Date Resolved POA    Dysphagia [R13.10] 09/19/2023 09/19/2023 Yes    Presence of automatic cardioverter/defibrillator (AICD) [Z95.810] 09/19/2023 09/19/2023 Yes    CAD (coronary artery disease) [I25.10] 09/19/2023 09/19/2023 Unknown    HFrEF (heart failure with reduced ejection fraction) [I50.20] 09/19/2023 09/20/2023 Yes    Hypotension [I95.9] 09/19/2023 09/19/2023 Yes    Impaired mobility [Z74.09] " 09/19/2023 09/19/2023 Yes    Sacral wound [S31.000A] 09/19/2023 09/19/2023 Yes    COPD (chronic obstructive pulmonary disease) [J44.9] 09/19/2023 09/19/2023 Yes    Hematoma [T14.8XXA] 09/19/2023 09/19/2023 Yes       Continue 3x weekly hemodialysis.    Thank you for your consult. I will follow-up with patient. Please contact us if you have any additional questions.    Alfred Valdez MD  Nephrology  [facility]

## 2023-09-27 NOTE — PROGRESS NOTES
[facility]  [facility] Medicine  Progress Note    Patient Name: Yvan Rollins  MRN: [id number]  Patient Class: IP- Inpatient   Admission Date: 9/19/2023  Length of Stay: 7 days  Attending Physician: Phil Hernandez Jr., MD  Primary Care Provider: Eugene Funez MD        Subjective:     Principal Problem:Bacteremia        HPI:  Patient is a 63 year old  male who was transferred to [facility] from [place] ED for vascular consultation for a necrotic wound overlying an AV access site in the RT arm. He presented to [place] ED via [facility] from [facility] (referenced to be [facility] from recent hospitalization at [facility] (8/15/2023)). He was recently hospitalized at [facility] after having dyspnea during dialysis that progressed to cardiac arrest with ROSC. The patient was mechanically intubated, started on pressors, and managed in the ICU. According to the discharge summary, the patient had an adverse reaction to surface and infiltration anesthetic and intravenous infiltration.     During hospitalization at [facility], the RT AV access site was unable to be used and a tunneled RT dialysis catheter was placed. He was evaluated by Vascular surgery during that admission with outpatient follow-up. Today, he was seen by wound care at the NH and they recommended return to [facility] for vascular evaluation of the wound. However, [facility] was on Diversion and he ended up at [place] and subsequently [facility].     During his hospitalization at [facility], cardiology was consulted. ECHO demonstrated EF of 25-30% and there was a reported 1 episode of nonsustained v-tach. Cardiology recommended resuming goal directed medical therapy, and there was no identified cardiac etiology for his arrest. He was also evaluated by neurology due to concern for anoxic brain injury.  CT of the brain demonstrated chronic ischemic microangiopathy. Neurology diagnosed his encephalopathy to be multifactorial - anoxic insult from  prolonged cardiac arrest with metabolic derangement. His mental status improved, but not to baseline. Today, at [facility] he   is Aox1 and he is a poor historian. He has difficulty answering questions. History is obtained from medical records.     The patient has a PMH of nonischemic cardiomyopathy with EF 20-25%, S/P AICD placement, ESRD on HD TTS, COPD, HTN, polysubstance abuse, and hepatitis C.         Overview/Hospital Course:  63 year old male presents with necrotic wound of AV fistula site s/p debridement.  He is being treated for a Staph bacteremia.  He is alert.  Patient denies chest pain, shortness of breath, nausea, vomiting, or diarrhea.        Interval History: No complaints.  Much more awake and alert today.  Discussed with surgery. They will apprise us when wound closed enough for safe discharge.  Agree with Dr. Valdez. MRSYVES blood cx likely contaminant. Will d/c vanc.  Continue gram negative coverage for wound.     Review of Systems  Objective:     Vital Signs (Most Recent):  Temp: 96.7 °F (35.9 °C) (09/26/23 1600)  Pulse: 71 (09/26/23 1600)  Resp: 18 (09/26/23 1600)  BP: (!) 88/67 (09/26/23 1600)  SpO2: 100 % (09/26/23 1600) Vital Signs (24h Range):  Temp:  [96.7 °F (35.9 °C)-98.1 °F (36.7 °C)] 96.7 °F (35.9 °C)  Pulse:  [58-71] 71  Resp:  [16-20] 18  SpO2:  [92 %-100 %] 100 %  BP: ()/(45-91) 88/67     Weight: 107.5 kg (237 lb)  Body mass index is 32.14 kg/m².    Intake/Output Summary (Last 24 hours) at 9/26/2023 1906  Last data filed at 9/26/2023 1812  Gross per 24 hour   Intake 320 ml   Output 526 ml   Net -206 ml         Physical Exam  Vitals reviewed.   Constitutional:       General: He is not in acute distress.     Appearance: He is not toxic-appearing.   HENT:      Head: Normocephalic and atraumatic.   Eyes:      General: No scleral icterus.  Cardiovascular:      Rate and Rhythm: Normal rate and regular rhythm.      Heart sounds: Normal heart sounds.   Pulmonary:      Effort: Pulmonary effort is  "normal. No respiratory distress.      Breath sounds: Normal breath sounds. No wheezing.   Abdominal:      General: Abdomen is flat. Bowel sounds are normal. There is no distension.      Palpations: Abdomen is soft.   Skin:     General: Skin is warm and dry.   Neurological:      Mental Status: He is alert.             Significant Labs: All pertinent labs within the past 24 hours have been reviewed.  BMP: No results for input(s): "GLU", "NA", "K", "CL", "CO2", "BUN", "CREATININE", "CALCIUM", "MG" in the last 48 hours.  CBC: No results for input(s): "WBC", "HGB", "HCT", "PLT" in the last 48 hours.    Significant Imaging: I have reviewed all pertinent imaging results/findings within the past 24 hours.      Assessment/Plan:      * Bacteremia  Blood culture x 1 from 9/19 shows MRSE; on Vancomycin; repeat blood cultures from 9/21 negative to date but pending.  D/c vanc.       Acute metabolic encephalopathy  He is more alert today but confused; probably at baseline; CT brain did not show acute abnormality      Penile discharge  Negative Chlamydia and N. gonorrhea; negative urine culture      Chronic combined systolic and diastolic congestive heart failure  Continue current meds      Diabetes  Does not appear to be on any home meds for DM; will monitor BS levels; BS stable    Arm wound, left, initial encounter  S/P I&D right hematoma and left I&D of wounds in upper extremities; followed by Surgery; this was due to vasopressor infiltration at [facility]; on Merrem      Arm wound, right, initial encounter  S/P I&D right hematoma and left I&D of wounds in upper extremities; followed by Surgery; this was due to vasopressor infiltration at [facility]; was placed on Aztreonam on admission--will change this to Merrem; wound culture shows Klebsiella    Atrial fibrillation  On Amiodarone and Coreg; followed by Cardiology      History of cardiac arrest  Has AICD; continue current meds; BP low at times;he is on Coreg and Amiodarone; he is " s/p cardiac arrest from dialysis at [facility] in [place], [place] in August    Chronic hepatitis C  Stable    GERD (gastroesophageal reflux disease)  On Protonix      ESRD (end stage renal disease)  On dialysis        VTE Risk Mitigation (From admission, onward)         Ordered     heparin (porcine) injection 4,000 Units  As needed (PRN)         09/21/23 1135     IP VTE HIGH RISK PATIENT  Once         09/19/23 0147     Place sequential compression device  Until discontinued         09/19/23 0147     Reason for No Pharmacological VTE Prophylaxis  Once        Question:  Reasons:  Answer:  Physician Provided (leave comment)    09/19/23 0147                Discharge Planning   SANDRA:      Code Status: Full Code   Is the patient medically ready for discharge?:     Reason for patient still in hospital (select all that apply): Treatment  Discharge Plan A: Home with family                  Phil Hernandez Jr, MD  Department of Hospital Medicine   [facility]

## 2023-09-27 NOTE — SUBJECTIVE & OBJECTIVE
"Interval History: No complaints.  Much more awake and alert today.  Discussed with surgery. They will apprise us when wound closed enough for safe discharge.  Agree with Dr. Valdez. ARNOLD blood cx likely contaminant. Will d/c vanc.  Continue gram negative coverage for wound.     Review of Systems  Objective:     Vital Signs (Most Recent):  Temp: 96.7 °F (35.9 °C) (09/26/23 1600)  Pulse: 71 (09/26/23 1600)  Resp: 18 (09/26/23 1600)  BP: (!) 88/67 (09/26/23 1600)  SpO2: 100 % (09/26/23 1600) Vital Signs (24h Range):  Temp:  [96.7 °F (35.9 °C)-98.1 °F (36.7 °C)] 96.7 °F (35.9 °C)  Pulse:  [58-71] 71  Resp:  [16-20] 18  SpO2:  [92 %-100 %] 100 %  BP: ()/(45-91) 88/67     Weight: 107.5 kg (237 lb)  Body mass index is 32.14 kg/m².    Intake/Output Summary (Last 24 hours) at 9/26/2023 1906  Last data filed at 9/26/2023 1812  Gross per 24 hour   Intake 320 ml   Output 526 ml   Net -206 ml         Physical Exam  Vitals reviewed.   Constitutional:       General: He is not in acute distress.     Appearance: He is not toxic-appearing.   HENT:      Head: Normocephalic and atraumatic.   Eyes:      General: No scleral icterus.  Cardiovascular:      Rate and Rhythm: Normal rate and regular rhythm.      Heart sounds: Normal heart sounds.   Pulmonary:      Effort: Pulmonary effort is normal. No respiratory distress.      Breath sounds: Normal breath sounds. No wheezing.   Abdominal:      General: Abdomen is flat. Bowel sounds are normal. There is no distension.      Palpations: Abdomen is soft.   Skin:     General: Skin is warm and dry.   Neurological:      Mental Status: He is alert.             Significant Labs: All pertinent labs within the past 24 hours have been reviewed.  BMP: No results for input(s): "GLU", "NA", "K", "CL", "CO2", "BUN", "CREATININE", "CALCIUM", "MG" in the last 48 hours.  CBC: No results for input(s): "WBC", "HGB", "HCT", "PLT" in the last 48 hours.    Significant Imaging: I have reviewed all pertinent " imaging results/findings within the past 24 hours.

## 2023-09-27 NOTE — PROGRESS NOTES
[facility]  [facility] Medicine  Progress Note    Patient Name: Yvan Rollins  MRN: [id number]  Patient Class: IP- Inpatient   Admission Date: 9/19/2023  Length of Stay: 8 days  Attending Physician: Phil Hernandez Jr., MD  Primary Care Provider: Eugene Funez MD        Subjective:     Principal Problem:Bacteremia        HPI:  Patient is a 63 year old  male who was transferred to [facility] from [place] ED for vascular consultation for a necrotic wound overlying an AV access site in the RT arm. He presented to [place] ED via [facility] from [facility] (referenced to be [facility] from recent hospitalization at [facility] (8/15/2023)). He was recently hospitalized at [facility] after having dyspnea during dialysis that progressed to cardiac arrest with ROSC. The patient was mechanically intubated, started on pressors, and managed in the ICU. According to the discharge summary, the patient had an adverse reaction to surface and infiltration anesthetic and intravenous infiltration.     During hospitalization at [facility], the RT AV access site was unable to be used and a tunneled RT dialysis catheter was placed. He was evaluated by Vascular surgery during that admission with outpatient follow-up. Today, he was seen by wound care at the NH and they recommended return to [facility] for vascular evaluation of the wound. However, [facility] was on Diversion and he ended up at [place] and subsequently [facility].     During his hospitalization at [facility], cardiology was consulted. ECHO demonstrated EF of 25-30% and there was a reported 1 episode of nonsustained v-tach. Cardiology recommended resuming goal directed medical therapy, and there was no identified cardiac etiology for his arrest. He was also evaluated by neurology due to concern for anoxic brain injury.  CT of the brain demonstrated chronic ischemic microangiopathy. Neurology diagnosed his encephalopathy to be multifactorial - anoxic insult from  prolonged cardiac arrest with metabolic derangement. His mental status improved, but not to baseline. Today, at [facility] he   is Aox1 and he is a poor historian. He has difficulty answering questions. History is obtained from medical records.     The patient has a PMH of nonischemic cardiomyopathy with EF 20-25%, S/P AICD placement, ESRD on HD TTS, COPD, HTN, polysubstance abuse, and hepatitis C.         Overview/Hospital Course:  63 year old male presents with necrotic wound of AV fistula site s/p debridement.  He is being treated for a Staph bacteremia.  He is alert.  Patient denies chest pain, shortness of breath, nausea, vomiting, or diarrhea.        Interval History: No new complaints.     Review of Systems  Objective:     Vital Signs (Most Recent):  Temp: 97.8 °F (36.6 °C) (09/27/23 1600)  Pulse: 65 (09/27/23 1600)  Resp: 18 (09/27/23 1600)  BP: 130/82 (09/27/23 1656)  SpO2: 95 % (09/27/23 1600) Vital Signs (24h Range):  Temp:  [96.4 °F (35.8 °C)-98.5 °F (36.9 °C)] 97.8 °F (36.6 °C)  Pulse:  [60-93] 65  Resp:  [16-20] 18  SpO2:  [93 %-99 %] 95 %  BP: ()/(55-83) 130/82     Weight: 107.5 kg (237 lb)  Body mass index is 32.14 kg/m².    Intake/Output Summary (Last 24 hours) at 9/27/2023 1712  Last data filed at 9/27/2023 0800  Gross per 24 hour   Intake 280 ml   Output 0 ml   Net 280 ml         Physical Exam  Vitals reviewed.   Constitutional:       General: He is not in acute distress.     Appearance: He is not toxic-appearing.   Cardiovascular:      Rate and Rhythm: Normal rate and regular rhythm.      Heart sounds: Normal heart sounds.   Pulmonary:      Effort: Pulmonary effort is normal. No respiratory distress.      Breath sounds: Normal breath sounds. No wheezing.   Abdominal:      General: Abdomen is flat. Bowel sounds are normal. There is no distension.      Palpations: Abdomen is soft.   Skin:     General: Skin is warm and dry.   Neurological:      Mental Status: He is alert.             Significant  "Labs: All pertinent labs within the past 24 hours have been reviewed.  BMP: No results for input(s): "GLU", "NA", "K", "CL", "CO2", "BUN", "CREATININE", "CALCIUM", "MG" in the last 48 hours.  CBC: No results for input(s): "WBC", "HGB", "HCT", "PLT" in the last 48 hours.    Significant Imaging: I have reviewed all pertinent imaging results/findings within the past 24 hours.      Assessment/Plan:      Arm wound, right, initial encounter  S/P I&D right hematoma and left I&D of wounds in upper extremities; followed by Surgery; this was due to vasopressor infiltration at [facility]; was placed on Aztreonam on admission--will change this to Merrem; wound culture shows Klebsiella    Debrided by surgery. 9/27 - High risk of rebleeding till closure. Will explore swing bed options closer to his vascular surgeon.  Only came here because [facility] was on bypass.     Acute metabolic encephalopathy  He is more alert today but confused; probably at baseline; CT brain did not show acute abnormality      Penile discharge  Negative Chlamydia and N. gonorrhea; negative urine culture      Chronic combined systolic and diastolic congestive heart failure  Continue current meds      Diabetes  Does not appear to be on any home meds for DM; will monitor BS levels; BS stable    Arm wound, left, initial encounter  S/P I&D right hematoma and left I&D of wounds in upper extremities; followed by Surgery; this was due to vasopressor infiltration at [facility]; on Merrem      Atrial fibrillation  On Amiodarone and Coreg; followed by Cardiology      History of cardiac arrest  Has AICD; continue current meds; BP low at times;he is on Coreg and Amiodarone; he is s/p cardiac arrest from dialysis at [facility] in [place], [place] in August    Chronic hepatitis C  Stable    GERD (gastroesophageal reflux disease)  On Protonix      ESRD (end stage renal disease)  On dialysis.        VTE Risk Mitigation (From admission, onward)         Ordered     heparin (porcine) " injection 4,000 Units  As needed (PRN)         09/21/23 1135     IP VTE HIGH RISK PATIENT  Once         09/19/23 0147     Place sequential compression device  Until discontinued         09/19/23 0147     Reason for No Pharmacological VTE Prophylaxis  Once        Question:  Reasons:  Answer:  Physician Provided (leave comment)    09/19/23 0147                Discharge Planning   SANDRA:      Code Status: Full Code   Is the patient medically ready for discharge?:     Reason for patient still in hospital (select all that apply): Treatment  Discharge Plan A: Home with family                  Phil Hernandez Jr, MD  Department of Hospital Medicine   [facility]

## 2023-09-27 NOTE — ASSESSMENT & PLAN NOTE
Blood culture x 1 from 9/19 shows MRSE; on Vancomycin; repeat blood cultures from 9/21 negative to date but pending.  D/c vanc.

## 2023-09-27 NOTE — PT/OT/SLP PROGRESS
Occupational Therapy   Treatment    Name: Yvan Rollins  MRN: [id number]  Admitting Diagnosis:  Bacteremia  7 Days Post-Op    Recommendations:     Discharge Recommendations: nursing facility, skilled  Discharge Equipment Recommendations:   (to be determined)  Barriers to discharge:  Inaccessible home environment, Decreased caregiver support    Assessment:     Yvan Rollins is a 63 y.o. male with a medical diagnosis of Bacteremia.  He presents with the following performance deficits affecting function are weakness, impaired endurance, decreased upper extremity function, decreased coordination, impaired skin.     Rehab Prognosis:  Good; patient would benefit from acute skilled OT services to address these deficits and reach maximum level of function.       Plan:     Patient to be seen 5 x/week to address the above listed problems via self-care/home management, therapeutic activities, therapeutic exercises  Plan of Care Expires:    Plan of Care Reviewed with: patient    Subjective     Chief Complaint: bacteremia  Patient/Family Comments/goals: Agreeable to OT tx  Pain/Comfort:       Objective:     Communicated with: PT prior to session.  Patient found HOB elevated with SCD, peripheral IV, oxygen upon OT entry to room.    General Precautions: Standard, fall    Orthopedic Precautions:N/A  Braces: N/A  Respiratory Status: Nasal cannula, flow 2 L/min     Occupational Performance:     Bed Mobility:    Patient completed Supine to Sit with contact guard assistance     Functional Mobility/Transfers:  Patient completed Sit <> Stand Transfer with contact guard assistance, minimum assistance, and of 2 persons  with  rolling walker   Functional Mobility: Patient ambulated in room with min assist x 2 with verbal cues for safety and technique    Activities of Daily Living:  Feeding:  with setup assistance to consume breakfast        AMPAC 6 Click ADL:      Treatment & Education:  Patient performed bed mobility, transfers,  functional mobility, ADLs as depicted above.     Patient left up in chair with all lines intact, call button in reach, and chair alarm on    GOALS:   Multidisciplinary Problems       Occupational Therapy Goals          Problem: Occupational Therapy    Goal Priority Disciplines Outcome Interventions   Occupational Therapy Goal     OT, PT/OT Ongoing, Progressing    Description: STG:  Pt will perform grooming with setup   Pt will bathe self with min assist  Pt will perform UB dressing with SBA  Pt will perform LB dressing with min assist  Pt will sit EOB x 10 min with SBA   Pt will transfer toilet/BSC with min assist  Pt will tolerate 15 minutes of tx with minimal fatigue      LT. Pt will achieve max level of independence with self care.                         Time Tracking:     OT Date of Treatment: 23  OT Start Time: 09  OT Stop Time: 1000  OT Total Time (min): 18 min    Billable Minutes:Neuromuscular Re-education 11               2023

## 2023-09-27 NOTE — ASSESSMENT & PLAN NOTE
Blood culture x 1 from 9/19 shows MRSE; on Vancomycin; repeat blood cultures from 9/21 negative to date but pending.  D/c vanc.   Likely contaminant first set of cultures.

## 2023-09-27 NOTE — PT/OT/SLP PROGRESS
Physical Therapy Treatment    Patient Name:  Yvan Rollins   MRN:  [id number]    Recommendations:     Discharge Recommendations: nursing facility, skilled  Discharge Equipment Recommendations: other (see comments) (to be determined)  Barriers to discharge:  ongoing medical treatment; increased level of assist    Assessment:     Yvan Rollins is a 63 y.o. male admitted with a medical diagnosis of Bacteremia.  He presents with the following impairments/functional limitations: weakness, impaired endurance, impaired self care skills, impaired functional mobility, impaired cognition, impaired skin Pt was alert and eager to participate in PT treatment.      Pt able to tolerate gait training this session but still demo myoclonic jerking as well as consistent bilateral knee wobble during all WB phases of gait.     Rehab Prognosis: Good; patient would benefit from acute skilled PT services to address these deficits and reach maximum level of function.    Recent Surgery: Procedure(s) (LRB):  INCISION AND DRAINAGE, HEMATOMA (Right)  IRRIGATION AND DEBRIDEMENT (Left) 7 Days Post-Op    Plan:     During this hospitalization, patient to be seen 5 x/week to address the identified rehab impairments via gait training, therapeutic activities, therapeutic exercises, neuromuscular re-education and progress toward the following goals:    Plan of Care Expires:       Subjective     Chief Complaint: Bacteremia  Patient/Family Comments/goals: agreeable to PT  Pain/Comfort:  Pain Rating 1: 0/10  Pain Addressed 1: Reposition, Distraction, Cessation of Activity  Pain Rating Post-Intervention 1: 0/10      Objective:     Communicated with TY Butler prior to session.  Patient found HOB elevated with SCD, telemetry, peripheral IV, oxygen, bed alarm upon PT entry to room.     General Precautions: Standard, fall  Orthopedic Precautions: N/A  Braces: N/A  Respiratory Status: Nasal cannula, flow 2 L/min     Functional Mobility:  Bed Mobility:      Scooting: contact guard assistance and minimum assistance  Supine to Sit: contact guard assistance and minimum assistance  Transfers:     Sit to Stand:  2 attempts w/ minimum assistance to moderate assistance of 2 persons with rolling walker  Gait: pt ambulated 35ft w/ RW, Min-ModAx2; decreased jim, narrow DAMIAN and step length; pt exhibited myoclonic jerks and knees buckling throughout gait pattern.   Balance: good in sitting; poor in standing  EOB sitting: pt was able to tolerate EOB sitting with good balance for 5-7 minutes; with supervision and cueing for corrective posture.    AM-PAC 6 CLICK MOBILITY  Turning over in bed (including adjusting bedclothes, sheets and blankets)?: 3  Sitting down on and standing up from a chair with arms (e.g., wheelchair, bedside commode, etc.): 3  Moving from lying on back to sitting on the side of the bed?: 3  Moving to and from a bed to a chair (including a wheelchair)?: 2  Need to walk in hospital room?: 2  Climbing 3-5 steps with a railing?: 1  Basic Mobility Total Score: 14       Treatment & Education:  Bilateral lower extremity exercise x 30 reps: ankle pumps, Quad sets, glut sets, short arc quads, heel slides, hip abduction/adduction, and Long arc quads with verbal cues     Patient left up in chair with all lines intact, call button in reach, and TY Butler notified and JOSLYN Stanford OTR present.    GOALS:   Multidisciplinary Problems       Physical Therapy Goals          Problem: Physical Therapy    Goal Priority Disciplines Outcome Goal Variances Interventions   Physical Therapy Goal     PT, PT/OT Ongoing, Progressing     Description: Short Term Goals  Ambulate Mod - 10 feet with rolling walker assistive device.   Supine to sit contact guard  Sit to stand contact guard  SPT contact guard  5. Independent with HEP    Long Term Goals  Ambulate CGA - 20 feet with rolling walker assistive device.   Supine to sit stand-by  Sit to stand stand-by  SPT stand-by  Needed  equipment for home.                              Time Tracking:     PT Received On: 09/27/23  PT Start Time: 0930     PT Stop Time: 0958  PT Total Time (min): 28 min     Billable Minutes: Gait Training 8 and Therapeutic Exercise 16    Treatment Type: Treatment  PT/PTA: PT     Number of PTA visits since last PT visit: 0     09/27/2023

## 2023-09-27 NOTE — SUBJECTIVE & OBJECTIVE
"Interval History: No new complaints.     Review of Systems  Objective:     Vital Signs (Most Recent):  Temp: 97.8 °F (36.6 °C) (09/27/23 1600)  Pulse: 65 (09/27/23 1600)  Resp: 18 (09/27/23 1600)  BP: 130/82 (09/27/23 1656)  SpO2: 95 % (09/27/23 1600) Vital Signs (24h Range):  Temp:  [96.4 °F (35.8 °C)-98.5 °F (36.9 °C)] 97.8 °F (36.6 °C)  Pulse:  [60-93] 65  Resp:  [16-20] 18  SpO2:  [93 %-99 %] 95 %  BP: ()/(55-83) 130/82     Weight: 107.5 kg (237 lb)  Body mass index is 32.14 kg/m².    Intake/Output Summary (Last 24 hours) at 9/27/2023 1712  Last data filed at 9/27/2023 0800  Gross per 24 hour   Intake 280 ml   Output 0 ml   Net 280 ml         Physical Exam  Vitals reviewed.   Constitutional:       General: He is not in acute distress.     Appearance: He is not toxic-appearing.   Cardiovascular:      Rate and Rhythm: Normal rate and regular rhythm.      Heart sounds: Normal heart sounds.   Pulmonary:      Effort: Pulmonary effort is normal. No respiratory distress.      Breath sounds: Normal breath sounds. No wheezing.   Abdominal:      General: Abdomen is flat. Bowel sounds are normal. There is no distension.      Palpations: Abdomen is soft.   Skin:     General: Skin is warm and dry.   Neurological:      Mental Status: He is alert.             Significant Labs: All pertinent labs within the past 24 hours have been reviewed.  BMP: No results for input(s): "GLU", "NA", "K", "CL", "CO2", "BUN", "CREATININE", "CALCIUM", "MG" in the last 48 hours.  CBC: No results for input(s): "WBC", "HGB", "HCT", "PLT" in the last 48 hours.    Significant Imaging: I have reviewed all pertinent imaging results/findings within the past 24 hours.  "

## 2023-09-28 LAB
GLUCOSE SERPL-MCNC: 121 MG/DL (ref 70–105)
GLUCOSE SERPL-MCNC: 82 MG/DL (ref 70–105)
GLUCOSE SERPL-MCNC: 82 MG/DL (ref 70–105)
VANCOMYCIN SERPL-MCNC: 19.1 ΜG/ML (ref 0–20)

## 2023-09-28 PROCEDURE — 90935 HEMODIALYSIS ONE EVALUATION: CPT

## 2023-09-28 PROCEDURE — 97116 GAIT TRAINING THERAPY: CPT | Mod: CQ

## 2023-09-28 PROCEDURE — 97535 SELF CARE MNGMENT TRAINING: CPT

## 2023-09-28 PROCEDURE — 82962 GLUCOSE BLOOD TEST: CPT

## 2023-09-28 PROCEDURE — 25000242 PHARM REV CODE 250 ALT 637 W/ HCPCS: Performed by: HOSPITALIST

## 2023-09-28 PROCEDURE — 63600175 PHARM REV CODE 636 W HCPCS: Performed by: INTERNAL MEDICINE

## 2023-09-28 PROCEDURE — 99900035 HC TECH TIME PER 15 MIN (STAT)

## 2023-09-28 PROCEDURE — 99232 SBSQ HOSP IP/OBS MODERATE 35: CPT | Mod: ,,, | Performed by: FAMILY MEDICINE

## 2023-09-28 PROCEDURE — 94640 AIRWAY INHALATION TREATMENT: CPT

## 2023-09-28 PROCEDURE — 94761 N-INVAS EAR/PLS OXIMETRY MLT: CPT

## 2023-09-28 PROCEDURE — 25000003 PHARM REV CODE 250: Performed by: INTERNAL MEDICINE

## 2023-09-28 PROCEDURE — 25000003 PHARM REV CODE 250

## 2023-09-28 PROCEDURE — 97110 THERAPEUTIC EXERCISES: CPT | Mod: CQ

## 2023-09-28 PROCEDURE — 99232 PR SUBSEQUENT HOSPITAL CARE,LEVL II: ICD-10-PCS | Mod: ,,, | Performed by: FAMILY MEDICINE

## 2023-09-28 PROCEDURE — 11000001 HC ACUTE MED/SURG PRIVATE ROOM

## 2023-09-28 PROCEDURE — 80202 ASSAY OF VANCOMYCIN: CPT | Performed by: FAMILY MEDICINE

## 2023-09-28 PROCEDURE — 25000003 PHARM REV CODE 250: Performed by: HOSPITALIST

## 2023-09-28 PROCEDURE — 63600175 PHARM REV CODE 636 W HCPCS: Performed by: HOSPITALIST

## 2023-09-28 PROCEDURE — 27000221 HC OXYGEN, UP TO 24 HOURS

## 2023-09-28 RX ADMIN — ATORVASTATIN CALCIUM 40 MG: 40 TABLET, FILM COATED ORAL at 10:09

## 2023-09-28 RX ADMIN — SEVELAMER CARBONATE 800 MG: 800 TABLET, FILM COATED ORAL at 10:09

## 2023-09-28 RX ADMIN — HEPARIN SODIUM 4000 UNITS: 1000 INJECTION, SOLUTION INTRAVENOUS; SUBCUTANEOUS at 10:09

## 2023-09-28 RX ADMIN — SEVELAMER CARBONATE 800 MG: 800 TABLET, FILM COATED ORAL at 08:09

## 2023-09-28 RX ADMIN — MEROPENEM 500 MG: 500 INJECTION, POWDER, FOR SOLUTION INTRAVENOUS at 05:09

## 2023-09-28 RX ADMIN — IPRATROPIUM BROMIDE AND ALBUTEROL SULFATE 3 ML: 2.5; .5 SOLUTION RESPIRATORY (INHALATION) at 07:09

## 2023-09-28 RX ADMIN — ASPIRIN 81 MG: 81 TABLET, COATED ORAL at 08:09

## 2023-09-28 RX ADMIN — CARVEDILOL 6.25 MG: 6.25 TABLET, FILM COATED ORAL at 05:09

## 2023-09-28 RX ADMIN — MIDODRINE HYDROCHLORIDE 10 MG: 5 TABLET ORAL at 12:09

## 2023-09-28 RX ADMIN — AMIODARONE HYDROCHLORIDE 400 MG: 200 TABLET ORAL at 08:09

## 2023-09-28 RX ADMIN — MONTELUKAST SODIUM 10 MG: 10 TABLET, FILM COATED ORAL at 10:09

## 2023-09-28 RX ADMIN — MIDODRINE HYDROCHLORIDE 10 MG: 5 TABLET ORAL at 08:09

## 2023-09-28 RX ADMIN — OLANZAPINE 2.5 MG: 2.5 TABLET, FILM COATED ORAL at 10:09

## 2023-09-28 RX ADMIN — PANTOPRAZOLE SODIUM 40 MG: 40 TABLET, DELAYED RELEASE ORAL at 08:09

## 2023-09-28 RX ADMIN — CARVEDILOL 6.25 MG: 6.25 TABLET, FILM COATED ORAL at 08:09

## 2023-09-28 RX ADMIN — MIDODRINE HYDROCHLORIDE 10 MG: 5 TABLET ORAL at 05:09

## 2023-09-28 RX ADMIN — SODIUM CHLORIDE: 9 INJECTION, SOLUTION INTRAVENOUS at 10:09

## 2023-09-28 RX ADMIN — SEVELAMER CARBONATE 800 MG: 800 TABLET, FILM COATED ORAL at 03:09

## 2023-09-28 NOTE — PROGRESS NOTES
[facility]  [facility] Medicine  Progress Note    Patient Name: Yvan Rollins  MRN: [id number]  Patient Class: IP- Inpatient   Admission Date: 9/19/2023  Length of Stay: 9 days  Attending Physician: Phil Hernandez Jr., MD  Primary Care Provider: Eugene Funez MD        Subjective:     Principal Problem:Arm wound, right, initial encounter        HPI:  Patient is a 63 year old  male who was transferred to [facility] from [facility] for vascular consultation for a necrotic wound overlying an AV access site in the RT arm. He presented to [facility] via [facility] from [facility] (referenced to be [facility] from recent hospitalization at [facility] (8/15/2023)). He was recently hospitalized at [facility] after having dyspnea during dialysis that progressed to cardiac arrest with ROSC. The patient was mechanically intubated, started on pressors, and managed in the ICU. According to the discharge summary, the patient had an adverse reaction to surface and infiltration anesthetic and intravenous infiltration.     During hospitalization at [facility], the RT AV access site was unable to be used and a tunneled RT dialysis catheter was placed. He was evaluated by Vascular surgery during that admission with outpatient follow-up. Today, he was seen by wound care at the NH and they recommended return to [facility] for vascular evaluation of the wound. However, [facility] was on Diversion and he ended up at [facility] and subsequently [facility].     During his hospitalization at [facility], cardiology was consulted. ECHO demonstrated EF of 25-30% and there was a reported 1 episode of nonsustained v-tach. Cardiology recommended resuming goal directed medical therapy, and there was no identified cardiac etiology for his arrest. He was also evaluated by neurology due to concern for anoxic brain injury.  CT of the brain demonstrated chronic ischemic microangiopathy. Neurology diagnosed his encephalopathy to be multifactorial -  anoxic insult from prolonged cardiac arrest with metabolic derangement. His mental status improved, but not to baseline. Today, at [facility] he   is Aox1 and he is a poor historian. He has difficulty answering questions. History is obtained from medical records.     The patient has a PMH of nonischemic cardiomyopathy with EF 20-25%, S/P AICD placement, ESRD on HD TTS, COPD, HTN, polysubstance abuse, and hepatitis C.         Overview/Hospital Course:  63 year old male presents with necrotic wound of AV fistula site s/p debridement.  He is being treated for a Staph bacteremia.  He is alert.  Patient denies chest pain, shortness of breath, nausea, vomiting, or diarrhea.        No new subjective & objective note has been filed under this hospital service since the last note was generated.      Assessment/Plan:      * Arm wound, right, initial encounter  S/P I&D right hematoma and left I&D of wounds in upper extremities; followed by Surgery; this was due to vasopressor infiltration at [facility]; was placed on Aztreonam on admission--will change this to Merrem; wound culture shows Klebsiella    Debrided by surgery.   9/27 - High risk of rebleeding till closure. Will explore swing bed options closer to his vascular surgeon.  Only came here because [facility] was on bypass.     Acute metabolic encephalopathy  He is more alert today but confused; probably at baseline; CT brain did not show acute abnormality      Penile discharge  Negative Chlamydia and N. gonorrhea; negative urine culture      Chronic combined systolic and diastolic congestive heart failure  Continue current meds      Diabetes  Does not appear to be on any home meds for DM; will monitor BS levels; BS stable    Arm wound, left, initial encounter  S/P I&D right hematoma and left I&D of wounds in upper extremities; followed by Surgery; this was due to vasopressor infiltration at [facility]; on Merrem      Atrial fibrillation  On Amiodarone and Coreg; followed by  Cardiology      History of cardiac arrest  Has AICD; continue current meds; BP low at times;he is on Coreg and Amiodarone; he is s/p cardiac arrest from dialysis at [facility] in [place], [place] in August    Chronic hepatitis C  Stable    GERD (gastroesophageal reflux disease)  On Protonix      ESRD (end stage renal disease)  On dialysis.      VTE Risk Mitigation (From admission, onward)         Ordered     heparin (porcine) injection 4,000 Units  As needed (PRN)         09/21/23 1135     IP VTE HIGH RISK PATIENT  Once         09/19/23 0147     Place sequential compression device  Until discontinued         09/19/23 0147     Reason for No Pharmacological VTE Prophylaxis  Once        Question:  Reasons:  Answer:  Physician Provided (leave comment)    09/19/23 0147                Discharge Planning   SANDRA:      Code Status: Full Code   Is the patient medically ready for discharge?:     Reason for patient still in hospital (select all that apply): Treatment  Discharge Plan A: Home with family                  Phil Hernandez Jr, MD  Department of Hospital Medicine   [facility]

## 2023-09-28 NOTE — PLAN OF CARE
Problem: Adult Inpatient Plan of Care  Goal: Plan of Care Review  Outcome: Ongoing, Progressing  Goal: Patient-Specific Goal (Individualized)  Outcome: Ongoing, Progressing  Goal: Absence of Hospital-Acquired Illness or Injury  Outcome: Ongoing, Progressing  Goal: Optimal Comfort and Wellbeing  Outcome: Ongoing, Progressing  Goal: Readiness for Transition of Care  Outcome: Ongoing, Progressing     Problem: Impaired Wound Healing  Goal: Optimal Wound Healing  Outcome: Ongoing, Progressing     Problem: Infection  Goal: Absence of Infection Signs and Symptoms  Outcome: Ongoing, Progressing     Problem: Skin Injury Risk Increased  Goal: Skin Health and Integrity  Outcome: Ongoing, Progressing     Problem: Diabetes Comorbidity  Goal: Blood Glucose Level Within Targeted Range  Outcome: Ongoing, Progressing     Problem: Device-Related Complication Risk (Hemodialysis)  Goal: Safe, Effective Therapy Delivery  Outcome: Ongoing, Progressing     Problem: Hemodynamic Instability (Hemodialysis)  Goal: Effective Tissue Perfusion  Outcome: Ongoing, Progressing     Problem: Infection (Hemodialysis)  Goal: Absence of Infection Signs and Symptoms  Outcome: Ongoing, Progressing     Problem: Gas Exchange Impaired  Goal: Optimal Gas Exchange  Outcome: Ongoing, Progressing     Problem: Airway Clearance Ineffective  Goal: Effective Airway Clearance  Outcome: Ongoing, Progressing     Problem: Fall Injury Risk  Goal: Absence of Fall and Fall-Related Injury  Outcome: Ongoing, Progressing

## 2023-09-28 NOTE — PROGRESS NOTES
[facility]  Nephrology  Progress Note    Patient Name: Yvan Rollins  MRN: [id number]  Admission Date: 9/19/2023  Hospital Length of Stay: 9 days  Attending Provider: Phil Hernandez Jr., MD   Primary Care Physician: Eugene Funez MD  Principal Problem:Bacteremia    Consults  Subjective:     Interval History: Mr. Rollins is seen in his room following his dialysis earlier today. He's up in his chair and conversant.    Review of patient's allergies indicates:   Allergen Reactions    Ceftriaxone Swelling    Lisinopril Swelling and Rash     Facial swelling   Facial swelling       Current Facility-Administered Medications   Medication Frequency    0.9%  NaCl infusion PRN    acetaminophen tablet 650 mg Q4H PRN    albuterol-ipratropium 2.5 mg-0.5 mg/3 mL nebulizer solution 3 mL BID    [START ON 9/30/2023] amiodarone tablet 200 mg Daily    amiodarone tablet 400 mg Daily    aspirin EC tablet 81 mg Daily    atorvastatin tablet 40 mg QHS    carvediloL tablet 6.25 mg BID WM    dextrose 10% bolus 125 mL 125 mL PRN    dextrose 10% bolus 250 mL 250 mL PRN    glucagon (human recombinant) injection 1 mg PRN    heparin (porcine) injection 4,000 Units PRN    HYDROcodone-acetaminophen  mg per tablet 1 tablet Q6H PRN    HYDROcodone-acetaminophen 5-325 mg per tablet 1 tablet Q6H PRN    insulin aspart U-100 injection 0-5 Units Q6H PRN    meropenem (MERREM) 500 mg in sodium chloride 0.9 % 100 mL IVPB (MB+) Daily    midodrine tablet 10 mg TID WM    montelukast tablet 10 mg QHS    naloxone 0.4 mg/mL injection 0.02 mg PRN    OLANZapine tablet 2.5 mg QHS    ondansetron injection 4 mg Q8H PRN    pantoprazole EC tablet 40 mg Daily    sevelamer carbonate tablet 800 mg TID    sodium chloride 0.9% flush 10 mL Q12H PRN       Objective:     Vital Signs (Most Recent):  Temp: 97.7 °F (36.5 °C) (09/28/23 1207)  Pulse: 60 (09/28/23 1235)  Resp: 16 (09/28/23 1207)  BP: 116/70 (09/28/23 1235)  SpO2: 95 % (09/28/23 1235)  "Vital Signs (24h Range):  Temp:  [97.7 °F (36.5 °C)-98.4 °F (36.9 °C)] 97.7 °F (36.5 °C)  Pulse:  [59-73] 60  Resp:  [16-20] 16  SpO2:  [90 %-98 %] 95 %  BP: ()/(63-90) 116/70     Weight: 107.5 kg (237 lb) (09/18/23 2315)  Body mass index is 32.14 kg/m².  Body surface area is 2.34 meters squared.    I/O last 3 completed shifts:  In: 460 [P.O.:360; IV Piggyback:100]  Out: 0     Physical Exam No edema. Chest clear. R and L forearms with bulky soft dressings    Significant Labs:sureBMP: No results for input(s): "GLU", "NA", "K", "CL", "CO2", "BUN", "CREATININE", "CALCIUM", "MG" in the last 168 hours.  CBC: No results for input(s): "WBC", "RBC", "HGB", "HCT", "PLT", "MCV", "MCH", "MCHC" in the last 168 hours.  All labs within the past 24 hours have been reviewed.    Significant Imaging:  Labs: Reviewed    Assessment/Plan:     Active Diagnoses:    Diagnosis Date Noted POA    Acute metabolic encephalopathy [G93.41] 09/22/2023 Yes    Penile discharge [R36.9] 09/20/2023 Yes    ESRD (end stage renal disease) [N18.6] 09/19/2023 Yes    History of cardiac arrest [Z86.74] 09/19/2023 Not Applicable    Atrial fibrillation [I48.91] 09/19/2023 Yes    Arm wound, right, initial encounter [S41.101A] 09/19/2023 Yes    Arm wound, left, initial encounter [S41.102A] 09/19/2023 Yes    Diabetes [E11.9] 09/19/2023 Yes    Chronic combined systolic and diastolic congestive heart failure [I50.42] 09/19/2023 Yes      Problems Resolved During this Admission:    Diagnosis Date Noted Date Resolved POA    PRINCIPAL PROBLEM:  Bacteremia [R78.81] 09/20/2023 09/27/2023 Unknown    Dysphagia [R13.10] 09/19/2023 09/19/2023 Yes    Presence of automatic cardioverter/defibrillator (AICD) [Z95.810] 09/19/2023 09/19/2023 Yes    CAD (coronary artery disease) [I25.10] 09/19/2023 09/19/2023 Unknown    HFrEF (heart failure with reduced ejection fraction) [I50.20] 09/19/2023 09/20/2023 Yes    Hypotension [I95.9] 09/19/2023 09/19/2023 Yes    Impaired mobility " [Z74.09] 09/19/2023 09/19/2023 Yes    Sacral wound [S31.000A] 09/19/2023 09/19/2023 Yes    COPD (chronic obstructive pulmonary disease) [J44.9] 09/19/2023 09/19/2023 Yes    Hematoma [T14.8XXA] 09/19/2023 09/19/2023 Yes       Will stop Vanc level checks since he's off Vanc. Can receive iv antibiotic on dialysis as outpatient if necessary.    Thank you for your consult. I will follow-up with patient. Please contact us if you have any additional questions.    Alfred Valdez MD  Nephrology  [facility]

## 2023-09-28 NOTE — PROGRESS NOTES
Random Vancomycin level was 19.1. Will continue to hold level. Dialysis session this morning. Will re-evaluate with another random level  9/29/23.

## 2023-09-28 NOTE — PT/OT/SLP PROGRESS
80-year-old female presented today for evaluation irregular bowel movement she reports she has constipation alternating with difficulty emptying the bowel and loose stool when she take laxative.  She is taking MiraLAX only as needed for constipation.  And when she take it the stool is loose and has difficulty sometimes controlling the bowel.  No blood in the stool, she has difficulty emptying the bowel without taking laxative she is wheelchair-bound, she has chronic bloating abdominal pain but she feels better after the bowel movement. Her symptoms started since her surgery to takedown the colostomy.   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  she had her surgery done at [facility] by Dr. Perry  The surgery was difficult and she reports since the surgery she had this symptoms Occupational Therapy   Treatment    Name: Yvan Rollins  MRN: [id number]  Admitting Diagnosis:  Bacteremia  8 Days Post-Op    Recommendations:     Discharge Recommendations: nursing facility, skilled  Discharge Equipment Recommendations:   (to be determined)  Barriers to discharge:  Inaccessible home environment, Decreased caregiver support    Assessment:     Yvan Rollins is a 63 y.o. male with a medical diagnosis of Bacteremia.  He presents with the following performance deficits affecting function are weakness, impaired endurance, decreased upper extremity function, decreased coordination, impaired skin. Patient somewhat fatigued after dialysis and PT tx but had not eaten his lunch upon therapist arrival. Patient reported he would like to eat his lunch of OT could warm it up.     Rehab Prognosis:  Good; patient would benefit from acute skilled OT services to address these deficits and reach maximum level of function.       Plan:     Patient to be seen 5 x/week to address the above listed problems via self-care/home management, therapeutic activities, therapeutic exercises  Plan of Care Expires:    Plan of Care Reviewed with: patient    Subjective     Chief Complaint: ESRD  Patient/Family Comments/goals: Agreeable to OT tx  Pain/Comfort:       Objective:     Communicated with: RN prior to session.  Patient found up in chair with peripheral IV upon OT entry to room.    General Precautions: Standard, fall    Orthopedic Precautions:N/A  Braces: N/A  Respiratory Status: Room air     Occupational Performance:     Bed Mobility:         Functional Mobility/Transfers:      Activities of Daily Living:  Feeding:  with setup assistance, mild ataxia noted with use of utensils         AMPAC 6 Click ADL:      Treatment & Education:  Patient performed self feeding with setup assistance and increased time.     Patient left up in chair with all lines intact, call button in reach, and chair alarm on    GOALS:    Multidisciplinary Problems       Occupational Therapy Goals          Problem: Occupational Therapy    Goal Priority Disciplines Outcome Interventions   Occupational Therapy Goal     OT, PT/OT Ongoing, Progressing    Description: STG:  Pt will perform grooming with setup   Pt will bathe self with min assist  Pt will perform UB dressing with SBA  Pt will perform LB dressing with min assist  Pt will sit EOB x 10 min with SBA   Pt will transfer toilet/BSC with min assist  Pt will tolerate 15 minutes of tx with minimal fatigue      LT. Pt will achieve max level of independence with self care.                         Time Tracking:     OT Date of Treatment: 23  OT Start Time: 1407  OT Stop Time: 1419  OT Total Time (min): 12 min    Billable Minutes:Self Care/Home Management 12               2023

## 2023-09-28 NOTE — PLAN OF CARE
Chart reviewed. Updates faxed to [facility]. Iv abx, pt/ot. Ss following for d/c needs.

## 2023-09-28 NOTE — PT/OT/SLP PROGRESS
Physical Therapy Treatment    Patient Name:  Yvan Rollins   MRN:  [id number]    Recommendations:     Discharge Recommendations: nursing facility, skilled  Discharge Equipment Recommendations: other (see comments) (to be determined)  Barriers to discharge:  ongoing medical treatment    Assessment:     Yvan Rollins is a 63 y.o. male admitted with a medical diagnosis of Bacteremia.  He presents with the following impairments/functional limitations: weakness, gait instability, impaired self care skills, impaired functional mobility, impaired skin, impaired cognition.    Pt making slow steady progress with all functional mobility and able to ambulate short distance much better than anticipated    Rehab Prognosis: Good and Fair; patient would benefit from acute skilled PT services to address these deficits and reach maximum level of function.    Recent Surgery: Procedure(s) (LRB):  INCISION AND DRAINAGE, HEMATOMA (Right)  IRRIGATION AND DEBRIDEMENT (Left) 8 Days Post-Op    Plan:     During this hospitalization, patient to be seen 5 x/week to address the identified rehab impairments via gait training, therapeutic activities, therapeutic exercises, neuromuscular re-education and progress toward the following goals:    Plan of Care Expires:       Subjective     Chief Complaint:   Patient/Family Comments/goals: pt agreeable  Pain/Comfort:         Objective:     Communicated with  prior to session.  Patient found HOB elevated with peripheral IV (dialysis catheter) upon PT entry to room.     General Precautions: Standard, fall  Orthopedic Precautions: N/A  Braces: N/A  Respiratory Status: Nasal cannula, flow 2 L/min     Functional Mobility:  Bed Mobility:     Supine to Sit: supervision  Transfers:     Sit to Stand:  minimum assistance and of 1-2 persons with rolling walker  Gait: 16' x 2 trials with RW and minimum assist; slow jim, ataxia pattern, episodes of knees buckling, tremors, seated rest break between  trials      AM-PAC 6 CLICK MOBILITY  Turning over in bed (including adjusting bedclothes, sheets and blankets)?: 4  Sitting down on and standing up from a chair with arms (e.g., wheelchair, bedside commode, etc.): 3  Moving from lying on back to sitting on the side of the bed?: 4  Moving to and from a bed to a chair (including a wheelchair)?: 3  Need to walk in hospital room?: 3  Climbing 3-5 steps with a railing?: 1  Basic Mobility Total Score: 18       Treatment & Education:  Pt performed 30 reps (B) LE exercises: ap, quad set, glut set, straight leg raise, hip ab/adduction, long arc quad, heel slide with assist and rest as needed      Patient left up in chair with all lines intact and call button in reach..    GOALS:   Multidisciplinary Problems       Physical Therapy Goals          Problem: Physical Therapy    Goal Priority Disciplines Outcome Goal Variances Interventions   Physical Therapy Goal     PT, PT/OT Ongoing, Progressing     Description: Short Term Goals  Ambulate Mod - 10 feet with rolling walker assistive device.   Supine to sit contact guard  Sit to stand contact guard  SPT contact guard  5. Independent with HEP    Long Term Goals  Ambulate CGA - 20 feet with rolling walker assistive device.   Supine to sit stand-by  Sit to stand stand-by  SPT stand-by  Needed equipment for home.                              Time Tracking:     PT Received On: 09/28/23  PT Start Time: 1329     PT Stop Time: 1355  PT Total Time (min): 26 min     Billable Minutes: Gait Training 8 and Therapeutic Exercise 15    Treatment Type: Treatment  PT/PTA: PTA     Number of PTA visits since last PT visit: 1 09/28/2023

## 2023-09-28 NOTE — ASSESSMENT & PLAN NOTE
S/P I&D right hematoma and left I&D of wounds in upper extremities; followed by Surgery; this was due to vasopressor infiltration at [facility]; was placed on Aztreonam on admission--will change this to Merrem; wound culture shows Klebsiella    Debrided by surgery.   9/27 - High risk of rebleeding till closure. Will explore swing bed options closer to his vascular surgeon.  Only came here because [facility] was on bypass.

## 2023-09-28 NOTE — PROGRESS NOTES
[facility]  [facility] Medicine  Progress Note    Patient Name: Yvan Rollins  MRN: [id number]  Patient Class: IP- Inpatient   Admission Date: 9/19/2023  Length of Stay: 9 days  Attending Physician: Phil Hernandez Jr., MD  Primary Care Provider: Eugene Funez MD        Subjective:     Principal Problem:Arm wound, right, initial encounter        HPI:  Patient is a 63 year old  male who was transferred to [facility] from [place] ED for vascular consultation for a necrotic wound overlying an AV access site in the RT arm. He presented to [place] ED via [facility] from [facility] (referenced to be [facility] from recent hospitalization at [facility] (8/15/2023)). He was recently hospitalized at [facility] after having dyspnea during dialysis that progressed to cardiac arrest with ROSC. The patient was mechanically intubated, started on pressors, and managed in the ICU. According to the discharge summary, the patient had an adverse reaction to surface and infiltration anesthetic and intravenous infiltration.     During hospitalization at [facility], the RT AV access site was unable to be used and a tunneled RT dialysis catheter was placed. He was evaluated by Vascular surgery during that admission with outpatient follow-up. Today, he was seen by wound care at the NH and they recommended return to [facility] for vascular evaluation of the wound. However, [facility] was on Diversion and he ended up at [facility] and subsequently [facility].     During his hospitalization at [facility], cardiology was consulted. ECHO demonstrated EF of 25-30% and there was a reported 1 episode of nonsustained v-tach. Cardiology recommended resuming goal directed medical therapy, and there was no identified cardiac etiology for his arrest. He was also evaluated by neurology due to concern for anoxic brain injury.  CT of the brain demonstrated chronic ischemic microangiopathy. Neurology diagnosed his encephalopathy to be multifactorial -  anoxic insult from prolonged cardiac arrest with metabolic derangement. His mental status improved, but not to baseline. Today, at [facility] he   is Aox1 and he is a poor historian. He has difficulty answering questions. History is obtained from medical records.     The patient has a PMH of nonischemic cardiomyopathy with EF 20-25%, S/P AICD placement, ESRD on HD TTS, COPD, HTN, polysubstance abuse, and hepatitis C.         Overview/Hospital Course:  63 year old male presents with necrotic wound of AV fistula site s/p debridement.  He is being treated for a Staph bacteremia.  He is alert.  Patient denies chest pain, shortness of breath, nausea, vomiting, or diarrhea.        No new subjective & objective note has been filed under this hospital service since the last note was generated.      Assessment/Plan:      * Arm wound, right, initial encounter  S/P I&D right hematoma and left I&D of wounds in upper extremities; followed by Surgery; this was due to vasopressor infiltration at [facility]; was placed on Aztreonam on admission--will change this to Merrem; wound culture shows Klebsiella    Debrided by surgery.   9/27 - High risk of rebleeding till closure. Will explore swing bed options closer to his vascular surgeon.  Only came here because [facility] was on bypass.     Acute metabolic encephalopathy  He is more alert today but confused; probably at baseline; CT brain did not show acute abnormality      Penile discharge  Negative Chlamydia and N. gonorrhea; negative urine culture      Chronic combined systolic and diastolic congestive heart failure  Continue current meds      Diabetes  Does not appear to be on any home meds for DM; will monitor BS levels; BS stable    Arm wound, left, initial encounter  S/P I&D right hematoma and left I&D of wounds in upper extremities; followed by Surgery; this was due to vasopressor infiltration at [facility]; on Merrem      Atrial fibrillation  On Amiodarone and Coreg; followed by  Cardiology      History of cardiac arrest  Has AICD; continue current meds; BP low at times;he is on Coreg and Amiodarone; he is s/p cardiac arrest from dialysis at [facility] in [place], [place] in August    Chronic hepatitis C  Stable    GERD (gastroesophageal reflux disease)  On Protonix      ESRD (end stage renal disease)  On dialysis.        VTE Risk Mitigation (From admission, onward)         Ordered     heparin (porcine) injection 4,000 Units  As needed (PRN)         09/21/23 1135     IP VTE HIGH RISK PATIENT  Once         09/19/23 0147     Place sequential compression device  Until discontinued         09/19/23 0147     Reason for No Pharmacological VTE Prophylaxis  Once        Question:  Reasons:  Answer:  Physician Provided (leave comment)    09/19/23 0147                Discharge Planning   SANDRA:      Code Status: Full Code   Is the patient medically ready for discharge?:     Reason for patient still in hospital (select all that apply): Treatment  Discharge Plan A: Home with family                  Phil Hernandez Jr, MD  Department of Hospital Medicine   [facility]

## 2023-09-29 PROBLEM — R36.9 PENILE DISCHARGE: Status: RESOLVED | Noted: 2023-09-20 | Resolved: 2023-09-29

## 2023-09-29 LAB
GLUCOSE SERPL-MCNC: 103 MG/DL (ref 70–105)
GLUCOSE SERPL-MCNC: 90 MG/DL (ref 70–105)

## 2023-09-29 PROCEDURE — 94640 AIRWAY INHALATION TREATMENT: CPT

## 2023-09-29 PROCEDURE — 99900035 HC TECH TIME PER 15 MIN (STAT)

## 2023-09-29 PROCEDURE — 11000001 HC ACUTE MED/SURG PRIVATE ROOM

## 2023-09-29 PROCEDURE — 25000242 PHARM REV CODE 250 ALT 637 W/ HCPCS: Performed by: HOSPITALIST

## 2023-09-29 PROCEDURE — 97110 THERAPEUTIC EXERCISES: CPT | Mod: CQ

## 2023-09-29 PROCEDURE — 99232 SBSQ HOSP IP/OBS MODERATE 35: CPT | Mod: ,,, | Performed by: FAMILY MEDICINE

## 2023-09-29 PROCEDURE — 27000221 HC OXYGEN, UP TO 24 HOURS

## 2023-09-29 PROCEDURE — 94761 N-INVAS EAR/PLS OXIMETRY MLT: CPT

## 2023-09-29 PROCEDURE — 25000003 PHARM REV CODE 250

## 2023-09-29 PROCEDURE — 82962 GLUCOSE BLOOD TEST: CPT

## 2023-09-29 PROCEDURE — 99232 PR SUBSEQUENT HOSPITAL CARE,LEVL II: ICD-10-PCS | Mod: ,,, | Performed by: FAMILY MEDICINE

## 2023-09-29 PROCEDURE — 97530 THERAPEUTIC ACTIVITIES: CPT

## 2023-09-29 PROCEDURE — 97116 GAIT TRAINING THERAPY: CPT | Mod: CQ

## 2023-09-29 RX ADMIN — CARVEDILOL 6.25 MG: 6.25 TABLET, FILM COATED ORAL at 04:09

## 2023-09-29 RX ADMIN — PANTOPRAZOLE SODIUM 40 MG: 40 TABLET, DELAYED RELEASE ORAL at 09:09

## 2023-09-29 RX ADMIN — ATORVASTATIN CALCIUM 40 MG: 40 TABLET, FILM COATED ORAL at 09:09

## 2023-09-29 RX ADMIN — OLANZAPINE 2.5 MG: 2.5 TABLET, FILM COATED ORAL at 09:09

## 2023-09-29 RX ADMIN — IPRATROPIUM BROMIDE AND ALBUTEROL SULFATE 3 ML: 2.5; .5 SOLUTION RESPIRATORY (INHALATION) at 07:09

## 2023-09-29 RX ADMIN — SEVELAMER CARBONATE 800 MG: 800 TABLET, FILM COATED ORAL at 09:09

## 2023-09-29 RX ADMIN — MONTELUKAST SODIUM 10 MG: 10 TABLET, FILM COATED ORAL at 09:09

## 2023-09-29 RX ADMIN — AMIODARONE HYDROCHLORIDE 400 MG: 200 TABLET ORAL at 09:09

## 2023-09-29 RX ADMIN — MIDODRINE HYDROCHLORIDE 10 MG: 5 TABLET ORAL at 09:09

## 2023-09-29 RX ADMIN — SEVELAMER CARBONATE 800 MG: 800 TABLET, FILM COATED ORAL at 04:09

## 2023-09-29 RX ADMIN — MIDODRINE HYDROCHLORIDE 10 MG: 5 TABLET ORAL at 04:09

## 2023-09-29 RX ADMIN — MIDODRINE HYDROCHLORIDE 10 MG: 5 TABLET ORAL at 11:09

## 2023-09-29 RX ADMIN — ASPIRIN 81 MG: 81 TABLET, COATED ORAL at 09:09

## 2023-09-29 NOTE — ASSESSMENT & PLAN NOTE
S/P I&D right hematoma and left I&D of wounds in upper extremities; followed by Surgery; this was due to vasopressor infiltration at [facility]; on Merrem

## 2023-09-29 NOTE — PT/OT/SLP PROGRESS
Physical Therapy Treatment    Patient Name:  Yvan Rollins   MRN:  [id number]    Recommendations:     Discharge Recommendations: nursing facility, skilled  Discharge Equipment Recommendations: other (see comments) (to be determined)  Barriers to discharge:  ongoing medical treatment    Assessment:     Yvan Rollins is a 63 y.o. male admitted with a medical diagnosis of Arm wound, right, initial encounter.  He presents with the following impairments/functional limitations: weakness, gait instability, impaired self care skills, impaired functional mobility, impaired skin, impaired cognition.    Pt with increased B LE tremors and unable to duplicate gait distance/quality of previous treatment session due to same    Rehab Prognosis: Fair; patient would benefit from acute skilled PT services to address these deficits and reach maximum level of function.    Recent Surgery: Procedure(s) (LRB):  INCISION AND DRAINAGE, HEMATOMA (Right)  IRRIGATION AND DEBRIDEMENT (Left) 9 Days Post-Op    Plan:     During this hospitalization, patient to be seen 5 x/week to address the identified rehab impairments via gait training, therapeutic activities, therapeutic exercises, neuromuscular re-education and progress toward the following goals:    Plan of Care Expires:       Subjective     Chief Complaint: arm wound  Patient/Family Comments/goals: pt agreeable  Pain/Comfort:         Objective:     Communicated with  prior to session.  Patient found HOB elevated with bed alarm, oxygen, SCD, telemetry upon PT entry to room.     General Precautions: Standard, fall  Orthopedic Precautions: N/A  Braces: N/A  Respiratory Status: Nasal cannula, flow 2 L/min     Functional Mobility:  Bed Mobility:     Supine to Sit: modified independence  Transfers:     Sit to Stand:  minimum assistance and of 1-2 persons with rolling walker  Gait: 10 x 2 trials with minimum increased to moderate assist as knees buckled; slow jim, short step length, knees  buckling      AM-PAC 6 CLICK MOBILITY  Turning over in bed (including adjusting bedclothes, sheets and blankets)?: 4  Sitting down on and standing up from a chair with arms (e.g., wheelchair, bedside commode, etc.): 3  Moving from lying on back to sitting on the side of the bed?: 4  Moving to and from a bed to a chair (including a wheelchair)?: 3  Need to walk in hospital room?: 3  Climbing 3-5 steps with a railing?: 1  Basic Mobility Total Score: 18       Treatment & Education:  Pt performed 30 reps (B) LE exercises: ap, quad set, glut set, straight leg raise, hip ab/adduction, long arc quad, heel slide with assist and rest as needed      Patient left up in chair with all lines intact and call button in reach..    GOALS:   Multidisciplinary Problems       Physical Therapy Goals          Problem: Physical Therapy    Goal Priority Disciplines Outcome Goal Variances Interventions   Physical Therapy Goal     PT, PT/OT Ongoing, Progressing     Description: Short Term Goals  Ambulate Mod - 10 feet with rolling walker assistive device.   Supine to sit contact guard  Sit to stand contact guard  SPT contact guard  5. Independent with HEP    Long Term Goals  Ambulate CGA - 20 feet with rolling walker assistive device.   Supine to sit stand-by  Sit to stand stand-by  SPT stand-by  Needed equipment for home.                              Time Tracking:     PT Received On: 09/29/23  PT Start Time: 0938     PT Stop Time: 1004  PT Total Time (min): 26 min     Billable Minutes: Gait Training 8 and Therapeutic Exercise 15    Treatment Type: Treatment  PT/PTA: PTA     Number of PTA visits since last PT visit: 2     09/29/2023

## 2023-09-29 NOTE — SUBJECTIVE & OBJECTIVE
"Interval History: No acute complaints, eating well. Patient family local. Not very interested in swing bed in [place] (Close to his vascular surgeon).   R fistula wound high risk of rebleeding.     Review of Systems  Objective:     Vital Signs (Most Recent):  Temp: 97.8 °F (36.6 °C) (09/29/23 1100)  Pulse: 66 (09/29/23 1100)  Resp: 18 (09/29/23 1100)  BP: (!) 98/55 (09/29/23 1100)  SpO2: 96 % (09/29/23 1100) Vital Signs (24h Range):  Temp:  [97.4 °F (36.3 °C)-98.1 °F (36.7 °C)] 97.8 °F (36.6 °C)  Pulse:  [60-78] 66  Resp:  [16-18] 18  SpO2:  [96 %-99 %] 96 %  BP: ()/(55-95) 98/55     Weight: 107.5 kg (237 lb)  Body mass index is 32.14 kg/m².    Intake/Output Summary (Last 24 hours) at 9/29/2023 1524  Last data filed at 9/28/2023 1851  Gross per 24 hour   Intake 500 ml   Output --   Net 500 ml         Physical Exam  Vitals reviewed.   Constitutional:       General: He is not in acute distress.     Appearance: He is not toxic-appearing.   Cardiovascular:      Rate and Rhythm: Normal rate and regular rhythm.      Heart sounds: Normal heart sounds.   Pulmonary:      Effort: Pulmonary effort is normal. No respiratory distress.      Breath sounds: Normal breath sounds. No wheezing.   Abdominal:      General: Abdomen is flat. Bowel sounds are normal. There is no distension.      Palpations: Abdomen is soft.   Skin:     General: Skin is warm and dry.      Comments: Viewed wounds at dressing change.  Improving.    Neurological:      Mental Status: He is alert.             Significant Labs: All pertinent labs within the past 24 hours have been reviewed.  BMP: No results for input(s): "GLU", "NA", "K", "CL", "CO2", "BUN", "CREATININE", "CALCIUM", "MG" in the last 48 hours.  CBC: No results for input(s): "WBC", "HGB", "HCT", "PLT" in the last 48 hours.    Significant Imaging: I have reviewed all pertinent imaging results/findings within the past 24 hours.  "

## 2023-09-29 NOTE — PROGRESS NOTES
[facility]  Nephrology  Progress Note    Patient Name: Yvan Rollins  MRN: [id number]  Admission Date: 9/19/2023  Hospital Length of Stay: 10 days  Attending Provider: Phil Hernandez Jr., MD   Primary Care Physician: Eugene Funez MD  Principal Problem:Arm wound, right, initial encounter    Consults  Subjective:     Interval History: F/U ESRD. Dialyzed yesterday and doing well today. In bed. Eating well.    Review of patient's allergies indicates:   Allergen Reactions    Ceftriaxone Swelling    Lisinopril Swelling and Rash     Facial swelling   Facial swelling       Current Facility-Administered Medications   Medication Frequency    0.9%  NaCl infusion PRN    acetaminophen tablet 650 mg Q4H PRN    albuterol-ipratropium 2.5 mg-0.5 mg/3 mL nebulizer solution 3 mL BID    [START ON 9/30/2023] amiodarone tablet 200 mg Daily    amiodarone tablet 400 mg Daily    aspirin EC tablet 81 mg Daily    atorvastatin tablet 40 mg QHS    carvediloL tablet 6.25 mg BID WM    dextrose 10% bolus 125 mL 125 mL PRN    dextrose 10% bolus 250 mL 250 mL PRN    glucagon (human recombinant) injection 1 mg PRN    heparin (porcine) injection 4,000 Units PRN    HYDROcodone-acetaminophen  mg per tablet 1 tablet Q6H PRN    HYDROcodone-acetaminophen 5-325 mg per tablet 1 tablet Q6H PRN    insulin aspart U-100 injection 0-5 Units Q6H PRN    meropenem (MERREM) 500 mg in sodium chloride 0.9 % 100 mL IVPB (MB+) Daily    midodrine tablet 10 mg TID WM    montelukast tablet 10 mg QHS    naloxone 0.4 mg/mL injection 0.02 mg PRN    OLANZapine tablet 2.5 mg QHS    ondansetron injection 4 mg Q8H PRN    pantoprazole EC tablet 40 mg Daily    sevelamer carbonate tablet 800 mg TID    sodium chloride 0.9% flush 10 mL Q12H PRN       Objective:     Vital Signs (Most Recent):  Temp: 97.4 °F (36.3 °C) (09/29/23 0600)  Pulse: 60 (09/29/23 0710)  Resp: 16 (09/29/23 0710)  BP: 113/72 (09/29/23 0600)  SpO2: 96 % (09/29/23 0710) Vital  "Signs (24h Range):  Temp:  [97.4 °F (36.3 °C)-98.1 °F (36.7 °C)] 97.4 °F (36.3 °C)  Pulse:  [59-65] 60  Resp:  [16-18] 16  SpO2:  [95 %-99 %] 96 %  BP: ()/(63-95) 113/72     Weight: 107.5 kg (237 lb) (09/18/23 2315)  Body mass index is 32.14 kg/m².  Body surface area is 2.34 meters squared.    I/O last 3 completed shifts:  In: 600 [P.O.:400; IV Piggyback:200]  Out: 1500 [Other:1500]    Physical Exam Arm dressings dry. Clear chest. Alert.     Significant Labs:sureBMP: No results for input(s): "GLU", "NA", "K", "CL", "CO2", "BUN", "CREATININE", "CALCIUM", "MG" in the last 168 hours.  CBC: No results for input(s): "WBC", "RBC", "HGB", "HCT", "PLT", "MCV", "MCH", "MCHC" in the last 168 hours.  All labs within the past 24 hours have been reviewed.    Significant Imaging:  Labs: Reviewed    Assessment/Plan:     Active Diagnoses:    Diagnosis Date Noted POA    PRINCIPAL PROBLEM:  Arm wound, right, initial encounter [S41.101A] 09/19/2023 Yes    Acute metabolic encephalopathy [G93.41] 09/22/2023 Yes    Penile discharge [R36.9] 09/20/2023 Yes    ESRD (end stage renal disease) [N18.6] 09/19/2023 Yes    History of cardiac arrest [Z86.74] 09/19/2023 Not Applicable    Atrial fibrillation [I48.91] 09/19/2023 Yes    Arm wound, left, initial encounter [S41.102A] 09/19/2023 Yes    Diabetes [E11.9] 09/19/2023 Yes    Chronic combined systolic and diastolic congestive heart failure [I50.42] 09/19/2023 Yes      Problems Resolved During this Admission:    Diagnosis Date Noted Date Resolved POA    Bacteremia [R78.81] 09/20/2023 09/27/2023 Unknown    Dysphagia [R13.10] 09/19/2023 09/19/2023 Yes    Presence of automatic cardioverter/defibrillator (AICD) [Z95.810] 09/19/2023 09/19/2023 Yes    CAD (coronary artery disease) [I25.10] 09/19/2023 09/19/2023 Unknown    HFrEF (heart failure with reduced ejection fraction) [I50.20] 09/19/2023 09/20/2023 Yes    Hypotension [I95.9] 09/19/2023 09/19/2023 Yes    Impaired mobility [Z74.09] " 09/19/2023 09/19/2023 Yes    Sacral wound [S31.000A] 09/19/2023 09/19/2023 Yes    COPD (chronic obstructive pulmonary disease) [J44.9] 09/19/2023 09/19/2023 Yes    Hematoma [T14.8XXA] 09/19/2023 09/19/2023 Yes       If wound allows, should be able to go home soon and continue outpatient antibiotics on dialysis in the [facility] dialysis unit if necessary.    Thank you for your consult. I will follow-up with patient. Please contact us if you have any additional questions.    Alfred Valdez MD  Nephrology  [facility]

## 2023-09-29 NOTE — ASSESSMENT & PLAN NOTE
Does not appear to be on any home meds for DM; will monitor BS levels; BS stable    9/29 - Does not currently meet criteria for DM diagnosis.  Glucoses have been good. A1C 5.3.  Will decrease accuchecks.

## 2023-09-29 NOTE — ASSESSMENT & PLAN NOTE
S/P I&D right hematoma and left I&D of wounds in upper extremities; followed by Surgery; this was due to vasopressor infiltration at [facility]; was placed on Aztreonam on admission--will change this to Merrem; wound culture shows Klebsiella    Debrided by surgery.   9/27 - High risk of rebleeding till closure. Will explore swing bed options closer to his vascular surgeon.  Only came here because [facility] was on bypass.     9/29 - Improving.

## 2023-09-29 NOTE — ASSESSMENT & PLAN NOTE
Has AICD; continue current meds; BP low at times;he is on Coreg and Amiodarone; he is s/p cardiac arrest from dialysis at [facility] in [place], [place] in August.     Improving but suspected hypoxic brain injury. Continue PT/OT

## 2023-09-29 NOTE — ASSESSMENT & PLAN NOTE
He is more alert today but confused; probably at baseline; CT brain did not show acute abnormality.     9/29 - Has improved a bit through the week. But still with some cognitive difficulties.

## 2023-09-29 NOTE — PROGRESS NOTES
[facility]  [facility]  Progress Note    Patient Name: Yvan Rollins  MRN: [id number]  Patient Class: IP- Inpatient   Admission Date: 9/19/2023  Length of Stay: 10 days  Attending Physician: Phil Hernandez Jr., MD  Primary Care Provider: Eugene Funez MD        Subjective:     Principal Problem:Arm wound, right, initial encounter        HPI:  Patient is a 63 year old  male who was transferred to [facility] from [place] ED for vascular consultation for a necrotic wound overlying an AV access site in the RT arm. He presented to [place] ED via [facility] from [facility] (referenced to be [facility] from recent hospitalization at [facility] (8/15/2023)). He was recently hospitalized at [facility] after having dyspnea during dialysis that progressed to cardiac arrest with ROSC. The patient was mechanically intubated, started on pressors, and managed in the ICU. According to the discharge summary, the patient had an adverse reaction to surface and infiltration anesthetic and intravenous infiltration.     During hospitalization at [facility], the RT AV access site was unable to be used and a tunneled RT dialysis catheter was placed. He was evaluated by Vascular surgery during that admission with outpatient follow-up. Today, he was seen by wound care at the NH and they recommended return to [facility] for vascular evaluation of the wound. However, [facility] was on Diversion and he ended up at [place] and subsequently [facility].     During his hospitalization at [facility], cardiology was consulted. ECHO demonstrated EF of 25-30% and there was a reported 1 episode of nonsustained v-tach. Cardiology recommended resuming goal directed medical therapy, and there was no identified cardiac etiology for his arrest. He was also evaluated by neurology due to concern for anoxic brain injury.  CT of the brain demonstrated chronic ischemic microangiopathy. Neurology diagnosed his encephalopathy to be multifactorial -  anoxic insult from prolonged cardiac arrest with metabolic derangement. His mental status improved, but not to baseline. Today, at [facility] he   is Aox1 and he is a poor historian. He has difficulty answering questions. History is obtained from medical records.     The patient has a PMH of nonischemic cardiomyopathy with EF 20-25%, S/P AICD placement, ESRD on HD TTS, COPD, HTN, polysubstance abuse, and hepatitis C.         Overview/Hospital Course:  63 year old male presents with necrotic wound of AV fistula site s/p debridement.  He is being treated for a Staph bacteremia.  He is alert.  Patient denies chest pain, shortness of breath, nausea, vomiting, or diarrhea.        Interval History: No acute complaints, eating well. Patient family local. Not very interested in swing bed in [place] (Close to his vascular surgeon).   R fistula wound high risk of rebleeding.     Review of Systems  Objective:     Vital Signs (Most Recent):  Temp: 97.8 °F (36.6 °C) (09/29/23 1100)  Pulse: 66 (09/29/23 1100)  Resp: 18 (09/29/23 1100)  BP: (!) 98/55 (09/29/23 1100)  SpO2: 96 % (09/29/23 1100) Vital Signs (24h Range):  Temp:  [97.4 °F (36.3 °C)-98.1 °F (36.7 °C)] 97.8 °F (36.6 °C)  Pulse:  [60-78] 66  Resp:  [16-18] 18  SpO2:  [96 %-99 %] 96 %  BP: ()/(55-95) 98/55     Weight: 107.5 kg (237 lb)  Body mass index is 32.14 kg/m².    Intake/Output Summary (Last 24 hours) at 9/29/2023 1524  Last data filed at 9/28/2023 1851  Gross per 24 hour   Intake 500 ml   Output --   Net 500 ml         Physical Exam  Vitals reviewed.   Constitutional:       General: He is not in acute distress.     Appearance: He is not toxic-appearing.   Cardiovascular:      Rate and Rhythm: Normal rate and regular rhythm.      Heart sounds: Normal heart sounds.   Pulmonary:      Effort: Pulmonary effort is normal. No respiratory distress.      Breath sounds: Normal breath sounds. No wheezing.   Abdominal:      General: Abdomen is flat. Bowel sounds are normal.  "There is no distension.      Palpations: Abdomen is soft.   Skin:     General: Skin is warm and dry.      Comments: Viewed wounds at dressing change.  Improving.    Neurological:      Mental Status: He is alert.             Significant Labs: All pertinent labs within the past 24 hours have been reviewed.  BMP: No results for input(s): "GLU", "NA", "K", "CL", "CO2", "BUN", "CREATININE", "CALCIUM", "MG" in the last 48 hours.  CBC: No results for input(s): "WBC", "HGB", "HCT", "PLT" in the last 48 hours.    Significant Imaging: I have reviewed all pertinent imaging results/findings within the past 24 hours.      Assessment/Plan:      * Arm wound, right, initial encounter  S/P I&D right hematoma and left I&D of wounds in upper extremities; followed by Surgery; this was due to vasopressor infiltration at [facility]; was placed on Aztreonam on admission--will change this to Merrem; wound culture shows Klebsiella    Debrided by surgery.   9/27 - High risk of rebleeding till closure. Will explore swing bed options closer to his vascular surgeon.  Only came here because [facility] was on bypass.     9/29 - Improving.     Acute metabolic encephalopathy  He is more alert today but confused; probably at baseline; CT brain did not show acute abnormality.     9/29 - Has improved a bit through the week. But still with some cognitive difficulties.       Chronic combined systolic and diastolic congestive heart failure  Continue current meds      Diabetes  Does not appear to be on any home meds for DM; will monitor BS levels; BS stable    9/29 - Does not currently meet criteria for DM diagnosis.  Glucoses have been good. A1C 5.3.  Will decrease accuchecks.     Arm wound, left, initial encounter  S/P I&D right hematoma and left I&D of wounds in upper extremities; followed by Surgery; this was due to vasopressor infiltration at [facility]; on Merrem      Atrial fibrillation  On Amiodarone and Coreg; followed by Cardiology      History of " cardiac arrest  Has AICD; continue current meds; BP low at times;he is on Coreg and Amiodarone; he is s/p cardiac arrest from dialysis at [facility] in [place], [place] in August.     Improving but suspected hypoxic brain injury. Continue PT/OT    Chronic hepatitis C  Stable    GERD (gastroesophageal reflux disease)  On Protonix      ESRD (end stage renal disease)  On dialysis.        VTE Risk Mitigation (From admission, onward)         Ordered     heparin (porcine) injection 4,000 Units  As needed (PRN)         09/21/23 1135     IP VTE HIGH RISK PATIENT  Once         09/19/23 0147     Place sequential compression device  Until discontinued         09/19/23 0147     Reason for No Pharmacological VTE Prophylaxis  Once        Question:  Reasons:  Answer:  Physician Provided (leave comment)    09/19/23 0147                Discharge Planning   SANDRA:      Code Status: Full Code   Is the patient medically ready for discharge?:     Reason for patient still in hospital (select all that apply): Treatment  Discharge Plan A: Home with family                  Phil Hernandez Jr, MD  Department of Hospital Medicine   [facility]

## 2023-09-29 NOTE — PT/OT/SLP PROGRESS
Occupational Therapy   Treatment    Name: Yvan Rollins  MRN: [id number]  Admitting Diagnosis:  Arm wound, right, initial encounter  9 Days Post-Op    Recommendations:     Discharge Recommendations: nursing facility, skilled  Discharge Equipment Recommendations:   (to be determined)  Barriers to discharge:  Inaccessible home environment, Decreased caregiver support    Assessment:     Yvan Rollins is a 63 y.o. male with a medical diagnosis of Arm wound, right, initial encounter.  He presents with the following performance deficits affecting function are weakness, impaired endurance, decreased upper extremity function, decreased coordination, impaired skin. Patient perseverated on orienting himself this date; asking therapist repeatedly why he was in the hospital, where he was before he came here, why he had a heart attack, etc.     Rehab Prognosis:  Good; patient would benefit from acute skilled OT services to address these deficits and reach maximum level of function.       Plan:     Patient to be seen 5 x/week to address the above listed problems via self-care/home management, therapeutic activities, therapeutic exercises  Plan of Care Expires:    Plan of Care Reviewed with: patient    Subjective     Chief Complaint: arm wound  Patient/Family Comments/goals: Agreeable to OT tx  Pain/Comfort:       Objective:     Communicated with: CHANDA Solorzano RN prior to session.  Patient found up in chair with peripheral IV, oxygen upon OT entry to room.    General Precautions: Standard, fall    Orthopedic Precautions:N/A  Braces: N/A  Respiratory Status: Nasal cannula, flow 2 L/min     Occupational Performance:     Bed Mobility:    Patient completed Sit to Supine with contact guard assistance     Functional Mobility/Transfers:  Patient completed Sit <> Stand Transfer with minimum assistance and of 2 persons  with  hand-held assist       Activities of Daily Living:        [facility] 6 Click ADL:      Treatment &  Education:  Patient performed functional transfer and bed mobility as depicted above.     Patient left supine with all lines intact and call button in reach    GOALS:   Multidisciplinary Problems       Occupational Therapy Goals          Problem: Occupational Therapy    Goal Priority Disciplines Outcome Interventions   Occupational Therapy Goal     OT, PT/OT Ongoing, Progressing    Description: STG:  Pt will perform grooming with setup   Pt will bathe self with min assist  Pt will perform UB dressing with SBA  Pt will perform LB dressing with min assist  Pt will sit EOB x 10 min with SBA   Pt will transfer toilet/BSC with min assist  Pt will tolerate 15 minutes of tx with minimal fatigue      LT. Pt will achieve max level of independence with self care.                         Time Tracking:     OT Date of Treatment: 23  OT Start Time: 1433  OT Stop Time: 1449  OT Total Time (min): 16 min    Billable Minutes:Therapeutic Activity 10               2023

## 2023-09-30 LAB
GLUCOSE SERPL-MCNC: 107 MG/DL (ref 70–105)
GLUCOSE SERPL-MCNC: 80 MG/DL (ref 70–105)

## 2023-09-30 PROCEDURE — 25000242 PHARM REV CODE 250 ALT 637 W/ HCPCS: Performed by: HOSPITALIST

## 2023-09-30 PROCEDURE — C1752 CATH,HEMODIALYSIS,SHORT-TERM: HCPCS

## 2023-09-30 PROCEDURE — 99900035 HC TECH TIME PER 15 MIN (STAT)

## 2023-09-30 PROCEDURE — 82962 GLUCOSE BLOOD TEST: CPT

## 2023-09-30 PROCEDURE — 90935 HEMODIALYSIS ONE EVALUATION: CPT

## 2023-09-30 PROCEDURE — 99232 PR SUBSEQUENT HOSPITAL CARE,LEVL II: ICD-10-PCS | Mod: ,,, | Performed by: FAMILY MEDICINE

## 2023-09-30 PROCEDURE — 11000001 HC ACUTE MED/SURG PRIVATE ROOM

## 2023-09-30 PROCEDURE — 25000003 PHARM REV CODE 250

## 2023-09-30 PROCEDURE — 63600175 PHARM REV CODE 636 W HCPCS: Performed by: INTERNAL MEDICINE

## 2023-09-30 PROCEDURE — 25000003 PHARM REV CODE 250: Performed by: INTERNAL MEDICINE

## 2023-09-30 PROCEDURE — 27000221 HC OXYGEN, UP TO 24 HOURS

## 2023-09-30 PROCEDURE — 94761 N-INVAS EAR/PLS OXIMETRY MLT: CPT

## 2023-09-30 PROCEDURE — 99232 SBSQ HOSP IP/OBS MODERATE 35: CPT | Mod: ,,, | Performed by: FAMILY MEDICINE

## 2023-09-30 PROCEDURE — 94640 AIRWAY INHALATION TREATMENT: CPT

## 2023-09-30 RX ADMIN — IPRATROPIUM BROMIDE AND ALBUTEROL SULFATE 3 ML: 2.5; .5 SOLUTION RESPIRATORY (INHALATION) at 07:09

## 2023-09-30 RX ADMIN — SEVELAMER CARBONATE 800 MG: 800 TABLET, FILM COATED ORAL at 08:09

## 2023-09-30 RX ADMIN — SEVELAMER CARBONATE 800 MG: 800 TABLET, FILM COATED ORAL at 09:09

## 2023-09-30 RX ADMIN — ASPIRIN 81 MG: 81 TABLET, COATED ORAL at 08:09

## 2023-09-30 RX ADMIN — OLANZAPINE 2.5 MG: 2.5 TABLET, FILM COATED ORAL at 09:09

## 2023-09-30 RX ADMIN — PANTOPRAZOLE SODIUM 40 MG: 40 TABLET, DELAYED RELEASE ORAL at 08:09

## 2023-09-30 RX ADMIN — MIDODRINE HYDROCHLORIDE 10 MG: 5 TABLET ORAL at 08:09

## 2023-09-30 RX ADMIN — MIDODRINE HYDROCHLORIDE 10 MG: 5 TABLET ORAL at 04:09

## 2023-09-30 RX ADMIN — MONTELUKAST SODIUM 10 MG: 10 TABLET, FILM COATED ORAL at 09:09

## 2023-09-30 RX ADMIN — ACETAMINOPHEN 650 MG: 325 TABLET ORAL at 09:09

## 2023-09-30 RX ADMIN — ATORVASTATIN CALCIUM 40 MG: 40 TABLET, FILM COATED ORAL at 09:09

## 2023-09-30 RX ADMIN — HEPARIN SODIUM 4000 UNITS: 1000 INJECTION, SOLUTION INTRAVENOUS; SUBCUTANEOUS at 11:09

## 2023-09-30 RX ADMIN — SODIUM CHLORIDE: 9 INJECTION, SOLUTION INTRAVENOUS at 11:09

## 2023-09-30 RX ADMIN — SEVELAMER CARBONATE 800 MG: 800 TABLET, FILM COATED ORAL at 02:09

## 2023-09-30 RX ADMIN — CARVEDILOL 6.25 MG: 6.25 TABLET, FILM COATED ORAL at 08:09

## 2023-09-30 NOTE — PLAN OF CARE
Problem: Adult Inpatient Plan of Care  Goal: Plan of Care Review  Outcome: Ongoing, Progressing  Goal: Patient-Specific Goal (Individualized)  Outcome: Ongoing, Progressing  Goal: Absence of Hospital-Acquired Illness or Injury  Outcome: Ongoing, Progressing  Goal: Optimal Comfort and Wellbeing  Outcome: Ongoing, Progressing  Goal: Readiness for Transition of Care  Outcome: Ongoing, Progressing     Problem: Impaired Wound Healing  Goal: Optimal Wound Healing  Outcome: Ongoing, Progressing     Problem: Infection  Goal: Absence of Infection Signs and Symptoms  Outcome: Ongoing, Progressing     Problem: Skin Injury Risk Increased  Goal: Skin Health and Integrity  Outcome: Ongoing, Progressing     Problem: Diabetes Comorbidity  Goal: Blood Glucose Level Within Targeted Range  Outcome: Ongoing, Progressing     Problem: Device-Related Complication Risk (Hemodialysis)  Goal: Safe, Effective Therapy Delivery  Outcome: Ongoing, Progressing     Problem: Hemodynamic Instability (Hemodialysis)  Goal: Effective Tissue Perfusion  Outcome: Ongoing, Progressing     Problem: Infection (Hemodialysis)  Goal: Absence of Infection Signs and Symptoms  Outcome: Ongoing, Progressing     Problem: Gas Exchange Impaired  Goal: Optimal Gas Exchange  Outcome: Ongoing, Progressing     Problem: Airway Clearance Ineffective  Goal: Effective Airway Clearance  Outcome: Ongoing, Progressing     Problem: Fall Injury Risk  Goal: Absence of Fall and Fall-Related Injury  Outcome: Ongoing, Progressing     Problem: Confusion Acute  Goal: Optimal Cognitive Function  Outcome: Ongoing, Progressing

## 2023-09-30 NOTE — PROGRESS NOTES
[facility]  Nephrology  Progress Note    Patient Name: Yvan Rollins  MRN: [id number]  Admission Date: 9/19/2023  Hospital Length of Stay: 11 days  Attending Provider: Phil Hernandez Jr., MD   Primary Care Physician: Eugene Funez MD  Principal Problem:Arm wound, right, initial encounter    Subjective:     HPI: No notes on file    Interval History: The patient is sitting up in bed.  He voices no complaints this morning.  He is scheduled for dialysis today.    Review of patient's allergies indicates:   Allergen Reactions    Ceftriaxone Swelling    Lisinopril Swelling and Rash     Facial swelling   Facial swelling       Current Facility-Administered Medications   Medication Frequency    0.9%  NaCl infusion PRN    acetaminophen tablet 650 mg Q4H PRN    albuterol-ipratropium 2.5 mg-0.5 mg/3 mL nebulizer solution 3 mL BID    amiodarone tablet 200 mg Daily    amiodarone tablet 400 mg Daily    aspirin EC tablet 81 mg Daily    atorvastatin tablet 40 mg QHS    carvediloL tablet 6.25 mg BID WM    dextrose 10% bolus 125 mL 125 mL PRN    dextrose 10% bolus 250 mL 250 mL PRN    glucagon (human recombinant) injection 1 mg PRN    heparin (porcine) injection 4,000 Units PRN    HYDROcodone-acetaminophen  mg per tablet 1 tablet Q6H PRN    HYDROcodone-acetaminophen 5-325 mg per tablet 1 tablet Q6H PRN    insulin aspart U-100 injection 0-5 Units Q6H PRN    midodrine tablet 10 mg TID WM    montelukast tablet 10 mg QHS    naloxone 0.4 mg/mL injection 0.02 mg PRN    OLANZapine tablet 2.5 mg QHS    ondansetron injection 4 mg Q8H PRN    pantoprazole EC tablet 40 mg Daily    sevelamer carbonate tablet 800 mg TID    sodium chloride 0.9% flush 10 mL Q12H PRN       Objective:     Vital Signs (Most Recent):  Temp: 97.6 °F (36.4 °C) (09/30/23 0800)  Pulse: 61 (09/30/23 0731)  Resp: 18 (09/30/23 0800)  BP: 128/82 (09/30/23 0800)  SpO2: (!) 94 % (09/30/23 0800) Vital Signs (24h  "Range):  Temp:  [96.3 °F (35.7 °C)-98.3 °F (36.8 °C)] 97.6 °F (36.4 °C)  Pulse:  [60-78] 61  Resp:  [18-20] 18  SpO2:  [94 %-99 %] 94 %  BP: ()/(55-82) 128/82     Weight: 107.5 kg (237 lb) (09/18/23 2315)  Body mass index is 32.14 kg/m².  Body surface area is 2.34 meters squared.    No intake/output data recorded.     Physical Exam  Vitals reviewed.   HENT:      Head: Normocephalic and atraumatic.   Eyes:      Pupils: Pupils are equal, round, and reactive to light.   Cardiovascular:      Rate and Rhythm: Regular rhythm.   Pulmonary:      Effort: Pulmonary effort is normal.   Abdominal:      Palpations: Abdomen is soft.   Skin:     General: Skin is warm.   Neurological:      Mental Status: He is alert.   Psychiatric:         Mood and Affect: Mood normal.          Significant Labs:  BMP: No results for input(s): "GLU", "NA", "K", "CL", "CO2", "BUN", "CREATININE", "CALCIUM", "MG" in the last 168 hours.  CBC: No results for input(s): "WBC", "RBC", "HGB", "HCT", "PLT", "MCV", "MCH", "MCHC" in the last 168 hours.     Significant Imaging:  Labs: Reviewed    Assessment/Plan:     Renal/  ESRD (end stage renal disease)  Continue with scheduled hemodialysis.    Endocrine  Diabetes  Chronic condition    GI  Chronic hepatitis C  Chronic condition        Thank you for your consult. I will follow-up with patient. Please contact us if you have any additional questions.    Roberto Navarrete Jr, MD  Nephrology  [facility]  "

## 2023-09-30 NOTE — SUBJECTIVE & OBJECTIVE
"Interval History: No complaints. Seen on HD.      Review of Systems  Objective:     Vital Signs (Most Recent):  Temp: 98.1 °F (36.7 °C) (09/30/23 1648)  Pulse: 65 (09/30/23 1648)  Resp: 16 (09/30/23 1648)  BP: 101/65 (09/30/23 1648)  SpO2: 97 % (09/30/23 1648) Vital Signs (24h Range):  Temp:  [97.6 °F (36.4 °C)-98.3 °F (36.8 °C)] 98.1 °F (36.7 °C)  Pulse:  [60-75] 65  Resp:  [16-20] 16  SpO2:  [94 %-99 %] 97 %  BP: ()/(51-86) 101/65     Weight: 107.5 kg (237 lb)  Body mass index is 32.14 kg/m².    Intake/Output Summary (Last 24 hours) at 9/30/2023 1744  Last data filed at 9/30/2023 1351  Gross per 24 hour   Intake 180 ml   Output 2800 ml   Net -2620 ml         Physical Exam  Vitals reviewed.   Constitutional:       General: He is not in acute distress.     Appearance: Normal appearance. He is not toxic-appearing.   HENT:      Head: Normocephalic and atraumatic.   Cardiovascular:      Rate and Rhythm: Normal rate and regular rhythm.      Heart sounds: Normal heart sounds.   Pulmonary:      Effort: Pulmonary effort is normal. No respiratory distress.      Breath sounds: Normal breath sounds. No wheezing.   Abdominal:      General: Abdomen is flat. Bowel sounds are normal. There is no distension.      Palpations: Abdomen is soft.   Skin:     General: Skin is warm and dry.      Comments: Viewed wounds at dressing change.  Improving.    Neurological:      Mental Status: He is alert.             Significant Labs: All pertinent labs within the past 24 hours have been reviewed.  BMP: No results for input(s): "GLU", "NA", "K", "CL", "CO2", "BUN", "CREATININE", "CALCIUM", "MG" in the last 48 hours.  CBC: No results for input(s): "WBC", "HGB", "HCT", "PLT" in the last 48 hours.    Significant Imaging: I have reviewed all pertinent imaging results/findings within the past 24 hours.  "

## 2023-09-30 NOTE — ASSESSMENT & PLAN NOTE
S/P I&D right hematoma and left I&D of wounds in upper extremities; followed by Surgery; this was due to vasopressor infiltration at [facility]; was placed on Aztreonam on admission--will change this to Merrem; wound culture shows Klebsiella    Debrided by surgery.   9/27 - High risk of rebleeding till closure. Will explore swing bed options closer to his vascular surgeon.  Only came here because [facility] was on bypass.     9/29 - Improving.     9/30 - Continue wound care.

## 2023-09-30 NOTE — PLAN OF CARE
Problem: Skin Injury Risk Increased  Goal: Skin Health and Integrity  Outcome: Ongoing, Progressing     Problem: Gas Exchange Impaired  Goal: Optimal Gas Exchange  Outcome: Ongoing, Progressing

## 2023-09-30 NOTE — SUBJECTIVE & OBJECTIVE
Interval History: The patient is sitting up in bed.  He voices no complaints this morning.  He is scheduled for dialysis today.    Review of patient's allergies indicates:   Allergen Reactions    Ceftriaxone Swelling    Lisinopril Swelling and Rash     Facial swelling   Facial swelling       Current Facility-Administered Medications   Medication Frequency    0.9%  NaCl infusion PRN    acetaminophen tablet 650 mg Q4H PRN    albuterol-ipratropium 2.5 mg-0.5 mg/3 mL nebulizer solution 3 mL BID    amiodarone tablet 200 mg Daily    amiodarone tablet 400 mg Daily    aspirin EC tablet 81 mg Daily    atorvastatin tablet 40 mg QHS    carvediloL tablet 6.25 mg BID WM    dextrose 10% bolus 125 mL 125 mL PRN    dextrose 10% bolus 250 mL 250 mL PRN    glucagon (human recombinant) injection 1 mg PRN    heparin (porcine) injection 4,000 Units PRN    HYDROcodone-acetaminophen  mg per tablet 1 tablet Q6H PRN    HYDROcodone-acetaminophen 5-325 mg per tablet 1 tablet Q6H PRN    insulin aspart U-100 injection 0-5 Units Q6H PRN    midodrine tablet 10 mg TID WM    montelukast tablet 10 mg QHS    naloxone 0.4 mg/mL injection 0.02 mg PRN    OLANZapine tablet 2.5 mg QHS    ondansetron injection 4 mg Q8H PRN    pantoprazole EC tablet 40 mg Daily    sevelamer carbonate tablet 800 mg TID    sodium chloride 0.9% flush 10 mL Q12H PRN       Objective:     Vital Signs (Most Recent):  Temp: 97.6 °F (36.4 °C) (09/30/23 0800)  Pulse: 61 (09/30/23 0731)  Resp: 18 (09/30/23 0800)  BP: 128/82 (09/30/23 0800)  SpO2: (!) 94 % (09/30/23 0800) Vital Signs (24h Range):  Temp:  [96.3 °F (35.7 °C)-98.3 °F (36.8 °C)] 97.6 °F (36.4 °C)  Pulse:  [60-78] 61  Resp:  [18-20] 18  SpO2:  [94 %-99 %] 94 %  BP: ()/(55-82) 128/82     Weight: 107.5 kg (237 lb) (09/18/23 2315)  Body mass index is 32.14 kg/m².  Body surface area is 2.34 meters squared.    No intake/output data recorded.     Physical Exam  Vitals reviewed.   HENT:      Head: Normocephalic and  "atraumatic.   Eyes:      Pupils: Pupils are equal, round, and reactive to light.   Cardiovascular:      Rate and Rhythm: Regular rhythm.   Pulmonary:      Effort: Pulmonary effort is normal.   Abdominal:      Palpations: Abdomen is soft.   Skin:     General: Skin is warm.   Neurological:      Mental Status: He is alert.   Psychiatric:         Mood and Affect: Mood normal.          Significant Labs:  BMP: No results for input(s): "GLU", "NA", "K", "CL", "CO2", "BUN", "CREATININE", "CALCIUM", "MG" in the last 168 hours.  CBC: No results for input(s): "WBC", "RBC", "HGB", "HCT", "PLT", "MCV", "MCH", "MCHC" in the last 168 hours.     Significant Imaging:  Labs: Reviewed  "

## 2023-09-30 NOTE — PROGRESS NOTES
[facility]  [facility] Medicine  Progress Note    Patient Name: Yvan Rollins  MRN: [id number]  Patient Class: IP- Inpatient   Admission Date: 9/19/2023  Length of Stay: 11 days  Attending Physician: Phil Hernandez Jr., MD  Primary Care Provider: Eugene Funez MD        Subjective:     Principal Problem:Arm wound, right, initial encounter        HPI:  Patient is a 63 year old  male who was transferred to [facility] from [place] ED for vascular consultation for a necrotic wound overlying an AV access site in the RT arm. He presented to [place] ED via [facility] from [facility] (referenced to be [facility] from recent hospitalization at [facility] (8/15/2023)). He was recently hospitalized at [facility] after having dyspnea during dialysis that progressed to cardiac arrest with ROSC. The patient was mechanically intubated, started on pressors, and managed in the ICU. According to the discharge summary, the patient had an adverse reaction to surface and infiltration anesthetic and intravenous infiltration.     During hospitalization at [facility], the RT AV access site was unable to be used and a tunneled RT dialysis catheter was placed. He was evaluated by Vascular surgery during that admission with outpatient follow-up. Today, he was seen by wound care at the NH and they recommended return to [facility] for vascular evaluation of the wound. However, [facility] was on Diversion and he ended up at [place] [facility] and subsequently [facility].     During his hospitalization at [facility], cardiology was consulted. ECHO demonstrated EF of 25-30% and there was a reported 1 episode of nonsustained v-tach. Cardiology recommended resuming goal directed medical therapy, and there was no identified cardiac etiology for his arrest. He was also evaluated by neurology due to concern for anoxic brain injury.  CT of the brain demonstrated chronic ischemic microangiopathy. Neurology diagnosed his encephalopathy to be multifactorial -  anoxic insult from prolonged cardiac arrest with metabolic derangement. His mental status improved, but not to baseline. Today, at [facility] he   is Aox1 and he is a poor historian. He has difficulty answering questions. History is obtained from medical records.     The patient has a PMH of nonischemic cardiomyopathy with EF 20-25%, S/P AICD placement, ESRD on HD TTS, COPD, HTN, polysubstance abuse, and hepatitis C.         Overview/Hospital Course:  63 year old male presents with necrotic wound of AV fistula site s/p debridement.  He is being treated for a Staph bacteremia.  He is alert.  Patient denies chest pain, shortness of breath, nausea, vomiting, or diarrhea.        Interval History: No complaints. Seen on HD.      Review of Systems  Objective:     Vital Signs (Most Recent):  Temp: 98.1 °F (36.7 °C) (09/30/23 1648)  Pulse: 65 (09/30/23 1648)  Resp: 16 (09/30/23 1648)  BP: 101/65 (09/30/23 1648)  SpO2: 97 % (09/30/23 1648) Vital Signs (24h Range):  Temp:  [97.6 °F (36.4 °C)-98.3 °F (36.8 °C)] 98.1 °F (36.7 °C)  Pulse:  [60-75] 65  Resp:  [16-20] 16  SpO2:  [94 %-99 %] 97 %  BP: ()/(51-86) 101/65     Weight: 107.5 kg (237 lb)  Body mass index is 32.14 kg/m².    Intake/Output Summary (Last 24 hours) at 9/30/2023 1744  Last data filed at 9/30/2023 1351  Gross per 24 hour   Intake 180 ml   Output 2800 ml   Net -2620 ml         Physical Exam  Vitals reviewed.   Constitutional:       General: He is not in acute distress.     Appearance: Normal appearance. He is not toxic-appearing.   HENT:      Head: Normocephalic and atraumatic.   Cardiovascular:      Rate and Rhythm: Normal rate and regular rhythm.      Heart sounds: Normal heart sounds.   Pulmonary:      Effort: Pulmonary effort is normal. No respiratory distress.      Breath sounds: Normal breath sounds. No wheezing.   Abdominal:      General: Abdomen is flat. Bowel sounds are normal. There is no distension.      Palpations: Abdomen is soft.   Skin:      "General: Skin is warm and dry.      Comments: Viewed wounds at dressing change.  Improving.    Neurological:      Mental Status: He is alert.             Significant Labs: All pertinent labs within the past 24 hours have been reviewed.  BMP: No results for input(s): "GLU", "NA", "K", "CL", "CO2", "BUN", "CREATININE", "CALCIUM", "MG" in the last 48 hours.  CBC: No results for input(s): "WBC", "HGB", "HCT", "PLT" in the last 48 hours.    Significant Imaging: I have reviewed all pertinent imaging results/findings within the past 24 hours.      Assessment/Plan:      * Arm wound, right, initial encounter  S/P I&D right hematoma and left I&D of wounds in upper extremities; followed by Surgery; this was due to vasopressor infiltration at [facility]; was placed on Aztreonam on admission--will change this to Merrem; wound culture shows Klebsiella    Debrided by surgery.   9/27 - High risk of rebleeding till closure. Will explore swing bed options closer to his vascular surgeon.  Only came here because [facility] was on bypass.     9/29 - Improving.     9/30 - Continue wound care.     Acute metabolic encephalopathy  He is more alert today but confused; probably at baseline; CT brain did not show acute abnormality.     9/29 - Has improved a bit through the week. But still with some cognitive difficulties.       Chronic combined systolic and diastolic congestive heart failure  Continue current meds      Diabetes  Does not appear to be on any home meds for DM; will monitor BS levels; BS stable    9/29 - Does not currently meet criteria for DM diagnosis.  Glucoses have been good. A1C 5.3.  Will decrease accuchecks.     Arm wound, left, initial encounter  S/P I&D right hematoma and left I&D of wounds in upper extremities; followed by Surgery; this was due to vasopressor infiltration at [facility]; on Merrem      Atrial fibrillation  On Amiodarone and Coreg; followed by Cardiology      History of cardiac arrest  Has AICD; continue current " meds; BP low at times;he is on Coreg and Amiodarone; he is s/p cardiac arrest from dialysis at [facility] in [place], [place] in August.     Improving but suspected hypoxic brain injury. Continue PT/OT    Chronic hepatitis C  Stable    GERD (gastroesophageal reflux disease)  On Protonix      ESRD (end stage renal disease)  On dialysis TTS        VTE Risk Mitigation (From admission, onward)         Ordered     heparin (porcine) injection 4,000 Units  As needed (PRN)         09/21/23 1135     IP VTE HIGH RISK PATIENT  Once         09/19/23 0147     Place sequential compression device  Until discontinued         09/19/23 0147     Reason for No Pharmacological VTE Prophylaxis  Once        Question:  Reasons:  Answer:  Physician Provided (leave comment)    09/19/23 0147                Discharge Planning   SANDRA:      Code Status: Full Code   Is the patient medically ready for discharge?:     Reason for patient still in hospital (select all that apply): Treatment  Discharge Plan A: Home with family                  Phil Hernandez Jr, MD  Department of Hospital Medicine   [facility]

## 2023-10-01 LAB
GLUCOSE SERPL-MCNC: 107 MG/DL (ref 70–105)
GLUCOSE SERPL-MCNC: 108 MG/DL (ref 70–105)

## 2023-10-01 PROCEDURE — 99232 SBSQ HOSP IP/OBS MODERATE 35: CPT | Mod: ,,, | Performed by: FAMILY MEDICINE

## 2023-10-01 PROCEDURE — 27000221 HC OXYGEN, UP TO 24 HOURS

## 2023-10-01 PROCEDURE — 82962 GLUCOSE BLOOD TEST: CPT

## 2023-10-01 PROCEDURE — 94761 N-INVAS EAR/PLS OXIMETRY MLT: CPT

## 2023-10-01 PROCEDURE — 99232 PR SUBSEQUENT HOSPITAL CARE,LEVL II: ICD-10-PCS | Mod: ,,, | Performed by: FAMILY MEDICINE

## 2023-10-01 PROCEDURE — 99900035 HC TECH TIME PER 15 MIN (STAT)

## 2023-10-01 PROCEDURE — 25000242 PHARM REV CODE 250 ALT 637 W/ HCPCS: Performed by: HOSPITALIST

## 2023-10-01 PROCEDURE — 25000003 PHARM REV CODE 250

## 2023-10-01 PROCEDURE — 94640 AIRWAY INHALATION TREATMENT: CPT

## 2023-10-01 PROCEDURE — 11000001 HC ACUTE MED/SURG PRIVATE ROOM

## 2023-10-01 RX ADMIN — ASPIRIN 81 MG: 81 TABLET, COATED ORAL at 09:10

## 2023-10-01 RX ADMIN — CARVEDILOL 6.25 MG: 6.25 TABLET, FILM COATED ORAL at 05:10

## 2023-10-01 RX ADMIN — SEVELAMER CARBONATE 800 MG: 800 TABLET, FILM COATED ORAL at 05:10

## 2023-10-01 RX ADMIN — CARVEDILOL 6.25 MG: 6.25 TABLET, FILM COATED ORAL at 09:10

## 2023-10-01 RX ADMIN — ACETAMINOPHEN 650 MG: 325 TABLET ORAL at 09:10

## 2023-10-01 RX ADMIN — PANTOPRAZOLE SODIUM 40 MG: 40 TABLET, DELAYED RELEASE ORAL at 09:10

## 2023-10-01 RX ADMIN — ATORVASTATIN CALCIUM 40 MG: 40 TABLET, FILM COATED ORAL at 09:10

## 2023-10-01 RX ADMIN — IPRATROPIUM BROMIDE AND ALBUTEROL SULFATE 3 ML: 2.5; .5 SOLUTION RESPIRATORY (INHALATION) at 07:10

## 2023-10-01 RX ADMIN — SEVELAMER CARBONATE 800 MG: 800 TABLET, FILM COATED ORAL at 09:10

## 2023-10-01 RX ADMIN — MONTELUKAST SODIUM 10 MG: 10 TABLET, FILM COATED ORAL at 09:10

## 2023-10-01 RX ADMIN — AMIODARONE HYDROCHLORIDE 200 MG: 200 TABLET ORAL at 09:10

## 2023-10-01 RX ADMIN — OLANZAPINE 2.5 MG: 2.5 TABLET, FILM COATED ORAL at 09:10

## 2023-10-01 NOTE — PROGRESS NOTES
[facility]  [facility] Medicine  Progress Note    Patient Name: Yvan Rollins  MRN: [id number]  Patient Class: IP- Inpatient   Admission Date: 9/19/2023  Length of Stay: 12 days  Attending Physician: Phil Hernandez Jr., MD  Primary Care Provider: Eugene Funez MD        Subjective:     Principal Problem:Arm wound, right, initial encounter        HPI:  Patient is a 63 year old  male who was transferred to [facility] from [place] ED for vascular consultation for a necrotic wound overlying an AV access site in the RT arm. He presented to [place] ED via [facility] from [facility] (referenced to be [facility] from recent hospitalization at [facility] (8/15/2023)). He was recently hospitalized at [facility] after having dyspnea during dialysis that progressed to cardiac arrest with ROSC. The patient was mechanically intubated, started on pressors, and managed in the ICU. According to the discharge summary, the patient had an adverse reaction to surface and infiltration anesthetic and intravenous infiltration.     During hospitalization at [facility], the RT AV access site was unable to be used and a tunneled RT dialysis catheter was placed. He was evaluated by Vascular surgery during that admission with outpatient follow-up. Today, he was seen by wound care at the NH and they recommended return to [facility] for vascular evaluation of the wound. However, [facility] was on Diversion and he ended up at [place] and subsequently [facility].     During his hospitalization at [facility], cardiology was consulted. ECHO demonstrated EF of 25-30% and there was a reported 1 episode of nonsustained v-tach. Cardiology recommended resuming goal directed medical therapy, and there was no identified cardiac etiology for his arrest. He was also evaluated by neurology due to concern for anoxic brain injury.  CT of the brain demonstrated chronic ischemic microangiopathy. Neurology diagnosed his encephalopathy to be multifactorial -  anoxic insult from prolonged cardiac arrest with metabolic derangement. His mental status improved, but not to baseline. Today, at [facility] he   is Aox1 and he is a poor historian. He has difficulty answering questions. History is obtained from medical records.     The patient has a PMH of nonischemic cardiomyopathy with EF 20-25%, S/P AICD placement, ESRD on HD TTS, COPD, HTN, polysubstance abuse, and hepatitis C.         Overview/Hospital Course:  63 year old male presents with necrotic wound of AV fistula site s/p debridement.  He is being treated for a Staph bacteremia.  He is alert.  Patient denies chest pain, shortness of breath, nausea, vomiting, or diarrhea.        Interval History: No complaints today. Seems slightly confused.  Is going to need to go to facility for further rehab. R arm wound over fistula is an issue .  High risk for rebleeding until closed.     Review of Systems  Objective:     Vital Signs (Most Recent):  Temp: 98.2 °F (36.8 °C) (10/01/23 1155)  Pulse: 72 (10/01/23 1155)  Resp: 16 (10/01/23 1155)  BP: 112/70 (10/01/23 1155)  SpO2: 95 % (10/01/23 1155) Vital Signs (24h Range):  Temp:  [97.7 °F (36.5 °C)-98.3 °F (36.8 °C)] 98.2 °F (36.8 °C)  Pulse:  [58-81] 72  Resp:  [16-20] 16  SpO2:  [95 %-99 %] 95 %  BP: ()/(51-96) 112/70     Weight: 107.5 kg (237 lb)  Body mass index is 32.14 kg/m².    Intake/Output Summary (Last 24 hours) at 10/1/2023 1240  Last data filed at 9/30/2023 1351  Gross per 24 hour   Intake --   Output 2800 ml   Net -2800 ml         Physical Exam  Vitals reviewed.   Constitutional:       General: He is not in acute distress.     Appearance: Normal appearance. He is not toxic-appearing.   HENT:      Head: Normocephalic and atraumatic.   Cardiovascular:      Rate and Rhythm: Normal rate and regular rhythm.      Heart sounds: Normal heart sounds.   Pulmonary:      Effort: Pulmonary effort is normal. No respiratory distress.      Breath sounds: Normal breath sounds. No  "wheezing.   Abdominal:      General: Abdomen is flat. Bowel sounds are normal. There is no distension.      Palpations: Abdomen is soft.   Skin:     General: Skin is warm and dry.      Comments: Viewed wounds at dressing change.  Improving.    Neurological:      Mental Status: He is alert.             Significant Labs: All pertinent labs within the past 24 hours have been reviewed.  BMP: No results for input(s): "GLU", "NA", "K", "CL", "CO2", "BUN", "CREATININE", "CALCIUM", "MG" in the last 48 hours.  CBC: No results for input(s): "WBC", "HGB", "HCT", "PLT" in the last 48 hours.    Significant Imaging: I have reviewed all pertinent imaging results/findings within the past 24 hours.      Assessment/Plan:      * Arm wound, right, initial encounter  S/P I&D right hematoma and left I&D of wounds in upper extremities; followed by Surgery; this was due to vasopressor infiltration at [facility]; was placed on Aztreonam on admission--will change this to Merrem; wound culture shows Klebsiella    Debrided by surgery.   9/27 - High risk of rebleeding till closure. Will explore swing bed options closer to his vascular surgeon.  Only came here because [facility] was on bypass.     9/29 - Improving.     9/30 - Continue wound care.     10/1 - High risk of rebleeding till closure. Will explore swing bed options closer to his vascular surgeon. Will need Rehab due to deficits following code. Would have to improve substantially to resume independent living.     History of cardiac arrest  Has AICD; continue current meds; BP low at times;he is on Coreg and Amiodarone; he is s/p cardiac arrest from dialysis at [facility] in [place], [place] in August.     Improving but suspected hypoxic brain injury. Continue PT/OT    ESRD (end stage renal disease)  On dialysis TTS. Has R subclavian tunneled cath.       Arm wound, left, initial encounter  Healing.  Off abx.       Acute metabolic encephalopathy  He is more alert today but confused; probably at " baseline; CT brain did not show acute abnormality.     9/29 - Has improved a bit through the week. But still with some cognitive difficulties.     10/1 - Conversant but confused at times.       Chronic combined systolic and diastolic congestive heart failure  Continue current meds      Diabetes  Does not appear to be on any home meds for DM; will monitor BS levels; BS stable    9/29 - Does not currently meet criteria for DM diagnosis.  Glucoses have been good. A1C 5.3.  Will decrease accuchecks.     Atrial fibrillation  On Amiodarone and Coreg; followed by Cardiology      Chronic hepatitis C  Stable    GERD (gastroesophageal reflux disease)  On Protonix        VTE Risk Mitigation (From admission, onward)         Ordered     heparin (porcine) injection 4,000 Units  As needed (PRN)         09/21/23 1135     IP VTE HIGH RISK PATIENT  Once         09/19/23 0147     Place sequential compression device  Until discontinued         09/19/23 0147     Reason for No Pharmacological VTE Prophylaxis  Once        Question:  Reasons:  Answer:  Physician Provided (leave comment)    09/19/23 0147                Discharge Planning   SANDRA:      Code Status: Full Code   Is the patient medically ready for discharge?:     Reason for patient still in hospital (select all that apply): Treatment  Discharge Plan A: Home with family                  Phil Hernandez Jr, MD  Department of Hospital Medicine   [facility]

## 2023-10-01 NOTE — SUBJECTIVE & OBJECTIVE
Interval History: The patient is sitting up in bed.  He appears to be in a good mood.  No shortness of breath.  No other acute changes.    Review of patient's allergies indicates:   Allergen Reactions    Ceftriaxone Swelling    Lisinopril Swelling and Rash     Facial swelling   Facial swelling       Current Facility-Administered Medications   Medication Frequency    0.9%  NaCl infusion PRN    acetaminophen tablet 650 mg Q4H PRN    albuterol-ipratropium 2.5 mg-0.5 mg/3 mL nebulizer solution 3 mL BID    amiodarone tablet 200 mg Daily    aspirin EC tablet 81 mg Daily    atorvastatin tablet 40 mg QHS    carvediloL tablet 6.25 mg BID WM    dextrose 10% bolus 125 mL 125 mL PRN    dextrose 10% bolus 250 mL 250 mL PRN    glucagon (human recombinant) injection 1 mg PRN    heparin (porcine) injection 4,000 Units PRN    HYDROcodone-acetaminophen  mg per tablet 1 tablet Q6H PRN    HYDROcodone-acetaminophen 5-325 mg per tablet 1 tablet Q6H PRN    insulin aspart U-100 injection 0-5 Units Q6H PRN    midodrine tablet 10 mg TID WM    montelukast tablet 10 mg QHS    naloxone 0.4 mg/mL injection 0.02 mg PRN    OLANZapine tablet 2.5 mg QHS    ondansetron injection 4 mg Q8H PRN    pantoprazole EC tablet 40 mg Daily    sevelamer carbonate tablet 800 mg TID    sodium chloride 0.9% flush 10 mL Q12H PRN       Objective:     Vital Signs (Most Recent):  Temp: 98 °F (36.7 °C) (10/01/23 0800)  Pulse: 76 (10/01/23 0800)  Resp: 16 (10/01/23 0800)  BP: 121/73 (10/01/23 0800)  SpO2: 96 % (10/01/23 0800) Vital Signs (24h Range):  Temp:  [97.6 °F (36.4 °C)-98.3 °F (36.8 °C)] 98 °F (36.7 °C)  Pulse:  [58-81] 76  Resp:  [16-20] 16  SpO2:  [96 %-99 %] 96 %  BP: ()/(51-96) 121/73     Weight: 107.5 kg (237 lb) (09/18/23 2315)  Body mass index is 32.14 kg/m².  Body surface area is 2.34 meters squared.    I/O last 3 completed shifts:  In: 180 [P.O.:180]  Out: 2800 [Other:2800]     Physical Exam  Vitals reviewed.   Constitutional:        "Appearance: He is obese.   HENT:      Head: Normocephalic and atraumatic.      Mouth/Throat:      Mouth: Mucous membranes are moist.   Eyes:      Pupils: Pupils are equal, round, and reactive to light.   Cardiovascular:      Rate and Rhythm: Regular rhythm.   Pulmonary:      Effort: Pulmonary effort is normal.   Abdominal:      Palpations: Abdomen is soft.   Skin:     General: Skin is warm.   Neurological:      Mental Status: He is alert.   Psychiatric:         Mood and Affect: Mood normal.          Significant Labs:  BMP: No results for input(s): "GLU", "NA", "K", "CL", "CO2", "BUN", "CREATININE", "CALCIUM", "MG" in the last 168 hours.  CBC: No results for input(s): "WBC", "RBC", "HGB", "HCT", "PLT", "MCV", "MCH", "MCHC" in the last 168 hours.     Significant Imaging:  Labs: Reviewed  "

## 2023-10-01 NOTE — ASSESSMENT & PLAN NOTE
He is more alert today but confused; probably at baseline; CT brain did not show acute abnormality.     9/29 - Has improved a bit through the week. But still with some cognitive difficulties.     10/1 - Conversant but confused at times.

## 2023-10-01 NOTE — PROGRESS NOTES
[facility]  Nephrology  Progress Note    Patient Name: Yvan Rollins  MRN: [id number]  Admission Date: 9/19/2023  Hospital Length of Stay: 12 days  Attending Provider: Phil Hernandez Jr., MD   Primary Care Physician: Eugene Funez MD  Principal Problem:Arm wound, right, initial encounter    Subjective:     HPI: No notes on file    Interval History: The patient is sitting up in bed.  He appears to be in a good mood.  No shortness of breath.  No other acute changes.    Review of patient's allergies indicates:   Allergen Reactions    Ceftriaxone Swelling    Lisinopril Swelling and Rash     Facial swelling   Facial swelling       Current Facility-Administered Medications   Medication Frequency    0.9%  NaCl infusion PRN    acetaminophen tablet 650 mg Q4H PRN    albuterol-ipratropium 2.5 mg-0.5 mg/3 mL nebulizer solution 3 mL BID    amiodarone tablet 200 mg Daily    aspirin EC tablet 81 mg Daily    atorvastatin tablet 40 mg QHS    carvediloL tablet 6.25 mg BID WM    dextrose 10% bolus 125 mL 125 mL PRN    dextrose 10% bolus 250 mL 250 mL PRN    glucagon (human recombinant) injection 1 mg PRN    heparin (porcine) injection 4,000 Units PRN    HYDROcodone-acetaminophen  mg per tablet 1 tablet Q6H PRN    HYDROcodone-acetaminophen 5-325 mg per tablet 1 tablet Q6H PRN    insulin aspart U-100 injection 0-5 Units Q6H PRN    midodrine tablet 10 mg TID WM    montelukast tablet 10 mg QHS    naloxone 0.4 mg/mL injection 0.02 mg PRN    OLANZapine tablet 2.5 mg QHS    ondansetron injection 4 mg Q8H PRN    pantoprazole EC tablet 40 mg Daily    sevelamer carbonate tablet 800 mg TID    sodium chloride 0.9% flush 10 mL Q12H PRN       Objective:     Vital Signs (Most Recent):  Temp: 98 °F (36.7 °C) (10/01/23 0800)  Pulse: 76 (10/01/23 0800)  Resp: 16 (10/01/23 0800)  BP: 121/73 (10/01/23 0800)  SpO2: 96 % (10/01/23 0800) Vital Signs (24h Range):  Temp:  [97.6 °F (36.4 °C)-98.3 °F  Scheduled procedure with Patient at   Telephone Information:   Mobile [phone]                                                          Scheduled Via: Case Request Order for  [facility]   Procedure date: 10/10   Procedure time: 10:45 am ; Did patient request this specific time? Yes    -If non-ambulatory location, select reason: Medically necessary  -Did patient request a specific facility other than MD's preferred facility? No; If so, which facility? n/a  -If a MAC pt is scheduled, pt was notified a family member/friend is need to stay with the patient over night after their procedure: Yes                Notified patient about receiving an animated [place] program? Yes    Was patient informed of COVID testing Yes;    The following have been confirmed:  Insurance name confirmed as [facility], will be the same at time of procedure?: Yes Ins Accepted at Facility? Yes  Latex Allergy No  Diabetic Yes  Sleep Apnea Yes  Diuretic/Water pill Yes  Defibrillator/Pacemaker No  MRSA hx No  Blood thinners: Coumadin (Warfarin) or Plavix No      Aspirin Yes      Phentermine (diet pill) No  Constipation No;    Pre-Op testing required No, Patient informed No  Prep required? n/a; Briefly reviewed? No; Prep cost range discussed? No  If procedure is scheduled 7 days or less, patient was told to  prep letter?: No   "(36.8 °C)] 98 °F (36.7 °C)  Pulse:  [58-81] 76  Resp:  [16-20] 16  SpO2:  [96 %-99 %] 96 %  BP: ()/(51-96) 121/73     Weight: 107.5 kg (237 lb) (09/18/23 2315)  Body mass index is 32.14 kg/m².  Body surface area is 2.34 meters squared.    I/O last 3 completed shifts:  In: 180 [P.O.:180]  Out: 2800 [Other:2800]     Physical Exam  Vitals reviewed.   Constitutional:       Appearance: He is obese.   HENT:      Head: Normocephalic and atraumatic.      Mouth/Throat:      Mouth: Mucous membranes are moist.   Eyes:      Pupils: Pupils are equal, round, and reactive to light.   Cardiovascular:      Rate and Rhythm: Regular rhythm.   Pulmonary:      Effort: Pulmonary effort is normal.   Abdominal:      Palpations: Abdomen is soft.   Skin:     General: Skin is warm.   Neurological:      Mental Status: He is alert.   Psychiatric:         Mood and Affect: Mood normal.          Significant Labs:  BMP: No results for input(s): "GLU", "NA", "K", "CL", "CO2", "BUN", "CREATININE", "CALCIUM", "MG" in the last 168 hours.  CBC: No results for input(s): "WBC", "RBC", "HGB", "HCT", "PLT", "MCV", "MCH", "MCHC" in the last 168 hours.     Significant Imaging:  Labs: Reviewed    Assessment/Plan:     Renal/  ESRD (end stage renal disease)  Continue with scheduled hemodialysis.  Volume status is acceptable.    Endocrine  Diabetes  Chronic condition    GI  Chronic hepatitis C  Chronic condition        Thank you for your consult. I will follow-up with patient. Please contact us if you have any additional questions.    Roberto Navarrete Jr, MD  Nephrology  [facility]  "

## 2023-10-01 NOTE — ASSESSMENT & PLAN NOTE
S/P I&D right hematoma and left I&D of wounds in upper extremities; followed by Surgery; this was due to vasopressor infiltration at [facility]; was placed on Aztreonam on admission--will change this to Merrem; wound culture shows Klebsiella    Debrided by surgery.   9/27 - High risk of rebleeding till closure. Will explore swing bed options closer to his vascular surgeon.  Only came here because [facility] was on bypass.     9/29 - Improving.     9/30 - Continue wound care.     10/1 - High risk of rebleeding till closure. Will explore swing bed options closer to his vascular surgeon. Will need Rehab due to deficits following code. Would have to improve substantially to resume independent living.

## 2023-10-01 NOTE — SUBJECTIVE & OBJECTIVE
"Interval History: No complaints today. Seems slightly confused.  Is going to need to go to facility for further rehab. R arm wound over fistula is an issue .  High risk for rebleeding until closed.     Review of Systems  Objective:     Vital Signs (Most Recent):  Temp: 98.2 °F (36.8 °C) (10/01/23 1155)  Pulse: 72 (10/01/23 1155)  Resp: 16 (10/01/23 1155)  BP: 112/70 (10/01/23 1155)  SpO2: 95 % (10/01/23 1155) Vital Signs (24h Range):  Temp:  [97.7 °F (36.5 °C)-98.3 °F (36.8 °C)] 98.2 °F (36.8 °C)  Pulse:  [58-81] 72  Resp:  [16-20] 16  SpO2:  [95 %-99 %] 95 %  BP: ()/(51-96) 112/70     Weight: 107.5 kg (237 lb)  Body mass index is 32.14 kg/m².    Intake/Output Summary (Last 24 hours) at 10/1/2023 1240  Last data filed at 9/30/2023 1351  Gross per 24 hour   Intake --   Output 2800 ml   Net -2800 ml         Physical Exam  Vitals reviewed.   Constitutional:       General: He is not in acute distress.     Appearance: Normal appearance. He is not toxic-appearing.   HENT:      Head: Normocephalic and atraumatic.   Cardiovascular:      Rate and Rhythm: Normal rate and regular rhythm.      Heart sounds: Normal heart sounds.   Pulmonary:      Effort: Pulmonary effort is normal. No respiratory distress.      Breath sounds: Normal breath sounds. No wheezing.   Abdominal:      General: Abdomen is flat. Bowel sounds are normal. There is no distension.      Palpations: Abdomen is soft.   Skin:     General: Skin is warm and dry.      Comments: Viewed wounds at dressing change.  Improving.    Neurological:      Mental Status: He is alert.             Significant Labs: All pertinent labs within the past 24 hours have been reviewed.  BMP: No results for input(s): "GLU", "NA", "K", "CL", "CO2", "BUN", "CREATININE", "CALCIUM", "MG" in the last 48 hours.  CBC: No results for input(s): "WBC", "HGB", "HCT", "PLT" in the last 48 hours.    Significant Imaging: I have reviewed all pertinent imaging results/findings within the past 24 " hours.

## 2023-10-02 PROCEDURE — 25000242 PHARM REV CODE 250 ALT 637 W/ HCPCS: Performed by: HOSPITALIST

## 2023-10-02 PROCEDURE — 27000221 HC OXYGEN, UP TO 24 HOURS

## 2023-10-02 PROCEDURE — 97110 THERAPEUTIC EXERCISES: CPT

## 2023-10-02 PROCEDURE — 11000001 HC ACUTE MED/SURG PRIVATE ROOM

## 2023-10-02 PROCEDURE — 99232 PR SUBSEQUENT HOSPITAL CARE,LEVL II: ICD-10-PCS | Mod: ,,, | Performed by: FAMILY MEDICINE

## 2023-10-02 PROCEDURE — 25000003 PHARM REV CODE 250

## 2023-10-02 PROCEDURE — 99232 SBSQ HOSP IP/OBS MODERATE 35: CPT | Mod: ,,, | Performed by: FAMILY MEDICINE

## 2023-10-02 PROCEDURE — 94761 N-INVAS EAR/PLS OXIMETRY MLT: CPT

## 2023-10-02 PROCEDURE — 99900035 HC TECH TIME PER 15 MIN (STAT)

## 2023-10-02 PROCEDURE — 97116 GAIT TRAINING THERAPY: CPT

## 2023-10-02 PROCEDURE — 94640 AIRWAY INHALATION TREATMENT: CPT

## 2023-10-02 PROCEDURE — 97110 THERAPEUTIC EXERCISES: CPT | Mod: CO

## 2023-10-02 RX ADMIN — CARVEDILOL 6.25 MG: 6.25 TABLET, FILM COATED ORAL at 08:10

## 2023-10-02 RX ADMIN — MONTELUKAST SODIUM 10 MG: 10 TABLET, FILM COATED ORAL at 10:10

## 2023-10-02 RX ADMIN — AMIODARONE HYDROCHLORIDE 200 MG: 200 TABLET ORAL at 08:10

## 2023-10-02 RX ADMIN — IPRATROPIUM BROMIDE AND ALBUTEROL SULFATE 3 ML: 2.5; .5 SOLUTION RESPIRATORY (INHALATION) at 07:10

## 2023-10-02 RX ADMIN — CARVEDILOL 6.25 MG: 6.25 TABLET, FILM COATED ORAL at 05:10

## 2023-10-02 RX ADMIN — ATORVASTATIN CALCIUM 40 MG: 40 TABLET, FILM COATED ORAL at 10:10

## 2023-10-02 RX ADMIN — OLANZAPINE 2.5 MG: 2.5 TABLET, FILM COATED ORAL at 10:10

## 2023-10-02 RX ADMIN — SEVELAMER CARBONATE 800 MG: 800 TABLET, FILM COATED ORAL at 10:10

## 2023-10-02 RX ADMIN — MIDODRINE HYDROCHLORIDE 10 MG: 5 TABLET ORAL at 12:10

## 2023-10-02 RX ADMIN — SEVELAMER CARBONATE 800 MG: 800 TABLET, FILM COATED ORAL at 05:10

## 2023-10-02 RX ADMIN — PANTOPRAZOLE SODIUM 40 MG: 40 TABLET, DELAYED RELEASE ORAL at 08:10

## 2023-10-02 RX ADMIN — MIDODRINE HYDROCHLORIDE 10 MG: 5 TABLET ORAL at 08:10

## 2023-10-02 RX ADMIN — SEVELAMER CARBONATE 800 MG: 800 TABLET, FILM COATED ORAL at 08:10

## 2023-10-02 NOTE — PROGRESS NOTES
[facility]  Nephrology  Progress Note    Patient Name: Yvan Rollins  MRN: [id number]  Admission Date: 9/19/2023  Hospital Length of Stay: 13 days  Attending Provider: Phil Hernandez Jr., MD   Primary Care Physician: Eugene Funez MD  Principal Problem:Arm wound, right, initial encounter    Consults  Subjective:     Interval History: F/U ESRD. Doing well overall. No rebleed from arm wounds    Review of patient's allergies indicates:   Allergen Reactions    Ceftriaxone Swelling    Lisinopril Swelling and Rash     Facial swelling   Facial swelling       Current Facility-Administered Medications   Medication Frequency    0.9%  NaCl infusion PRN    acetaminophen tablet 650 mg Q4H PRN    albuterol-ipratropium 2.5 mg-0.5 mg/3 mL nebulizer solution 3 mL BID    amiodarone tablet 200 mg Daily    aspirin EC tablet 81 mg Daily    atorvastatin tablet 40 mg QHS    carvediloL tablet 6.25 mg BID WM    dextrose 10% bolus 125 mL 125 mL PRN    dextrose 10% bolus 250 mL 250 mL PRN    glucagon (human recombinant) injection 1 mg PRN    heparin (porcine) injection 4,000 Units PRN    HYDROcodone-acetaminophen  mg per tablet 1 tablet Q6H PRN    HYDROcodone-acetaminophen 5-325 mg per tablet 1 tablet Q6H PRN    insulin aspart U-100 injection 0-5 Units Q6H PRN    midodrine tablet 10 mg TID WM    montelukast tablet 10 mg QHS    naloxone 0.4 mg/mL injection 0.02 mg PRN    OLANZapine tablet 2.5 mg QHS    ondansetron injection 4 mg Q8H PRN    pantoprazole EC tablet 40 mg Daily    sevelamer carbonate tablet 800 mg TID    sodium chloride 0.9% flush 10 mL Q12H PRN       Objective:     Vital Signs (Most Recent):  Temp: 98.3 °F (36.8 °C) (10/02/23 1155)  Pulse: 71 (10/02/23 1155)  Resp: 17 (10/02/23 1155)  BP: 101/62 (10/02/23 1155)  SpO2: (!) 93 % (10/02/23 1155) Vital Signs (24h Range):  Temp:  [97.8 °F (36.6 °C)-98.7 °F (37.1 °C)] 98.3 °F (36.8 °C)  Pulse:  [63-87] 71  Resp:  [16-20] 17  SpO2:  [93 %-100 %] 93  "%  BP: (101-158)/(62-96) 101/62     Weight: 107.5 kg (237 lb) (09/18/23 2315)  Body mass index is 32.14 kg/m².  Body surface area is 2.34 meters squared.    I/O last 3 completed shifts:  In: 500 [P.O.:500]  Out: -     Physical Exam No edema. Alert and pleasant.    Significant Labs:sureBMP: No results for input(s): "GLU", "NA", "K", "CL", "CO2", "BUN", "CREATININE", "CALCIUM", "MG" in the last 168 hours.    Significant Imaging:  Labs: Reviewed    Assessment/Plan:     Active Diagnoses:    Diagnosis Date Noted POA    PRINCIPAL PROBLEM:  Arm wound, right, initial encounter [S41.101A] 09/19/2023 Yes    Acute metabolic encephalopathy [G93.41] 09/22/2023 Yes    ESRD (end stage renal disease) [N18.6] 09/19/2023 Yes    History of cardiac arrest [Z86.74] 09/19/2023 Not Applicable    Atrial fibrillation [I48.91] 09/19/2023 Yes    Arm wound, left, initial encounter [S41.102A] 09/19/2023 Yes    Chronic combined systolic and diastolic congestive heart failure [I50.42] 09/19/2023 Yes      Problems Resolved During this Admission:    Diagnosis Date Noted Date Resolved POA    Bacteremia [R78.81] 09/20/2023 09/27/2023 Unknown    Penile discharge [R36.9] 09/20/2023 09/29/2023 Yes    Dysphagia [R13.10] 09/19/2023 09/19/2023 Yes    Presence of automatic cardioverter/defibrillator (AICD) [Z95.810] 09/19/2023 09/19/2023 Yes    CAD (coronary artery disease) [I25.10] 09/19/2023 09/19/2023 Unknown    HFrEF (heart failure with reduced ejection fraction) [I50.20] 09/19/2023 09/20/2023 Yes    Hypotension [I95.9] 09/19/2023 09/19/2023 Yes    Impaired mobility [Z74.09] 09/19/2023 09/19/2023 Yes    Sacral wound [S31.000A] 09/19/2023 09/19/2023 Yes    COPD (chronic obstructive pulmonary disease) [J44.9] 09/19/2023 09/19/2023 Yes    Hematoma [T14.8XXA] 09/19/2023 09/19/2023 Yes       Hemodialysis tomorrow. Continue dialysis support while here.    Thank you for your consult. I will follow-up with patient. Please contact us if you have any additional " questions.    Alfred Valdez MD  Nephrology  [facility]

## 2023-10-02 NOTE — PROGRESS NOTES
[facility]  Adult Nutrition  Consult Note         Reason for Assessment  Reason For Assessment: RD follow-up   Nutrition Risk Screen: no indicators present       10/2/2023 RD follow up. Patient continues on a renal diet with Charlie and Noversource renal BID. Patients po intake is 0-100% per flow sheets. Consider addition of MVI daily, Vit C 500mg BID and ZnS04 220mg BID. Recommend obtaining current weight.    9/26/2023 RD follow up. Patient continues on a renal diet with Charlie and Noversource renal BID. Patients po intake is 0-100% per flow sheets. Consider addition of MVI daily, Vit C 500mg BID and ZnS04 220mg BID.    9/21/2023 RD follow up. Patient seen by ST and recommended a regular diet. Current diet is Renal diet and appropriate for patient due to ESRD. Recommend Charlie BID to aid in wound healing. Consider addition of MVI daily, Vit C 500mg BID and ZnS04 220mg BID. Recommend addition of Novasource Renal due to poor po intake at 25% per flow sheets.       9/19/2023 RD note:Consult received and appreciated. Consult for wounds chewing/swallowing issues. ST to eval today. Recommend advance diet per ST recommendations to a renal high protein diabetic diet.   Recommend addition of Novasource Renal with poor po intake.       RD visited patient this morning to perform NFPE.     Patient is a weight loss of 30#/11.2% x 1 month, 63#/21% x 8 months and 77#/24.5% x 1 year. Weight loss is severe.     Patient meets ASPEN criteria for Moderate-severe protein calorie malnutrition.     Unhealed wounds, weight loss and muscle and fat wasting noted.     See muscle and fat loss severity below.     Malnutrition  Is Patient Malnourished: Yes Malnutrition Assessment  Malnutrition Context: chronic illness  Malnutrition Level: moderate (moderate to severe)  Skin (Micronutrient): wounds unhealed       Weight Loss (Malnutrition): greater than 20% in 1 year  Subcutaneous Fat (Malnutrition): moderate depletion  Muscle  Mass (Malnutrition): severe depletion   Orbital Region (Subcutaneous Fat Loss): mild depletion  Upper Arm Region (Subcutaneous Fat Loss): severe depletion  Thoracic and Lumbar Region: moderate depletion   [place] Region (Muscle Loss): severe depletion  Clavicle Bone Region (Muscle Loss): severe depletion  Clavicle and Acromion Bone Region (Muscle Loss): moderate depletion  Scapular Bone Region (Muscle Loss): moderate depletion  Patellar Region (Muscle Loss): moderate depletion  Anterior Thigh Region (Muscle Loss): moderate depletion                 Skin Integrity  Delfino Risk Assessment  Sensory Perception: 3-->slightly limited  Moisture: 3-->occasionally moist  Activity: 2-->chairfast  Mobility: 3-->slightly limited  Nutrition: 3-->adequate  Friction and Shear: 2-->potential problem  Delfino Score: 16    Nutrition Diagnosis  Malnutrition (Moderate)   related to Fat wasting and Muscle wasting as evidenced by sig weight loss with visible muscle and fat wasting    Nutrition Diagnosis Status: Chronic/ continues      Nutrition Risk  Level of Risk/Frequency of Follow-up: moderate    Recent Labs   Lab 10/01/23  1727   POCGLU 107*       Comments on Glucose: dx of diabetes  Nutrition Prescription / Recommendations  Recommendation/Intervention: Recommend continue with renal diet + Charlie and Novasource renal BID.  Goals: weight maintenance, wound healng  Nutrition Goal Status: progressing towards goal  Current Diet Order: Renal diet  Oral Nutrition Supplement: Novasource Renal + Charlie BID  Chewing or Swallowing Difficulty?: pending ST eval  Recommended Diet: renal diabetic  Recommended Oral Supplement:  Novasource renal  Is Nutrition Support Recommended: No  Is Education Recommended: No  Monitor and Evaluation  % current Intake: Unknown/ No P.O. intake documented  % intake to meet estimated needs: P.O. + Supplements  Food and Nutrient Intake: food and beverage intake, energy intake  Food and Nutrient Adminstration: diet  "order  Anthropometric Measurements: weight, weight change, body mass index  Biochemical Data, Medical Tests and Procedures: electrolyte and renal panel, gastrointestinal profile, glucose/endocrine profile, inflammatory profile, lipid profile  Nutrition-Focused Physical Findings: overall appearance     Current Medical Diagnosis and Past Medical History     Past Medical History:   Diagnosis Date    Cardiac arrest     CHF (congestive heart failure)     EF 25-30%    COPD (chronic obstructive pulmonary disease)     Coronary artery disease     Diabetes     GERD (gastroesophageal reflux disease)     Hepatitis C     Hypotension     Requiring Midodrine    Neuropathy     Substance abuse      Nutrition/Diet History  Spiritual, Cultural Beliefs, Confucianist Practices, Values that Affect Care: no  Food Allergies: NKFA  Factors Affecting Nutritional Intake: NPO, difficulty/impaired swallowing  Lab/Procedures/Meds  No results for input(s): "NA", "K", "BUN", "CREATININE", "CALCIUM", "ALBUMIN", "CL", "ALT", "AST", "PHOS" in the last 72 hours.  Note: BUN and crea elevated with ESRD, albumin low- wounds and poor intake with wound infection.  Last A1c:   Lab Results   Component Value Date    HGBA1C 5.3 09/19/2023     Lab Results   Component Value Date    RBC 3.67 (L) 09/19/2023    HGB 11.9 (L) 09/19/2023    HCT 37.2 (L) 09/19/2023    .4 (H) 09/19/2023    MCH 32.4 (H) 09/19/2023    MCHC 32.0 09/19/2023     Pertinent Labs Reviewed: reviewed  Pertinent Labs Comments: Sodium: 141  Potassium: 4.6  Chloride: 103  CO2: 24  Anion Gap: 19 (H)  BUN: 49 (H)  Creatinine: 11.70 (H)  BUN/CREAT RATIO: 4 (L)  eGFR: 4 (L)  Glucose: 80  Calcium: 9.0  Phosphorus: 4.2  Magnesium : 2.4 (H)  Alkaline Phosphatase: 125 (H)  PROTEIN TOTAL: 7.0  Albumin: 2.8 (L)  Albumin/Globulin Ratio: 0.7  BILIRUBIN TOTAL: 1.2  AST: 53 (H)  ALT: 43  Globulin, Total: 4.2 (H)      (H): Data is abnormally high  (L): Data is abnormally low  Pertinent Medications Reviewed: " reviewed  Pertinent Medications Comments: amiodarone, albuterol, vanc, carvedilol, azacatem, clindamycin, atrovastatin, monetlusk, olanzapine  Anthropometrics  Temp: 97.8 °F (36.6 °C)  Height: 6' (182.9 cm)  Height (inches): 72 in  Weight Method: Bed Scale  Weight: 107.5 kg (237 lb)  Weight (lb): 237 lb  Ideal Body Weight (IBW), Male: 178 lb  % Ideal Body Weight, Male (lb): 133.15 %  BMI (Calculated): 32.1  BMI Grade: 30 - 34.9- obesity - grade I     Estimated/Assessed Needs  Weight Used For Calorie Calculations: 87.6 kg (193 lb 2 oz)   Energy Need Method: Kcal/kg Energy Calorie Requirements (kcal): 0689-7761  Weight Used For Protein Calculations: 87.6 kg (193 lb 2 oz)  Protein Requirements: 105-122  Estimated Fluid Requirement Method: RDA Method    RDA Method (mL): 2190     Nutrition by Nursing  Diet/Nutrition Received: restrict fluids, consistent carb/diabetic diet  Intake (%): 50%     Diet/Feeding Tolerance: fair  Last Bowel Movement: 09/30/23              Nutrition Follow-Up  RD Follow-up?: Yes

## 2023-10-02 NOTE — SUBJECTIVE & OBJECTIVE
"Interval History: No new complaints. Patient verbal but confused.     Review of Systems  Objective:     Vital Signs (Most Recent):  Temp: 97.8 °F (36.6 °C) (10/02/23 0817)  Pulse: 87 (10/02/23 0817)  Resp: 16 (10/02/23 0817)  BP: 114/75 (10/02/23 0822)  SpO2: 100 % (10/02/23 0817) Vital Signs (24h Range):  Temp:  [97.8 °F (36.6 °C)-98.7 °F (37.1 °C)] 97.8 °F (36.6 °C)  Pulse:  [63-87] 87  Resp:  [16-20] 16  SpO2:  [97 %-100 %] 100 %  BP: (114-158)/(73-96) 114/75     Weight: 107.5 kg (237 lb)  Body mass index is 32.14 kg/m².    Intake/Output Summary (Last 24 hours) at 10/2/2023 1214  Last data filed at 10/1/2023 1756  Gross per 24 hour   Intake 500 ml   Output --   Net 500 ml         Physical Exam  Vitals reviewed.   Constitutional:       General: He is not in acute distress.     Appearance: Normal appearance. He is not toxic-appearing.   HENT:      Head: Normocephalic and atraumatic.   Cardiovascular:      Rate and Rhythm: Normal rate and regular rhythm.      Heart sounds: Normal heart sounds.   Pulmonary:      Effort: Pulmonary effort is normal. No respiratory distress.      Breath sounds: Normal breath sounds. No wheezing.   Abdominal:      General: Abdomen is flat. Bowel sounds are normal. There is no distension.      Palpations: Abdomen is soft.   Skin:     General: Skin is warm and dry.   Neurological:      Mental Status: He is alert.             Significant Labs: All pertinent labs within the past 24 hours have been reviewed.  BMP: No results for input(s): "GLU", "NA", "K", "CL", "CO2", "BUN", "CREATININE", "CALCIUM", "MG" in the last 48 hours.  CBC: No results for input(s): "WBC", "HGB", "HCT", "PLT" in the last 48 hours.    Significant Imaging: I have reviewed all pertinent imaging results/findings within the past 24 hours.  "

## 2023-10-02 NOTE — PROGRESS NOTES
[facility]  [facility] Medicine  Progress Note    Patient Name: Yvan Rollins  MRN: [id number]  Patient Class: IP- Inpatient   Admission Date: 9/19/2023  Length of Stay: 13 days  Attending Physician: Phil Hernandez Jr., MD  Primary Care Provider: Eugene Funez MD        Subjective:     Principal Problem:Arm wound, right, initial encounter        HPI:  Patient is a 63 year old  male who was transferred to [facility] from [facility] for vascular consultation for a necrotic wound overlying an AV access site in the RT arm. He presented to [facility] via [facility] from [facility] (referenced to be [facility] from recent hospitalization at [facility] (8/15/2023)). He was recently hospitalized at [facility] after having dyspnea during dialysis that progressed to cardiac arrest with ROSC. The patient was mechanically intubated, started on pressors, and managed in the ICU. According to the discharge summary, the patient had an adverse reaction to surface and infiltration anesthetic and intravenous infiltration.     During hospitalization at [facility], the RT AV access site was unable to be used and a tunneled RT dialysis catheter was placed. He was evaluated by Vascular surgery during that admission with outpatient follow-up. Today, he was seen by wound care at the NH and they recommended return to [facility] for vascular evaluation of the wound. However, [facility] was on Diversion and he ended up at [facility] and subsequently [facility].     During his hospitalization at [facility], cardiology was consulted. ECHO demonstrated EF of 25-30% and there was a reported 1 episode of nonsustained v-tach. Cardiology recommended resuming goal directed medical therapy, and there was no identified cardiac etiology for his arrest. He was also evaluated by neurology due to concern for anoxic brain injury.  CT of the brain demonstrated chronic ischemic microangiopathy. Neurology diagnosed his encephalopathy to be multifactorial -  anoxic insult from prolonged cardiac arrest with metabolic derangement. His mental status improved, but not to baseline. Today, at [facility] he   is Aox1 and he is a poor historian. He has difficulty answering questions. History is obtained from medical records.     The patient has a PMH of nonischemic cardiomyopathy with EF 20-25%, S/P AICD placement, ESRD on HD TTS, COPD, HTN, polysubstance abuse, and hepatitis C.         Overview/Hospital Course:  63 year old male presents with necrotic wound of AV fistula site s/p debridement.  He is being treated for a Staph bacteremia.  He is alert.  Patient denies chest pain, shortness of breath, nausea, vomiting, or diarrhea.        Interval History: No new complaints. Patient verbal but confused.     Review of Systems  Objective:     Vital Signs (Most Recent):  Temp: 97.8 °F (36.6 °C) (10/02/23 0817)  Pulse: 87 (10/02/23 0817)  Resp: 16 (10/02/23 0817)  BP: 114/75 (10/02/23 0822)  SpO2: 100 % (10/02/23 0817) Vital Signs (24h Range):  Temp:  [97.8 °F (36.6 °C)-98.7 °F (37.1 °C)] 97.8 °F (36.6 °C)  Pulse:  [63-87] 87  Resp:  [16-20] 16  SpO2:  [97 %-100 %] 100 %  BP: (114-158)/(73-96) 114/75     Weight: 107.5 kg (237 lb)  Body mass index is 32.14 kg/m².    Intake/Output Summary (Last 24 hours) at 10/2/2023 1214  Last data filed at 10/1/2023 1756  Gross per 24 hour   Intake 500 ml   Output --   Net 500 ml         Physical Exam  Vitals reviewed.   Constitutional:       General: He is not in acute distress.     Appearance: Normal appearance. He is not toxic-appearing.   HENT:      Head: Normocephalic and atraumatic.   Cardiovascular:      Rate and Rhythm: Normal rate and regular rhythm.      Heart sounds: Normal heart sounds.   Pulmonary:      Effort: Pulmonary effort is normal. No respiratory distress.      Breath sounds: Normal breath sounds. No wheezing.   Abdominal:      General: Abdomen is flat. Bowel sounds are normal. There is no distension.      Palpations: Abdomen is soft.  "  Skin:     General: Skin is warm and dry.   Neurological:      Mental Status: He is alert.             Significant Labs: All pertinent labs within the past 24 hours have been reviewed.  BMP: No results for input(s): "GLU", "NA", "K", "CL", "CO2", "BUN", "CREATININE", "CALCIUM", "MG" in the last 48 hours.  CBC: No results for input(s): "WBC", "HGB", "HCT", "PLT" in the last 48 hours.    Significant Imaging: I have reviewed all pertinent imaging results/findings within the past 24 hours.      Assessment/Plan:      * Arm wound, right, initial encounter  S/P I&D right hematoma and left I&D of wounds in upper extremities; followed by Surgery; this was due to vasopressor infiltration at [facility]; was placed on Aztreonam on admission--will change this to Merrem; wound culture shows Klebsiella    Debrided by surgery.   9/27 - High risk of rebleeding till closure. Will explore swing bed options closer to his vascular surgeon.  Only came here because [facility] was on bypass.     9/29 - Improving.     9/30 - Continue wound care.     10/1 - High risk of rebleeding till closure. Will explore swing bed options closer to his vascular surgeon. Will need Rehab due to deficits following code. Would have to improve substantially to resume independent living.     As above.     History of cardiac arrest  Has AICD; continue current meds; BP low at times;he is on Coreg and Amiodarone; he is s/p cardiac arrest from dialysis at [facility] in [place], [place] in August.     Improving but suspected hypoxic brain injury. Continue PT/OT    ESRD (end stage renal disease)  On dialysis TTS. Has R subclavian tunneled cath.       Arm wound, left, initial encounter  Healing.  Off abx.       Acute metabolic encephalopathy  He is more alert today but confused; probably at baseline; CT brain did not show acute abnormality.     9/29 - Has improved a bit through the week. But still with some cognitive difficulties.     10/1 - Conversant but confused at times. "       Chronic combined systolic and diastolic congestive heart failure  Continue current meds      Diabetes  Does not appear to be on any home meds for DM; will monitor BS levels; BS stable    9/29 - Does not currently meet criteria for DM diagnosis.  Glucoses have been good. A1C 5.3.  Will decrease accuchecks.     Atrial fibrillation  On Amiodarone and Coreg; followed by Cardiology      Chronic hepatitis C  Stable    GERD (gastroesophageal reflux disease)  On Protonix        VTE Risk Mitigation (From admission, onward)         Ordered     heparin (porcine) injection 4,000 Units  As needed (PRN)         09/21/23 1135     IP VTE HIGH RISK PATIENT  Once         09/19/23 0147     Place sequential compression device  Until discontinued         09/19/23 0147     Reason for No Pharmacological VTE Prophylaxis  Once        Question:  Reasons:  Answer:  Physician Provided (leave comment)    09/19/23 0147                Discharge Planning   SANDRA:      Code Status: Full Code   Is the patient medically ready for discharge?:     Reason for patient still in hospital (select all that apply): Treatment  Discharge Plan A: Home with family                  Phil Hernandez Jr, MD  Department of Hospital Medicine   [facility]

## 2023-10-02 NOTE — PLAN OF CARE
Chart reviewed. Per  pt not ready for d/c at this time. Pt at high risk of rebleeding until closer. Ss following.

## 2023-10-02 NOTE — PT/OT/SLP PROGRESS
Occupational Therapy   Treatment    Name: Yvan Rollins  MRN: [id number]  Admitting Diagnosis:  Arm wound, right, initial encounter  12 Days Post-Op    Recommendations:     Discharge Recommendations: nursing facility, skilled  Discharge Equipment Recommendations:   (to be determined)  Barriers to discharge:       Assessment:     Yvan Rollins is a 63 y.o. male with a medical diagnosis of Arm wound, right, initial encounter.  Performance deficits affecting function are weakness, impaired endurance, impaired self care skills.     Rehab Prognosis:  Good; patient would benefit from acute skilled OT services to address these deficits and reach maximum level of function.       Plan:     Patient to be seen 5 x/week to address the above listed problems via self-care/home management, therapeutic activities, therapeutic exercises  Plan of Care Expires:    Plan of Care Reviewed with: patient    Subjective     Chief Complaint:   Patient/Family Comments/goals:   Pain/Comfort:  Pain Rating 1: 0/10    Objective:     Communicated with: RN prior to session.  Patient found right sidelying with peripheral IV, oxygen,bed alarm upon OT entry to room.    General Precautions: Standard, fall    Orthopedic Precautions:N/A  Braces: N/A  Respiratory Status: Nasal cannula, flow 2 L/min     Occupational Performance:     Bed Mobility:         Functional Mobility/Transfers:    Functional Mobility:     Activities of Daily Living:        [facility] 6 Click ADL:      Treatment & Education:  Pt performed  hand helper with 3 rubber band resistances 20 reps, aarom with 2 lb wt on L 15 reps x 2 shld flex,abd/add,elbow flex/ext, wrist flex/ext, aarom ex/s on R 15 reps x 2 shld flex,abd/add,elbow flex/ext, wrist  flex/ext   Pt drowsy and required v/c's to remain on task  Patient left left sidelying with all lines intact, call button in reach, and bed alarm on    GOALS:   Multidisciplinary Problems       Occupational Therapy Goals          Problem:  Occupational Therapy    Goal Priority Disciplines Outcome Interventions   Occupational Therapy Goal     OT, PT/OT Ongoing, Progressing    Description: STG:  Pt will perform grooming with setup   Pt will bathe self with min assist  Pt will perform UB dressing with SBA  Pt will perform LB dressing with min assist  Pt will sit EOB x 10 min with SBA   Pt will transfer toilet/BSC with min assist  Pt will tolerate 15 minutes of tx with minimal fatigue      LT. Pt will achieve max level of independence with self care.                         Time Tracking:     OT Date of Treatment: 10/02/23  OT Start Time:   OT Stop Time:   OT Total Time (min): 14 min    Billable Minutes:Therapeutic Exercise 13    OT/SHERRON: SHERRON          10/2/2023

## 2023-10-02 NOTE — ASSESSMENT & PLAN NOTE
S/P I&D right hematoma and left I&D of wounds in upper extremities; followed by Surgery; this was due to vasopressor infiltration at [facility], [place], operated by [facility]; was placed on Aztreonam on admission--will change this to Merrem; wound culture shows Klebsiella    Debrided by surgery.   9/27 - High risk of rebleeding till closure. Will explore swing bed options closer to his vascular surgeon.  Only came here because [facility] was on bypass.     9/29 - Improving.     9/30 - Continue wound care.     10/1 - High risk of rebleeding till closure. Will explore swing bed options closer to his vascular surgeon. Will need Rehab due to deficits following code. Would have to improve substantially to resume independent living.     As above.

## 2023-10-02 NOTE — PLAN OF CARE
Problem: Adult Inpatient Plan of Care  Goal: Plan of Care Review  10/2/2023 1622 by Raya Caldwell RN  Outcome: Ongoing, Progressing  10/2/2023 1622 by Raya Caldwell RN  Outcome: Ongoing, Progressing  Goal: Patient-Specific Goal (Individualized)  10/2/2023 1622 by Raya Caldwell RN  Outcome: Ongoing, Progressing  10/2/2023 1622 by Raya Caldwell RN  Outcome: Ongoing, Progressing  Goal: Absence of Hospital-Acquired Illness or Injury  10/2/2023 1622 by Raya Caldwell RN  Outcome: Ongoing, Progressing  10/2/2023 1622 by Raya Caldwell RN  Outcome: Ongoing, Progressing  Goal: Optimal Comfort and Wellbeing  10/2/2023 1622 by Raya Caldwell RN  Outcome: Ongoing, Progressing  10/2/2023 1622 by Raya Caldwell RN  Outcome: Ongoing, Progressing  Goal: Readiness for Transition of Care  10/2/2023 1622 by Raya Caldwell RN  Outcome: Ongoing, Progressing  10/2/2023 1622 by Raya Caldwell RN  Outcome: Ongoing, Progressing     Problem: Impaired Wound Healing  Goal: Optimal Wound Healing  10/2/2023 1622 by Raya Caldwell RN  Outcome: Ongoing, Progressing  10/2/2023 1622 by Raya Caldwell RN  Outcome: Ongoing, Progressing     Problem: Infection  Goal: Absence of Infection Signs and Symptoms  10/2/2023 1622 by Raya Caldwell RN  Outcome: Ongoing, Progressing  10/2/2023 1622 by Raya Caldwell RN  Outcome: Ongoing, Progressing     Problem: Skin Injury Risk Increased  Goal: Skin Health and Integrity  10/2/2023 1622 by Raya Caldwell RN  Outcome: Ongoing, Progressing  10/2/2023 1622 by Raya Caldwell RN  Outcome: Ongoing, Progressing     Problem: Diabetes Comorbidity  Goal: Blood Glucose Level Within Targeted Range  10/2/2023 1622 by Raya Caldwell RN  Outcome: Ongoing, Progressing  10/2/2023 1622 by Raya Caldwell RN  Outcome: Ongoing, Progressing     Problem: Device-Related Complication Risk (Hemodialysis)  Goal: Safe, Effective Therapy Delivery  Outcome: Ongoing, Progressing     Problem: Hemodynamic Instability (Hemodialysis)  Goal: Effective Tissue  Perfusion  Outcome: Ongoing, Progressing     Problem: Infection (Hemodialysis)  Goal: Absence of Infection Signs and Symptoms  Outcome: Ongoing, Progressing     Problem: Gas Exchange Impaired  Goal: Optimal Gas Exchange  Outcome: Ongoing, Progressing     Problem: Airway Clearance Ineffective  Goal: Effective Airway Clearance  Outcome: Ongoing, Progressing     Problem: Fall Injury Risk  Goal: Absence of Fall and Fall-Related Injury  Outcome: Ongoing, Progressing     Problem: Confusion Acute  Goal: Optimal Cognitive Function  Outcome: Ongoing, Progressing

## 2023-10-03 PROCEDURE — 25000003 PHARM REV CODE 250

## 2023-10-03 PROCEDURE — 99900035 HC TECH TIME PER 15 MIN (STAT)

## 2023-10-03 PROCEDURE — 97116 GAIT TRAINING THERAPY: CPT

## 2023-10-03 PROCEDURE — 25000003 PHARM REV CODE 250: Performed by: INTERNAL MEDICINE

## 2023-10-03 PROCEDURE — 94640 AIRWAY INHALATION TREATMENT: CPT

## 2023-10-03 PROCEDURE — 25000242 PHARM REV CODE 250 ALT 637 W/ HCPCS: Performed by: HOSPITALIST

## 2023-10-03 PROCEDURE — 97110 THERAPEUTIC EXERCISES: CPT

## 2023-10-03 PROCEDURE — 11000001 HC ACUTE MED/SURG PRIVATE ROOM

## 2023-10-03 PROCEDURE — 63600175 PHARM REV CODE 636 W HCPCS: Performed by: INTERNAL MEDICINE

## 2023-10-03 PROCEDURE — 99232 PR SUBSEQUENT HOSPITAL CARE,LEVL II: ICD-10-PCS | Mod: ,,, | Performed by: FAMILY MEDICINE

## 2023-10-03 PROCEDURE — 90935 HEMODIALYSIS ONE EVALUATION: CPT

## 2023-10-03 PROCEDURE — 94761 N-INVAS EAR/PLS OXIMETRY MLT: CPT

## 2023-10-03 PROCEDURE — 99232 SBSQ HOSP IP/OBS MODERATE 35: CPT | Mod: ,,, | Performed by: FAMILY MEDICINE

## 2023-10-03 PROCEDURE — 27000221 HC OXYGEN, UP TO 24 HOURS

## 2023-10-03 RX ADMIN — CARVEDILOL 6.25 MG: 6.25 TABLET, FILM COATED ORAL at 08:10

## 2023-10-03 RX ADMIN — ATORVASTATIN CALCIUM 40 MG: 40 TABLET, FILM COATED ORAL at 08:10

## 2023-10-03 RX ADMIN — PANTOPRAZOLE SODIUM 40 MG: 40 TABLET, DELAYED RELEASE ORAL at 08:10

## 2023-10-03 RX ADMIN — HEPARIN SODIUM 4000 UNITS: 1000 INJECTION, SOLUTION INTRAVENOUS; SUBCUTANEOUS at 10:10

## 2023-10-03 RX ADMIN — SODIUM CHLORIDE: 9 INJECTION, SOLUTION INTRAVENOUS at 10:10

## 2023-10-03 RX ADMIN — CARVEDILOL 6.25 MG: 6.25 TABLET, FILM COATED ORAL at 04:10

## 2023-10-03 RX ADMIN — IPRATROPIUM BROMIDE AND ALBUTEROL SULFATE 3 ML: 2.5; .5 SOLUTION RESPIRATORY (INHALATION) at 07:10

## 2023-10-03 RX ADMIN — SEVELAMER CARBONATE 800 MG: 800 TABLET, FILM COATED ORAL at 08:10

## 2023-10-03 RX ADMIN — AMIODARONE HYDROCHLORIDE 200 MG: 200 TABLET ORAL at 08:10

## 2023-10-03 RX ADMIN — OLANZAPINE 2.5 MG: 2.5 TABLET, FILM COATED ORAL at 08:10

## 2023-10-03 RX ADMIN — MONTELUKAST SODIUM 10 MG: 10 TABLET, FILM COATED ORAL at 08:10

## 2023-10-03 RX ADMIN — SEVELAMER CARBONATE 800 MG: 800 TABLET, FILM COATED ORAL at 04:10

## 2023-10-03 RX ADMIN — ASPIRIN 81 MG: 81 TABLET, COATED ORAL at 08:10

## 2023-10-03 NOTE — PT/OT/SLP PROGRESS
Physical Therapy Treatment    Patient Name:  Yvan Rollins   MRN:  [id number]    Recommendations:     Discharge Recommendations: nursing facility, skilled  Discharge Equipment Recommendations: other (see comments) (to be determined)  Barriers to discharge: Decreased caregiver support    Assessment:     Yvan Rollins is a 63 y.o. male admitted with a medical diagnosis of Arm wound, right, initial encounter.  He presents with the following impairments/functional limitations: weakness, gait instability, impaired self care skills, impaired functional mobility, impaired skin, impaired cognition .    Rehab Prognosis: Fair; patient would benefit from acute skilled PT services to address these deficits and reach maximum level of function.    Recent Surgery: Procedure(s) (LRB):  INCISION AND DRAINAGE, HEMATOMA (Right)  IRRIGATION AND DEBRIDEMENT (Left) 12 Days Post-Op    Plan:     During this hospitalization, patient to be seen 5 x/week to address the identified rehab impairments via gait training, therapeutic activities, therapeutic exercises, neuromuscular re-education and progress toward the following goals:    Plan of Care Expires:       Subjective     Chief Complaint: ue wounds  Patient/Family Comments/goals: Awaiting dc to [facility]  Pain/Comfort:  Pain Rating 1: 0/10      Objective:     Communicated with nurse prior to session.  Patient found supine with   upon PT entry to room.     General Precautions: Standard, fall  Orthopedic Precautions: N/A  Braces: N/A  Respiratory Status: Room air     Functional Mobility:  Transfers:     Sit to Stand:  minimum assistance with hand-held assist  Gait: ambulated 20 feet with hha, min assist, occasional knee buckle      AM-PAC 6 CLICK MOBILITY  Turning over in bed (including adjusting bedclothes, sheets and blankets)?: 3  Sitting down on and standing up from a chair with arms (e.g., wheelchair, bedside commode, etc.): 3  Moving from lying on back to sitting on the side of the  bed?: 3  Moving to and from a bed to a chair (including a wheelchair)?: 3  Need to walk in hospital room?: 3  Climbing 3-5 steps with a railing?: 1  Basic Mobility Total Score: 16       Treatment & Education:  BLE: aps, hs, saq, abd-add, mre ext 3x10    Patient left HOB elevated with bed alarm on..    GOALS:   Multidisciplinary Problems       Physical Therapy Goals          Problem: Physical Therapy    Goal Priority Disciplines Outcome Goal Variances Interventions   Physical Therapy Goal     PT, PT/OT Ongoing, Progressing     Description: Short Term Goals  Ambulate Mod - 10 feet with rolling walker assistive device.   Supine to sit contact guard  Sit to stand contact guard  SPT contact guard  5. Independent with HEP    Long Term Goals  Ambulate CGA - 20 feet with rolling walker assistive device.   Supine to sit stand-by  Sit to stand stand-by  SPT stand-by  Needed equipment for home.                              Time Tracking:     PT Received On: 10/02/23  PT Start Time: 0905     PT Stop Time: 0930  PT Total Time (min): 25 min     Billable Minutes: Gait Training 10 and Therapeutic Exercise 15    Treatment Type: Treatment  PT/PTA: PT     Number of PTA visits since last PT visit: 2     10/02/2023

## 2023-10-03 NOTE — PROGRESS NOTES
[facility]  Nephrology  Progress Note    Patient Name: Yvan Rollins  MRN: [id number]  Admission Date: 9/19/2023  Hospital Length of Stay: 14 days  Attending Provider: Phil Hernandez Jr., MD   Primary Care Physician: Eugene Funez MD  Principal Problem:Arm wound, right, initial encounter    Consults  Subjective:     Interval History: Pt seen in f/u of his ESRD. He dialyzed today and did well though became a bit agitated toward the end of dialysis. He is calm now in his room.    Review of patient's allergies indicates:   Allergen Reactions    Ceftriaxone Swelling    Lisinopril Swelling and Rash     Facial swelling   Facial swelling       Current Facility-Administered Medications   Medication Frequency    0.9%  NaCl infusion PRN    acetaminophen tablet 650 mg Q4H PRN    albuterol-ipratropium 2.5 mg-0.5 mg/3 mL nebulizer solution 3 mL BID    amiodarone tablet 200 mg Daily    aspirin EC tablet 81 mg Daily    atorvastatin tablet 40 mg QHS    carvediloL tablet 6.25 mg BID WM    dextrose 10% bolus 125 mL 125 mL PRN    dextrose 10% bolus 250 mL 250 mL PRN    glucagon (human recombinant) injection 1 mg PRN    heparin (porcine) injection 4,000 Units PRN    HYDROcodone-acetaminophen  mg per tablet 1 tablet Q6H PRN    HYDROcodone-acetaminophen 5-325 mg per tablet 1 tablet Q6H PRN    insulin aspart U-100 injection 0-5 Units Q6H PRN    midodrine tablet 10 mg TID WM    montelukast tablet 10 mg QHS    naloxone 0.4 mg/mL injection 0.02 mg PRN    OLANZapine tablet 2.5 mg QHS    ondansetron injection 4 mg Q8H PRN    pantoprazole EC tablet 40 mg Daily    sevelamer carbonate tablet 800 mg TID    sodium chloride 0.9% flush 10 mL Q12H PRN       Objective:     Vital Signs (Most Recent):  Temp: 97.6 °F (36.4 °C) (10/03/23 1315)  Pulse: 75 (10/03/23 1315)  Resp: 16 (10/03/23 1315)  BP: 117/63 (10/03/23 1315)  SpO2: 97 % (10/03/23 0736) Vital Signs (24h Range):  Temp:  [97 °F (36.1 °C)-97.9 °F (36.6 °C)]  "97.6 °F (36.4 °C)  Pulse:  [65-88] 75  Resp:  [16-18] 16  SpO2:  [96 %-100 %] 97 %  BP: ()/(43-94) 117/63     Weight: 107.5 kg (237 lb) (09/18/23 2315)  Body mass index is 32.14 kg/m².  Body surface area is 2.34 meters squared.    No intake/output data recorded.    Physical Exam Chest clear. No distress and no complaint. Dry dressings both forearms.    Significant Labs:sureBMP: No results for input(s): "GLU", "NA", "K", "CL", "CO2", "BUN", "CREATININE", "CALCIUM", "MG" in the last 168 hours.    Significant Imaging:  Labs: Reviewed    Assessment/Plan:     Active Diagnoses:    Diagnosis Date Noted POA    PRINCIPAL PROBLEM:  Arm wound, right, initial encounter [S41.101A] 09/19/2023 Yes    Acute metabolic encephalopathy [G93.41] 09/22/2023 Yes    ESRD (end stage renal disease) [N18.6] 09/19/2023 Yes    History of cardiac arrest [Z86.74] 09/19/2023 Not Applicable    Atrial fibrillation [I48.91] 09/19/2023 Yes    Arm wound, left, initial encounter [S41.102A] 09/19/2023 Yes    Chronic combined systolic and diastolic congestive heart failure [I50.42] 09/19/2023 Yes      Problems Resolved During this Admission:    Diagnosis Date Noted Date Resolved POA    Bacteremia [R78.81] 09/20/2023 09/27/2023 Unknown    Penile discharge [R36.9] 09/20/2023 09/29/2023 Yes    Dysphagia [R13.10] 09/19/2023 09/19/2023 Yes    Presence of automatic cardioverter/defibrillator (AICD) [Z95.810] 09/19/2023 09/19/2023 Yes    CAD (coronary artery disease) [I25.10] 09/19/2023 09/19/2023 Unknown    HFrEF (heart failure with reduced ejection fraction) [I50.20] 09/19/2023 09/20/2023 Yes    Hypotension [I95.9] 09/19/2023 09/19/2023 Yes    Impaired mobility [Z74.09] 09/19/2023 09/19/2023 Yes    Sacral wound [S31.000A] 09/19/2023 09/19/2023 Yes    COPD (chronic obstructive pulmonary disease) [J44.9] 09/19/2023 09/19/2023 Yes    Hematoma [T14.8XXA] 09/19/2023 09/19/2023 Yes       Resume outpatient dialysis at [facility] when discharged. We'll continue to " dialyze while here.    Thank you for your consult. I will follow-up with patient. Please contact us if you have any additional questions.    Alfred Valdez MD  Nephrology  [facility]

## 2023-10-03 NOTE — PT/OT/SLP PROGRESS
Patient not seen this date secondary to patient off the floor for dialysis tx. Will follow up 10/4/23.

## 2023-10-03 NOTE — PT/OT/SLP PROGRESS
Physical Therapy Treatment    Patient Name:  Yvan Rollins   MRN:  [id number]    Recommendations:     Discharge Recommendations: nursing facility, skilled  Discharge Equipment Recommendations: walker, rolling, wheelchair, hospital bed  Barriers to discharge: Decreased caregiver support    Assessment:     Yvan Rollins is a 63 y.o. male admitted with a medical diagnosis of Arm wound, right, initial encounter.  He presents with the following impairments/functional limitations: weakness, gait instability, impaired self care skills, impaired functional mobility, impaired skin, impaired cognition . Patient with a lot of ataxia with ambulation. Needs assist and walker. Sister now wanting to care for him. Patient will be 24/7 care. Would benefit from wheelchair to assist sister in mobility.     Rehab Prognosis: Fair; patient would benefit from acute skilled PT services to address these deficits and reach maximum level of function.    Recent Surgery: Procedure(s) (LRB):  INCISION AND DRAINAGE, HEMATOMA (Right)  IRRIGATION AND DEBRIDEMENT (Left) 13 Days Post-Op    Plan:     During this hospitalization, patient to be seen 5 x/week to address the identified rehab impairments via gait training, therapeutic activities, therapeutic exercises, neuromuscular re-education and progress toward the following goals:    Plan of Care Expires:       Subjective     Chief Complaint: ue wounds  Patient/Family Comments/goals: Awaiting dc to [facility]  Pain/Comfort:  Pain Rating 1: 0/10      Objective:     Communicated with nurse prior to session.  Patient found supine with   upon PT entry to room.     General Precautions: Standard, fall  Orthopedic Precautions: N/A  Braces: N/A  Respiratory Status: Room air     Functional Mobility:  Transfers:     Sit to Stand:  minimum assistance with hand-held assist  Gait: ambulated 50 feet with hha, min assist, occasional knee buckle , moderate ataxia      AM-PAC 6 CLICK MOBILITY  Turning over in bed  (including adjusting bedclothes, sheets and blankets)?: 2  Sitting down on and standing up from a chair with arms (e.g., wheelchair, bedside commode, etc.): 2  Moving from lying on back to sitting on the side of the bed?: 2  Moving to and from a bed to a chair (including a wheelchair)?: 2  Need to walk in hospital room?: 2  Climbing 3-5 steps with a railing?: 1  Basic Mobility Total Score: 11       Treatment & Education:  BLE: aps, hs, saq, abd-add, mre ext 3x10    Patient left HOB elevated with bed alarm on..    GOALS:   Multidisciplinary Problems       Physical Therapy Goals          Problem: Physical Therapy    Goal Priority Disciplines Outcome Goal Variances Interventions   Physical Therapy Goal     PT, PT/OT Ongoing, Progressing     Description: Short Term Goals  Ambulate Mod - 10 feet with rolling walker assistive device.   Supine to sit contact guard  Sit to stand contact guard  SPT contact guard  5. Independent with HEP    Long Term Goals  Ambulate CGA - 20 feet with rolling walker assistive device.   Supine to sit stand-by  Sit to stand stand-by  SPT stand-by  Needed equipment for home.                              Time Tracking:     PT Received On: 10/03/23  PT Start Time: 0900     PT Stop Time: 0925  PT Total Time (min): 25 min     Billable Minutes: Gait Training 10 and Therapeutic Exercise 15    Treatment Type: Treatment  PT/PTA: PT     Number of PTA visits since last PT visit: 0     10/03/2023

## 2023-10-03 NOTE — PLAN OF CARE
[facility]  Discharge Reassessment    Primary Care Provider: Eugene Funez MD    Expected Discharge Date:     Reassessment (most recent)       Discharge Reassessment - 10/03/23 1052          Discharge Reassessment    Assessment Type Discharge Planning Reassessment     Did the patient's condition or plan change since previous assessment? Yes     Discharge Plan discussed with: Sibling     Name(s) and Number(s) gail alexis- sister     Communicated SANDRA with patient/caregiver Date not available/Unable to determine     Discharge Plan A Home with family;Home Health     Discharge Plan B Home with family;Home Health     Transition of Care Barriers None     Why the patient remains in the hospital Requires continued medical care        Post-Acute Status    Patient choice form signed by patient/caregiver List with quality metrics by geographic area provided;List from CMS Compare;List from System Post-Acute Care     Discharge Delays None known at this time                   Ss spoke with pt's sister, gail and she stated she will be taking pt home at d/c. Choice obtained for [facility] if ordered at d/c. Sister does not wish for pt to return to [facility] at this time. Dr armas. Ss following.

## 2023-10-03 NOTE — PLAN OF CARE
Problem: Adult Inpatient Plan of Care  Goal: Plan of Care Review  Outcome: Ongoing, Progressing  Goal: Patient-Specific Goal (Individualized)  Outcome: Ongoing, Progressing  Goal: Absence of Hospital-Acquired Illness or Injury  Outcome: Ongoing, Progressing  Goal: Optimal Comfort and Wellbeing  10/3/2023 0544 by Deidra Mcdaniel RN  Outcome: Ongoing, Progressing  10/3/2023 0542 by Deidra Mcdaniel RN  Outcome: Ongoing, Progressing  Goal: Readiness for Transition of Care  10/3/2023 0544 by Deidra Mcdaniel RN  Outcome: Ongoing, Progressing  10/3/2023 0542 by Deidra Mcdaniel RN  Outcome: Ongoing, Progressing     Problem: Impaired Wound Healing  Goal: Optimal Wound Healing  10/3/2023 0544 by Deidra Mcdaniel RN  Outcome: Ongoing, Progressing  10/3/2023 0542 by Deidra Mcdaniel RN  Outcome: Ongoing, Progressing     Problem: Infection  Goal: Absence of Infection Signs and Symptoms  Outcome: Ongoing, Progressing     Problem: Skin Injury Risk Increased  Goal: Skin Health and Integrity  Outcome: Ongoing, Progressing     Problem: Diabetes Comorbidity  Goal: Blood Glucose Level Within Targeted Range  Outcome: Ongoing, Progressing     Problem: Device-Related Complication Risk (Hemodialysis)  Goal: Safe, Effective Therapy Delivery  Outcome: Ongoing, Progressing

## 2023-10-04 LAB — GLUCOSE SERPL-MCNC: 105 MG/DL (ref 70–105)

## 2023-10-04 PROCEDURE — 94761 N-INVAS EAR/PLS OXIMETRY MLT: CPT

## 2023-10-04 PROCEDURE — C1752 CATH,HEMODIALYSIS,SHORT-TERM: HCPCS

## 2023-10-04 PROCEDURE — 25000003 PHARM REV CODE 250

## 2023-10-04 PROCEDURE — 94640 AIRWAY INHALATION TREATMENT: CPT

## 2023-10-04 PROCEDURE — 82962 GLUCOSE BLOOD TEST: CPT

## 2023-10-04 PROCEDURE — 99232 PR SUBSEQUENT HOSPITAL CARE,LEVL II: ICD-10-PCS | Mod: ,,, | Performed by: FAMILY MEDICINE

## 2023-10-04 PROCEDURE — 99900035 HC TECH TIME PER 15 MIN (STAT)

## 2023-10-04 PROCEDURE — 27000221 HC OXYGEN, UP TO 24 HOURS

## 2023-10-04 PROCEDURE — 97116 GAIT TRAINING THERAPY: CPT

## 2023-10-04 PROCEDURE — 11000001 HC ACUTE MED/SURG PRIVATE ROOM

## 2023-10-04 PROCEDURE — 97110 THERAPEUTIC EXERCISES: CPT

## 2023-10-04 PROCEDURE — 25000242 PHARM REV CODE 250 ALT 637 W/ HCPCS: Performed by: HOSPITALIST

## 2023-10-04 PROCEDURE — 99232 SBSQ HOSP IP/OBS MODERATE 35: CPT | Mod: ,,, | Performed by: FAMILY MEDICINE

## 2023-10-04 RX ADMIN — IPRATROPIUM BROMIDE AND ALBUTEROL SULFATE 3 ML: 2.5; .5 SOLUTION RESPIRATORY (INHALATION) at 07:10

## 2023-10-04 RX ADMIN — CARVEDILOL 6.25 MG: 6.25 TABLET, FILM COATED ORAL at 06:10

## 2023-10-04 RX ADMIN — SEVELAMER CARBONATE 800 MG: 800 TABLET, FILM COATED ORAL at 10:10

## 2023-10-04 RX ADMIN — OLANZAPINE 2.5 MG: 2.5 TABLET, FILM COATED ORAL at 08:10

## 2023-10-04 RX ADMIN — MIDODRINE HYDROCHLORIDE 10 MG: 5 TABLET ORAL at 02:10

## 2023-10-04 RX ADMIN — ASPIRIN 81 MG: 81 TABLET, COATED ORAL at 10:10

## 2023-10-04 RX ADMIN — CARVEDILOL 6.25 MG: 6.25 TABLET, FILM COATED ORAL at 10:10

## 2023-10-04 RX ADMIN — PANTOPRAZOLE SODIUM 40 MG: 40 TABLET, DELAYED RELEASE ORAL at 10:10

## 2023-10-04 RX ADMIN — AMIODARONE HYDROCHLORIDE 200 MG: 200 TABLET ORAL at 10:10

## 2023-10-04 RX ADMIN — MONTELUKAST SODIUM 10 MG: 10 TABLET, FILM COATED ORAL at 08:10

## 2023-10-04 RX ADMIN — ATORVASTATIN CALCIUM 40 MG: 40 TABLET, FILM COATED ORAL at 08:10

## 2023-10-04 RX ADMIN — SEVELAMER CARBONATE 800 MG: 800 TABLET, FILM COATED ORAL at 08:10

## 2023-10-04 RX ADMIN — SEVELAMER CARBONATE 800 MG: 800 TABLET, FILM COATED ORAL at 02:10

## 2023-10-04 RX ADMIN — MIDODRINE HYDROCHLORIDE 10 MG: 5 TABLET ORAL at 06:10

## 2023-10-04 NOTE — PROGRESS NOTES
[facility]  [place] [facility] Medicine  Progress Note    Patient Name: Yvan Rollins  MRN: [id number]  Patient Class: IP- Inpatient   Admission Date: 9/19/2023  Length of Stay: 15 days  Attending Physician: Phil Hernandez Jr., MD  Primary Care Provider: Eugene Funez MD        Subjective:     Principal Problem:Arm wound, right, initial encounter        HPI:  Patient is a 63 year old  male who was transferred to [facility] from [place] ED for vascular consultation for a necrotic wound overlying an AV access site in the RT arm. He presented to [place] ED via [facility] from [facility] (referenced to be [facility] from recent hospitalization at [facility] [place] (8/15/2023)). He was recently hospitalized at [facility] [place] after having dyspnea during dialysis that progressed to cardiac arrest with ROSC. The patient was mechanically intubated, started on pressors, and managed in the ICU. According to the discharge summary, the patient had an adverse reaction to surface and infiltration anesthetic and intravenous infiltration.     During hospitalization at [facility] [place], the RT AV access site was unable to be used and a tunneled RT dialysis catheter was placed. He was evaluated by Vascular surgery during that admission with outpatient follow-up. Today, he was seen by wound care at the NH and they recommended return to [facility] [place] for vascular evaluation of the wound. However, [facility] [place] was on Diversion and he ended up at [place] and subsequently [facility].     During his hospitalization at [facility] [place], cardiology was consulted. ECHO demonstrated EF of 25-30% and there was a reported 1 episode of nonsustained v-tach. Cardiology recommended resuming goal directed medical therapy, and there was no identified cardiac etiology for his arrest. He was also evaluated by neurology due to concern for anoxic brain injury.  CT of the brain demonstrated chronic ischemic microangiopathy. Neurology diagnosed his encephalopathy to be multifactorial -  anoxic insult from prolonged cardiac arrest with metabolic derangement. His mental status improved, but not to baseline. Today, at [facility] he   is Aox1 and he is a poor historian. He has difficulty answering questions. History is obtained from medical records.     The patient has a PMH of nonischemic cardiomyopathy with EF 20-25%, S/P AICD placement, ESRD on HD TTS, COPD, HTN, polysubstance abuse, and hepatitis C.         Overview/Hospital Course:  63 year old male presents with necrotic wound of AV fistula site s/p debridement.  He is being treated for a Staph bacteremia.  He is alert.  Patient denies chest pain, shortness of breath, nausea, vomiting, or diarrhea.        Interval History: No complaints. Appreciate surgery recs.     Review of Systems  Objective:     Vital Signs (Most Recent):  Temp: 98.5 °F (36.9 °C) (10/04/23 1600)  Pulse: 62 (10/04/23 1600)  Resp: 16 (10/04/23 1600)  BP: 132/78 (10/04/23 1600)  SpO2: 97 % (10/04/23 1600) Vital Signs (24h Range):  Temp:  [96.8 °F (36 °C)-98.5 °F (36.9 °C)] 98.5 °F (36.9 °C)  Pulse:  [62-81] 62  Resp:  [16-18] 16  SpO2:  [94 %-100 %] 97 %  BP: (107-152)/(65-92) 132/78     Weight: 107.5 kg (237 lb)  Body mass index is 32.14 kg/m².    Intake/Output Summary (Last 24 hours) at 10/4/2023 1817  Last data filed at 10/4/2023 0525  Gross per 24 hour   Intake 320 ml   Output --   Net 320 ml         Physical Exam  Vitals reviewed.   Constitutional:       General: He is not in acute distress.     Appearance: Normal appearance. He is not toxic-appearing.   HENT:      Head: Normocephalic and atraumatic.   Cardiovascular:      Rate and Rhythm: Normal rate and regular rhythm.      Heart sounds: Normal heart sounds.   Pulmonary:      Effort: Pulmonary effort is normal. No respiratory distress.      Breath sounds: Normal breath sounds. No wheezing.   Abdominal:      General: Abdomen is flat. Bowel sounds are normal. There is no distension.      Palpations: Abdomen is soft.   Skin:      "General: Skin is warm and dry.   Neurological:      Mental Status: He is alert.             Significant Labs: All pertinent labs within the past 24 hours have been reviewed.  BMP: No results for input(s): "GLU", "NA", "K", "CL", "CO2", "BUN", "CREATININE", "CALCIUM", "MG" in the last 48 hours.  CBC: No results for input(s): "WBC", "HGB", "HCT", "PLT" in the last 48 hours.    Significant Imaging: I have reviewed all pertinent imaging results/findings within the past 24 hours.      Assessment/Plan:      * Arm wound, right, initial encounter  S/P I&D right hematoma and left I&D of wounds in upper extremities; followed by Surgery; this was due to vasopressor infiltration at [facility]; was placed on Aztreonam on admission--will change this to Merrem; wound culture shows Klebsiella    Debrided by surgery.   9/27 - High risk of rebleeding till closure. Will explore swing bed options closer to his vascular surgeon.  Only came here because [facility] was on bypass.     9/29 - Improving.     9/30 - Continue wound care.     10/1 - High risk of rebleeding till closure. Will explore swing bed options closer to his vascular surgeon. Will need Rehab due to deficits following code. Would have to improve substantially to resume independent living.     10/4 - Family desires to take patient home.  Needs to stay in medical facility till R arm ulcer closed.     History of cardiac arrest  Has AICD; continue current meds; BP low at times;he is on Coreg and Amiodarone; he is s/p cardiac arrest from dialysis at [facility] in [place], [place] in August.     Improving but suspected hypoxic brain injury. Continue PT/OT    ESRD (end stage renal disease)  On dialysis TTS. Has R subclavian tunneled cath.       Arm wound, left, initial encounter  Healing.  Off abx.       Acute metabolic encephalopathy  He is more alert today but confused; probably at baseline; CT brain did not show acute abnormality.     9/29 - Has improved a bit through the week. But still " with some cognitive difficulties.     10/1 - Conversant but confused at times.       Chronic combined systolic and diastolic congestive heart failure  Continue current meds      Diabetes  Does not appear to be on any home meds for DM; will monitor BS levels; BS stable    9/29 - Does not currently meet criteria for DM diagnosis.  Glucoses have been good. A1C 5.3.  Will decrease accuchecks.     Atrial fibrillation  On Amiodarone and Coreg; followed by Cardiology      Chronic hepatitis C  Stable    GERD (gastroesophageal reflux disease)  On Protonix        VTE Risk Mitigation (From admission, onward)         Ordered     heparin (porcine) injection 4,000 Units  As needed (PRN)         09/21/23 1135     IP VTE HIGH RISK PATIENT  Once         09/19/23 0147     Place sequential compression device  Until discontinued         09/19/23 0147     Reason for No Pharmacological VTE Prophylaxis  Once        Question:  Reasons:  Answer:  Physician Provided (leave comment)    09/19/23 0147                Discharge Planning   SANDRA:      Code Status: Full Code   Is the patient medically ready for discharge?:     Reason for patient still in hospital (select all that apply): Treatment  Discharge Plan A: Home with family, Home Health   Discharge Delays: None known at this time              Phil Hernandez Jr, MD  Department of Hospital Medicine   [facility]

## 2023-10-04 NOTE — SUBJECTIVE & OBJECTIVE
"Interval History: No complaints. Appreciate surgery recs.     Review of Systems  Objective:     Vital Signs (Most Recent):  Temp: 98.5 °F (36.9 °C) (10/04/23 1600)  Pulse: 62 (10/04/23 1600)  Resp: 16 (10/04/23 1600)  BP: 132/78 (10/04/23 1600)  SpO2: 97 % (10/04/23 1600) Vital Signs (24h Range):  Temp:  [96.8 °F (36 °C)-98.5 °F (36.9 °C)] 98.5 °F (36.9 °C)  Pulse:  [62-81] 62  Resp:  [16-18] 16  SpO2:  [94 %-100 %] 97 %  BP: (107-152)/(65-92) 132/78     Weight: 107.5 kg (237 lb)  Body mass index is 32.14 kg/m².    Intake/Output Summary (Last 24 hours) at 10/4/2023 1814  Last data filed at 10/4/2023 0525  Gross per 24 hour   Intake 320 ml   Output --   Net 320 ml         Physical Exam  Vitals reviewed.   Constitutional:       General: He is not in acute distress.     Appearance: Normal appearance. He is not toxic-appearing.   HENT:      Head: Normocephalic and atraumatic.   Cardiovascular:      Rate and Rhythm: Normal rate and regular rhythm.      Heart sounds: Normal heart sounds.   Pulmonary:      Effort: Pulmonary effort is normal. No respiratory distress.      Breath sounds: Normal breath sounds. No wheezing.   Abdominal:      General: Abdomen is flat. Bowel sounds are normal. There is no distension.      Palpations: Abdomen is soft.   Skin:     General: Skin is warm and dry.   Neurological:      Mental Status: He is alert.             Significant Labs: All pertinent labs within the past 24 hours have been reviewed.  BMP: No results for input(s): "GLU", "NA", "K", "CL", "CO2", "BUN", "CREATININE", "CALCIUM", "MG" in the last 48 hours.  CBC: No results for input(s): "WBC", "HGB", "HCT", "PLT" in the last 48 hours.    Significant Imaging: I have reviewed all pertinent imaging results/findings within the past 24 hours.  "

## 2023-10-04 NOTE — SUBJECTIVE & OBJECTIVE
"Interval History: No complaints. Working with PT.     Review of Systems  Objective:     Vital Signs (Most Recent):  Temp: 96.9 °F (36.1 °C) (10/03/23 1929)  Pulse: 81 (10/03/23 1929)  Resp: 18 (10/03/23 1929)  BP: 134/79 (10/03/23 1929)  SpO2: 97 % (10/03/23 1929) Vital Signs (24h Range):  Temp:  [96.9 °F (36.1 °C)-97.7 °F (36.5 °C)] 96.9 °F (36.1 °C)  Pulse:  [62-88] 81  Resp:  [16-18] 18  SpO2:  [96 %-100 %] 97 %  BP: ()/(43-94) 134/79     Weight: 107.5 kg (237 lb)  Body mass index is 32.14 kg/m².    Intake/Output Summary (Last 24 hours) at 10/3/2023 2111  Last data filed at 10/3/2023 1800  Gross per 24 hour   Intake 600 ml   Output 671 ml   Net -71 ml         Physical Exam  Vitals reviewed.   Constitutional:       General: He is not in acute distress.     Appearance: Normal appearance. He is not toxic-appearing.   HENT:      Head: Normocephalic and atraumatic.   Cardiovascular:      Rate and Rhythm: Normal rate and regular rhythm.      Heart sounds: Normal heart sounds.   Pulmonary:      Effort: Pulmonary effort is normal. No respiratory distress.      Breath sounds: Normal breath sounds. No wheezing.   Abdominal:      General: Abdomen is flat. Bowel sounds are normal. There is no distension.      Palpations: Abdomen is soft.   Skin:     General: Skin is warm and dry.   Neurological:      Mental Status: He is alert.             Significant Labs: All pertinent labs within the past 24 hours have been reviewed.  BMP: No results for input(s): "GLU", "NA", "K", "CL", "CO2", "BUN", "CREATININE", "CALCIUM", "MG" in the last 48 hours.  CBC: No results for input(s): "WBC", "HGB", "HCT", "PLT" in the last 48 hours.    Significant Imaging: I have reviewed all pertinent imaging results/findings within the past 24 hours.  "

## 2023-10-04 NOTE — PROGRESS NOTES
Vascular surgery    Dressing changes to bilateral upper extremities no bleeding at right arm fistula ulcer   See images in epic vascular surgery recommends keeping patient in a medical facility until wound closes due to high risk of bleeding at patent fistula    Order Daily for left wrist wound continue to clean with Vashe redressed with gauze daily

## 2023-10-04 NOTE — ASSESSMENT & PLAN NOTE
S/P I&D right hematoma and left I&D of wounds in upper extremities; followed by Surgery; this was due to vasopressor infiltration at [facility]; was placed on Aztreonam on admission--will change this to Merrem; wound culture shows Klebsiella    Debrided by surgery.   9/27 - High risk of rebleeding till closure. Will explore swing bed options closer to his vascular surgeon.  Only came here because [facility] was on bypass.     9/29 - Improving.     9/30 - Continue wound care.     10/1 - High risk of rebleeding till closure. Will explore swing bed options closer to his vascular surgeon. Will need Rehab due to deficits following code. Would have to improve substantially to resume independent living.     10/4 - Family desires to take patient home.  Needs to stay in medical facility till R arm ulcer closed.

## 2023-10-04 NOTE — PT/OT/SLP PROGRESS
Physical Therapy Treatment    Patient Name:  Yvan Rollins   MRN:  [id number]    Recommendations:     Discharge Recommendations: nursing facility, skilled  Discharge Equipment Recommendations: walker, rolling, wheelchair, hospital bed  Barriers to discharge: Decreased caregiver support    Assessment:     Yvan Rollins is a 63 y.o. male admitted with a medical diagnosis of Arm wound, right, initial encounter.  He presents with the following impairments/functional limitations: weakness, gait instability, impaired self care skills, impaired functional mobility, impaired skin, impaired cognition . Patient with a lot of ataxia with ambulation. Needs assist and walker. Sister now wanting to care for him. Patient will be 24/7 care. Would benefit from wheelchair to assist sister in mobility.     Rehab Prognosis: Fair; patient would benefit from acute skilled PT services to address these deficits and reach maximum level of function.    Recent Surgery: Procedure(s) (LRB):  INCISION AND DRAINAGE, HEMATOMA (Right)  IRRIGATION AND DEBRIDEMENT (Left) 14 Days Post-Op    Plan:     During this hospitalization, patient to be seen 5 x/week to address the identified rehab impairments via gait training, therapeutic activities, therapeutic exercises, neuromuscular re-education and progress toward the following goals:    Plan of Care Expires:       Subjective     Chief Complaint: ue wounds  Patient/Family Comments/goals: Awaiting dc to [facility]  Pain/Comfort:         Objective:     Communicated with nurse prior to session.  Patient found supine with   upon PT entry to room.     General Precautions: Standard, fall  Orthopedic Precautions: N/A  Braces: N/A  Respiratory Status: Room air     Functional Mobility:  Transfers:     Sit to Stand:  minimum assistance with hand-held assist  Gait: ambulated 50 feet with hha, min assist, occasional knee buckle , moderate ataxia      AM-PAC 6 CLICK MOBILITY  Turning over in bed (including  adjusting bedclothes, sheets and blankets)?: 2  Sitting down on and standing up from a chair with arms (e.g., wheelchair, bedside commode, etc.): 2  Moving from lying on back to sitting on the side of the bed?: 2  Moving to and from a bed to a chair (including a wheelchair)?: 2  Need to walk in hospital room?: 2  Climbing 3-5 steps with a railing?: 1  Basic Mobility Total Score: 11       Treatment & Education:  BLE: aps, hs, saq, abd-add, mre ext 3x10    Patient left HOB elevated with bed alarm on..    GOALS:   Multidisciplinary Problems       Physical Therapy Goals          Problem: Physical Therapy    Goal Priority Disciplines Outcome Goal Variances Interventions   Physical Therapy Goal     PT, PT/OT Ongoing, Progressing     Description: Short Term Goals  Ambulate Mod - 10 feet with rolling walker assistive device.   Supine to sit contact guard  Sit to stand contact guard  SPT contact guard  5. Independent with HEP    Long Term Goals  Ambulate CGA - 20 feet with rolling walker assistive device.   Supine to sit stand-by  Sit to stand stand-by  SPT stand-by  Needed equipment for home.                              Time Tracking:     PT Received On: 10/04/23  PT Start Time: 0945     PT Stop Time: 1010  PT Total Time (min): 25 min     Billable Minutes: Gait Training 10 and Therapeutic Exercise 15    Treatment Type: Treatment  PT/PTA: PT     Number of PTA visits since last PT visit: 0     10/04/2023

## 2023-10-04 NOTE — PROGRESS NOTES
[facility]  Nephrology  Progress Note    Patient Name: Yvan Rollins  MRN: [id number]  Admission Date: 9/19/2023  Hospital Length of Stay: 15 days  Attending Provider: Phil Hernandez Jr., MD   Primary Care Physician: Eugene Funez MD  Principal Problem:Arm wound, right, initial encounter    Consults  Subjective:     Interval History: F/U ESRD. Doing well. Calm in bed today. Confused at times with his encephalopathy    Review of patient's allergies indicates:   Allergen Reactions    Ceftriaxone Swelling    Lisinopril Swelling and Rash     Facial swelling   Facial swelling       Current Facility-Administered Medications   Medication Frequency    0.9%  NaCl infusion PRN    acetaminophen tablet 650 mg Q4H PRN    albuterol-ipratropium 2.5 mg-0.5 mg/3 mL nebulizer solution 3 mL BID    amiodarone tablet 200 mg Daily    aspirin EC tablet 81 mg Daily    atorvastatin tablet 40 mg QHS    carvediloL tablet 6.25 mg BID WM    dextrose 10% bolus 125 mL 125 mL PRN    dextrose 10% bolus 250 mL 250 mL PRN    glucagon (human recombinant) injection 1 mg PRN    heparin (porcine) injection 4,000 Units PRN    HYDROcodone-acetaminophen  mg per tablet 1 tablet Q6H PRN    HYDROcodone-acetaminophen 5-325 mg per tablet 1 tablet Q6H PRN    insulin aspart U-100 injection 0-5 Units Q6H PRN    midodrine tablet 10 mg TID WM    montelukast tablet 10 mg QHS    naloxone 0.4 mg/mL injection 0.02 mg PRN    OLANZapine tablet 2.5 mg QHS    ondansetron injection 4 mg Q8H PRN    pantoprazole EC tablet 40 mg Daily    sevelamer carbonate tablet 800 mg TID    sodium chloride 0.9% flush 10 mL Q12H PRN       Objective:     Vital Signs (Most Recent):  Temp: 97.8 °F (36.6 °C) (10/04/23 1429)  Pulse: 67 (10/04/23 1429)  Resp: 16 (10/04/23 1200)  BP: 114/65 (10/04/23 1429)  SpO2: (!) 94 % (10/04/23 1429) Vital Signs (24h Range):  Temp:  [96.8 °F (36 °C)-98 °F (36.7 °C)] 97.8 °F (36.6 °C)  Pulse:  [62-81] 67  Resp:  [16-18]  "16  SpO2:  [94 %-100 %] 94 %  BP: (107-152)/(65-92) 114/65     Weight: 107.5 kg (237 lb) (09/18/23 2315)  Body mass index is 32.14 kg/m².  Body surface area is 2.34 meters squared.    I/O last 3 completed shifts:  In: 920 [P.O.:920]  Out: 671 [Other:671]    Physical Exam No edema. Chest clear. Dressings both forearms.    Significant Labs:sureBMP: No results for input(s): "GLU", "NA", "K", "CL", "CO2", "BUN", "CREATININE", "CALCIUM", "MG" in the last 168 hours.  CBC: No results for input(s): "WBC", "RBC", "HGB", "HCT", "PLT", "MCV", "MCH", "MCHC" in the last 168 hours.  All labs within the past 24 hours have been reviewed.    Significant Imaging:  Labs: Reviewed    Assessment/Plan:     Active Diagnoses:    Diagnosis Date Noted POA    PRINCIPAL PROBLEM:  Arm wound, right, initial encounter [S41.101A] 09/19/2023 Yes    Acute metabolic encephalopathy [G93.41] 09/22/2023 Yes    ESRD (end stage renal disease) [N18.6] 09/19/2023 Yes    History of cardiac arrest [Z86.74] 09/19/2023 Not Applicable    Atrial fibrillation [I48.91] 09/19/2023 Yes    Arm wound, left, initial encounter [S41.102A] 09/19/2023 Yes    Chronic combined systolic and diastolic congestive heart failure [I50.42] 09/19/2023 Yes      Problems Resolved During this Admission:    Diagnosis Date Noted Date Resolved POA    Bacteremia [R78.81] 09/20/2023 09/27/2023 Unknown    Penile discharge [R36.9] 09/20/2023 09/29/2023 Yes    Dysphagia [R13.10] 09/19/2023 09/19/2023 Yes    Presence of automatic cardioverter/defibrillator (AICD) [Z95.810] 09/19/2023 09/19/2023 Yes    CAD (coronary artery disease) [I25.10] 09/19/2023 09/19/2023 Unknown    HFrEF (heart failure with reduced ejection fraction) [I50.20] 09/19/2023 09/20/2023 Yes    Hypotension [I95.9] 09/19/2023 09/19/2023 Yes    Impaired mobility [Z74.09] 09/19/2023 09/19/2023 Yes    Sacral wound [S31.000A] 09/19/2023 09/19/2023 Yes    COPD (chronic obstructive pulmonary disease) [J44.9] 09/19/2023 09/19/2023 Yes "    Hematoma [T14.8XXA] 09/19/2023 09/19/2023 Yes       Continue dialysis while here. Resume [facility] outpatient dialysis when discharged.    Thank you for your consult. I will follow-up with patient. Please contact us if you have any additional questions.    Alfred Valdez MD  Nephrology  [facility]

## 2023-10-04 NOTE — PT/OT/SLP PROGRESS
Occupational Therapy   Treatment    Name: Yvan Rollins  MRN: [id number]  Admitting Diagnosis:  Arm wound, right, initial encounter  14 Days Post-Op    Recommendations:     Discharge Recommendations: nursing facility, skilled  Discharge Equipment Recommendations:   (to be determined)  Barriers to discharge:  Inaccessible home environment, Decreased caregiver support    Assessment:     Yvan Rollins is a 63 y.o. male with a medical diagnosis of Arm wound, right, initial encounter.  He presents with the following performance deficits affecting function are weakness, impaired endurance, impaired self care skills. RN giving patient bed bath upon therapist arrival.     Rehab Prognosis:  Good; patient would benefit from acute skilled OT services to address these deficits and reach maximum level of function.       Plan:     Patient to be seen 5 x/week to address the above listed problems via self-care/home management, therapeutic activities, therapeutic exercises  Plan of Care Expires:    Plan of Care Reviewed with: patient    Subjective     Chief Complaint: arm wound  Patient/Family Comments/goals: Agreeable to OT tx  Pain/Comfort:       Objective:     Communicated with: OSMEL Thao RN prior to session.  Patient found supine with peripheral IV, oxygen upon OT entry to room.    General Precautions: Standard, fall    Orthopedic Precautions:N/A  Braces: N/A  Respiratory Status: Room air     Occupational Performance:     Bed Mobility:    Patient completed Scooting/Bridging with contact guard assistance     Functional Mobility/Transfers:      Activities of Daily Living:      [facility] 6 Click ADL:      Treatment & Education:  Pt performed B UE strengthening exercises to include:   Shoulder flexion   Chest press    Elbow flexion   Elbow extension  All exercises performed 2 x 15 reps with 2# weight with min assist for RUE exercises.      Patient left supine with all lines intact and call button in reach    GOALS:    Multidisciplinary Problems       Occupational Therapy Goals          Problem: Occupational Therapy    Goal Priority Disciplines Outcome Interventions   Occupational Therapy Goal     OT, PT/OT Ongoing, Progressing    Description: STG:  Pt will perform grooming with setup   Pt will bathe self with min assist  Pt will perform UB dressing with SBA  Pt will perform LB dressing with min assist  Pt will sit EOB x 10 min with SBA   Pt will transfer toilet/BSC with min assist  Pt will tolerate 15 minutes of tx with minimal fatigue      LT. Pt will achieve max level of independence with self care.                         Time Tracking:     OT Date of Treatment: 10/04/23  OT Start Time: 1437  OT Stop Time: 1454  OT Total Time (min): 17 min    Billable Minutes:Therapeutic Exercise 17            10/4/2023

## 2023-10-04 NOTE — PROGRESS NOTES
[facility]  [facility] Medicine  Progress Note    Patient Name: Yvan Rollins  MRN: [id number]  Patient Class: IP- Inpatient   Admission Date: 9/19/2023  Length of Stay: 14 days  Attending Physician: Phil Hernandez Jr., MD  Primary Care Provider: Eugene Funez MD        Subjective:     Principal Problem:Arm wound, right, initial encounter        HPI:  Patient is a 63 year old  male who was transferred to [facility] from [place] ED for vascular consultation for a necrotic wound overlying an AV access site in the RT arm. He presented to [facility] ED via [facility] from [facility] (referenced to be [facility] from recent hospitalization at [facility] (8/15/2023)). He was recently hospitalized at [facility] after having dyspnea during dialysis that progressed to cardiac arrest with ROSC. The patient was mechanically intubated, started on pressors, and managed in the ICU. According to the discharge summary, the patient had an adverse reaction to surface and infiltration anesthetic and intravenous infiltration.     During hospitalization at [facility], the RT AV access site was unable to be used and a tunneled RT dialysis catheter was placed. He was evaluated by Vascular surgery during that admission with outpatient follow-up. Today, he was seen by wound care at the NH and they recommended return to [facility] for vascular evaluation of the wound. However, [facility] was on Diversion and he ended up at [facility] and subsequently [facility].     During his hospitalization at [facility], cardiology was consulted. ECHO demonstrated EF of 25-30% and there was a reported 1 episode of nonsustained v-tach. Cardiology recommended resuming goal directed medical therapy, and there was no identified cardiac etiology for his arrest. He was also evaluated by neurology due to concern for anoxic brain injury.  CT of the brain demonstrated chronic ischemic microangiopathy. Neurology diagnosed his encephalopathy to be multifactorial -  anoxic insult from prolonged cardiac arrest with metabolic derangement. His mental status improved, but not to baseline. Today, at [facility] he   is Aox1 and he is a poor historian. He has difficulty answering questions. History is obtained from medical records.     The patient has a PMH of nonischemic cardiomyopathy with EF 20-25%, S/P AICD placement, ESRD on HD TTS, COPD, HTN, polysubstance abuse, and hepatitis C.         Overview/Hospital Course:  63 year old male presents with necrotic wound of AV fistula site s/p debridement.  He is being treated for a Staph bacteremia.  He is alert.  Patient denies chest pain, shortness of breath, nausea, vomiting, or diarrhea.        Interval History: No complaints. Working with PT.     Review of Systems  Objective:     Vital Signs (Most Recent):  Temp: 96.9 °F (36.1 °C) (10/03/23 1929)  Pulse: 81 (10/03/23 1929)  Resp: 18 (10/03/23 1929)  BP: 134/79 (10/03/23 1929)  SpO2: 97 % (10/03/23 1929) Vital Signs (24h Range):  Temp:  [96.9 °F (36.1 °C)-97.7 °F (36.5 °C)] 96.9 °F (36.1 °C)  Pulse:  [62-88] 81  Resp:  [16-18] 18  SpO2:  [96 %-100 %] 97 %  BP: ()/(43-94) 134/79     Weight: 107.5 kg (237 lb)  Body mass index is 32.14 kg/m².    Intake/Output Summary (Last 24 hours) at 10/3/2023 2111  Last data filed at 10/3/2023 1800  Gross per 24 hour   Intake 600 ml   Output 671 ml   Net -71 ml         Physical Exam  Vitals reviewed.   Constitutional:       General: He is not in acute distress.     Appearance: Normal appearance. He is not toxic-appearing.   HENT:      Head: Normocephalic and atraumatic.   Cardiovascular:      Rate and Rhythm: Normal rate and regular rhythm.      Heart sounds: Normal heart sounds.   Pulmonary:      Effort: Pulmonary effort is normal. No respiratory distress.      Breath sounds: Normal breath sounds. No wheezing.   Abdominal:      General: Abdomen is flat. Bowel sounds are normal. There is no distension.      Palpations: Abdomen is soft.   Skin:      "General: Skin is warm and dry.   Neurological:      Mental Status: He is alert.             Significant Labs: All pertinent labs within the past 24 hours have been reviewed.  BMP: No results for input(s): "GLU", "NA", "K", "CL", "CO2", "BUN", "CREATININE", "CALCIUM", "MG" in the last 48 hours.  CBC: No results for input(s): "WBC", "HGB", "HCT", "PLT" in the last 48 hours.    Significant Imaging: I have reviewed all pertinent imaging results/findings within the past 24 hours.      Assessment/Plan:      * Arm wound, right, initial encounter  S/P I&D right hematoma and left I&D of wounds in upper extremities; followed by Surgery; this was due to vasopressor infiltration at [facility]; was placed on Aztreonam on admission--will change this to Merrem; wound culture shows Klebsiella    Debrided by surgery.   9/27 - High risk of rebleeding till closure. Will explore swing bed options closer to his vascular surgeon.  Only came here because [facility] was on bypass.     9/29 - Improving.     9/30 - Continue wound care.     10/1 - High risk of rebleeding till closure. Will explore swing bed options closer to his vascular surgeon. Will need Rehab due to deficits following code. Would have to improve substantially to resume independent living.     As above.     History of cardiac arrest  Has AICD; continue current meds; BP low at times;he is on Coreg and Amiodarone; he is s/p cardiac arrest from dialysis at [facility] in [place], [place] in August.     Improving but suspected hypoxic brain injury. Continue PT/OT    ESRD (end stage renal disease)  On dialysis TTS. Has R subclavian tunneled cath.       Arm wound, left, initial encounter  Healing.  Off abx.       Acute metabolic encephalopathy  He is more alert today but confused; probably at baseline; CT brain did not show acute abnormality.     9/29 - Has improved a bit through the week. But still with some cognitive difficulties.     10/1 - Conversant but confused at times.       Chronic " combined systolic and diastolic congestive heart failure  Continue current meds      Diabetes  Does not appear to be on any home meds for DM; will monitor BS levels; BS stable    9/29 - Does not currently meet criteria for DM diagnosis.  Glucoses have been good. A1C 5.3.  Will decrease accuchecks.     Atrial fibrillation  On Amiodarone and Coreg; followed by Cardiology      Chronic hepatitis C  Stable    GERD (gastroesophageal reflux disease)  On Protonix        VTE Risk Mitigation (From admission, onward)         Ordered     heparin (porcine) injection 4,000 Units  As needed (PRN)         09/21/23 1135     IP VTE HIGH RISK PATIENT  Once         09/19/23 0147     Place sequential compression device  Until discontinued         09/19/23 0147     Reason for No Pharmacological VTE Prophylaxis  Once        Question:  Reasons:  Answer:  Physician Provided (leave comment)    09/19/23 0147                Discharge Planning   SANDRA:      Code Status: Full Code   Is the patient medically ready for discharge?:     Reason for patient still in hospital (select all that apply): Treatment  Discharge Plan A: Home with family, Home Health   Discharge Delays: None known at this time              Phil Hernandez Jr, MD  Department of Hospital Medicine   [facility]

## 2023-10-05 PROCEDURE — 94761 N-INVAS EAR/PLS OXIMETRY MLT: CPT

## 2023-10-05 PROCEDURE — 94640 AIRWAY INHALATION TREATMENT: CPT

## 2023-10-05 PROCEDURE — 27000221 HC OXYGEN, UP TO 24 HOURS

## 2023-10-05 PROCEDURE — 99232 SBSQ HOSP IP/OBS MODERATE 35: CPT | Mod: ,,, | Performed by: FAMILY MEDICINE

## 2023-10-05 PROCEDURE — 99232 PR SUBSEQUENT HOSPITAL CARE,LEVL II: ICD-10-PCS | Mod: ,,, | Performed by: FAMILY MEDICINE

## 2023-10-05 PROCEDURE — 11000001 HC ACUTE MED/SURG PRIVATE ROOM

## 2023-10-05 PROCEDURE — P9047 ALBUMIN (HUMAN), 25%, 50ML: HCPCS | Mod: JZ,JG | Performed by: INTERNAL MEDICINE

## 2023-10-05 PROCEDURE — 99900035 HC TECH TIME PER 15 MIN (STAT)

## 2023-10-05 PROCEDURE — 63600175 PHARM REV CODE 636 W HCPCS: Mod: JZ,JG | Performed by: INTERNAL MEDICINE

## 2023-10-05 PROCEDURE — 90935 HEMODIALYSIS ONE EVALUATION: CPT

## 2023-10-05 PROCEDURE — C1752 CATH,HEMODIALYSIS,SHORT-TERM: HCPCS

## 2023-10-05 PROCEDURE — 25000242 PHARM REV CODE 250 ALT 637 W/ HCPCS: Performed by: HOSPITALIST

## 2023-10-05 PROCEDURE — 25000003 PHARM REV CODE 250

## 2023-10-05 PROCEDURE — 25000003 PHARM REV CODE 250: Performed by: INTERNAL MEDICINE

## 2023-10-05 RX ORDER — CARVEDILOL 3.12 MG/1
3.12 TABLET ORAL 2 TIMES DAILY WITH MEALS
Status: DISCONTINUED | OUTPATIENT
Start: 2023-10-05 | End: 2023-10-09 | Stop reason: HOSPADM

## 2023-10-05 RX ORDER — ALBUMIN HUMAN 250 G/1000ML
25 SOLUTION INTRAVENOUS ONCE
Status: DISCONTINUED | OUTPATIENT
Start: 2023-10-05 | End: 2023-10-05

## 2023-10-05 RX ORDER — ALBUMIN HUMAN 250 G/1000ML
25 SOLUTION INTRAVENOUS ONCE
Status: COMPLETED | OUTPATIENT
Start: 2023-10-05 | End: 2023-10-05

## 2023-10-05 RX ADMIN — MIDODRINE HYDROCHLORIDE 10 MG: 5 TABLET ORAL at 05:10

## 2023-10-05 RX ADMIN — PANTOPRAZOLE SODIUM 40 MG: 40 TABLET, DELAYED RELEASE ORAL at 08:10

## 2023-10-05 RX ADMIN — IPRATROPIUM BROMIDE AND ALBUTEROL SULFATE 3 ML: 2.5; .5 SOLUTION RESPIRATORY (INHALATION) at 08:10

## 2023-10-05 RX ADMIN — ATORVASTATIN CALCIUM 40 MG: 40 TABLET, FILM COATED ORAL at 08:10

## 2023-10-05 RX ADMIN — HEPARIN SODIUM 4000 UNITS: 1000 INJECTION, SOLUTION INTRAVENOUS; SUBCUTANEOUS at 11:10

## 2023-10-05 RX ADMIN — MONTELUKAST SODIUM 10 MG: 10 TABLET, FILM COATED ORAL at 08:10

## 2023-10-05 RX ADMIN — IPRATROPIUM BROMIDE AND ALBUTEROL SULFATE 3 ML: 2.5; .5 SOLUTION RESPIRATORY (INHALATION) at 07:10

## 2023-10-05 RX ADMIN — ALBUMIN (HUMAN) 25 G: 12.5 SOLUTION INTRAVENOUS at 12:10

## 2023-10-05 RX ADMIN — CARVEDILOL 3.12 MG: 3.12 TABLET, FILM COATED ORAL at 05:10

## 2023-10-05 RX ADMIN — MIDODRINE HYDROCHLORIDE 10 MG: 5 TABLET ORAL at 08:10

## 2023-10-05 RX ADMIN — AMIODARONE HYDROCHLORIDE 200 MG: 200 TABLET ORAL at 08:10

## 2023-10-05 RX ADMIN — ASPIRIN 81 MG: 81 TABLET, COATED ORAL at 08:10

## 2023-10-05 RX ADMIN — SEVELAMER CARBONATE 800 MG: 800 TABLET, FILM COATED ORAL at 08:10

## 2023-10-05 RX ADMIN — SEVELAMER CARBONATE 800 MG: 800 TABLET, FILM COATED ORAL at 05:10

## 2023-10-05 RX ADMIN — SODIUM CHLORIDE: 9 INJECTION, SOLUTION INTRAVENOUS at 11:10

## 2023-10-05 RX ADMIN — OLANZAPINE 2.5 MG: 2.5 TABLET, FILM COATED ORAL at 08:10

## 2023-10-05 RX ADMIN — MIDODRINE HYDROCHLORIDE 10 MG: 5 TABLET ORAL at 01:10

## 2023-10-05 NOTE — PLAN OF CARE
Problem: Adult Inpatient Plan of Care  Goal: Plan of Care Review  Outcome: Ongoing, Progressing     Problem: Impaired Wound Healing  Goal: Optimal Wound Healing  Outcome: Ongoing, Progressing  Intervention: Promote Wound Healing  Flowsheets (Taken 10/5/2023 0350)  Oral Nutrition Promotion:   physical activity promoted   safe use of adaptive equipment encouraged  Sleep/Rest Enhancement:   awakenings minimized   room darkened   noise level reduced  Pain Management Interventions: position adjusted     Problem: Infection  Goal: Absence of Infection Signs and Symptoms  Outcome: Ongoing, Progressing  Intervention: Prevent or Manage Infection  Flowsheets (Taken 10/5/2023 0350)  Fever Reduction/Comfort Measures: lightweight bedding  Infection Management: aseptic technique maintained  Isolation Precautions: precautions maintained

## 2023-10-05 NOTE — PT/OT/SLP PROGRESS
Occupational Therapy      Patient Name:  Yvan Rollins   MRN:  [id number]    Patient not seen today secondary to Dialysis. Will follow-up 10/6/23.    10/5/2023

## 2023-10-05 NOTE — DIALYSIS ROUNDING
Melissa Melendez MD      Patient: Ruth Ann Underwood  : 1945    Today's Date: 2019    HISTORY OF PRESENT ILLNESS:     History of Present Illness:    Ruth Ann Underwood is a 72 y.o. male with history of Chronic Atrial Fibrillation, CAD, HFrEF, Myocarditis/Cardiomyopathy, HTN, TIA, Carotid stenosis, Diabetes, Barrets Esophagus, GERD, Anemia of chronic disease, Severe Gout presenting for syncope. We are consulted for evaluation of Syncopal episode. While at transfusion center today patient had episode of syncope that lasted 5 minutes before spontaneously waking. No seizure-like activity was noted. Patient endorsed sensation of abdominal fullness/bloating immediately prior to syncopal episode, but asymptomatic after. Does note over the several days a 5 pound weight increase and some increased dyspnea with exertion but denies chest pain , palpitations, nausea/vomiting. Episode occurred at transfusion center while getting IV solumedrol and Pepcid. Patient denies previous episodes of syncope. ER labs significant for Hgb 8.9 (BL~9.0), Troponin 0.05, K 4.3, Mag 2.1, Crt 1.97(BL 1.8-2.0). Patient followed by Dr. Viv Hughes, Mycarditis diagnosed at 42 y.o., preceded by severe viral infection. Systolic HF with EF of 81% per patient at the time, improved and normalized now. Patient has Chronic A-fib that has failed cardioversion on multiple times.         PAST MEDICAL HISTORY:     Past Medical History:   Diagnosis Date    Acute encephalopathy 2016    Anemia 2017    Atrial fibrillation (HCC)     Painter esophagus     CAD (coronary artery disease)     Diabetes (HCC)     Heart failure (HCC)     Cardiomyopathy, myocarditis    HTN, goal below 140/90     Retinopathy, diabetic, bilateral ([facility] [address] 3/11/2016    Stenosis of right carotid artery without cerebral infarction 2016    TIA (transient ischemic attack) 2016    Vitamin D deficiency 3/1/2016    Nephrology ordered and Pt seen during dialysis. He is wide awake and in no distress with a systolic of 85. We'll decrease his Carvedilol to 3.25mg BID. We'll support with dialysis TTS while here and he'll resume dialysis in [place] when discharged.   follow 2/22/16        Past Surgical History:   Procedure Laterality Date    COLONOSCOPY N/A 6/23/2016    COLONOSCOPY performed by Jordan Greene MD at [address] COLONOSCOPY N/A 8/6/2018    COLONOSCOPY performed by Jordan Greene MD at [address] 8/30/2018    COLONOSCOPY performed by Darien Kramer MD at [address] HX ENDOSCOPY  06/27/2016    CAPSULE; normal small bowel    HX ENDOSCOPY  06/23/2016    upper endoscopy    HX UROLOGICAL  2013         MEDICATIONS:     Current Facility-Administered Medications   Medication Dose Route Frequency Provider Last Rate Last Dose    hydrALAZINE (APRESOLINE) 20 mg/mL injection 10 mg  10 mg IntraVENous Q6H PRN José Valdovinos MD        labetalol (NORMODYNE;TRANDATE) injection 10 mg  10 mg IntraVENous Q2H PRN José Valdovinos MD         Current Outpatient Medications   Medication Sig Dispense Refill    colchicine 0.6 mg tablet Take 1 Tab by mouth daily as needed. 30 Tab 2    ELIQUIS 5 mg tablet TAKE 1 TABLET BY MOUTH TWICE A DAY  3    leflunomide (ARAVA) 20 mg tablet Take 1 Tab by mouth daily. 90 Tab 0    pegloticase (KRYSTEXXA) 8 mg/mL soln injection by IntraVENous route Once every 2 weeks.  digoxin (LANOXIN) 0.125 mg tablet Take 0.125 mg by mouth every other day.  dextran 70-hypromellose (ARTIFICIAL TEARS,[id number],) ophthalmic solution Administer 1 Drop to both eyes as needed.  loperamide (IMODIUM) 2 mg capsule Take 2 mg by mouth four (4) times daily as needed for Diarrhea.  furosemide (LASIX) 40 mg tablet Take 40 mg by mouth daily. 5    hydrALAZINE (APRESOLINE) 25 mg tablet Take 25 mg by mouth three (3) times daily. 3    omeprazole (PRILOSEC) 20 mg capsule Take 20 mg by mouth daily.  multivitamin (ONE A DAY) tablet Take 1 Tab by mouth daily.  pravastatin (PRAVACHOL) 20 mg tablet Take 1 Tab by mouth nightly.  30 Tab 2    carvedilol (COREG) 3.125 mg tablet Take 1 Tab by mouth two (2) times daily (with meals). 60 Tab 2     Facility-Administered Medications Ordered in Other Encounters   Medication Dose Route Frequency Provider Last Rate Last Dose    sodium chloride (NS) flush 5-10 mL  5-10 mL IntraVENous PRN Obed Stein MD        0.9% sodium chloride infusion  25 mL/hr IntraVENous PRN Obed Stein MD 25 mL/hr at 01/14/19 1216 25 mL/hr at 01/14/19 1216    pegloticase (KRYSTEXXA) 8 mg in 0.9% sodium chloride 250 mL infusion  8 mg IntraVENous ONCE Zay Frederick MD           No Known Allergies      SOCIAL HISTORY:     Social History     Tobacco Use    Smoking status: Never Smoker    Smokeless tobacco: Never Used   Substance Use Topics    Alcohol use: Yes     Comment: 2 drinks/week, never a heavy drinker    Drug use: No         FAMILY HISTORY:     Family History   Problem Relation Age of Onset    Heart Failure Mother     Diabetes Father     No Known Problems Sister     Other Daughter         Faisal Heath Other Daughter         Faisal Heath Other Daughter         Hashimoto         REVIEW OF SYMPTOMS:     Review of Symptoms:  Constitutional: Negative for fever, chills  HEENT: Negative for nosebleeds, tinnitus, and vision changes. Respiratory: Negative for cough, wheezing  Cardiovascular: Positive for increased SOB/Dyspnea Negative for orthopnea, claudication, syncope, and PND. Gastrointestinal: Negative for abdominal pain, diarrhea, melena. Genitourinary: Negative for dysuria  Musculoskeletal: Negative for myalgias. Skin: Negative for rash  Heme: No problems bleeding. Neurological: Negative for speech change and focal weakness. PHYSICAL EXAM:     Physical Exam:  Visit Vitals  BP (!) 201/93   Pulse 95   Temp 97.5 °F (36.4 °C)   Resp 14   Ht 5' 10\" (1.778 m)   Wt 185 lb (83.9 kg)   SpO2 95%   BMI 26.54 kg/m²     Patient appears generally well, mood and affect are appropriate and pleasant. HEENT:  Hearing intact, non-icteric, normocephalic, atraumatic.    Neck Exam: Supple, No JVD or carotid bruit Present. Lung Exam: Clear to auscultation, even breath sounds. Cardiac Exam: Regular rate and irregularly irregular rhythm with 2/6 Mitral regurgitation Murmur  Abdomen: Soft, non-tender, normal bowel sounds. No bruits or masses. Extremities: Moves all ext well. No lower extremity edema. Vascular: 2+ dorsalis pedis pulses bilaterally. Psych: Appropriate affect  Neuro: Grossly intact      LABS / OTHER STUDIES:     Recent Results (from the past 24 hour(s))   CBC WITH AUTOMATED DIFF    Collection Time: 01/14/19 11:58 AM   Result Value Ref Range    WBC 4.5 4.1 - 11.1 K/uL    RBC 3.08 (L) 4.10 - 5.70 M/uL    HGB 8.9 (L) 12.1 - 17.0 g/dL    HCT 28.4 (L) 36.6 - 50.3 %    MCV 92.2 80.0 - 99.0 FL    MCH 28.9 26.0 - 34.0 PG    MCHC 31.3 30.0 - 36.5 g/dL    RDW 15.8 (H) 11.5 - 14.5 %    PLATELET 548 (L) 447 - 400 K/uL    MPV 12.4 8.9 - 12.9 FL    NRBC 0.0 0  WBC    ABSOLUTE NRBC 0.00 0.00 - 0.01 K/uL    NEUTROPHILS 77 (H) 32 - 75 %    LYMPHOCYTES 7 (L) 12 - 49 %    MONOCYTES 11 5 - 13 %    EOSINOPHILS 4 0 - 7 %    BASOPHILS 1 0 - 1 %    IMMATURE GRANULOCYTES 0 0.0 - 0.5 %    ABS. NEUTROPHILS 3.5 1.8 - 8.0 K/UL    ABS. LYMPHOCYTES 0.3 (L) 0.8 - 3.5 K/UL    ABS. MONOCYTES 0.5 0.0 - 1.0 K/UL    ABS. EOSINOPHILS 0.2 0.0 - 0.4 K/UL    ABS. BASOPHILS 0.0 0.0 - 0.1 K/UL    ABS. IMM.  GRANS. 0.0 0.00 - 0.04 K/UL    DF SMEAR SCANNED      RBC COMMENTS NORMOCYTIC, NORMOCHROMIC     METABOLIC PANEL, COMPREHENSIVE    Collection Time: 01/14/19 11:58 AM   Result Value Ref Range    Sodium 141 136 - 145 mmol/L    Potassium 4.3 3.5 - 5.1 mmol/L    Chloride 102 97 - 108 mmol/L    CO2 28 21 - 32 mmol/L    Anion gap 11 5 - 15 mmol/L    Glucose 115 (H) 65 - 100 mg/dL    BUN 57 (H) 6 - 20 MG/DL    Creatinine 1.97 (H) 0.70 - 1.30 MG/DL    BUN/Creatinine ratio 29 (H) 12 - 20      GFR est AA 41 (L) >60 ml/min/1.73m2    GFR est non-AA 34 (L) >60 ml/min/1.73m2    Calcium 9.1 8.5 - 10.1 MG/DL    Bilirubin, total 0.7 0.2 - 1.0 MG/DL    ALT (SGPT) 35 12 - 78 U/L    AST (SGOT) 29 15 - 37 U/L    Alk. phosphatase 80 45 - 117 U/L    Protein, total 7.5 6.4 - 8.2 g/dL    Albumin 3.6 3.5 - 5.0 g/dL    Globulin 3.9 2.0 - 4.0 g/dL    A-G Ratio 0.9 (L) 1.1 - 2.2     SED RATE (ESR)    Collection Time: 01/14/19 11:58 AM   Result Value Ref Range    Sed rate, automated 56 (H) 0 - 20 mm/hr   URIC ACID    Collection Time: 01/14/19 11:58 AM   Result Value Ref Range    Uric acid 2.0 (L) 3.5 - 7.2 MG/DL   SAMPLES BEING HELD    Collection Time: 01/14/19 11:58 AM   Result Value Ref Range    SAMPLES BEING HELD sst     COMMENT        Add-on orders for these samples will be processed based on acceptable specimen integrity and analyte stability, which may vary by analyte. TROPONIN I    Collection Time: 01/14/19  2:19 PM   Result Value Ref Range    Troponin-I, Qt. 0.05 (H) <0.05 ng/mL   SAMPLES BEING HELD    Collection Time: 01/14/19  2:19 PM   Result Value Ref Range    SAMPLES BEING HELD 1LAV,1SST,1RED,1BL     COMMENT        Add-on orders for these samples will be processed based on acceptable specimen integrity and analyte stability, which may vary by analyte. CARDIAC DIAGNOSTICS:     Cardiac Evaluation Includes:  US Carotid - 12/16/2008: Bilateral 0-49% stenosis with moderatley heavy calcification  Renal Artery Duplex- 2/5/16: Mild bilateral renal artery stenosis, severe superior mesenteric artery stenosis. ABIs-4/29/16: No evidence of significant disease. 1.14 on the right and noncompressible on the left. Carotid Duplex 8/17/17: 0-49% stenosis of the right and left internal carotids, > 70% stenosis of the right and left external carotids  AAA Screening US- 3/2/18: Normal Abdominal Aorta  Carotid Duplex- 9/6/18: High end stenosis of the right internal carotid, 50-69% stenosis of the left internal carotid, > 70% of the right and left external carotid arteries. Echocardiogram- 9/24/18: Left ventricle: EF 60%. Moderate wall thickening.   Moderate dilated Left and right atrium. Mild to moderate Mitral regurgitation. Aortic valve was trileaflet. Leaflets exhibited mildly increased thickness and sclerosis without stenosis. Tricuspid was moderate to severe pulmonary hypertension. Pulmonic valve: There was mild regurgitation. EKGs:   9/4/18: Atrial Fibrillation HR 86,   1/14/19: Atrial Fibrillation, HR 88, Prolonged QTC at 492    ASSESSMENT AND PLAN:     Assessment and Plan:    Syncopal episode:  - Significant cardiac history including hx of mycarditis and myopathy. Also concerning for mild volume overload as well. Need to rule out possible VT given myopathy and possible increased cardiac strain. Concern also possible decompensation of HF given recent increased weight gain and dyspnea. - Lasix 40 mg IV now  - Limited Echo-evaluate LVEF  - Trend Troponins  - Cardiac Monitoring overnight  - 30 day event monitor on discharge, through Dr. Ressie Olszewski office   - Jam Nia above 2.5 and K above 4.0    CHF/Cardiomypoathy:  - Lasix 40 mg IV now  - Limited Echo-evaluate LVEF   - Restart Lasix PO tomorrow  - Continue Carvedilol and Pravastatin  - Strict I/O and dailyweight  - Overnight cardiac Monitoring    Chronic Atrial Fibrillation:  - Rate controlled currently  - Continue Eliquis 5 mg BID  - Continue Coreg 3.125 mg BID  - Continue Digoxin 0.125 mg every other day, dose tonigh    HTN:  - BP elevated on Admission, may be due to volume overload, or possibly causing  - Continue Coreg BID, Hydralizine 25 mg TID  - Lasix 40 mg IV now, restart Lasix 40 mg PO tomorrow    Gout:  - Management per primary team    CKD stage 4:  - Management per primary team    Diabetes:   - Management per primary team    GERD:   - Management per primary team    Resident: MD Grover Ellis, [address], [address]  [address] [facility] [address]  [address]  [facility] [address]  [place], [address] [place] [address]  Ph: [phone]   Ph [phone]

## 2023-10-05 NOTE — PLAN OF CARE
Ss and Dr. Hernandez spoke with pt's sister gail alexis on the phone about dc planning, pt needs medical care until wound closes before going to a home setting, sister does not want [facility] at this time but choice for [facility] and [facility] or [facility], ejheet sent to [facility] joe to check insurance, rec'd call back and insurance is out of network, will send to fernanda, following   Daily Assessment

## 2023-10-05 NOTE — PT/OT/SLP PROGRESS
Physical Therapy      Patient Name:  Yvan Rollins   MRN:  [id number]    Patient not seen today secondary to gone to dialysis . Will follow-up 10/6/23.

## 2023-10-05 NOTE — PLAN OF CARE
Problem: Skin Injury Risk Increased  Goal: Skin Health and Integrity  Outcome: Ongoing, Progressing     Problem: Gas Exchange Impaired  Goal: Optimal Gas Exchange  Outcome: Ongoing, Progressing     Problem: Airway Clearance Ineffective  Goal: Effective Airway Clearance  Outcome: Ongoing, Progressing

## 2023-10-05 NOTE — PLAN OF CARE
Problem: Adult Inpatient Plan of Care  Goal: Plan of Care Review  10/5/2023 1612 by Raya Caldwell RN  Outcome: Ongoing, Progressing  10/5/2023 1611 by Raya Caldwell RN  Outcome: Ongoing, Progressing  Goal: Patient-Specific Goal (Individualized)  10/5/2023 1612 by Raya Caldwell RN  Outcome: Ongoing, Progressing  10/5/2023 1611 by Raya Caldwell RN  Outcome: Ongoing, Progressing  Goal: Absence of Hospital-Acquired Illness or Injury  10/5/2023 1612 by Raya Caldwell RN  Outcome: Ongoing, Progressing  10/5/2023 1611 by Raya Caldwell RN  Outcome: Ongoing, Progressing  Goal: Optimal Comfort and Wellbeing  10/5/2023 1612 by Raya Caldwell RN  Outcome: Ongoing, Progressing  10/5/2023 1611 by Raya Caldwell RN  Outcome: Ongoing, Progressing  Goal: Readiness for Transition of Care  10/5/2023 1612 by Raya Caldwell RN  Outcome: Ongoing, Progressing  10/5/2023 1611 by Raya Caldwell RN  Outcome: Ongoing, Progressing     Problem: Impaired Wound Healing  Goal: Optimal Wound Healing  10/5/2023 1612 by Raya Caldwell RN  Outcome: Ongoing, Progressing  10/5/2023 1611 by Raya Caldwell RN  Outcome: Ongoing, Progressing     Problem: Infection  Goal: Absence of Infection Signs and Symptoms  10/5/2023 1612 by Raya Caldwell RN  Outcome: Ongoing, Progressing  10/5/2023 1611 by Raya Caldwell RN  Outcome: Ongoing, Progressing     Problem: Skin Injury Risk Increased  Goal: Skin Health and Integrity  10/5/2023 1612 by Raya Caldwell RN  Outcome: Ongoing, Progressing  10/5/2023 1611 by Raya Caldwell RN  Outcome: Ongoing, Progressing     Problem: Diabetes Comorbidity  Goal: Blood Glucose Level Within Targeted Range  10/5/2023 1612 by Raya Caldwell RN  Outcome: Ongoing, Progressing  10/5/2023 1611 by Raya Caldwell RN  Outcome: Ongoing, Progressing     Problem: Device-Related Complication Risk (Hemodialysis)  Goal: Safe, Effective Therapy Delivery  10/5/2023 1612 by Raya Caldwell RN  Outcome: Ongoing, Progressing  10/5/2023 1611 by Raya Caldwell RN  Outcome: Ongoing,  Progressing     Problem: Hemodynamic Instability (Hemodialysis)  Goal: Effective Tissue Perfusion  10/5/2023 1612 by Raya Caldwell RN  Outcome: Ongoing, Progressing  10/5/2023 1611 by Raya Caldwell RN  Outcome: Ongoing, Progressing     Problem: Infection (Hemodialysis)  Goal: Absence of Infection Signs and Symptoms  10/5/2023 1612 by Raya Caldwell RN  Outcome: Ongoing, Progressing  10/5/2023 1611 by Raya Caldwell RN  Outcome: Ongoing, Progressing     Problem: Gas Exchange Impaired  Goal: Optimal Gas Exchange  10/5/2023 1612 by Raya Caldwell RN  Outcome: Ongoing, Progressing  10/5/2023 1611 by Raya Caldwell RN  Outcome: Ongoing, Progressing     Problem: Airway Clearance Ineffective  Goal: Effective Airway Clearance  10/5/2023 1612 by Raya Caldwell RN  Outcome: Ongoing, Progressing  10/5/2023 1611 by Raya Caldwell RN  Outcome: Ongoing, Progressing     Problem: Fall Injury Risk  Goal: Absence of Fall and Fall-Related Injury  10/5/2023 1612 by Raya Caldwell RN  Outcome: Ongoing, Progressing  10/5/2023 1611 by Raya Caldwell RN  Outcome: Ongoing, Progressing     Problem: Confusion Acute  Goal: Optimal Cognitive Function  10/5/2023 1612 by Raya Caldwell RN  Outcome: Ongoing, Progressing  10/5/2023 1611 by Raya Caldwell RN  Outcome: Ongoing, Progressing

## 2023-10-06 PROCEDURE — 25000003 PHARM REV CODE 250

## 2023-10-06 PROCEDURE — 94761 N-INVAS EAR/PLS OXIMETRY MLT: CPT

## 2023-10-06 PROCEDURE — 11000001 HC ACUTE MED/SURG PRIVATE ROOM

## 2023-10-06 PROCEDURE — 25000242 PHARM REV CODE 250 ALT 637 W/ HCPCS: Performed by: HOSPITALIST

## 2023-10-06 PROCEDURE — 25000003 PHARM REV CODE 250: Performed by: INTERNAL MEDICINE

## 2023-10-06 PROCEDURE — 97542 WHEELCHAIR MNGMENT TRAINING: CPT | Mod: CQ

## 2023-10-06 PROCEDURE — 30200315 PPD INTRADERMAL TEST REV CODE 302: Performed by: INTERNAL MEDICINE

## 2023-10-06 PROCEDURE — 94640 AIRWAY INHALATION TREATMENT: CPT

## 2023-10-06 PROCEDURE — 86580 TB INTRADERMAL TEST: CPT | Performed by: INTERNAL MEDICINE

## 2023-10-06 PROCEDURE — 97110 THERAPEUTIC EXERCISES: CPT

## 2023-10-06 PROCEDURE — 27000221 HC OXYGEN, UP TO 24 HOURS

## 2023-10-06 PROCEDURE — 99900035 HC TECH TIME PER 15 MIN (STAT)

## 2023-10-06 PROCEDURE — 99232 SBSQ HOSP IP/OBS MODERATE 35: CPT | Mod: ,,, | Performed by: INTERNAL MEDICINE

## 2023-10-06 PROCEDURE — 99232 PR SUBSEQUENT HOSPITAL CARE,LEVL II: ICD-10-PCS | Mod: ,,, | Performed by: INTERNAL MEDICINE

## 2023-10-06 PROCEDURE — 97116 GAIT TRAINING THERAPY: CPT | Mod: CQ

## 2023-10-06 RX ORDER — AMLODIPINE BESYLATE 5 MG/1
5 TABLET ORAL 2 TIMES DAILY
Status: DISCONTINUED | OUTPATIENT
Start: 2023-10-06 | End: 2023-10-09 | Stop reason: HOSPADM

## 2023-10-06 RX ORDER — MIDODRINE HYDROCHLORIDE 5 MG/1
10 TABLET ORAL
Status: COMPLETED | OUTPATIENT
Start: 2023-10-07 | End: 2023-10-07

## 2023-10-06 RX ADMIN — SEVELAMER CARBONATE 800 MG: 800 TABLET, FILM COATED ORAL at 03:10

## 2023-10-06 RX ADMIN — PANTOPRAZOLE SODIUM 40 MG: 40 TABLET, DELAYED RELEASE ORAL at 08:10

## 2023-10-06 RX ADMIN — SEVELAMER CARBONATE 800 MG: 800 TABLET, FILM COATED ORAL at 08:10

## 2023-10-06 RX ADMIN — ASPIRIN 81 MG: 81 TABLET, COATED ORAL at 08:10

## 2023-10-06 RX ADMIN — IPRATROPIUM BROMIDE AND ALBUTEROL SULFATE 3 ML: 2.5; .5 SOLUTION RESPIRATORY (INHALATION) at 07:10

## 2023-10-06 RX ADMIN — MONTELUKAST SODIUM 10 MG: 10 TABLET, FILM COATED ORAL at 08:10

## 2023-10-06 RX ADMIN — TUBERCULIN PURIFIED PROTEIN DERIVATIVE 5 UNITS: 5 INJECTION, SOLUTION INTRADERMAL at 06:10

## 2023-10-06 RX ADMIN — AMIODARONE HYDROCHLORIDE 200 MG: 200 TABLET ORAL at 08:10

## 2023-10-06 RX ADMIN — CARVEDILOL 3.12 MG: 3.12 TABLET, FILM COATED ORAL at 05:10

## 2023-10-06 RX ADMIN — OLANZAPINE 2.5 MG: 2.5 TABLET, FILM COATED ORAL at 08:10

## 2023-10-06 RX ADMIN — AMLODIPINE BESYLATE 5 MG: 5 TABLET ORAL at 10:10

## 2023-10-06 RX ADMIN — ATORVASTATIN CALCIUM 40 MG: 40 TABLET, FILM COATED ORAL at 08:10

## 2023-10-06 RX ADMIN — CARVEDILOL 3.12 MG: 3.12 TABLET, FILM COATED ORAL at 08:10

## 2023-10-06 NOTE — PT/OT/SLP PROGRESS
Occupational Therapy   Treatment    Name: Yvan Rollins  MRN: [id number]  Admitting Diagnosis:  Arm wound, right, initial encounter  16 Days Post-Op    Recommendations:     Discharge Recommendations: nursing facility, skilled  Discharge Equipment Recommendations:   (to be determined)  Barriers to discharge:  Inaccessible home environment, Decreased caregiver support    Assessment:     Yvan Rollins is a 63 y.o. male with a medical diagnosis of Arm wound, right, initial encounter.  He presents with the following performance deficits affecting function are weakness, impaired endurance, impaired self care skills. Patient appears fatigued this date and presented with difficulty remaining alert and attending to task. Patient required min-mod assist performed BUE exercises and requested to end tx after 2 sets of B therex     Rehab Prognosis:  Good; patient would benefit from acute skilled OT services to address these deficits and reach maximum level of function.       Plan:     Patient to be seen 5 x/week to address the above listed problems via self-care/home management, therapeutic activities, therapeutic exercises  Plan of Care Expires:    Plan of Care Reviewed with: patient    Subjective     Chief Complaint: arm wound   Patient/Family Comments/goals: Agreeable to OT tx  Pain/Comfort:       Objective:     Communicated with: PRETTY Siegel RN prior to session.  Patient found up in chair with peripheral IV upon OT entry to room.    General Precautions: Standard, fall    Orthopedic Precautions:N/A  Braces: N/A  Respiratory Status: Room air     Occupational Performance:     Bed Mobility:         Functional Mobility/Transfers:        Activities of Daily Living:        [facility] 6 Click ADL:      Treatment & Education:  Pt performed B UE strengthening exercises to include:   Chest press    Bicep curls   All exercises performed 2x10 reps with 2# weight with min assist.      Patient left up in chair with all lines intact and  call button in reach    GOALS:   Multidisciplinary Problems       Occupational Therapy Goals          Problem: Occupational Therapy    Goal Priority Disciplines Outcome Interventions   Occupational Therapy Goal     OT, PT/OT Ongoing, Progressing    Description: STG:  Pt will perform grooming with setup   Pt will bathe self with min assist  Pt will perform UB dressing with SBA  Pt will perform LB dressing with min assist  Pt will sit EOB x 10 min with SBA   Pt will transfer toilet/BSC with min assist  Pt will tolerate 15 minutes of tx with minimal fatigue      LT. Pt will achieve max level of independence with self care.                         Time Tracking:     OT Date of Treatment: 10/06/23  OT Start Time: 1029  OT Stop Time: 1039  OT Total Time (min): 10 min    Billable Minutes:Therapeutic Exercise 10      10/6/2023

## 2023-10-06 NOTE — PROGRESS NOTES
[facility]  [facility] Medicine  Progress Note    Patient Name: Yvan Rollins  MRN: [id number]  Patient Class: IP- Inpatient   Admission Date: 9/19/2023  Length of Stay: 16 days  Attending Physician: Phil Hernandez Jr., MD  Primary Care Provider: Eugene Funez MD        Subjective:     Principal Problem:Arm wound, right, initial encounter        HPI:  Patient is a 63 year old  male who was transferred to [facility] from [place] [facility] for vascular consultation for a necrotic wound overlying an AV access site in the RT arm. He presented to [place] [facility] via [facility] from [facility] (referenced to be [facility] from recent hospitalization at [facility] (8/15/2023)). He was recently hospitalized at [facility] after having dyspnea during dialysis that progressed to cardiac arrest with ROSC. The patient was mechanically intubated, started on pressors, and managed in the ICU. According to the discharge summary, the patient had an adverse reaction to surface and infiltration anesthetic and intravenous infiltration.     During hospitalization at [facility], the RT AV access site was unable to be used and a tunneled RT dialysis catheter was placed. He was evaluated by Vascular surgery during that admission with outpatient follow-up. Today, he was seen by wound care at the NH and they recommended return to [facility] for vascular evaluation of the wound. However, [facility] was on Diversion and he ended up at [place] and subsequently [facility].     During his hospitalization at [facility], cardiology was consulted. ECHO demonstrated EF of 25-30% and there was a reported 1 episode of nonsustained v-tach. Cardiology recommended resuming goal directed medical therapy, and there was no identified cardiac etiology for his arrest. He was also evaluated by neurology due to concern for anoxic brain injury.  CT of the brain demonstrated chronic ischemic microangiopathy. Neurology diagnosed his encephalopathy to be multifactorial -  anoxic insult from prolonged cardiac arrest with metabolic derangement. His mental status improved, but not to baseline. Today, at [facility] he   is Aox1 and he is a poor historian. He has difficulty answering questions. History is obtained from medical records.     The patient has a PMH of nonischemic cardiomyopathy with EF 20-25%, S/P AICD placement, ESRD on HD TTS, COPD, HTN, polysubstance abuse, and hepatitis C.         Overview/Hospital Course:  63 year old male presents with necrotic wound of AV fistula site s/p debridement.  He is being treated for a Staph bacteremia.  He is alert.  Patient denies chest pain, shortness of breath, nausea, vomiting, or diarrhea.        Interval History: No complaints.   and I spoke with patient's sister on conference call today.  Based on PT patricia I don't recommend she take him home at this time.  She agrees to look at swing.  He needs to be in a medical setting until wound over AV fistula is covered.     Review of Systems  Objective:     Vital Signs (Most Recent):  Temp: 97.5 °F (36.4 °C) (10/05/23 1926)  Pulse: 83 (10/05/23 1926)  Resp: 18 (10/05/23 1926)  BP: 133/78 (10/05/23 1926)  SpO2: 98 % (10/05/23 1926) Vital Signs (24h Range):  Temp:  [97.5 °F (36.4 °C)-98.2 °F (36.8 °C)] 97.5 °F (36.4 °C)  Pulse:  [62-83] 83  Resp:  [16-19] 18  SpO2:  [93 %-98 %] 98 %  BP: ()/(44-96) 133/78     Weight: 107.5 kg (237 lb)  Body mass index is 32.14 kg/m².    Intake/Output Summary (Last 24 hours) at 10/5/2023 1949  Last data filed at 10/5/2023 1727  Gross per 24 hour   Intake 360 ml   Output 439 ml   Net -79 ml         Physical Exam  Vitals reviewed.   Constitutional:       General: He is not in acute distress.     Appearance: Normal appearance. He is not toxic-appearing.   HENT:      Head: Normocephalic and atraumatic.   Cardiovascular:      Rate and Rhythm: Normal rate and regular rhythm.      Heart sounds: Normal heart sounds.   Pulmonary:      Effort: Pulmonary effort is  "normal. No respiratory distress.      Breath sounds: Normal breath sounds. No wheezing.   Abdominal:      General: Abdomen is flat. Bowel sounds are normal. There is no distension.      Palpations: Abdomen is soft.   Skin:     General: Skin is warm and dry.   Neurological:      Mental Status: He is alert.             Significant Labs: All pertinent labs within the past 24 hours have been reviewed.  BMP: No results for input(s): "GLU", "NA", "K", "CL", "CO2", "BUN", "CREATININE", "CALCIUM", "MG" in the last 48 hours.  CBC: No results for input(s): "WBC", "HGB", "HCT", "PLT" in the last 48 hours.    Significant Imaging: I have reviewed all pertinent imaging results/findings within the past 24 hours.      Assessment/Plan:      * Arm wound, right, initial encounter  S/P I&D right hematoma and left I&D of wounds in upper extremities; followed by Surgery; this was due to vasopressor infiltration at [facility]; was placed on Aztreonam on admission--will change this to Merrem; wound culture shows Klebsiella    Debrided by surgery.   9/27 - High risk of rebleeding till closure. Will explore swing bed options closer to his vascular surgeon.  Only came here because [facility] was on bypass.     9/29 - Improving.     9/30 - Continue wound care.     10/1 - High risk of rebleeding till closure. Will explore swing bed options closer to his vascular surgeon. Will need Rehab due to deficits following code. Would have to improve substantially to resume independent living.     10/4 - Family desires to take patient home.  Needs to stay in medical facility till R arm ulcer closed.     10/5 - Sister agreeable to swing bed after discussion.     History of cardiac arrest  Has AICD; continue current meds; BP low at times;he is on Coreg and Amiodarone; he is s/p cardiac arrest from dialysis at [facility] in [place], [place] in August.     Improving but suspected hypoxic brain injury. Continue PT/OT    ESRD (end stage renal disease)  On dialysis TTS. Has R " subclavian tunneled cath.       Arm wound, left, initial encounter  Healing.  Off abx.       Acute metabolic encephalopathy  He is more alert today but confused; probably at baseline; CT brain did not show acute abnormality.     9/29 - Has improved a bit through the week. But still with some cognitive difficulties.     10/1 - Conversant but confused at times.     10/5 - verbal but confused.      Chronic combined systolic and diastolic congestive heart failure  Continue current meds      Diabetes  Does not appear to be on any home meds for DM; will monitor BS levels; BS stable    9/29 - Does not currently meet criteria for DM diagnosis.  Glucoses have been good. A1C 5.3.  Will decrease accuchecks.     Atrial fibrillation  On Amiodarone and Coreg; followed by Cardiology      Chronic hepatitis C  Stable    GERD (gastroesophageal reflux disease)  On Protonix        VTE Risk Mitigation (From admission, onward)         Ordered     heparin (porcine) injection 4,000 Units  As needed (PRN)         09/21/23 1135     IP VTE HIGH RISK PATIENT  Once         09/19/23 0147     Place sequential compression device  Until discontinued         09/19/23 0147     Reason for No Pharmacological VTE Prophylaxis  Once        Question:  Reasons:  Answer:  Physician Provided (leave comment)    09/19/23 0147                Discharge Planning   SANDRA:      Code Status: Full Code   Is the patient medically ready for discharge?:     Reason for patient still in hospital (select all that apply): Treatment  Discharge Plan A: Home with family, Home Health   Discharge Delays: None known at this time              Phil Hernandez Jr, MD  Department of Hospital Medicine   [facility]

## 2023-10-06 NOTE — PROGRESS NOTES
[facility]  [facility] Medicine  Progress Note    Patient Name: Yvan Rollins  MRN: [id number]  Patient Class: IP- Inpatient   Admission Date: 9/19/2023  Length of Stay: 17 days  Attending Physician: Phil Forman MD  Primary Care Provider: Eugene Funez MD        Subjective:     Principal Problem:Arm wound, right, initial encounter        HPI:  Patient is a 63 year old  male who was transferred to [facility] from [place] ED for vascular consultation for a necrotic wound overlying an AV access site in the RT arm. He presented to [place] ED via [facility] from [facility] (referenced to be [facility] from recent hospitalization at [facility] (8/15/2023)). He was recently hospitalized at [facility] after having dyspnea during dialysis that progressed to cardiac arrest with ROSC. The patient was mechanically intubated, started on pressors, and managed in the ICU. According to the discharge summary, the patient had an adverse reaction to surface and infiltration anesthetic and intravenous infiltration.     During hospitalization at [facility], the RT AV access site was unable to be used and a tunneled RT dialysis catheter was placed. He was evaluated by Vascular surgery during that admission with outpatient follow-up. Today, he was seen by wound care at the NH and they recommended return to [facility] for vascular evaluation of the wound. However, [facility] was on Diversion and he ended up at [place] and subsequently [facility].     During his hospitalization at [facility], cardiology was consulted. ECHO demonstrated EF of 25-30% and there was a reported 1 episode of nonsustained v-tach. Cardiology recommended resuming goal directed medical therapy, and there was no identified cardiac etiology for his arrest. He was also evaluated by neurology due to concern for anoxic brain injury.  CT of the brain demonstrated chronic ischemic microangiopathy. Neurology diagnosed his encephalopathy to be multifactorial -  anoxic insult from prolonged cardiac arrest with metabolic derangement. His mental status improved, but not to baseline. Today, at [facility] he   is Aox1 and he is a poor historian. He has difficulty answering questions. History is obtained from medical records.     The patient has a PMH of nonischemic cardiomyopathy with EF 20-25%, S/P AICD placement, ESRD on HD TTS, COPD, HTN, polysubstance abuse, and hepatitis C.       Overview/Hospital Course:  63 year old male presents with necrotic wound of AV fistula site s/p debridement.  He is being treated for a Staph bacteremia.  He is alert.  Patient denies chest pain, shortness of breath, nausea, vomiting, or diarrhea.      No new subjective & objective note has been filed under this hospital service since the last note was generated.      Assessment/Plan:      * Arm wound, right, initial encounter  S/P I&D right hematoma and left I&D of wounds in upper extremities; followed by Surgery; this was due to vasopressor infiltration at [facility]; was placed on Aztreonam on admission--will change this to Merrem; wound culture shows Klebsiella    Debrided by surgery.   9/27 - High risk of rebleeding till closure. Will explore swing bed options closer to his vascular surgeon.  Only came here because [facility] was on bypass.     9/29 - Improving.     9/30 - Continue wound care.     10/1 - High risk of rebleeding till closure. Will explore swing bed options closer to his vascular surgeon. Will need Rehab due to deficits following code. Would have to improve substantially to resume independent living.     10/4 - Family desires to take patient home.  Needs to stay in medical facility till R arm ulcer closed.     10/5 - Sister agreeable to swing bed after discussion.          10/06/2023  Patient is awake alert.  He is undergone physical therapy.  He has a history of cardiac arrest, end-stage renal disease and a wound to his left arm.  Mentally appears to be doing better.  Acute metabolic  encephalopathy has improved.    Lungs are clear with no crackles   Cardiovascular S1-S2 regular rate rhythm   Abdomen is obese soft positive bowel sounds nontender   Extremities slightly edematous     Will place a TB skin test today.        Awaiting swing bed placement                Acute metabolic encephalopathy  He is more alert today but confused; probably at baseline; CT brain did not show acute abnormality.     9/29 - Has improved a bit through the week. But still with some cognitive difficulties.     10/1 - Conversant but confused at times.     10/5 - verbal but confused.      Chronic combined systolic and diastolic congestive heart failure  Continue current meds      Diabetes  Does not appear to be on any home meds for DM; will monitor BS levels; BS stable    9/29 - Does not currently meet criteria for DM diagnosis.  Glucoses have been good. A1C 5.3.  Will decrease accuchecks.     Arm wound, left, initial encounter  Healing.  Off abx.       Atrial fibrillation  On Amiodarone and Coreg; followed by Cardiology      History of cardiac arrest  Has AICD; continue current meds; BP low at times;he is on Coreg and Amiodarone; he is s/p cardiac arrest from dialysis at [facility] in [place], [place] in August.     Improving but suspected hypoxic brain injury. Continue PT/OT    Chronic hepatitis C  Stable    GERD (gastroesophageal reflux disease)  On Protonix      ESRD (end stage renal disease)  On dialysis TTS. Has R subclavian tunneled cath.         VTE Risk Mitigation (From admission, onward)           Ordered     heparin (porcine) injection 4,000 Units  As needed (PRN)         09/21/23 1135     IP VTE HIGH RISK PATIENT  Once         09/19/23 0147     Place sequential compression device  Until discontinued         09/19/23 0147     Reason for No Pharmacological VTE Prophylaxis  Once        Question:  Reasons:  Answer:  Physician Provided (leave comment)    09/19/23 0147                    Discharge Planning   SANDRA:      Code  Status: Full Code   Is the patient medically ready for discharge?:     Reason for patient still in hospital (select all that apply): Treatment  Discharge Plan A: Home with family, Home Health   Discharge Delays: None known at this time              Phil Forman MD  Department of Hospital Medicine   [facility]

## 2023-10-06 NOTE — ASSESSMENT & PLAN NOTE
S/P I&D right hematoma and left I&D of wounds in upper extremities; followed by Surgery; this was due to vasopressor infiltration at [facility]; was placed on Aztreonam on admission--will change this to Merrem; wound culture shows Klebsiella    Debrided by surgery.   9/27 - High risk of rebleeding till closure. Will explore swing bed options closer to his vascular surgeon.  Only came here because [facility] was on bypass.     9/29 - Improving.     9/30 - Continue wound care.     10/1 - High risk of rebleeding till closure. Will explore swing bed options closer to his vascular surgeon. Will need Rehab due to deficits following code. Would have to improve substantially to resume independent living.     10/4 - Family desires to take patient home.  Needs to stay in medical facility till R arm ulcer closed.     10/5 - Sister agreeable to swing bed after discussion.

## 2023-10-06 NOTE — PLAN OF CARE
Rec'd call from Will at [facility] and pt accepted pending insurance auth, ss called Levine, Susan. \Hospital Has a New Name and Outlook.\"" and spoke with Jenna to see about switching pt to [facility] from [facility]-pending chair schedule change, ss to fax needed clinicals to [facility], ss updated pt's sister and she agrees with plan, pt will need tb test/ss sheet for snf placement and ss to fax once available, following

## 2023-10-06 NOTE — SUBJECTIVE & OBJECTIVE
"Interval History: No complaints.   and I spoke with patient's sister on conference call today.  Based on PT patricia I don't recommend she take him home at this time.  She agrees to look at swing.  He needs to be in a medical setting until wound over AV fistula is covered.     Review of Systems  Objective:     Vital Signs (Most Recent):  Temp: 97.5 °F (36.4 °C) (10/05/23 1926)  Pulse: 83 (10/05/23 1926)  Resp: 18 (10/05/23 1926)  BP: 133/78 (10/05/23 1926)  SpO2: 98 % (10/05/23 1926) Vital Signs (24h Range):  Temp:  [97.5 °F (36.4 °C)-98.2 °F (36.8 °C)] 97.5 °F (36.4 °C)  Pulse:  [62-83] 83  Resp:  [16-19] 18  SpO2:  [93 %-98 %] 98 %  BP: ()/(44-96) 133/78     Weight: 107.5 kg (237 lb)  Body mass index is 32.14 kg/m².    Intake/Output Summary (Last 24 hours) at 10/5/2023 1949  Last data filed at 10/5/2023 1727  Gross per 24 hour   Intake 360 ml   Output 439 ml   Net -79 ml         Physical Exam  Vitals reviewed.   Constitutional:       General: He is not in acute distress.     Appearance: Normal appearance. He is not toxic-appearing.   HENT:      Head: Normocephalic and atraumatic.   Cardiovascular:      Rate and Rhythm: Normal rate and regular rhythm.      Heart sounds: Normal heart sounds.   Pulmonary:      Effort: Pulmonary effort is normal. No respiratory distress.      Breath sounds: Normal breath sounds. No wheezing.   Abdominal:      General: Abdomen is flat. Bowel sounds are normal. There is no distension.      Palpations: Abdomen is soft.   Skin:     General: Skin is warm and dry.   Neurological:      Mental Status: He is alert.             Significant Labs: All pertinent labs within the past 24 hours have been reviewed.  BMP: No results for input(s): "GLU", "NA", "K", "CL", "CO2", "BUN", "CREATININE", "CALCIUM", "MG" in the last 48 hours.  CBC: No results for input(s): "WBC", "HGB", "HCT", "PLT" in the last 48 hours.    Significant Imaging: I have reviewed all pertinent imaging " results/findings within the past 24 hours.

## 2023-10-06 NOTE — PLAN OF CARE
Rec'd call from Yazmin from [address] and they will have a chair for pt upon dc BUT ss needs to call to confirm once pt accepted to a SNF and is ready for dc, following

## 2023-10-06 NOTE — PROGRESS NOTES
[facility]  Nephrology  Progress Note    Patient Name: Yvan Rollins  MRN: [id number]  Admission Date: 9/19/2023  Hospital Length of Stay: 17 days  Attending Provider: Phil Forman MD   Primary Care Physician: Eugene Funez MD  Principal Problem:Arm wound, right, initial encounter    Consults  Subjective:     Interval History: Mr. Rollins is seen in f/u of his ESRD and arm wounds. He dialyzed yesterday. He's alert and calm with a systolic of about 150. His bp has tended to drop significantly with dialysis. We reduced his Coreg by half yesterday in hopes of less dialysis hypotension.    Review of patient's allergies indicates:   Allergen Reactions    Ceftriaxone Swelling    Lisinopril Swelling and Rash     Facial swelling   Facial swelling       Current Facility-Administered Medications   Medication Frequency    0.9%  NaCl infusion PRN    acetaminophen tablet 650 mg Q4H PRN    albuterol-ipratropium 2.5 mg-0.5 mg/3 mL nebulizer solution 3 mL BID    amiodarone tablet 200 mg Daily    aspirin EC tablet 81 mg Daily    atorvastatin tablet 40 mg QHS    carvediloL tablet 3.125 mg BID WM    dextrose 10% bolus 125 mL 125 mL PRN    dextrose 10% bolus 250 mL 250 mL PRN    glucagon (human recombinant) injection 1 mg PRN    heparin (porcine) injection 4,000 Units PRN    HYDROcodone-acetaminophen  mg per tablet 1 tablet Q6H PRN    HYDROcodone-acetaminophen 5-325 mg per tablet 1 tablet Q6H PRN    insulin aspart U-100 injection 0-5 Units Q6H PRN    midodrine tablet 10 mg TID WM    montelukast tablet 10 mg QHS    naloxone 0.4 mg/mL injection 0.02 mg PRN    OLANZapine tablet 2.5 mg QHS    ondansetron injection 4 mg Q8H PRN    pantoprazole EC tablet 40 mg Daily    sevelamer carbonate tablet 800 mg TID    sodium chloride 0.9% flush 10 mL Q12H PRN       Objective:     Vital Signs (Most Recent):  Temp: 97.8 °F (36.6 °C) (10/06/23 0600)  Pulse: 81 (10/06/23 0738)  Resp: 16 (10/06/23 0738)  BP: (!)  "161/95 (10/06/23 0810)  SpO2: 98 % (10/06/23 0738) Vital Signs (24h Range):  Temp:  [97.5 °F (36.4 °C)-98.2 °F (36.8 °C)] 97.8 °F (36.6 °C)  Pulse:  [68-85] 81  Resp:  [16-20] 16  SpO2:  [93 %-99 %] 98 %  BP: ()/(44-95) 161/95     Weight: 107.5 kg (237 lb) (09/18/23 2315)  Body mass index is 32.14 kg/m².  Body surface area is 2.34 meters squared.    I/O last 3 completed shifts:  In: 360 [P.O.:360]  Out: 439 [Other:439]    Physical Exam No edema. Chest clear.     Significant Labs:sureBMP: No results for input(s): "GLU", "NA", "K", "CL", "CO2", "BUN", "CREATININE", "CALCIUM", "MG" in the last 168 hours.  CBC: No results for input(s): "WBC", "RBC", "HGB", "HCT", "PLT", "MCV", "MCH", "MCHC" in the last 168 hours.  All labs within the past 24 hours have been reviewed.    Significant Imaging:  Labs: Reviewed    Assessment/Plan:     Active Diagnoses:    Diagnosis Date Noted POA    PRINCIPAL PROBLEM:  Arm wound, right, initial encounter [S41.101A] 09/19/2023 Yes    Acute metabolic encephalopathy [G93.41] 09/22/2023 Yes    ESRD (end stage renal disease) [N18.6] 09/19/2023 Yes    History of cardiac arrest [Z86.74] 09/19/2023 Not Applicable    Atrial fibrillation [I48.91] 09/19/2023 Yes    Arm wound, left, initial encounter [S41.102A] 09/19/2023 Yes    Chronic combined systolic and diastolic congestive heart failure [I50.42] 09/19/2023 Yes      Problems Resolved During this Admission:    Diagnosis Date Noted Date Resolved POA    Bacteremia [R78.81] 09/20/2023 09/27/2023 Unknown    Penile discharge [R36.9] 09/20/2023 09/29/2023 Yes    Dysphagia [R13.10] 09/19/2023 09/19/2023 Yes    Presence of automatic cardioverter/defibrillator (AICD) [Z95.810] 09/19/2023 09/19/2023 Yes    CAD (coronary artery disease) [I25.10] 09/19/2023 09/19/2023 Unknown    HFrEF (heart failure with reduced ejection fraction) [I50.20] 09/19/2023 09/20/2023 Yes    Hypotension [I95.9] 09/19/2023 09/19/2023 Yes    Impaired mobility [Z74.09] 09/19/2023 " 09/19/2023 Yes    Sacral wound [S31.000A] 09/19/2023 09/19/2023 Yes    COPD (chronic obstructive pulmonary disease) [J44.9] 09/19/2023 09/19/2023 Yes    Hematoma [T14.8XXA] 09/19/2023 09/19/2023 Yes       Dialysis tomorrow. Looks like he can do w/o Midodrine except on dialysis. I'll try to order that to be given just prior to and 1 hr into dialysis.    Thank you for your consult. I will follow-up with patient. Please contact us if you have any additional questions.    Alfred Valdez MD  Nephrology  [facility]

## 2023-10-06 NOTE — ASSESSMENT & PLAN NOTE
He is more alert today but confused; probably at baseline; CT brain did not show acute abnormality.     9/29 - Has improved a bit through the week. But still with some cognitive difficulties.     10/1 - Conversant but confused at times.     10/5 - verbal but confused.

## 2023-10-06 NOTE — PT/OT/SLP PROGRESS
Physical Therapy Treatment    Patient Name:  Yvan Rollins   MRN:  [id number]    Recommendations:     Discharge Recommendations: nursing facility, skilled  Discharge Equipment Recommendations: walker, rolling, wheelchair, hospital bed  Barriers to discharge:  ongoing medical treatment    Assessment:     Yvan Rollins is a 63 y.o. male admitted with a medical diagnosis of Arm wound, right, initial encounter.  He presents with the following impairments/functional limitations: weakness, impaired endurance, impaired cardiopulmonary response to activity, impaired skin, impaired self care skills, impaired functional mobility, gait instability.    Pt received new wheelchair and was educated on functionality/features of same.  Pt voiced and demonstrated understanding, however, will require reinforcement.  Pt able to propel w/c short distance requiring heavy verbal cueing and exhibiting increased fatigue. Pt cont to present with ataxia pattern during ambulation with knees buckling during same    Rehab Prognosis: Fair; patient would benefit from acute skilled PT services to address these deficits and reach maximum level of function.    Recent Surgery: Procedure(s) (LRB):  INCISION AND DRAINAGE, HEMATOMA (Right)  IRRIGATION AND DEBRIDEMENT (Left) 16 Days Post-Op    Plan:     During this hospitalization, patient to be seen 5 x/week to address the identified rehab impairments via gait training, therapeutic activities, therapeutic exercises, neuromuscular re-education and progress toward the following goals:    Plan of Care Expires:       Subjective     Chief Complaint: arm wound  Patient/Family Comments/goals: pt agreeble  Pain/Comfort:         Objective:     Communicated with Roopa Siegel RN prior to session.  Patient found HOB elevated with peripheral IV, oxygen upon PT entry to room.     General Precautions: Standard, fall  Orthopedic Precautions: N/A  Braces: N/A  Respiratory Status: Nasal cannula, flow 2 L/min      Functional Mobility:  Bed Mobility:     Rolling Left:  contact guard assistance  Rolling Right: contact guard assistance  Supine to Sit: supervision  Transfers:     Sit to Stand:  minimum assistance and of 1-2 persons with rolling walker  Gait: 14'. 6' with RW and minimum assist increasing to moderate assist when knees buckled on each trial  Wheelchair Propulsion:  Pt propelled Standard wheelchair x 45 feet on Level tile with  Bilateral upper extremity with Contact Guard Assistance, Minimal Assistance, and with heavy cueing.       AM-PAC 6 CLICK MOBILITY  Turning over in bed (including adjusting bedclothes, sheets and blankets)?: 3  Sitting down on and standing up from a chair with arms (e.g., wheelchair, bedside commode, etc.): 3  Moving from lying on back to sitting on the side of the bed?: 4  Moving to and from a bed to a chair (including a wheelchair)?: 3  Need to walk in hospital room?: 3  Climbing 3-5 steps with a railing?: 1  Basic Mobility Total Score: 17       Treatment & Education:  W/c propulsion/management    Patient left up in chair with all lines intact and call button in reach..    GOALS:   Multidisciplinary Problems       Physical Therapy Goals          Problem: Physical Therapy    Goal Priority Disciplines Outcome Goal Variances Interventions   Physical Therapy Goal     PT, PT/OT Ongoing, Progressing     Description: Short Term Goals  Ambulate Mod - 10 feet with rolling walker assistive device.   Supine to sit contact guard  Sit to stand contact guard  SPT contact guard  5. Independent with HEP    Long Term Goals  Ambulate CGA - 20 feet with rolling walker assistive device.   Supine to sit stand-by  Sit to stand stand-by  SPT stand-by  Needed equipment for home.                              Time Tracking:     PT Received On: 10/06/23  PT Start Time: 0923     PT Stop Time: 1000  PT Total Time (min): 37 min     Billable Minutes: Gait Training 8 and Train/Wheelchair Management 20    Treatment Type:  Treatment  PT/PTA: PTA     Number of PTA visits since last PT visit: 1     10/06/2023

## 2023-10-07 LAB
ANION GAP SERPL CALCULATED.3IONS-SCNC: 11 MMOL/L (ref 7–16)
BASOPHILS # BLD AUTO: 0.05 K/UL (ref 0–0.2)
BASOPHILS NFR BLD AUTO: 0.5 % (ref 0–1)
BUN SERPL-MCNC: 31 MG/DL (ref 7–18)
BUN/CREAT SERPL: 6 (ref 6–20)
CALCIUM SERPL-MCNC: 8.6 MG/DL (ref 8.5–10.1)
CHLORIDE SERPL-SCNC: 103 MMOL/L (ref 98–107)
CO2 SERPL-SCNC: 28 MMOL/L (ref 21–32)
CREAT SERPL-MCNC: 5.41 MG/DL (ref 0.7–1.3)
DIFFERENTIAL METHOD BLD: ABNORMAL
EGFR (NO RACE VARIABLE) (RUSH/TITUS): 11 ML/MIN/1.73M2
EOSINOPHIL # BLD AUTO: 0.42 K/UL (ref 0–0.5)
EOSINOPHIL NFR BLD AUTO: 4.6 % (ref 1–4)
ERYTHROCYTE [DISTWIDTH] IN BLOOD BY AUTOMATED COUNT: 14 % (ref 11.5–14.5)
GLUCOSE SERPL-MCNC: 117 MG/DL (ref 70–105)
GLUCOSE SERPL-MCNC: 71 MG/DL (ref 74–106)
GLUCOSE SERPL-MCNC: 82 MG/DL (ref 70–105)
HCT VFR BLD AUTO: 34.2 % (ref 40–54)
HGB BLD-MCNC: 11.2 G/DL (ref 13.5–18)
IMM GRANULOCYTES # BLD AUTO: 0.03 K/UL (ref 0–0.04)
IMM GRANULOCYTES NFR BLD: 0.3 % (ref 0–0.4)
LYMPHOCYTES # BLD AUTO: 0.78 K/UL (ref 1–4.8)
LYMPHOCYTES NFR BLD AUTO: 8.5 % (ref 27–41)
MCH RBC QN AUTO: 31.8 PG (ref 27–31)
MCHC RBC AUTO-ENTMCNC: 32.7 G/DL (ref 32–36)
MCV RBC AUTO: 97.2 FL (ref 80–96)
MONOCYTES # BLD AUTO: 0.74 K/UL (ref 0–0.8)
MONOCYTES NFR BLD AUTO: 8 % (ref 2–6)
MPC BLD CALC-MCNC: 10.5 FL (ref 9.4–12.4)
NEUTROPHILS # BLD AUTO: 7.19 K/UL (ref 1.8–7.7)
NEUTROPHILS NFR BLD AUTO: 78.1 % (ref 53–65)
NRBC # BLD AUTO: 0 X10E3/UL
NRBC, AUTO (.00): 0 %
PLATELET # BLD AUTO: 114 K/UL (ref 150–400)
POTASSIUM SERPL-SCNC: 3.8 MMOL/L (ref 3.5–5.1)
RBC # BLD AUTO: 3.52 M/UL (ref 4.6–6.2)
SODIUM SERPL-SCNC: 138 MMOL/L (ref 136–145)
WBC # BLD AUTO: 9.21 K/UL (ref 4.5–11)

## 2023-10-07 PROCEDURE — 63600175 PHARM REV CODE 636 W HCPCS: Performed by: INTERNAL MEDICINE

## 2023-10-07 PROCEDURE — 90935 HEMODIALYSIS ONE EVALUATION: CPT

## 2023-10-07 PROCEDURE — 80048 BASIC METABOLIC PNL TOTAL CA: CPT | Performed by: INTERNAL MEDICINE

## 2023-10-07 PROCEDURE — 85025 COMPLETE CBC W/AUTO DIFF WBC: CPT | Performed by: INTERNAL MEDICINE

## 2023-10-07 PROCEDURE — 25000003 PHARM REV CODE 250: Performed by: INTERNAL MEDICINE

## 2023-10-07 PROCEDURE — 25000242 PHARM REV CODE 250 ALT 637 W/ HCPCS: Performed by: HOSPITALIST

## 2023-10-07 PROCEDURE — 99232 SBSQ HOSP IP/OBS MODERATE 35: CPT | Mod: ,,, | Performed by: INTERNAL MEDICINE

## 2023-10-07 PROCEDURE — 99232 PR SUBSEQUENT HOSPITAL CARE,LEVL II: ICD-10-PCS | Mod: ,,, | Performed by: INTERNAL MEDICINE

## 2023-10-07 PROCEDURE — 82962 GLUCOSE BLOOD TEST: CPT

## 2023-10-07 PROCEDURE — 99900035 HC TECH TIME PER 15 MIN (STAT)

## 2023-10-07 PROCEDURE — 94761 N-INVAS EAR/PLS OXIMETRY MLT: CPT

## 2023-10-07 PROCEDURE — 11000001 HC ACUTE MED/SURG PRIVATE ROOM

## 2023-10-07 PROCEDURE — 94640 AIRWAY INHALATION TREATMENT: CPT

## 2023-10-07 PROCEDURE — 25000003 PHARM REV CODE 250

## 2023-10-07 RX ADMIN — SODIUM CHLORIDE: 9 INJECTION, SOLUTION INTRAVENOUS at 08:10

## 2023-10-07 RX ADMIN — HEPARIN SODIUM 4000 UNITS: 1000 INJECTION, SOLUTION INTRAVENOUS; SUBCUTANEOUS at 08:10

## 2023-10-07 RX ADMIN — IPRATROPIUM BROMIDE AND ALBUTEROL SULFATE 3 ML: 2.5; .5 SOLUTION RESPIRATORY (INHALATION) at 07:10

## 2023-10-07 RX ADMIN — CARVEDILOL 3.12 MG: 3.12 TABLET, FILM COATED ORAL at 05:10

## 2023-10-07 RX ADMIN — ATORVASTATIN CALCIUM 40 MG: 40 TABLET, FILM COATED ORAL at 09:10

## 2023-10-07 RX ADMIN — SEVELAMER CARBONATE 800 MG: 800 TABLET, FILM COATED ORAL at 09:10

## 2023-10-07 RX ADMIN — AMIODARONE HYDROCHLORIDE 200 MG: 200 TABLET ORAL at 02:10

## 2023-10-07 RX ADMIN — AMLODIPINE BESYLATE 5 MG: 5 TABLET ORAL at 09:10

## 2023-10-07 RX ADMIN — OLANZAPINE 2.5 MG: 2.5 TABLET, FILM COATED ORAL at 09:10

## 2023-10-07 RX ADMIN — PANTOPRAZOLE SODIUM 40 MG: 40 TABLET, DELAYED RELEASE ORAL at 02:10

## 2023-10-07 RX ADMIN — ASPIRIN 81 MG: 81 TABLET, COATED ORAL at 02:10

## 2023-10-07 RX ADMIN — MONTELUKAST SODIUM 10 MG: 10 TABLET, FILM COATED ORAL at 09:10

## 2023-10-07 RX ADMIN — SEVELAMER CARBONATE 800 MG: 800 TABLET, FILM COATED ORAL at 02:10

## 2023-10-07 RX ADMIN — MIDODRINE HYDROCHLORIDE 10 MG: 5 TABLET ORAL at 07:10

## 2023-10-07 NOTE — PROGRESS NOTES
[facility]  [facility] Medicine  Progress Note    Patient Name: Yvan Rollins  MRN: [id number]  Patient Class: IP- Inpatient   Admission Date: 9/19/2023  Length of Stay: 18 days  Attending Physician: Phil Forman MD  Primary Care Provider: Eugene Funez MD        Subjective:     Principal Problem:Arm wound, right, initial encounter        HPI:  Patient is a 63 year old  male who was transferred to [facility] from [place] ED for vascular consultation for a necrotic wound overlying an AV access site in the RT arm. He presented to [facility] ED via [facility] from [facility] (referenced to be [facility] from recent hospitalization at [facility] (8/15/2023)). He was recently hospitalized at [facility] after having dyspnea during dialysis that progressed to cardiac arrest with ROSC. The patient was mechanically intubated, started on pressors, and managed in the ICU. According to the discharge summary, the patient had an adverse reaction to surface and infiltration anesthetic and intravenous infiltration.     During hospitalization at [facility], the RT AV access site was unable to be used and a tunneled RT dialysis catheter was placed. He was evaluated by Vascular surgery during that admission with outpatient follow-up. Today, he was seen by wound care at the NH and they recommended return to [facility] for vascular evaluation of the wound. However, [facility] was on Diversion and he ended up at [facility] and subsequently [facility].     During his hospitalization at [facility], cardiology was consulted. ECHO demonstrated EF of 25-30% and there was a reported 1 episode of nonsustained v-tach. Cardiology recommended resuming goal directed medical therapy, and there was no identified cardiac etiology for his arrest. He was also evaluated by neurology due to concern for anoxic brain injury.  CT of the brain demonstrated chronic ischemic microangiopathy. Neurology diagnosed his encephalopathy to be multifactorial -  anoxic insult from prolonged cardiac arrest with metabolic derangement. His mental status improved, but not to baseline. Today, at [facility] he   is Aox1 and he is a poor historian. He has difficulty answering questions. History is obtained from medical records.     The patient has a PMH of nonischemic cardiomyopathy with EF 20-25%, S/P AICD placement, ESRD on HD TTS, COPD, HTN, polysubstance abuse, and hepatitis C.       Overview/Hospital Course:  63 year old male presents with necrotic wound of AV fistula site s/p debridement.  He is being treated for a Staph bacteremia.  He is alert.  Patient denies chest pain, shortness of breath, nausea, vomiting, or diarrhea.      No new subjective & objective note has been filed under this hospital service since the last note was generated.      Assessment/Plan:      * Arm wound, right, initial encounter  S/P I&D right hematoma and left I&D of wounds in upper extremities; followed by Surgery; this was due to vasopressor infiltration at [facility]; was placed on Aztreonam on admission--will change this to Merrem; wound culture shows Klebsiella    Debrided by surgery.   9/27 - High risk of rebleeding till closure. Will explore swing bed options closer to his vascular surgeon.  Only came here because [facility] was on bypass.     9/29 - Improving.     9/30 - Continue wound care.     10/1 - High risk of rebleeding till closure. Will explore swing bed options closer to his vascular surgeon. Will need Rehab due to deficits following code. Would have to improve substantially to resume independent living.     10/4 - Family desires to take patient home.  Needs to stay in medical facility till R arm ulcer closed.     10/5 - Sister agreeable to swing bed after discussion.          10/07/2023  Patient voiced no new complaints today.  He is receiving hemodialysis Tuesday Thursday he and Saturday.  Patient tolerated dialysis well today.  Patient is awake alert.  He is undergone physical therapy.  He  has a history of cardiac arrest, end-stage renal disease and a wound to his left arm.  Mentally appears to be doing better.  Acute metabolic encephalopathy has improved.    Lungs are clear with no crackles   Cardiovascular S1-S2 regular rate rhythm   Abdomen is obese soft positive bowel sounds nontender   Extremities slightly edematous     Will place a TB skin test today.        Awaiting swing bed placement                Acute metabolic encephalopathy  He is more alert today but confused; probably at baseline; CT brain did not show acute abnormality.     9/29 - Has improved a bit through the week. But still with some cognitive difficulties.     10/1 - Conversant but confused at times.     10/5 - verbal but confused.      Chronic combined systolic and diastolic congestive heart failure  Continue current meds      Diabetes  Does not appear to be on any home meds for DM; will monitor BS levels; BS stable    9/29 - Does not currently meet criteria for DM diagnosis.  Glucoses have been good. A1C 5.3.  Will decrease accuchecks.     Arm wound, left, initial encounter  Healing.  Off abx.       Atrial fibrillation  On Amiodarone and Coreg; followed by Cardiology      History of cardiac arrest  Has AICD; continue current meds; BP low at times;he is on Coreg and Amiodarone; he is s/p cardiac arrest from dialysis at [facility] in [place], [place] in August.     Improving but suspected hypoxic brain injury. Continue PT/OT    Chronic hepatitis C  Stable    GERD (gastroesophageal reflux disease)  On Protonix      ESRD (end stage renal disease)  On dialysis TTS. Has R subclavian tunneled cath.         VTE Risk Mitigation (From admission, onward)           Ordered     heparin (porcine) injection 4,000 Units  As needed (PRN)         09/21/23 1135     IP VTE HIGH RISK PATIENT  Once         09/19/23 0147     Place sequential compression device  Until discontinued         09/19/23 0147     Reason for No Pharmacological VTE Prophylaxis  Once         Question:  Reasons:  Answer:  Physician Provided (leave comment)    09/19/23 0147                    Discharge Planning   SANDRA:      Code Status: Full Code   Is the patient medically ready for discharge?:     Reason for patient still in hospital (select all that apply): Treatment  Discharge Plan A: Home with family, Home Health   Discharge Delays: None known at this time              Phil Forman MD  Department of Hospital Medicine   [facility]

## 2023-10-08 LAB — GLUCOSE SERPL-MCNC: 79 MG/DL (ref 70–105)

## 2023-10-08 PROCEDURE — 94640 AIRWAY INHALATION TREATMENT: CPT

## 2023-10-08 PROCEDURE — 99900035 HC TECH TIME PER 15 MIN (STAT)

## 2023-10-08 PROCEDURE — 25000003 PHARM REV CODE 250: Performed by: INTERNAL MEDICINE

## 2023-10-08 PROCEDURE — 99232 SBSQ HOSP IP/OBS MODERATE 35: CPT | Mod: ,,, | Performed by: INTERNAL MEDICINE

## 2023-10-08 PROCEDURE — 25000242 PHARM REV CODE 250 ALT 637 W/ HCPCS: Performed by: HOSPITALIST

## 2023-10-08 PROCEDURE — 25000003 PHARM REV CODE 250

## 2023-10-08 PROCEDURE — 94761 N-INVAS EAR/PLS OXIMETRY MLT: CPT

## 2023-10-08 PROCEDURE — 82962 GLUCOSE BLOOD TEST: CPT

## 2023-10-08 PROCEDURE — 99232 PR SUBSEQUENT HOSPITAL CARE,LEVL II: ICD-10-PCS | Mod: ,,, | Performed by: INTERNAL MEDICINE

## 2023-10-08 PROCEDURE — 11000001 HC ACUTE MED/SURG PRIVATE ROOM

## 2023-10-08 RX ADMIN — AMIODARONE HYDROCHLORIDE 200 MG: 200 TABLET ORAL at 08:10

## 2023-10-08 RX ADMIN — SEVELAMER CARBONATE 800 MG: 800 TABLET, FILM COATED ORAL at 08:10

## 2023-10-08 RX ADMIN — CARVEDILOL 3.12 MG: 3.12 TABLET, FILM COATED ORAL at 05:10

## 2023-10-08 RX ADMIN — PANTOPRAZOLE SODIUM 40 MG: 40 TABLET, DELAYED RELEASE ORAL at 08:10

## 2023-10-08 RX ADMIN — AMLODIPINE BESYLATE 5 MG: 5 TABLET ORAL at 08:10

## 2023-10-08 RX ADMIN — ASPIRIN 81 MG: 81 TABLET, COATED ORAL at 08:10

## 2023-10-08 RX ADMIN — ATORVASTATIN CALCIUM 40 MG: 40 TABLET, FILM COATED ORAL at 08:10

## 2023-10-08 RX ADMIN — OLANZAPINE 2.5 MG: 2.5 TABLET, FILM COATED ORAL at 08:10

## 2023-10-08 RX ADMIN — IPRATROPIUM BROMIDE AND ALBUTEROL SULFATE 3 ML: 2.5; .5 SOLUTION RESPIRATORY (INHALATION) at 07:10

## 2023-10-08 RX ADMIN — CARVEDILOL 3.12 MG: 3.12 TABLET, FILM COATED ORAL at 08:10

## 2023-10-08 RX ADMIN — SEVELAMER CARBONATE 800 MG: 800 TABLET, FILM COATED ORAL at 02:10

## 2023-10-08 RX ADMIN — MONTELUKAST SODIUM 10 MG: 10 TABLET, FILM COATED ORAL at 08:10

## 2023-10-08 NOTE — PROGRESS NOTES
[facility]  [facility] Medicine  Progress Note    Patient Name: Yvan Rollins  MRN: [id number]  Patient Class: IP- Inpatient   Admission Date: 9/19/2023  Length of Stay: 19 days  Attending Physician: Phil Forman MD  Primary Care Provider: Eugene Funez MD        Subjective:     Principal Problem:Arm wound, right, initial encounter        HPI:  Patient is a 63 year old  male who was transferred to [facility] from [place] ED for vascular consultation for a necrotic wound overlying an AV access site in the RT arm. He presented to [place] ED via [facility] from [facility] (referenced to be [facility] from recent hospitalization at [facility] (8/15/2023)). He was recently hospitalized at [facility] after having dyspnea during dialysis that progressed to cardiac arrest with ROSC. The patient was mechanically intubated, started on pressors, and managed in the ICU. According to the discharge summary, the patient had an adverse reaction to surface and infiltration anesthetic and intravenous infiltration.     During hospitalization at [facility], the RT AV access site was unable to be used and a tunneled RT dialysis catheter was placed. He was evaluated by Vascular surgery during that admission with outpatient follow-up. Today, he was seen by wound care at the NH and they recommended return to [facility] for vascular evaluation of the wound. However, [facility] was on Diversion and he ended up at [place] and subsequently [facility].     During his hospitalization at [facility], cardiology was consulted. ECHO demonstrated EF of 25-30% and there was a reported 1 episode of nonsustained v-tach. Cardiology recommended resuming goal directed medical therapy, and there was no identified cardiac etiology for his arrest. He was also evaluated by neurology due to concern for anoxic brain injury.  CT of the brain demonstrated chronic ischemic microangiopathy. Neurology diagnosed his encephalopathy to be multifactorial -  anoxic insult from prolonged cardiac arrest with metabolic derangement. His mental status improved, but not to baseline. Today, at [facility] he   is Aox1 and he is a poor historian. He has difficulty answering questions. History is obtained from medical records.     The patient has a PMH of nonischemic cardiomyopathy with EF 20-25%, S/P AICD placement, ESRD on HD TTS, COPD, HTN, polysubstance abuse, and hepatitis C.       Overview/Hospital Course:  63 year old male presents with necrotic wound of AV fistula site s/p debridement.  He is being treated for a Staph bacteremia.  He is alert.  Patient denies chest pain, shortness of breath, nausea, vomiting, or diarrhea.      No new subjective & objective note has been filed under this hospital service since the last note was generated.      Assessment/Plan:      * Arm wound, right, initial encounter  S/P I&D right hematoma and left I&D of wounds in upper extremities; followed by Surgery; this was due to vasopressor infiltration at [facility]; was placed on Aztreonam on admission--will change this to Merrem; wound culture shows Klebsiella    Debrided by surgery.   9/27 - High risk of rebleeding till closure. Will explore swing bed options closer to his vascular surgeon.  Only came here because [facility] was on bypass.     9/29 - Improving.     9/30 - Continue wound care.     10/1 - High risk of rebleeding till closure. Will explore swing bed options closer to his vascular surgeon. Will need Rehab due to deficits following code. Would have to improve substantially to resume independent living.     10/4 - Family desires to take patient home.  Needs to stay in medical facility till R arm ulcer closed.     10/5 - Sister agreeable to swing bed after discussion.          10/08/2023  Patient stated he feels just fine today.  No family is at the bedside today.    No major issues over the last 24 hours.  Mental status is improving.  Patient voiced no new complaints today.  He is receiving  hemodialysis Tuesday Thursday he and Saturday.  Lungs are clear no crackles or wheezing   Cardiovascular S1-S2 regular rate rhythm  Abdomen is soft positive bowel sounds nontender  Extremities nonedematous.  Patient tolerated dialysis well today.  Patient is awake alert.  He is undergone physical therapy.  He has a history of cardiac arrest, end-stage renal disease and a wound to his left arm.  Mentally appears to be doing better.  Acute metabolic encephalopathy has improved.    Awaiting swing bed placement                Acute metabolic encephalopathy  He is more alert today but confused; probably at baseline; CT brain did not show acute abnormality.     9/29 - Has improved a bit through the week. But still with some cognitive difficulties.     10/1 - Conversant but confused at times.     10/5 - verbal but confused.      Chronic combined systolic and diastolic congestive heart failure  Continue current meds      Diabetes  Does not appear to be on any home meds for DM; will monitor BS levels; BS stable    9/29 - Does not currently meet criteria for DM diagnosis.  Glucoses have been good. A1C 5.3.  Will decrease accuchecks.     Arm wound, left, initial encounter  Healing.  Off abx.       Atrial fibrillation  On Amiodarone and Coreg; followed by Cardiology      History of cardiac arrest  Has AICD; continue current meds; BP low at times;he is on Coreg and Amiodarone; he is s/p cardiac arrest from dialysis at [facility] in [place], [place] in August.     Improving but suspected hypoxic brain injury. Continue PT/OT    Chronic hepatitis C  Stable    GERD (gastroesophageal reflux disease)  On Protonix      ESRD (end stage renal disease)  On dialysis TTS. Has R subclavian tunneled cath.         VTE Risk Mitigation (From admission, onward)           Ordered     heparin (porcine) injection 4,000 Units  As needed (PRN)         09/21/23 1135     IP VTE HIGH RISK PATIENT  Once         09/19/23 0147     Place sequential compression  device  Until discontinued         09/19/23 0147     Reason for No Pharmacological VTE Prophylaxis  Once        Question:  Reasons:  Answer:  Physician Provided (leave comment)    09/19/23 0147                    Discharge Planning   SANDRA:      Code Status: Full Code   Is the patient medically ready for discharge?:     Reason for patient still in hospital (select all that apply): Treatment  Discharge Plan A: Home with family, Home Health   Discharge Delays: None known at this time              Phil Forman MD  Department of Hospital Medicine   [facility]

## 2023-10-08 NOTE — PROGRESS NOTES
[facility]  Nephrology  Progress Note    Patient Name: Yvan Rollins  MRN: [id number]  Admission Date: 9/19/2023  Hospital Length of Stay: 19 days  Attending Provider: Wood Ridley MD   Primary Care Physician: Eugene Funez MD  Principal Problem:Arm wound, right, initial encounter    Consults  Subjective:     Interval History: The patient has no complaints today    Review of patient's allergies indicates:   Allergen Reactions    Ceftriaxone Swelling    Lisinopril Swelling and Rash     Facial swelling   Facial swelling       Current Facility-Administered Medications   Medication Frequency    0.9%  NaCl infusion PRN    acetaminophen tablet 650 mg Q4H PRN    albuterol-ipratropium 2.5 mg-0.5 mg/3 mL nebulizer solution 3 mL BID    amiodarone tablet 200 mg Daily    amLODIPine tablet 5 mg BID    aspirin EC tablet 81 mg Daily    atorvastatin tablet 40 mg QHS    carvediloL tablet 3.125 mg BID WM    dextrose 10% bolus 125 mL 125 mL PRN    dextrose 10% bolus 250 mL 250 mL PRN    glucagon (human recombinant) injection 1 mg PRN    heparin (porcine) injection 4,000 Units PRN    HYDROcodone-acetaminophen  mg per tablet 1 tablet Q6H PRN    HYDROcodone-acetaminophen 5-325 mg per tablet 1 tablet Q6H PRN    insulin aspart U-100 injection 0-5 Units Q6H PRN    montelukast tablet 10 mg QHS    naloxone 0.4 mg/mL injection 0.02 mg PRN    OLANZapine tablet 2.5 mg QHS    ondansetron injection 4 mg Q8H PRN    pantoprazole EC tablet 40 mg Daily    sevelamer carbonate tablet 800 mg TID    sodium chloride 0.9% flush 10 mL Q12H PRN       Objective:     Vital Signs (Most Recent):  Temp: 98.5 °F (36.9 °C) (10/08/23 1600)  Pulse: 91 (10/08/23 1600)  Resp: 15 (10/08/23 1600)  BP: 122/74 (10/08/23 1600)  SpO2: (!) 91 % (10/08/23 1600) Vital Signs (24h Range):  Temp:  [97.5 °F (36.4 °C)-98.8 °F (37.1 °C)] 98.5 °F (36.9 °C)  Pulse:  [63-91] 91  Resp:  [15-20] 15  SpO2:  [90 %-100 %] 91 %  BP: (106-162)/() 122/74      Weight: 107.5 kg (237 lb) (09/18/23 2315)  Body mass index is 32.14 kg/m².  Body surface area is 2.34 meters squared.    I/O last 3 completed shifts:  In: -   Out: 2000 [Other:2000]    Physical Exam  Lungs-crackles at the bases  Cor-no murmur or rub  Abd-soft    Significant Labs:sureCBC:   Recent Labs   Lab 10/07/23  1108   WBC 9.21   RBC 3.52*   HGB 11.2*   HCT 34.2*   *   MCV 97.2*   MCH 31.8*   MCHC 32.7     CMP:   Recent Labs   Lab 10/07/23  1107   GLU 71*   CALCIUM 8.6      K 3.8   CO2 28      BUN 31*   CREATININE 5.41*     All labs within the past 24 hours have been reviewed.    Significant Imaging:      Assessment/Plan:     Active Diagnoses:    Diagnosis Date Noted POA    PRINCIPAL PROBLEM:  Arm wound, right, initial encounter [S41.101A] 09/19/2023 Yes    Acute metabolic encephalopathy [G93.41] 09/22/2023 Yes    ESRD (end stage renal disease) [N18.6] 09/19/2023 Yes    History of cardiac arrest [Z86.74] 09/19/2023 Not Applicable    Atrial fibrillation [I48.91] 09/19/2023 Yes    Arm wound, left, initial encounter [S41.102A] 09/19/2023 Yes    Chronic combined systolic and diastolic congestive heart failure [I50.42] 09/19/2023 Yes      Problems Resolved During this Admission:    Diagnosis Date Noted Date Resolved POA    Bacteremia [R78.81] 09/20/2023 09/27/2023 Unknown    Penile discharge [R36.9] 09/20/2023 09/29/2023 Yes    Dysphagia [R13.10] 09/19/2023 09/19/2023 Yes    Presence of automatic cardioverter/defibrillator (AICD) [Z95.810] 09/19/2023 09/19/2023 Yes    CAD (coronary artery disease) [I25.10] 09/19/2023 09/19/2023 Unknown    HFrEF (heart failure with reduced ejection fraction) [I50.20] 09/19/2023 09/20/2023 Yes    Hypotension [I95.9] 09/19/2023 09/19/2023 Yes    Impaired mobility [Z74.09] 09/19/2023 09/19/2023 Yes    Sacral wound [S31.000A] 09/19/2023 09/19/2023 Yes    COPD (chronic obstructive pulmonary disease) [J44.9] 09/19/2023 09/19/2023 Yes    Hematoma [T14.8XXA] 09/19/2023  09/19/2023 Yes       Plan:  Continue the present care    Thank you for your consult. I will follow-up with patient. Please contact us if you have any additional questions.    Joel Murray MD  Nephrology  [facility]

## 2023-10-08 NOTE — DIALYSIS ROUNDING
The patient was dialyzed today without complication    PE  Lungs-crackles at the bases   Cor-no rub  Abd-soft    Plan:  Continue the present care

## 2023-10-09 VITALS
BODY MASS INDEX: 32.1 KG/M2 | RESPIRATION RATE: 18 BRPM | WEIGHT: 237 LBS | DIASTOLIC BLOOD PRESSURE: 67 MMHG | TEMPERATURE: 98 F | SYSTOLIC BLOOD PRESSURE: 100 MMHG | OXYGEN SATURATION: 95 % | HEIGHT: 72 IN | HEART RATE: 97 BPM

## 2023-10-09 PROBLEM — G93.1 CHRONIC ANOXIC ENCEPHALOPATHY: Status: ACTIVE | Noted: 2023-10-09

## 2023-10-09 PROBLEM — G93.41 ACUTE METABOLIC ENCEPHALOPATHY: Status: RESOLVED | Noted: 2023-09-22 | Resolved: 2023-10-09

## 2023-10-09 PROBLEM — Z99.2 CHRONIC KIDNEY DISEASE WITH END STAGE RENAL FAILURE ON DIALYSIS: Status: ACTIVE | Noted: 2023-09-19

## 2023-10-09 PROBLEM — I48.91 ATRIAL FIBRILLATION: Status: RESOLVED | Noted: 2023-09-19 | Resolved: 2023-10-09

## 2023-10-09 PROBLEM — E11.9 DIABETES: Status: RESOLVED | Noted: 2023-09-19 | Resolved: 2023-10-09

## 2023-10-09 PROBLEM — S41.102A ARM WOUND, LEFT, INITIAL ENCOUNTER: Status: RESOLVED | Noted: 2023-09-19 | Resolved: 2023-10-09

## 2023-10-09 LAB — GLUCOSE SERPL-MCNC: 128 MG/DL (ref 70–105)

## 2023-10-09 PROCEDURE — 99900035 HC TECH TIME PER 15 MIN (STAT)

## 2023-10-09 PROCEDURE — 99239 PR HOSPITAL DISCHARGE DAY,>30 MIN: ICD-10-PCS | Mod: ,,, | Performed by: HOSPITALIST

## 2023-10-09 PROCEDURE — 25000003 PHARM REV CODE 250: Performed by: INTERNAL MEDICINE

## 2023-10-09 PROCEDURE — 94640 AIRWAY INHALATION TREATMENT: CPT

## 2023-10-09 PROCEDURE — 25000003 PHARM REV CODE 250

## 2023-10-09 PROCEDURE — 97530 THERAPEUTIC ACTIVITIES: CPT

## 2023-10-09 PROCEDURE — 25000242 PHARM REV CODE 250 ALT 637 W/ HCPCS: Performed by: HOSPITALIST

## 2023-10-09 PROCEDURE — 94761 N-INVAS EAR/PLS OXIMETRY MLT: CPT

## 2023-10-09 PROCEDURE — 99239 HOSP IP/OBS DSCHRG MGMT >30: CPT | Mod: ,,, | Performed by: HOSPITALIST

## 2023-10-09 PROCEDURE — 82962 GLUCOSE BLOOD TEST: CPT

## 2023-10-09 RX ORDER — ASPIRIN 81 MG/1
81 TABLET ORAL DAILY
Refills: 0
Start: 2023-10-10 | End: 2024-10-09

## 2023-10-09 RX ORDER — CARVEDILOL 3.12 MG/1
3.12 TABLET ORAL 2 TIMES DAILY WITH MEALS
Qty: 60 TABLET | Refills: 11
Start: 2023-10-09 | End: 2024-10-08

## 2023-10-09 RX ORDER — AMIODARONE HYDROCHLORIDE 200 MG/1
200 TABLET ORAL DAILY
Qty: 30 TABLET | Refills: 11 | Status: SHIPPED | OUTPATIENT
Start: 2023-10-10 | End: 2024-10-09

## 2023-10-09 RX ADMIN — PANTOPRAZOLE SODIUM 40 MG: 40 TABLET, DELAYED RELEASE ORAL at 08:10

## 2023-10-09 RX ADMIN — SEVELAMER CARBONATE 800 MG: 800 TABLET, FILM COATED ORAL at 04:10

## 2023-10-09 RX ADMIN — AMIODARONE HYDROCHLORIDE 200 MG: 200 TABLET ORAL at 08:10

## 2023-10-09 RX ADMIN — ASPIRIN 81 MG: 81 TABLET, COATED ORAL at 08:10

## 2023-10-09 RX ADMIN — CARVEDILOL 3.12 MG: 3.12 TABLET, FILM COATED ORAL at 08:10

## 2023-10-09 RX ADMIN — AMLODIPINE BESYLATE 5 MG: 5 TABLET ORAL at 08:10

## 2023-10-09 RX ADMIN — IPRATROPIUM BROMIDE AND ALBUTEROL SULFATE 3 ML: 2.5; .5 SOLUTION RESPIRATORY (INHALATION) at 08:10

## 2023-10-09 RX ADMIN — CARVEDILOL 3.12 MG: 3.12 TABLET, FILM COATED ORAL at 04:10

## 2023-10-09 RX ADMIN — SEVELAMER CARBONATE 800 MG: 800 TABLET, FILM COATED ORAL at 08:10

## 2023-10-09 NOTE — ASSESSMENT & PLAN NOTE
S/P I&D right hematoma and left I&D of wounds in upper extremities; followed by Surgery; this was due to vasopressor infiltration at [facility]; was placed on Aztreonam on admission--will change this to Merrem; wound culture shows Klebsiella    Debrided by surgery.   9/27 - High risk of rebleeding till closure. Will explore swing bed options closer to his vascular surgeon.  Only came here because [facility] was on bypass.     9/29 - Improving.     9/30 - Continue wound care.     10/1 - High risk of rebleeding till closure. Will explore swing bed options closer to his vascular surgeon. Will need Rehab due to deficits following code. Would have to improve substantially to resume independent living.     10/4 - Family desires to take patient home.  Needs to stay in medical facility till R arm ulcer closed.     10/5 - Sister agreeable to swing bed after discussion.

## 2023-10-09 NOTE — PROGRESS NOTES
[facility]  Adult Nutrition  Consult Note         Reason for Assessment  Reason For Assessment: RD follow-up   Nutrition Risk Screen: no indicators present     10/9/2023 RD follow up.  Patient continues on a renal diet with Charlie and Noversource renal BID. Patients po intake is % per flow sheets. Consider addition of MVI daily, Vit C 500mg BID and ZnS04 220mg BID.   Recommend obtaining current weight.  Last BM 10/5/2023 per flow sheets.    10/2/2023 RD follow up. Patient continues on a renal diet with Charlie and Noversource renal BID. Patients po intake is 0-100% per flow sheets. Consider addition of MVI daily, Vit C 500mg BID and ZnS04 220mg BID. Recommend obtaining current weight.    9/26/2023 RD follow up. Patient continues on a renal diet with Charlie and Noversource renal BID. Patients po intake is 0-100% per flow sheets. Consider addition of MVI daily, Vit C 500mg BID and ZnS04 220mg BID.    9/21/2023 RD follow up. Patient seen by ST and recommended a regular diet. Current diet is Renal diet and appropriate for patient due to ESRD. Recommend Charlie BID to aid in wound healing. Consider addition of MVI daily, Vit C 500mg BID and ZnS04 220mg BID. Recommend addition of Novasource Renal due to poor po intake at 25% per flow sheets.       9/19/2023 RD note:Consult received and appreciated. Consult for wounds chewing/swallowing issues. ST to eval today. Recommend advance diet per ST recommendations to a renal high protein diabetic diet.   Recommend addition of Novasource Renal with poor po intake.     RD visited patient this morning to perform NFPE.     Patient is a weight loss of 30#/11.2% x 1 month, 63#/21% x 8 months and 77#/24.5% x 1 year. Weight loss is severe.     Patient meets ASPEN criteria for Moderate-severe protein calorie malnutrition.     Unhealed wounds, weight loss and muscle and fat wasting noted.     See muscle and fat loss severity below.     Malnutrition  Is Patient  Malnourished: Yes Malnutrition Assessment  Malnutrition Context: chronic illness  Malnutrition Level: moderate (moderate to severe)  Skin (Micronutrient): wounds unhealed       Weight Loss (Malnutrition): greater than 20% in 1 year  Subcutaneous Fat (Malnutrition): moderate depletion  Muscle Mass (Malnutrition): severe depletion   Orbital Region (Subcutaneous Fat Loss): mild depletion  Upper Arm Region (Subcutaneous Fat Loss): severe depletion  Thoracic and Lumbar Region: moderate depletion   [place] Region (Muscle Loss): severe depletion  Clavicle Bone Region (Muscle Loss): severe depletion  Clavicle and Acromion Bone Region (Muscle Loss): moderate depletion  Scapular Bone Region (Muscle Loss): moderate depletion  Patellar Region (Muscle Loss): moderate depletion  Anterior Thigh Region (Muscle Loss): moderate depletion                 Skin Integrity  Delfino Risk Assessment  Sensory Perception: 3-->slightly limited  Moisture: 3-->occasionally moist  Activity: 2-->chairfast  Mobility: 3-->slightly limited  Nutrition: 3-->adequate  Friction and Shear: 3-->no apparent problem  Delfino Score: 17    Nutrition Diagnosis  Malnutrition (Moderate)   related to Fat wasting and Muscle wasting as evidenced by sig weight loss with visible muscle and fat wasting    Nutrition Diagnosis Status: Chronic/ continues      Nutrition Risk  Level of Risk/Frequency of Follow-up: moderate    Recent Labs   Lab 10/07/23  1107 10/07/23  1627 10/08/23  0723   GLU 71*  --   --    POCGLU  --    < > 79    < > = values in this interval not displayed.       Comments on Glucose: dx of diabetes  Nutrition Prescription / Recommendations  Recommendation/Intervention: Recommend continue with renal diet + Charlie and Novasource renal BID.  Goals: weight maintenance, wound healng  Nutrition Goal Status: progressing towards goal  Current Diet Order: Renal diet  Oral Nutrition Supplement: Novasource Renal + Charlie BID  Chewing or Swallowing Difficulty?: pending  ST eval  Recommended Diet: renal diabetic  Recommended Oral Supplement:  Novasource renal  Is Nutrition Support Recommended: No  Is Education Recommended: No  Monitor and Evaluation  % current Intake: Unknown/ No P.O. intake documented  % intake to meet estimated needs: P.O. + Supplements  Food and Nutrient Intake: food and beverage intake, energy intake  Food and Nutrient Adminstration: diet order  Anthropometric Measurements: weight, weight change, body mass index  Biochemical Data, Medical Tests and Procedures: electrolyte and renal panel, gastrointestinal profile, glucose/endocrine profile, inflammatory profile, lipid profile  Nutrition-Focused Physical Findings: overall appearance     Current Medical Diagnosis and Past Medical History     Past Medical History:   Diagnosis Date    Cardiac arrest     CHF (congestive heart failure)     EF 25-30%    COPD (chronic obstructive pulmonary disease)     Coronary artery disease     Diabetes     GERD (gastroesophageal reflux disease)     Hepatitis C     Hypotension     Requiring Midodrine    Neuropathy     Substance abuse      Nutrition/Diet History  Spiritual, Cultural Beliefs, Adventist Practices, Values that Affect Care: no  Food Allergies: NKFA  Factors Affecting Nutritional Intake: NPO, difficulty/impaired swallowing  Lab/Procedures/Meds  Recent Labs   Lab 10/07/23  1107      K 3.8   BUN 31*   CREATININE 5.41*   CALCIUM 8.6        Note: BUN and crea elevated with ESRD, albumin low- wounds and poor intake with wound infection.  Last A1c:   Lab Results   Component Value Date    HGBA1C 5.3 09/19/2023     Lab Results   Component Value Date    RBC 3.52 (L) 10/07/2023    HGB 11.2 (L) 10/07/2023    HCT 34.2 (L) 10/07/2023    MCV 97.2 (H) 10/07/2023    MCH 31.8 (H) 10/07/2023    MCHC 32.7 10/07/2023     Pertinent Labs Reviewed: reviewed  Pertinent Labs Comments: Sodium: 141  Potassium: 4.6  Chloride: 103  CO2: 24  Anion Gap: 19 (H)  BUN: 49 (H)  Creatinine: 11.70  (H)  BUN/CREAT RATIO: 4 (L)  eGFR: 4 (L)  Glucose: 80  Calcium: 9.0  Phosphorus: 4.2  Magnesium : 2.4 (H)  Alkaline Phosphatase: 125 (H)  PROTEIN TOTAL: 7.0  Albumin: 2.8 (L)  Albumin/Globulin Ratio: 0.7  BILIRUBIN TOTAL: 1.2  AST: 53 (H)  ALT: 43  Globulin, Total: 4.2 (H)      (H): Data is abnormally high  (L): Data is abnormally low  Pertinent Medications Reviewed: reviewed  Pertinent Medications Comments: amiodarone, albuterol, vanc, carvedilol, azacatem, clindamycin, atrovastatin, monetlusk, olanzapine  Anthropometrics  Temp: 98.1 °F (36.7 °C)  Height: 6' (182.9 cm)  Height (inches): 72 in  Weight Method: Bed Scale  Weight: 107.5 kg (237 lb)  Weight (lb): 237 lb  Ideal Body Weight (IBW), Male: 178 lb  % Ideal Body Weight, Male (lb): 133.15 %  BMI (Calculated): 32.1  BMI Grade: 30 - 34.9- obesity - grade I     Estimated/Assessed Needs  Weight Used For Calorie Calculations: 87.6 kg (193 lb 2 oz)   Energy Need Method: Kcal/kg Energy Calorie Requirements (kcal): 0490-8309  Weight Used For Protein Calculations: 87.6 kg (193 lb 2 oz)  Protein Requirements: 105-122  Estimated Fluid Requirement Method: RDA Method    RDA Method (mL): 2190     Nutrition by Nursing  Diet/Nutrition Received: restrict fluids, consistent carb/diabetic diet  Intake (%): 100%     Diet/Feeding Tolerance: fair  Last Bowel Movement: 10/05/23              Nutrition Follow-Up  RD Follow-up?: Yes

## 2023-10-09 NOTE — ASSESSMENT & PLAN NOTE
Should follow up with Neurology outpatient.  Seen previously by neurologist in [place].  Will refer to Neurology here to lessen chance of lost to follow-up

## 2023-10-09 NOTE — DISCHARGE SUMMARY
[facility]  [facility] Medicine  Discharge Summary      Patient Name: Yvan Rollins  MRN: [id number]  [facility]: [id number]  Patient Class: IP- Inpatient  Admission Date: 9/19/2023  Hospital Length of Stay: 20 days  Discharge Date and Time:  10/09/2023 1:50 PM  Attending Physician: Wood Ridley MD   Discharging Provider: Wood Ridley MD  Primary Care Provider: Eugene Funez MD    Primary Care Team: Networked reference to record PCT     HPI:   Patient is a 63 year old  male who was transferred to [facility] from [place] ED for vascular consultation for a necrotic wound overlying an AV access site in the RT arm. He presented to [place] ED via [facility] from [facility] (referenced to be [facility] from recent hospitalization at [facility] (8/15/2023)). He was recently hospitalized at [facility] after having dyspnea during dialysis that progressed to cardiac arrest with ROSC. The patient was mechanically intubated, started on pressors, and managed in the ICU. According to the discharge summary, the patient had an adverse reaction to surface and infiltration anesthetic and intravenous infiltration.     During hospitalization at [facility], the RT AV access site was unable to be used and a tunneled RT dialysis catheter was placed. He was evaluated by Vascular surgery during that admission with outpatient follow-up. Today, he was seen by wound care at the NH and they recommended return to [facility] for vascular evaluation of the wound. However, [facility] was on Diversion and he ended up at [place] and subsequently [facility].     During his hospitalization at [facility], cardiology was consulted. ECHO demonstrated EF of 25-30% and there was a reported 1 episode of nonsustained v-tach. Cardiology recommended resuming goal directed medical therapy, and there was no identified cardiac etiology for his arrest. He was also evaluated by neurology due to concern for anoxic brain injury.  CT of the brain demonstrated chronic  ischemic microangiopathy. Neurology diagnosed his encephalopathy to be multifactorial - anoxic insult from prolonged cardiac arrest with metabolic derangement. His mental status improved, but not to baseline. Today, at [facility] he   is Aox1 and he is a poor historian. He has difficulty answering questions. History is obtained from medical records.     The patient has a PMH of nonischemic cardiomyopathy with EF 20-25%, S/P AICD placement, ESRD on HD TTS, COPD, HTN, polysubstance abuse, and hepatitis C.         Procedure(s) (LRB):  INCISION AND DRAINAGE, HEMATOMA (Right)  IRRIGATION AND DEBRIDEMENT (Left)      Hospital Course:   63 year old male presents with necrotic wound of AV fistula site s/p debridement.  He is being treated for a Staph bacteremia.  He is alert.  Patient denies chest pain, shortness of breath, nausea, vomiting, or diarrhea.    Chief Complaint   Patient presents with    Wound Infection         HPI: Patient is a 63 year old  male who was transferred to [facility] from [place] ED for vascular consultation for a necrotic wound overlying an AV access site in the RT arm. He presented to [facility] via [facility] from [facility] (referenced to be [facility] from recent hospitalization at [facility] (8/15/2023)). He was recently hospitalized at [facility] after having dyspnea during dialysis that progressed to cardiac arrest with ROSC. The patient was mechanically intubated, started on pressors, and managed in the ICU. According to the discharge summary, the patient had an adverse reaction to surface and infiltration anesthetic and intravenous infiltration.      During hospitalization at [facility], the RT AV access site was unable to be used and a tunneled RT dialysis catheter was placed. He was evaluated by Vascular surgery during that admission with outpatient follow-up. Today, he was seen by wound care at the NH and they recommended return to [facility] for vascular evaluation of the wound. However, [facility] was  on Diversion and he ended up at [place] and subsequently [facility].      During his hospitalization at [facility], cardiology was consulted. ECHO demonstrated EF of 25-30% and there was a reported 1 episode of nonsustained v-tach. Cardiology recommended resuming goal directed medical therapy, and there was no identified cardiac etiology for his arrest. He was also evaluated by neurology due to concern for anoxic brain injury.  CT of the brain demonstrated chronic ischemic microangiopathy. Neurology diagnosed his encephalopathy to be multifactorial - anoxic insult from prolonged cardiac arrest with metabolic derangement. His mental status improved, but not to baseline. Today, at [facility] he   is Aox1 and he is a poor historian. He has difficulty answering questions. History is obtained from medical records.      The patient has a PMH of nonischemic cardiomyopathy with EF 20-25%, S/P AICD placement, ESRD on HD TTS, COPD, HTN, polysubstance abuse, and hepatitis C.                 Past Medical History:   Diagnosis Date    Cardiac arrest      CHF (congestive heart failure)      COPD (chronic obstructive pulmonary disease)      Coronary artery disease      GERD (gastroesophageal reflux disease)      Hepatitis C      Hypotension      MI (myocardial infarction)      Neuropathy      Renal disorder      Substance abuse                 Past Surgical History:   Procedure Laterality Date    arm surgery Right      INSERTION OF PERMANENT PACEMAKER N/A 08/15/2018    JOINT REPLACEMENT Right       Knee surgery    LUMBAR SPINE SURGERY                   Review of patient's allergies indicates:   Allergen Reactions    Ceftriaxone Swelling    Lisinopril Swelling and Rash       Facial swelling   Facial swelling                Current Facility-Administered Medications on File Prior to Encounter   Medication    [DISCONTINUED] acetaminophen oral solution    [DISCONTINUED] albumin human 25% bottle    [DISCONTINUED] amiodarone tablet     [DISCONTINUED] aspirin chewable tablet    [DISCONTINUED] atorvastatin tablet    [DISCONTINUED] carvediloL tablet    [DISCONTINUED] dextrose 50 % in water (D50W) injection    [DISCONTINUED] GENERIC EXTERNAL MEDICATION    [DISCONTINUED] GENERIC EXTERNAL MEDICATION    [DISCONTINUED] glucagon injection    [DISCONTINUED] haloperidol lactate injection    [DISCONTINUED] heparin (porcine) injection    [DISCONTINUED] heparin (porcine) injection    [DISCONTINUED] hydrALAZINE injection    [DISCONTINUED] labetalol 20 mg/4 mL (5 mg/mL) IV syring    [DISCONTINUED] midodrine tablet    [DISCONTINUED] montelukast tablet    [DISCONTINUED] pantoprazole EC tablet           Current Outpatient Medications on File Prior to Encounter   Medication Sig    albuterol-ipratropium (DUO-NEB) 2.5 mg-0.5 mg/3 mL nebulizer solution Take 3 mLs by nebulization 2 (two) times a day.    amiodarone (PACERONE) 200 MG Tab Take 2 tablets by mouth once daily. X 13 days    aspirin (ECOTRIN) 81 MG EC tablet Take 81 mg by mouth.    atorvastatin (LIPITOR) 40 MG tablet Take 40 mg by mouth every evening.    carvediloL (COREG) 6.25 MG tablet 6.25 mg.    midodrine (PROAMATINE) 10 MG tablet Take 10 mg by mouth.    montelukast (SINGULAIR) 10 mg tablet Take 10 mg by mouth.    OLANZapine (ZYPREXA) 2.5 MG tablet Take 2.5 mg by mouth every evening.    pantoprazole (PROTONIX) 40 MG tablet Take 40 mg by mouth once daily.    sevelamer carbonate (RENVELA) 800 mg Tab Take 1 tablet by mouth 3 (three) times daily.      Family History    None               Tobacco Use    Smoking status: Every Day       Current packs/day: 0.50       Types: Cigarettes    Smokeless tobacco: Current       Types: Snuff, Chew   Substance and Sexual Activity    Alcohol use: Not Currently    Drug use: Not Currently    Sexual activity: Not on file      Review of Systems   Unable to perform ROS: Other     Hospital course:  Records reviewed.  See detailed HPI above.  Patient  previously dialyzing in [place].  Patient transferred here because of hematoma with eschar over old access site in patient's right forearm.  Now status post drainage of hematoma and excision of eschar.  Continues to be dialyzed in right IJ access.  Patient patient now accepted to swing bed.  Continue to monitor right forearm wound and to receive rehab from therapy team.  Patient is able to converse but confused.  Told me the year was 1920, was 40 years old and he was at hospital in [place].  No family in room.  With patient not looking acutely ill assume his confusion is related to previous not sick injury from code.  As stated above has seen Neurology in [place].    Echo noted with combined systolic and diastolic dysfunction.  No evidence of decompensation at this time.  Allergic to ACE inhibitor and will not be able to give ARB.  Use guided target therapy for his heart failure as much as able with his allergy to ACE inhibitors and his renal failure.  Will have him follow-up with Cardiology outpatient.    Discharged.               Goals of Care Treatment Preferences:  Code Status: Full Code      Consults:   Consults (From admission, onward)        Status Ordering Provider     Inpatient consult to Social Work  Once        Provider:  (Not yet assigned)    Completed MAURA DYE JR     Inpatient consult to Social Work/Case Management  Once        Provider:  (Not yet assigned)    Completed VINAY BROWN     Pharmacy to dose Vancomycin consult  Once        Provider:  (Not yet assigned)    Completed DEMETRIUS ERAZO     Inpatient consult to Registered Dietitian/Nutritionist  Once        Provider:  (Not yet assigned)    Completed MILEY DALTON     Inpatient consult to Cardiology  Once        Provider:  Epi Welch MD    Completed DEMETRIUS ERAZO     Inpatient consult to Vascular Surgery  Once        Provider:  Lexis Campbell MD    Completed DEMETRIUS ERAZO     Inpatient consult to Nephrology  Once         Provider:  Alfredo Rose MD    Acknowledged DEMETRIUS ERAZO              Intake/Output - Last 3 Shifts       10/07 0700  10/08 0659 10/08 0700  10/09 0659 10/09 0700  10/10 0659    Other 2000      Stool   1    Total Output 2000 1    Net -2000  -1           Stool Occurrence 2 x          Lab Results   Component Value Date    WBC 9.21 10/07/2023    HGB 11.2 (L) 10/07/2023    HCT 34.2 (L) 10/07/2023    MCV 97.2 (H) 10/07/2023     (L) 10/07/2023        CMP  Sodium   Date Value Ref Range Status   10/07/2023 138 136 - 145 mmol/L Final     Potassium   Date Value Ref Range Status   10/07/2023 3.8 3.5 - 5.1 mmol/L Final     Chloride   Date Value Ref Range Status   10/07/2023 103 98 - 107 mmol/L Final     CO2   Date Value Ref Range Status   10/07/2023 28 21 - 32 mmol/L Final     Glucose   Date Value Ref Range Status   10/07/2023 71 (L) 74 - 106 mg/dL Final     BUN   Date Value Ref Range Status   10/07/2023 31 (H) 7 - 18 mg/dL Final   04/12/2021 35.0 (H) 6.0 - 20.0 mg/dL Final     Creatinine   Date Value Ref Range Status   10/07/2023 5.41 (H) 0.70 - 1.30 mg/dL Final     Calcium   Date Value Ref Range Status   10/07/2023 8.6 8.5 - 10.1 mg/dL Final     Total Protein   Date Value Ref Range Status   09/19/2023 7.0 6.4 - 8.2 g/dL Final     Albumin   Date Value Ref Range Status   09/19/2023 2.8 (L) 3.5 - 5.0 g/dL Final     Bilirubin, Total   Date Value Ref Range Status   09/19/2023 1.2 >0.0 - 1.2 mg/dL Final     Alk Phos   Date Value Ref Range Status   09/19/2023 125 (H) 45 - 115 U/L Final     AST   Date Value Ref Range Status   09/19/2023 53 (H) 15 - 37 U/L Final     ALT   Date Value Ref Range Status   09/19/2023 43 16 - 61 U/L Final     Anion Gap   Date Value Ref Range Status   10/07/2023 11 7 - 16 mmol/L Final      BMP  Lab Results   Component Value Date     10/07/2023    K 3.8 10/07/2023     10/07/2023    CO2 28 10/07/2023    BUN 31 (H) 10/07/2023    CREATININE 5.41 (H) 10/07/2023    CALCIUM 8.6 10/07/2023     ANIONGAP 11 10/07/2023            Neuro  Chronic anoxic encephalopathy    Should follow up with Neurology outpatient.  Seen previously by neurologist in [place].  Will refer to Neurology here to lessen chance of lost to follow-up    Cardiac/Vascular  Chronic combined systolic and diastolic congestive heart failure  Continue current meds    Have patient follow up with Cardiology outpatient      History of cardiac arrest  Has AICD; continue current meds; BP low at times;he is on Coreg and Amiodarone; he is s/p cardiac arrest from dialysis at [facility] in [place], [place] in August.     Improving but suspected hypoxic brain injury. Continue PT/OT    Renal/  Chronic kidney disease with end stage renal failure on dialysis  On dialysis TTS. Has R subclavian tunneled cath.       GI  Chronic hepatitis C  Stable    Uncertain status of treatment    GERD (gastroesophageal reflux disease)  On Protonix      Other  * Arm wound, right, initial encounter  S/P I&D right hematoma and left I&D of wounds in upper extremities; followed by Surgery; this was due to vasopressor infiltration at [facility]; was placed on Aztreonam on admission--will change this to Merrem; wound culture shows Klebsiella    Debrided by surgery.   9/27 - High risk of rebleeding till closure. Will explore swing bed options closer to his vascular surgeon.  Only came here because [facility] was on bypass.     9/29 - Improving.     9/30 - Continue wound care.     10/1 - High risk of rebleeding till closure. Will explore swing bed options closer to his vascular surgeon. Will need Rehab due to deficits following code. Would have to improve substantially to resume independent living.     10/4 - Family desires to take patient home.  Needs to stay in medical facility till R arm ulcer closed.     10/5 - Sister agreeable to swing bed after discussion.       Final Active Diagnoses:    Diagnosis Date Noted POA    PRINCIPAL PROBLEM:  Arm wound, right, initial encounter [S41.101A]  09/19/2023 Yes    Chronic anoxic encephalopathy [G93.1] 10/09/2023 Yes    Chronic kidney disease with end stage renal failure on dialysis [N18.6, Z99.2] 09/19/2023 Not Applicable    History of cardiac arrest [Z86.74] 09/19/2023 Not Applicable    Chronic combined systolic and diastolic congestive heart failure [I50.42] 09/19/2023 Yes      Problems Resolved During this Admission:    Diagnosis Date Noted Date Resolved POA    Acute metabolic encephalopathy [G93.41] 09/22/2023 10/09/2023 Yes    Bacteremia [R78.81] 09/20/2023 09/27/2023 No    Penile discharge [R36.9] 09/20/2023 09/29/2023 Yes    Dysphagia [R13.10] 09/19/2023 09/19/2023 Yes    Presence of automatic cardioverter/defibrillator (AICD) [Z95.810] 09/19/2023 09/19/2023 Yes    CAD (coronary artery disease) [I25.10] 09/19/2023 09/19/2023 Unknown    HFrEF (heart failure with reduced ejection fraction) [I50.20] 09/19/2023 09/20/2023 Yes    Atrial fibrillation [I48.91] 09/19/2023 10/09/2023 Yes    Hypotension [I95.9] 09/19/2023 09/19/2023 Yes    Impaired mobility [Z74.09] 09/19/2023 09/19/2023 Yes    Arm wound, left, initial encounter [S41.102A] 09/19/2023 10/09/2023 Yes    Sacral wound [S31.000A] 09/19/2023 09/19/2023 Yes    COPD (chronic obstructive pulmonary disease) [J44.9] 09/19/2023 09/19/2023 Yes    Hematoma [T14.8XXA] 09/19/2023 09/19/2023 Yes       Discharged Condition: fair    Disposition: Skilled Nursing Facility    Follow Up:   Contact information for follow-up providers     Ray Paulson MD. Schedule an appointment as soon as possible for a visit in 1 month(s).    Specialties: Interventional Cardiology, Cardiology  Contact information:  [address] [place] [id number]  [phone]             Steve Escobedo MD. Schedule an appointment as soon as possible for a visit in 1 month(s).    Specialty: Neurology  Contact information:  [address] [facility] [address]  [phone]                   Contact information for  "after-discharge care     Destination     [facility] .    Service: Skilled Nursing  Contact information:  [facility] [address] [id number] [phone]                           Patient Instructions:      Diet renal     Activity as tolerated       Significant Diagnostic Studies: Labs: BMP: No results for input(s): "GLU", "NA", "K", "CL", "CO2", "BUN", "CREATININE", "CALCIUM", "MG" in the last 48 hours., CMP No results for input(s): "NA", "K", "CL", "CO2", "GLU", "BUN", "CREATININE", "CALCIUM", "PROT", "ALBUMIN", "BILITOT", "ALKPHOS", "AST", "ALT", "ANIONGAP", "ESTGFRAFRICA", "EGFRNONAA" in the last 48 hours. and CBC No results for input(s): "WBC", "HGB", "HCT", "PLT" in the last 48 hours.    Pending Diagnostic Studies:     Procedure Component Value Units Date/Time    EXTRA TUBES [[id number]] Collected: 09/21/23 0443    Order Status: Sent Lab Status: In process Updated: 09/21/23 0519    Specimen: Blood, Venous     Narrative:      The following orders were created for panel order EXTRA TUBES.  Procedure                               Abnormality         Status                     ---------                               -----------         ------                     Lavender Top Hold[[id number]]                               In process                   Please view results for these tests on the individual orders.    Urinalysis [[id number]]     Order Status: Sent Lab Status: No result     Specimen: Urine          Medications:  Reconciled Home Medications:      Medication List      CHANGE how you take these medications    amiodarone 200 MG Tab  Commonly known as: PACERONE  Take 1 tablet (200 mg total) by mouth once daily.  Start taking on: October 10, 2023  What changed:   · how much to take  · additional instructions     aspirin 81 MG EC tablet  Commonly known as: ECOTRIN  Take 1 tablet (81 mg total) by mouth once daily.  Start taking on: October 10, 2023  What changed: when to take this   "   carvediloL 3.125 MG tablet  Commonly known as: COREG  Take 1 tablet (3.125 mg total) by mouth 2 (two) times daily with meals.  What changed:   · medication strength  · how much to take  · how to take this  · when to take this        CONTINUE taking these medications    albuterol-ipratropium 2.5 mg-0.5 mg/3 mL nebulizer solution  Commonly known as: DUO-NEB  Take 3 mLs by nebulization 2 (two) times a day.     atorvastatin 40 MG tablet  Commonly known as: LIPITOR  Take 40 mg by mouth every evening.     RENVELA 800 mg Tab  Generic drug: sevelamer carbonate  Take 1 tablet by mouth 3 (three) times daily.        STOP taking these medications    midodrine 10 MG tablet  Commonly known as: PROAMATINE     montelukast 10 mg tablet  Commonly known as: SINGULAIR     OLANZapine 2.5 MG tablet  Commonly known as: ZyPREXA     pantoprazole 40 MG tablet  Commonly known as: PROTONIX            Indwelling Lines/Drains at time of discharge:   Lines/Drains/Airways     Central Venous Catheter Line  Duration                Hemodialysis AV Graft 09/19/23 0022 Right forearm 20 days         Hemodialysis Catheter 09/19/23 0020 right subclavian 20 days                Time spent on the discharge of patient: more 30  minutes         Wood Ridley MD  Department of Hospital Medicine  [facility]

## 2023-10-09 NOTE — PLAN OF CARE
[facility]  Discharge Final Note    Primary Care Provider: Eugene Funez MD    Expected Discharge Date: 10/9/2023    Final Discharge Note (most recent)       Final Note - 10/09/23 1010          Final Note    Assessment Type Final Discharge Note     Anticipated Discharge Disposition Skilled Nursing Facility        Post-Acute Status    Post-Acute Authorization Placement     Post-Acute Placement Status Set-up Complete/Auth obtained     Patient choice form signed by patient/caregiver List with quality metrics by geographic area provided;List from CMS Compare;List from System Post-Acute Care     Discharge Delays None known at this time                   Pt accepted at [facility]. Pt's sister gail informed. Ss informed anjel with [employer] and updates faxed. Pt t-t-s at 11:00 am.  Important Message from Medicare  Important Message from Medicare regarding Discharge Appeal Rights: Given to patient/caregiver, Explained to patient/caregiver, Signed/date by patient/caregiver     Date IMM was signed: 10/09/23  Time IMM was signed: 1010

## 2023-10-09 NOTE — PLAN OF CARE
Chart reviewed. Updates faxed to [employer]. Pt/ot. Awaiting ins approval/denial for snf. Ss following.

## 2023-10-09 NOTE — PT/OT/SLP PROGRESS
Occupational Therapy   Treatment    Name: Yvan Rollins  MRN: [id number]  Admitting Diagnosis:  Arm wound, right, initial encounter  19 Days Post-Op    Recommendations:     Discharge Recommendations: nursing facility, skilled  Discharge Equipment Recommendations:   (to be determined)  Barriers to discharge:       Assessment:     Yvan Rollins is a 63 y.o. male with a medical diagnosis of Arm wound, right, initial encounter.  He presents with weakness and confusion. Performance deficits affecting function are weakness, impaired endurance, impaired self care skills.     Rehab Prognosis:  Good; patient would benefit from acute skilled OT services to address these deficits and reach maximum level of function.       Plan:     Patient to be seen 5 x/week to address the above listed problems via self-care/home management, therapeutic activities, therapeutic exercises  Plan of Care Expires:    Plan of Care Reviewed with: patient    Subjective     Chief Complaint: Pt c/o weakness  Patient/Family Comments/goals: return home after SWB  Pain/Comfort:       Objective:     Communicated with: TY Gomez prior to session.  Patient found HOB elevated with   upon OT entry to room.    General Precautions: Standard, fall    Orthopedic Precautions:N/A  Braces: N/A  Respiratory Status: Room air     Occupational Performance:     Bed Mobility:    Patient completed Supine to Sit with independence     Functional Mobility/Transfers:  Patient completed Sit <> Stand Transfer with minimum assistance  with  rolling walker   Patient completed Bed <> Chair Transfer using Stand Pivot technique with moderate assistance with rolling walker  Functional Mobility: Pt completed sit>stand and 2 step pivot transfer to chair.     Activities of Daily Living:        [facility] 6 Click ADL:      Treatment & Education:  Pt completed BUE elbow flex and chest press with 2# wt 3x12 ea ex sitting edge of chair. Pt was very confused, knew he is hospital did not know  what city ot what year. Pt tried to use call light remote to make a phone call. When handed the phone, Pt was unable to turn it on and dial number. Therapist assisted Pt with making call.    Patient left up in chair with call button in reach and chair alarm off    GOALS:   Multidisciplinary Problems       Occupational Therapy Goals          Problem: Occupational Therapy    Goal Priority Disciplines Outcome Interventions   Occupational Therapy Goal     OT, PT/OT Ongoing, Progressing    Description: STG:  Pt will perform grooming with setup   Pt will bathe self with min assist  Pt will perform UB dressing with SBA  Pt will perform LB dressing with min assist  Pt will sit EOB x 10 min with SBA   Pt will transfer toilet/BSC with min assist  Pt will tolerate 15 minutes of tx with minimal fatigue      LT. Pt will achieve max level of independence with self care.                         Time Tracking:     OT Date of Treatment: 10/09/23  OT Start Time: 1035  OT Stop Time: 1055  OT Total Time (min): 20 min    Billable Minutes:Therapeutic Activity 20               10/9/2023

## 2023-10-09 NOTE — PROGRESS NOTES
[facility]  Nephrology  Progress Note    Patient Name: Yvan Rollins  MRN: [id number]  Admission Date: 9/19/2023  Hospital Length of Stay: 20 days  Attending Provider: Wood Ridley MD   Primary Care Physician: Eugene Funez MD  Principal Problem:Arm wound, right, initial encounter    Consults  Subjective:     Interval History: Mr. Rollins is stable today and is up in his recliner. He is talkative and asking questions though still confused at times. Last dialysis was 10-7.    Review of patient's allergies indicates:   Allergen Reactions    Ceftriaxone Swelling    Lisinopril Swelling and Rash     Facial swelling   Facial swelling       Current Facility-Administered Medications   Medication Frequency    0.9%  NaCl infusion PRN    acetaminophen tablet 650 mg Q4H PRN    albuterol-ipratropium 2.5 mg-0.5 mg/3 mL nebulizer solution 3 mL BID    amiodarone tablet 200 mg Daily    amLODIPine tablet 5 mg BID    aspirin EC tablet 81 mg Daily    atorvastatin tablet 40 mg QHS    carvediloL tablet 3.125 mg BID WM    dextrose 10% bolus 125 mL 125 mL PRN    dextrose 10% bolus 250 mL 250 mL PRN    glucagon (human recombinant) injection 1 mg PRN    heparin (porcine) injection 4,000 Units PRN    HYDROcodone-acetaminophen  mg per tablet 1 tablet Q6H PRN    HYDROcodone-acetaminophen 5-325 mg per tablet 1 tablet Q6H PRN    insulin aspart U-100 injection 0-5 Units Q6H PRN    montelukast tablet 10 mg QHS    naloxone 0.4 mg/mL injection 0.02 mg PRN    OLANZapine tablet 2.5 mg QHS    ondansetron injection 4 mg Q8H PRN    pantoprazole EC tablet 40 mg Daily    sevelamer carbonate tablet 800 mg TID    sodium chloride 0.9% flush 10 mL Q12H PRN       Objective:     Vital Signs (Most Recent):  Temp: 98.1 °F (36.7 °C) (10/09/23 0600)  Pulse: 60 (10/09/23 0801)  Resp: 20 (10/09/23 0801)  BP: 120/75 (10/09/23 0600)  SpO2: 97 % (10/09/23 0801) Vital Signs (24h Range):  Temp:  [97.5 °F (36.4 °C)-98.5 °F (36.9 °C)]  98.1 °F (36.7 °C)  Pulse:  [60-91] 60  Resp:  [15-20] 20  SpO2:  [91 %-100 %] 97 %  BP: (108-130)/(69-91) 120/75     Weight: 107.5 kg (237 lb) (09/18/23 2315)  Body mass index is 32.14 kg/m².  Body surface area is 2.34 meters squared.    No intake/output data recorded.    Physical Exam Good bruit right forearm under bulky dressing. Chest clear.    Significant Labs:sureBMP:   Recent Labs   Lab 10/07/23  1107   GLU 71*      K 3.8      CO2 28   BUN 31*   CREATININE 5.41*   CALCIUM 8.6     CBC:   Recent Labs   Lab 10/07/23  1108   WBC 9.21   RBC 3.52*   HGB 11.2*   HCT 34.2*   *   MCV 97.2*   MCH 31.8*   MCHC 32.7     All labs within the past 24 hours have been reviewed.    Significant Imaging:  Labs: Reviewed    Assessment/Plan:     Active Diagnoses:    Diagnosis Date Noted POA    PRINCIPAL PROBLEM:  Arm wound, right, initial encounter [S41.101A] 09/19/2023 Yes    Acute metabolic encephalopathy [G93.41] 09/22/2023 Yes    ESRD (end stage renal disease) [N18.6] 09/19/2023 Yes    History of cardiac arrest [Z86.74] 09/19/2023 Not Applicable    Atrial fibrillation [I48.91] 09/19/2023 Yes    Arm wound, left, initial encounter [S41.102A] 09/19/2023 Yes    Chronic combined systolic and diastolic congestive heart failure [I50.42] 09/19/2023 Yes      Problems Resolved During this Admission:    Diagnosis Date Noted Date Resolved POA    Bacteremia [R78.81] 09/20/2023 09/27/2023 Unknown    Penile discharge [R36.9] 09/20/2023 09/29/2023 Yes    Dysphagia [R13.10] 09/19/2023 09/19/2023 Yes    Presence of automatic cardioverter/defibrillator (AICD) [Z95.810] 09/19/2023 09/19/2023 Yes    CAD (coronary artery disease) [I25.10] 09/19/2023 09/19/2023 Unknown    HFrEF (heart failure with reduced ejection fraction) [I50.20] 09/19/2023 09/20/2023 Yes    Hypotension [I95.9] 09/19/2023 09/19/2023 Yes    Impaired mobility [Z74.09] 09/19/2023 09/19/2023 Yes    Sacral wound [S31.000A] 09/19/2023 09/19/2023 Yes    COPD (chronic  obstructive pulmonary disease) [J44.9] 09/19/2023 09/19/2023 Yes    Hematoma [T14.8XXA] 09/19/2023 09/19/2023 Yes       Dialysis tomorrow if remains here. Outpatient dialysis if he transfers elsewhere for wound care.    Thank you for your consult. I will follow-up with patient. Please contact us if you have any additional questions.    Alfred Valdez MD  Nephrology  [facility]

## 2023-10-09 NOTE — ASSESSMENT & PLAN NOTE
Has AICD; continue current meds; BP low at times;he is on Coreg and Amiodarone; he is s/p cardiac arrest from dialysis at [facility] in [place], [place] in August.     Improving but suspected hypoxic brain injury. Continue PT/OT

## 2023-10-16 NOTE — PLAN OF CARE
Problem: Adult Inpatient Plan of Care  Goal: Plan of Care Review  Outcome: Ongoing, Progressing  Goal: Patient-Specific Goal (Individualized)  Outcome: Ongoing, Progressing  Goal: Absence of Hospital-Acquired Illness or Injury  Outcome: Ongoing, Progressing  Goal: Optimal Comfort and Wellbeing  Outcome: Ongoing, Progressing  Goal: Readiness for Transition of Care  Outcome: Ongoing, Progressing     Problem: Impaired Wound Healing  Goal: Optimal Wound Healing  Outcome: Ongoing, Progressing     Problem: Infection  Goal: Absence of Infection Signs and Symptoms  Outcome: Ongoing, Progressing     Problem: Skin Injury Risk Increased  Goal: Skin Health and Integrity  Outcome: Ongoing, Progressing     Problem: Diabetes Comorbidity  Goal: Blood Glucose Level Within Targeted Range  Outcome: Ongoing, Progressing     Problem: Device-Related Complication Risk (Hemodialysis)  Goal: Safe, Effective Therapy Delivery  Outcome: Ongoing, Progressing     Problem: Hemodynamic Instability (Hemodialysis)  Goal: Effective Tissue Perfusion  Outcome: Ongoing, Progressing     Problem: Infection (Hemodialysis)  Goal: Absence of Infection Signs and Symptoms  Outcome: Ongoing, Progressing     Problem: Gas Exchange Impaired  Goal: Optimal Gas Exchange  Outcome: Ongoing, Progressing     Problem: Airway Clearance Ineffective  Goal: Effective Airway Clearance  Outcome: Ongoing, Progressing     Problem: Fall Injury Risk  Goal: Absence of Fall and Fall-Related Injury  Outcome: Ongoing, Progressing     Problem: Confusion Acute  Goal: Optimal Cognitive Function  Outcome: Ongoing, Progressing      Pt was contacted back and her appt was switched from 10/17/23 to 10/19/23 per her request.

## 2023-10-24 ENCOUNTER — OFFICE VISIT (OUTPATIENT)
Dept: VASCULAR SURGERY | Facility: CLINIC | Age: 63
End: 2023-10-24
Payer: MEDICARE

## 2023-10-24 DIAGNOSIS — Z09 SURGERY FOLLOW-UP: Primary | ICD-10-CM

## 2023-10-24 DIAGNOSIS — S41.101A ARM WOUND, RIGHT, INITIAL ENCOUNTER: ICD-10-CM

## 2023-10-24 PROCEDURE — 3044F PR MOST RECENT HEMOGLOBIN A1C LEVEL <7.0%: ICD-10-PCS | Mod: CPTII,,, | Performed by: SURGERY

## 2023-10-24 PROCEDURE — 99024 PR POST-OP FOLLOW-UP VISIT: ICD-10-PCS | Mod: ,,, | Performed by: SURGERY

## 2023-10-24 PROCEDURE — 3066F PR DOCUMENTATION OF TREATMENT FOR NEPHROPATHY: ICD-10-PCS | Mod: CPTII,,, | Performed by: SURGERY

## 2023-10-24 PROCEDURE — 3066F NEPHROPATHY DOC TX: CPT | Mod: CPTII,,, | Performed by: SURGERY

## 2023-10-24 PROCEDURE — 99213 OFFICE O/P EST LOW 20 MIN: CPT | Mod: PBBFAC | Performed by: SURGERY

## 2023-10-24 PROCEDURE — 1159F MED LIST DOCD IN RCRD: CPT | Mod: CPTII,,, | Performed by: SURGERY

## 2023-10-24 PROCEDURE — 99024 POSTOP FOLLOW-UP VISIT: CPT | Mod: ,,, | Performed by: SURGERY

## 2023-10-24 PROCEDURE — 1159F PR MEDICATION LIST DOCUMENTED IN MEDICAL RECORD: ICD-10-PCS | Mod: CPTII,,, | Performed by: SURGERY

## 2023-10-24 PROCEDURE — 3044F HG A1C LEVEL LT 7.0%: CPT | Mod: CPTII,,, | Performed by: SURGERY

## 2023-10-24 NOTE — PROGRESS NOTES
Vascular clinic     Right forearm fistula steal patent, DC sutures medial wounds completely healed     We will allow the patient utilize fistula nose call if his been utilize twice with no problems

## 2024-08-03 NOTE — NURSING
1045 transported pt via bed to dialysis, o2, heparin, NS and chart sent.   
At 0845 someone in Radiology contacted me through secure chat about the pt having an order for CT with contrast and when will the pt have dialysis, I checked through the chart to see if Nephro consult was called and there is no note from a Nephro MD.  
At 0855 I called Dr. Valdez for the nephro consult.  
At 1110 pt is taken per bed to dialysis per PCT and student nurse  
At 1425 pt is back to room per PCT times two and vs are taken, no acute distress.  
Contact Isolation equipment set up outside the room.  
Dr sams notified of consult  
Dr ta notified of consult he said they will come by and see patient this am  
Found patient pulling IV access out of his left AC area.  Some confusion to person and place.  Reoriented to both.  He is pulling at objects and reaching for items not seen by me.  When asked what he was doing states he was looking for the red bread bag.  His right subclavian HD site was securely hidden from his attention under his gown.  The telemetry monitor remains intact also.  The one remaining INT site in his left upper arm hidded from him under a loose fitting B/P Cuff.  
Message left for dr holder notifying him of a consult on patient in room 455  
Patient arrived on the floor via stretcher. Transported via ems from [facility]. Patient is poor historian. Unable to provide a lot of information to me. The little information he provided me with was inaccurate. I called [facility] and spoke with the nurse there. She was able to provide me with some info. Patient is nonambulatory and a full care patient. He is frequently disoriented and is unable to provide any type of care for himself. She states that in the time he was at [facility], the cna had to feed him. He is completely bed bound. Dr Ewing on the floor and I provided her this information.  
Patient to dialysis by bed at this time.   
Patient to dialysis by patient bed at this time  
Primary nurse and CNA noticed new onset of bleeding from patients penile area throughout the night. Bleeding was in a scant amount but still showed up after linen change and skin care. MD was notified via secure chat. Will notify oncoming nurse.   
Pt back from dialysis   
Pt gone to dialysis  
Pt taken down to dialysis by PCT and student nurse.   
Report given to TY Negrete at [facility][note 3]. She said that she is ok with getting the pt packet when the pt arrives instead of faxing it ahead of time. She had no other concerns or questions at this time. Transport to be setup with [facility] non-urgent transport.  
numerical 0-10

## 2024-11-03 ENCOUNTER — HOSPITAL ENCOUNTER (EMERGENCY)
Facility: HOSPITAL | Age: 64
Discharge: NURSING, REHAB OR LONG-TERM CARE | End: 2024-11-03
Payer: MEDICARE

## 2024-11-03 VITALS
BODY MASS INDEX: 31.15 KG/M2 | HEART RATE: 86 BPM | RESPIRATION RATE: 18 BRPM | TEMPERATURE: 98 F | SYSTOLIC BLOOD PRESSURE: 131 MMHG | HEIGHT: 72 IN | OXYGEN SATURATION: 96 % | WEIGHT: 230 LBS | DIASTOLIC BLOOD PRESSURE: 82 MMHG

## 2024-11-03 DIAGNOSIS — R41.82 ALTERED MENTAL STATUS, UNSPECIFIED: ICD-10-CM

## 2024-11-03 LAB
ALBUMIN SERPL BCP-MCNC: 2.7 G/DL (ref 3.5–5)
ALBUMIN/GLOB SERPL: 0.8 {RATIO}
ALP SERPL-CCNC: 93 U/L (ref 45–115)
ALT SERPL W P-5'-P-CCNC: 33 U/L (ref 16–61)
ANION GAP SERPL CALCULATED.3IONS-SCNC: 12 MMOL/L (ref 7–16)
AST SERPL W P-5'-P-CCNC: 50 U/L (ref 15–37)
BASOPHILS # BLD AUTO: 0.02 K/UL (ref 0–0.2)
BASOPHILS NFR BLD AUTO: 0.2 % (ref 0–1)
BILIRUB SERPL-MCNC: 2.3 MG/DL (ref ?–1.2)
BUN SERPL-MCNC: 44 MG/DL (ref 7–18)
BUN/CREAT SERPL: 6 (ref 6–20)
CALCIUM SERPL-MCNC: 8.9 MG/DL (ref 8.5–10.1)
CHLORIDE SERPL-SCNC: 95 MMOL/L (ref 98–107)
CO2 SERPL-SCNC: 28 MMOL/L (ref 21–32)
CREAT SERPL-MCNC: 7.69 MG/DL (ref 0.7–1.3)
DIFFERENTIAL METHOD BLD: ABNORMAL
EGFR (NO RACE VARIABLE) (RUSH/TITUS): 7 ML/MIN/1.73M2
EOSINOPHIL # BLD AUTO: 0.09 K/UL (ref 0–0.5)
EOSINOPHIL NFR BLD AUTO: 1.1 % (ref 1–4)
ERYTHROCYTE [DISTWIDTH] IN BLOOD BY AUTOMATED COUNT: 15.5 % (ref 11.5–14.5)
GLOBULIN SER-MCNC: 3.4 G/DL (ref 2–4)
GLUCOSE SERPL-MCNC: 77 MG/DL (ref 74–106)
GLUCOSE SERPL-MCNC: 87 MG/DL (ref 70–105)
HCT VFR BLD AUTO: 41.6 % (ref 40–54)
HGB BLD-MCNC: 13.8 G/DL (ref 13.5–18)
LACTATE SERPL-SCNC: 1.5 MMOL/L (ref 0.4–2)
LYMPHOCYTES # BLD AUTO: 0.72 K/UL (ref 1–4.8)
LYMPHOCYTES NFR BLD AUTO: 8.8 % (ref 27–41)
MAGNESIUM SERPL-MCNC: 2.2 MG/DL (ref 1.7–2.3)
MCH RBC QN AUTO: 33.2 PG (ref 27–31)
MCHC RBC AUTO-ENTMCNC: 33.2 G/DL (ref 32–36)
MCV RBC AUTO: 100 FL (ref 80–96)
MONOCYTES # BLD AUTO: 0.59 K/UL (ref 0–0.8)
MONOCYTES NFR BLD AUTO: 7.2 % (ref 2–6)
MPC BLD CALC-MCNC: 11.5 FL (ref 9.4–12.4)
NEUTROPHILS # BLD AUTO: 6.73 K/UL (ref 1.8–7.7)
NEUTROPHILS NFR BLD AUTO: 82.7 % (ref 53–65)
OHS QRS DURATION: 150 MS
OHS QTC CALCULATION: 560 MS
PLATELET # BLD AUTO: 66 K/UL (ref 150–400)
POTASSIUM SERPL-SCNC: 4.3 MMOL/L (ref 3.5–5.1)
PROT SERPL-MCNC: 6.1 G/DL (ref 6.4–8.2)
RBC # BLD AUTO: 4.16 M/UL (ref 4.6–6.2)
SODIUM SERPL-SCNC: 131 MMOL/L (ref 136–145)
TROPONIN I SERPL DL<=0.01 NG/ML-MCNC: 82.8 PG/ML
WBC # BLD AUTO: 8.15 K/UL (ref 4.5–11)

## 2024-11-03 PROCEDURE — 83605 ASSAY OF LACTIC ACID: CPT | Performed by: NURSE PRACTITIONER

## 2024-11-03 PROCEDURE — 36415 COLL VENOUS BLD VENIPUNCTURE: CPT | Performed by: NURSE PRACTITIONER

## 2024-11-03 PROCEDURE — 84484 ASSAY OF TROPONIN QUANT: CPT | Performed by: NURSE PRACTITIONER

## 2024-11-03 PROCEDURE — 93005 ELECTROCARDIOGRAM TRACING: CPT

## 2024-11-03 PROCEDURE — 85025 COMPLETE CBC W/AUTO DIFF WBC: CPT | Performed by: NURSE PRACTITIONER

## 2024-11-03 PROCEDURE — 82962 GLUCOSE BLOOD TEST: CPT

## 2024-11-03 PROCEDURE — 80053 COMPREHEN METABOLIC PANEL: CPT | Performed by: NURSE PRACTITIONER

## 2024-11-03 PROCEDURE — 83735 ASSAY OF MAGNESIUM: CPT | Performed by: NURSE PRACTITIONER

## 2024-11-03 PROCEDURE — 99285 EMERGENCY DEPT VISIT HI MDM: CPT | Mod: 25

## 2024-11-03 PROCEDURE — 99285 EMERGENCY DEPT VISIT HI MDM: CPT | Mod: GF | Performed by: NURSE PRACTITIONER

## 2024-11-03 RX ORDER — HYDROXYZINE PAMOATE 25 MG/1
25 CAPSULE ORAL 2 TIMES DAILY
Status: ON HOLD | COMMUNITY

## 2024-11-03 RX ORDER — HALOPERIDOL 5 MG/1
10 TABLET ORAL EVERY 6 HOURS PRN
Status: ON HOLD | COMMUNITY

## 2024-11-03 RX ORDER — LORAZEPAM 2 MG/1
2 TABLET ORAL
Status: ON HOLD | COMMUNITY

## 2024-11-03 RX ORDER — CARVEDILOL 3.12 MG/1
3.12 TABLET ORAL 2 TIMES DAILY
Status: ON HOLD | COMMUNITY
Start: 2024-10-10

## 2024-11-03 RX ORDER — MEMANTINE HYDROCHLORIDE 5 MG/1
5 TABLET ORAL DAILY
Status: ON HOLD | COMMUNITY

## 2024-11-03 RX ORDER — TALC
6 POWDER (GRAM) TOPICAL
Status: ON HOLD | COMMUNITY

## 2024-11-03 NOTE — ED NOTES
No nursing needs at present. Resting quietly with eyes closed, no distress. Will continue to monitor.

## 2024-11-03 NOTE — ED NOTES
[facility] Dispatch notified for transport of patient back to [facility].

## 2024-11-03 NOTE — ED PROVIDER NOTES
"Encounter Date: 11/3/2024       History     Chief Complaint   Patient presents with    Altered Mental Status     Pt came from [facility] home with Altered mental status. Pt is confused and is Ox1 at times. Pt was given Haldol and Melatonin prn at 2300 last night.     63 y/o male with PMHx of cardiac maker, hypothyroidism, End stage renal disease, dialysis, Hep C, HTN, CVA and age related cognitive decline who is a resident at [facility] is brought to the ED via [facility] for altered mental status. Patient goes to dialysis on MWF, but went yesterday instead of Friday due to traveling from [facility] to [facility] for admission at [facility]. Patient's right arm shunt is not working, had double lumen placed to right upper chest wall at [facility]. Last known well was 1 pm yesterday per sister when patient went to dialysis. Patient was able to ambulate at that time and was A/O x 3.  Nursing staff had administer patient Vistaril 25 mg, prn Melatonin and prn Haldol at bedtime last "so patient could sleep".  Denies falls.    The history is provided by the patient.     Review of patient's allergies indicates:   Allergen Reactions    Ceftriaxone Swelling    Lisinopril Swelling and Rash     Facial swelling   Facial swelling       Past Medical History:   Diagnosis Date    Cardiac arrest     CHF (congestive heart failure)     EF 25-30%    COPD (chronic obstructive pulmonary disease)     Coronary artery disease     Diabetes     GERD (gastroesophageal reflux disease)     Hepatitis C     Hypotension     Requiring Midodrine    Neuropathy     Substance abuse      Past Surgical History:   Procedure Laterality Date    arm surgery Right     INCISION AND DRAINAGE OF HEMATOMA Right 9/20/2023    Procedure: INCISION AND DRAINAGE, HEMATOMA;  Surgeon: Lexis Campbell MD;  Location: [facility];  Service: General;  Laterality: Right;    INSERTION OF PERMANENT PACEMAKER N/A 08/15/2018    IRRIGATION AND DEBRIDEMENT Left 9/20/2023    " Procedure: IRRIGATION AND DEBRIDEMENT;  Surgeon: Lexis Campbell MD;  Location: [facility];  Service: General;  Laterality: Left;  LUE    JOINT REPLACEMENT Right     Knee surgery    LUMBAR SPINE SURGERY       No family history on file.  Social History     Tobacco Use    Smoking status: Former     Current packs/day: 0.50     Types: Cigarettes    Smokeless tobacco: Former     Types: Snuff, Chew   Substance Use Topics    Alcohol use: Not Currently    Drug use: Not Currently     Review of Systems   Unable to perform ROS: Mental status change       Physical Exam     Initial Vitals   BP Pulse Resp Temp SpO2   11/03/24 0350 11/03/24 0350 11/03/24 0350 11/03/24 0350 11/03/24 0404   99/78 103 18 97.5 °F (36.4 °C) 97 %      MAP       --                Physical Exam    Nursing note and vitals reviewed.  Constitutional: Vital signs are normal. He appears well-developed and well-nourished. He appears lethargic. He is not diaphoretic.  Non-toxic appearance. He does not have a sickly appearance. He does not appear ill. No distress. Nasal cannula in place.   HENT:   Head: Normocephalic and atraumatic.   Right Ear: Tympanic membrane, external ear and ear canal normal.   Left Ear: Tympanic membrane, external ear and ear canal normal.   Nose: Nose normal. Mouth/Throat: Mucous membranes are normal.   Eyes: Conjunctivae and EOM are normal. Pupils are equal, round, and reactive to light.   Neck: Neck supple.   Normal range of motion.  Cardiovascular:  Normal rate, regular rhythm, S1 normal, normal heart sounds, intact distal pulses and normal pulses.           No edema to BLE  Dialysis AV fistula to right forearm, no thrill or bruit.  Has double lumen to right upper chest wall.   Pulmonary/Chest: Effort normal and breath sounds normal.   Abdominal: Abdomen is soft. Bowel sounds are normal. There is no abdominal tenderness.   Musculoskeletal:         General: Normal range of motion.      Cervical back: Normal range of motion and neck  supple.     Lymphadenopathy:     He has no cervical adenopathy.   Neurological: He has normal strength. He appears lethargic. No cranial nerve deficit or sensory deficit. Gait abnormal. GCS eye subscore is 4. GCS verbal subscore is 4. GCS motor subscore is 5.   Skin: Skin is warm, dry and intact. Capillary refill takes less than 2 seconds.   Dressing to left lower extremity clean dry and intact   Psychiatric: He has a normal mood and affect. His speech is slurred.         Medical Screening Exam   See Full Note    ED Course   Procedures  Labs Reviewed   COMPREHENSIVE METABOLIC PANEL - Abnormal       Result Value    Sodium 131 (*)     Potassium 4.3      Chloride 95 (*)     CO2 28      Anion Gap 12      Glucose 77      BUN 44 (*)     Creatinine 7.69 (*)     BUN/Creatinine Ratio 6      Calcium 8.9      Total Protein 6.1 (*)     Albumin 2.7 (*)     Globulin 3.4      A/G Ratio 0.8      Bilirubin, Total 2.3 (*)     Alk Phos 93      ALT 33      AST 50 (*)     eGFR 7 (*)    CBC WITH DIFFERENTIAL - Abnormal    WBC 8.15      RBC 4.16 (*)     Hemoglobin 13.8      Hematocrit 41.6      .0 (*)     MCH 33.2 (*)     MCHC 33.2      RDW 15.5 (*)     Platelet Count 66 (*)     MPV 11.5      Neutrophils % 82.7 (*)     Lymphocytes % 8.8 (*)     Neutrophils, Abs 6.73      Lymphocytes, Absolute 0.72 (*)     Diff Type Scan Smear      Monocytes % 7.2 (*)     Eosinophils % 1.1      Basophils % 0.2      Monocytes, Absolute 0.59      Eosinophils, Absolute 0.09      Basophils, Absolute 0.02     TROPONIN I - Abnormal    Troponin I High Sensitivity 82.8 (*)    LACTIC ACID, PLASMA - Normal    Lactic Acid 1.5     MAGNESIUM - Normal    Magnesium 2.2     CBC W/ AUTO DIFFERENTIAL    Narrative:     The following orders were created for panel order CBC auto differential.  Procedure                               Abnormality         Status                     ---------                               -----------         ------                     CBC  with Differential[[id number]]       Abnormal            Final result                 Please view results for these tests on the individual orders.   URINALYSIS   POCT GLUCOSE MONITORING CONTINUOUS    POC Glucose 87          ECG Results              EKG 12-lead (In process)        Collection Time Result Time QRS Duration OHS QTC Calculation    11/03/24 04:55:14 11/03/24 05:11:25 150 560                     In process by Interface, Lab In [facility] (11/03/24 05:11:33)                   Narrative:    Test Reason : R41.82,    Vent. Rate : 101 BPM     Atrial Rate : 101 BPM     P-R Int : 080 ms          QRS Dur : 150 ms      QT Int : 432 ms       P-R-T Axes : 000 264 268 degrees     QTc Int : 560 ms    Sinus tachycardia with short NM with Premature supraventricular complexes  Right bundle branch block  Possible Lateral infarct ,age undetermined  Inferior infarct ,age undetermined  Abnormal ECG  When compared with ECG of 19-SEP-2023 01:03,  Significant changes have occurred    Referred By: [email or web address]   SELF           Confirmed By:                                   Imaging Results              CT Head Without Contrast (Final result)  Result time 11/03/24 08:08:03      Final result by Edgardo Porter MD (11/03/24 08:08:03)                   Impression:      No acute intracranial findings.      Electronically signed by: Edgardo Porter  Date:    11/03/2024  Time:    08:08               Narrative:    EXAMINATION:  CT HEAD WITHOUT CONTRAST    CLINICAL HISTORY:  Mental status change, unknown cause;    TECHNIQUE:  Low dose axial images were obtained through the head.  Coronal and sagittal reformations were also performed. Contrast was not administered.    COMPARISON:  Head CT performed 09/22/2023, 14:36 hours.    FINDINGS:  Blood: No acute intracranial hemorrhage.    Parenchyma: No definite loss of gray-white differentiation to suggest acute or subacute transcortical infarct. Generalized pattern of age-related parenchymal volume  loss.  Nonspecific areas of white matter hypoattenuation may reflect sequela of chronic small vessel ischemic disease.    Ventricles/Extra-axial spaces: No abnormal extra-axial fluid collection. Basal cisterns are patent.    Vessels: Mild atherosclerotic calcification.    Orbits: Unremarkable.    Scalp: Unremarkable.    Skull: There are no depressed skull fractures or destructive bone lesions.    Sinuses and mastoids: Minor paranasal sinus mucosal thickening with possible tiny retention cysts.    Other findings: None                                       X-Ray Chest AP Portable (Final result)  Result time 11/03/24 08:25:43      Final result by Edgardo Porter MD (11/03/24 08:25:43)                   Impression:      As above.      Electronically signed by: Edgardo Porter  Date:    11/03/2024  Time:    08:25               Narrative:    EXAMINATION:  XR CHEST AP PORTABLE    CLINICAL HISTORY:  altered mental status;    TECHNIQUE:  Single frontal view of the chest was performed.    COMPARISON:  Chest radiograph performed 10/07/2023, 13:23 hours.    FINDINGS:  Cardiac monitoring leads.  Tunneled dual lumen right internal jugular approach central venous catheter position unchanged.  Left subclavian approach AICD.    Grossly unchanged cardiac contours, again noting enlargement the cardiac silhouette and prominence of central vasculature..    Slightly progressed interstitial and alveolar opacities relative to 10/07/2023, 13:23 hours, possibly related to edema versus infectious or noninfectious inflammatory etiology.    Question trace pleural effusions.  No definite pneumothorax.    No acute findings in the visualized abdomen osseous and soft tissue structures appear without definite acute change.                                       Medications - No data to display  Medical Decision Making  65 y/o male with PMHx of cardiac maker, hypothyroidism, End stage renal disease, dialysis, Hep C, HTN, CVA and age related  "cognitive decline who is a resident at [facility] is brought to the ED via [facility] for altered mental status. Patient goes to dialysis on MWF, but went yesterday instead of Friday due to traveling from [facility] to [place] for admission at [place]. Patient's right arm shunt is not working, had double lumen placed to right upper chest wall at [facility]. Last known well was 1 pm yesterday per sister when patient went to dialysis. Patient was able to ambulate at that time and was A/O x 3.  Nursing staff had administer patient Vistaril 25 mg, prn Melatonin and prn Haldol at bedtime last "so patient could sleep".     Problems Addressed:  Altered mental status, unspecified: self-limited or minor problem     Details: CT head negative   Symptoms likely related to multiple medications given on night shift for sleep    Amount and/or Complexity of Data Reviewed  Labs: ordered.  Radiology: ordered.     Details: CT head and CXR  ECG/medicine tests: ordered. Decision-making details documented in ED Course.     Details: EKG: sinus tachy with short MD with premature supraventricular complexes  Discussion of management or test interpretation with external provider(s): 17:08 attempted to cath patient for urinalysis, but patient only had scant amount in bishop tube. Appears that patient is not making urine.            Clinical Impression:   Final diagnoses:  [R41.82] Altered mental status, unspecified        ED Disposition Condition    Discharge Stable          ED Prescriptions    None       Follow-up Information    None          Muriel Chan FNP  11/03/24 0832    "

## 2024-11-06 ENCOUNTER — HOSPITAL ENCOUNTER (INPATIENT)
Facility: HOSPITAL | Age: 64
LOS: 7 days | Discharge: NURSING, REHAB OR LONG-TERM CARE | DRG: 189 | End: 2024-11-13
Attending: EMERGENCY MEDICINE | Admitting: INTERNAL MEDICINE
Payer: MEDICARE

## 2024-11-06 DIAGNOSIS — J96.02 ACUTE RESPIRATORY FAILURE WITH HYPOXIA AND HYPERCARBIA: ICD-10-CM

## 2024-11-06 DIAGNOSIS — J96.01 ACUTE RESPIRATORY FAILURE WITH HYPOXIA AND HYPERCARBIA: ICD-10-CM

## 2024-11-06 DIAGNOSIS — I50.9 CHF (CONGESTIVE HEART FAILURE): ICD-10-CM
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
## 2024-11-06 PROBLEM — L97.919 VENOUS STASIS ULCERS OF BOTH LOWER EXTREMITIES: Status: ACTIVE | Noted: 2024-11-06

## 2024-11-06 PROBLEM — I83.029 VENOUS STASIS ULCERS OF BOTH LOWER EXTREMITIES: Status: ACTIVE | Noted: 2024-11-06

## 2024-11-06 PROBLEM — L97.929 VENOUS STASIS ULCERS OF BOTH LOWER EXTREMITIES: Status: ACTIVE | Noted: 2024-11-06

## 2024-11-06 PROBLEM — I83.019 VENOUS STASIS ULCERS OF BOTH LOWER EXTREMITIES: Status: ACTIVE | Noted: 2024-11-06

## 2024-11-06 LAB
ALBUMIN SERPL BCP-MCNC: 2.8 G/DL (ref 3.5–5)
ALBUMIN/GLOB SERPL: 0.6 {RATIO}
ALP SERPL-CCNC: 123 U/L (ref 45–115)
ALT SERPL W P-5'-P-CCNC: 34 U/L (ref 16–61)
AMMONIA PLAS-SCNC: 30 ΜMOL/L (ref 11–32)
ANION GAP SERPL CALCULATED.3IONS-SCNC: 13 MMOL/L (ref 7–16)
APTT PPP: 39.5 SECONDS (ref 25.2–37.3)
AST SERPL W P-5'-P-CCNC: 13 U/L (ref 15–37)
BASOPHILS # BLD AUTO: 0.03 K/UL (ref 0–0.2)
BASOPHILS # BLD AUTO: 0.03 K/UL (ref 0–0.2)
BASOPHILS NFR BLD AUTO: 0.4 % (ref 0–1)
BASOPHILS NFR BLD AUTO: 0.5 % (ref 0–1)
BILIRUB SERPL-MCNC: 2.5 MG/DL (ref ?–1.2)
BUN SERPL-MCNC: 42 MG/DL (ref 7–18)
BUN/CREAT SERPL: 5 (ref 6–20)
CALCIUM SERPL-MCNC: 8.5 MG/DL (ref 8.5–10.1)
CHLORIDE SERPL-SCNC: 97 MMOL/L (ref 98–107)
CO2 SERPL-SCNC: 27 MMOL/L (ref 21–32)
CREAT SERPL-MCNC: 7.64 MG/DL (ref 0.7–1.3)
DIFFERENTIAL METHOD BLD: ABNORMAL
DIFFERENTIAL METHOD BLD: ABNORMAL
EGFR (NO RACE VARIABLE) (RUSH/TITUS): 7 ML/MIN/1.73M2
EOSINOPHIL # BLD AUTO: 0.04 K/UL (ref 0–0.5)
EOSINOPHIL # BLD AUTO: 0.06 K/UL (ref 0–0.5)
EOSINOPHIL NFR BLD AUTO: 0.7 % (ref 1–4)
EOSINOPHIL NFR BLD AUTO: 0.8 % (ref 1–4)
ERYTHROCYTE [DISTWIDTH] IN BLOOD BY AUTOMATED COUNT: 15.8 % (ref 11.5–14.5)
ERYTHROCYTE [DISTWIDTH] IN BLOOD BY AUTOMATED COUNT: 15.9 % (ref 11.5–14.5)
GLOBULIN SER-MCNC: 4.5 G/DL (ref 2–4)
GLUCOSE SERPL-MCNC: 73 MG/DL (ref 70–105)
GLUCOSE SERPL-MCNC: 75 MG/DL (ref 74–106)
GLUCOSE SERPL-MCNC: 82 MG/DL (ref 70–105)
GLUCOSE SERPL-MCNC: 87 MG/DL (ref 70–105)
GLUCOSE SERPL-MCNC: 97 MG/DL (ref 70–105)
HCO3 UR-SCNC: 24.8 MMOL/L (ref 21–28)
HCO3 UR-SCNC: 26.8 MMOL/L (ref 21–28)
HCO3 UR-SCNC: 26.8 MMOL/L (ref 21–28)
HCO3 UR-SCNC: 27.7 MMOL/L (ref 21–28)
HCT VFR BLD AUTO: 38.8 % (ref 40–54)
HCT VFR BLD AUTO: 44.7 % (ref 40–54)
HCT VFR BLD CALC: 39 % (ref 35–51)
HCT VFR BLD CALC: 43 % (ref 35–51)
HCT VFR BLD CALC: 43 % (ref 35–51)
HCT VFR BLD CALC: 45 % (ref 35–51)
HGB BLD-MCNC: 12.2 G/DL (ref 13.5–18)
HGB BLD-MCNC: 14.3 G/DL (ref 13.5–18)
IMM GRANULOCYTES # BLD AUTO: 0.02 K/UL (ref 0–0.04)
IMM GRANULOCYTES # BLD AUTO: 0.03 K/UL (ref 0–0.04)
IMM GRANULOCYTES NFR BLD: 0.3 % (ref 0–0.4)
IMM GRANULOCYTES NFR BLD: 0.4 % (ref 0–0.4)
INR BLD: 1.64
LDH SERPL L TO P-CCNC: 0.6 MMOL/L (ref 0.3–1.2)
LDH SERPL L TO P-CCNC: 0.7 MMOL/L (ref 0.3–1.2)
LDH SERPL L TO P-CCNC: 1.2 MMOL/L (ref 0.3–1.2)
LDH SERPL L TO P-CCNC: 2.3 MMOL/L (ref 0.3–1.2)
LYMPHOCYTES # BLD AUTO: 0.56 K/UL (ref 1–4.8)
LYMPHOCYTES # BLD AUTO: 0.62 K/UL (ref 1–4.8)
LYMPHOCYTES NFR BLD AUTO: 8.3 % (ref 27–41)
LYMPHOCYTES NFR BLD AUTO: 9.4 % (ref 27–41)
MAGNESIUM SERPL-MCNC: 2.5 MG/DL (ref 1.7–2.3)
MCH RBC QN AUTO: 32.5 PG (ref 27–31)
MCH RBC QN AUTO: 33.3 PG (ref 27–31)
MCHC RBC AUTO-ENTMCNC: 31.4 G/DL (ref 32–36)
MCHC RBC AUTO-ENTMCNC: 32 G/DL (ref 32–36)
MCV RBC AUTO: 103.5 FL (ref 80–96)
MCV RBC AUTO: 104 FL (ref 80–96)
MONOCYTES # BLD AUTO: 0.4 K/UL (ref 0–0.8)
MONOCYTES # BLD AUTO: 0.48 K/UL (ref 0–0.8)
MONOCYTES NFR BLD AUTO: 6.5 % (ref 2–6)
MONOCYTES NFR BLD AUTO: 6.7 % (ref 2–6)
MPC BLD CALC-MCNC: 11.9 FL (ref 9.4–12.4)
MPC BLD CALC-MCNC: 11.9 FL (ref 9.4–12.4)
NEUTROPHILS # BLD AUTO: 4.93 K/UL (ref 1.8–7.7)
NEUTROPHILS # BLD AUTO: 6.21 K/UL (ref 1.8–7.7)
NEUTROPHILS NFR BLD AUTO: 82.4 % (ref 53–65)
NEUTROPHILS NFR BLD AUTO: 83.6 % (ref 53–65)
NRBC # BLD AUTO: 0 X10E3/UL
NRBC # BLD AUTO: 0 X10E3/UL
NRBC, AUTO (.00): 0 %
NRBC, AUTO (.00): 0 %
PCO2 BLDA: 45 MMHG (ref 35–48)
PCO2 BLDA: 52 MMHG (ref 35–48)
PCO2 BLDA: 57 MMHG (ref 35–48)
PCO2 BLDA: 71 MMHG (ref 35–48)
PH SMN: 7.2 [PH] (ref 7.35–7.45)
PH SMN: 7.28 [PH] (ref 7.35–7.45)
PH SMN: 7.32 [PH] (ref 7.35–7.45)
PH SMN: 7.35 [PH] (ref 7.35–7.45)
PLATELET # BLD AUTO: 55 K/UL (ref 150–400)
PLATELET # BLD AUTO: 62 K/UL (ref 150–400)
PO2 BLDA: 35 MMHG (ref 83–108)
PO2 BLDA: 46 MMHG (ref 83–108)
PO2 BLDA: 66 MMHG (ref 83–108)
PO2 BLDA: 79 MMHG (ref 83–108)
POC BASE EXCESS: -1 MMOL/L (ref -2–3)
POC BASE EXCESS: -1.1 MMOL/L (ref -2–3)
POC BASE EXCESS: -2.1 MMOL/L (ref -2–3)
POC BASE EXCESS: 0 MMOL/L (ref -2–3)
POC CO2: 26.2 MMOL/L
POC CO2: 28.4 MMOL/L
POC CO2: 28.5 MMOL/L
POC CO2: 29.9 MMOL/L
POC IONIZED CALCIUM: 1.04 MMOL/L (ref 1.15–1.35)
POC IONIZED CALCIUM: 1.05 MMOL/L (ref 1.15–1.35)
POC IONIZED CALCIUM: 1.05 MMOL/L (ref 1.15–1.35)
POC IONIZED CALCIUM: 1.07 MMOL/L (ref 1.15–1.35)
POC SATURATED O2: 52 % (ref 95–98)
POC SATURATED O2: 75 % (ref 95–98)
POC SATURATED O2: 92 % (ref 95–98)
POC SATURATED O2: 94 % (ref 95–98)
POCT GLUCOSE: 121 MG/DL (ref 60–95)
POCT GLUCOSE: 59 MG/DL (ref 60–95)
POCT GLUCOSE: 75 MG/DL (ref 60–95)
POCT GLUCOSE: 76 MG/DL (ref 60–95)
POTASSIUM BLD-SCNC: 3.9 MMOL/L (ref 3.4–4.5)
POTASSIUM BLD-SCNC: 4.1 MMOL/L (ref 3.4–4.5)
POTASSIUM BLD-SCNC: 4.1 MMOL/L (ref 3.4–4.5)
POTASSIUM BLD-SCNC: 4.3 MMOL/L (ref 3.4–4.5)
POTASSIUM SERPL-SCNC: 4.6 MMOL/L (ref 3.5–5.1)
PROT SERPL-MCNC: 7.3 G/DL (ref 6.4–8.2)
PROTHROMBIN TIME: 19.2 SECONDS (ref 11.7–14.7)
RBC # BLD AUTO: 3.75 M/UL (ref 4.6–6.2)
RBC # BLD AUTO: 4.3 M/UL (ref 4.6–6.2)
SODIUM BLD-SCNC: 125 MMOL/L (ref 136–145)
SODIUM BLD-SCNC: 129 MMOL/L (ref 136–145)
SODIUM BLD-SCNC: 129 MMOL/L (ref 136–145)
SODIUM BLD-SCNC: 130 MMOL/L (ref 136–145)
SODIUM SERPL-SCNC: 132 MMOL/L (ref 136–145)
TROPONIN I SERPL DL<=0.01 NG/ML-MCNC: 60 PG/ML
TROPONIN I SERPL DL<=0.01 NG/ML-MCNC: 76.1 PG/ML
WBC # BLD AUTO: 5.98 K/UL (ref 4.5–11)
WBC # BLD AUTO: 7.43 K/UL (ref 4.5–11)

## 2024-11-06 PROCEDURE — G0390 TRAUMA RESPONS W/HOSP CRITI: HCPCS

## 2024-11-06 PROCEDURE — 82962 GLUCOSE BLOOD TEST: CPT

## 2024-11-06 PROCEDURE — 20000000 HC ICU ROOM

## 2024-11-06 PROCEDURE — 5A09357 ASSISTANCE WITH RESPIRATORY VENTILATION, LESS THAN 24 CONSECUTIVE HOURS, CONTINUOUS POSITIVE AIRWAY PRESSURE: ICD-10-PCS | Performed by: INTERNAL MEDICINE

## 2024-11-06 PROCEDURE — 99900031 HC PATIENT EDUCATION (STAT)

## 2024-11-06 PROCEDURE — 85025 COMPLETE CBC W/AUTO DIFF WBC: CPT

## 2024-11-06 PROCEDURE — 99285 EMERGENCY DEPT VISIT HI MDM: CPT | Mod: 25

## 2024-11-06 PROCEDURE — 87040 BLOOD CULTURE FOR BACTERIA: CPT | Performed by: EMERGENCY MEDICINE

## 2024-11-06 PROCEDURE — 82962 GLUCOSE BLOOD TEST: CPT | Mod: 91

## 2024-11-06 PROCEDURE — 27000221 HC OXYGEN, UP TO 24 HOURS

## 2024-11-06 PROCEDURE — 82803 BLOOD GASES ANY COMBINATION: CPT

## 2024-11-06 PROCEDURE — 25000003 PHARM REV CODE 250: Performed by: INTERNAL MEDICINE

## 2024-11-06 PROCEDURE — 93005 ELECTROCARDIOGRAM TRACING: CPT

## 2024-11-06 PROCEDURE — 82140 ASSAY OF AMMONIA: CPT | Performed by: EMERGENCY MEDICINE

## 2024-11-06 PROCEDURE — 83735 ASSAY OF MAGNESIUM: CPT | Performed by: EMERGENCY MEDICINE

## 2024-11-06 PROCEDURE — 36415 COLL VENOUS BLD VENIPUNCTURE: CPT | Performed by: EMERGENCY MEDICINE

## 2024-11-06 PROCEDURE — 85730 THROMBOPLASTIN TIME PARTIAL: CPT | Performed by: EMERGENCY MEDICINE

## 2024-11-06 PROCEDURE — 94640 AIRWAY INHALATION TREATMENT: CPT

## 2024-11-06 PROCEDURE — 94660 CPAP INITIATION&MGMT: CPT

## 2024-11-06 PROCEDURE — 25000003 PHARM REV CODE 250: Performed by: EMERGENCY MEDICINE

## 2024-11-06 PROCEDURE — 63600175 PHARM REV CODE 636 W HCPCS: Performed by: INTERNAL MEDICINE

## 2024-11-06 PROCEDURE — 84484 ASSAY OF TROPONIN QUANT: CPT | Performed by: EMERGENCY MEDICINE

## 2024-11-06 PROCEDURE — 94761 N-INVAS EAR/PLS OXIMETRY MLT: CPT

## 2024-11-06 PROCEDURE — 85014 HEMATOCRIT: CPT

## 2024-11-06 PROCEDURE — 80053 COMPREHEN METABOLIC PANEL: CPT | Performed by: EMERGENCY MEDICINE

## 2024-11-06 PROCEDURE — 84295 ASSAY OF SERUM SODIUM: CPT

## 2024-11-06 PROCEDURE — 82947 ASSAY GLUCOSE BLOOD QUANT: CPT

## 2024-11-06 PROCEDURE — 85025 COMPLETE CBC W/AUTO DIFF WBC: CPT | Performed by: EMERGENCY MEDICINE

## 2024-11-06 PROCEDURE — 84132 ASSAY OF SERUM POTASSIUM: CPT

## 2024-11-06 PROCEDURE — 63600175 PHARM REV CODE 636 W HCPCS: Mod: JZ,JG

## 2024-11-06 PROCEDURE — 83880 ASSAY OF NATRIURETIC PEPTIDE: CPT

## 2024-11-06 PROCEDURE — 25000242 PHARM REV CODE 250 ALT 637 W/ HCPCS

## 2024-11-06 PROCEDURE — 5A1D70Z PERFORMANCE OF URINARY FILTRATION, INTERMITTENT, LESS THAN 6 HOURS PER DAY: ICD-10-PCS | Performed by: INTERNAL MEDICINE

## 2024-11-06 PROCEDURE — 99900035 HC TECH TIME PER 15 MIN (STAT)

## 2024-11-06 PROCEDURE — 82330 ASSAY OF CALCIUM: CPT

## 2024-11-06 PROCEDURE — 83605 ASSAY OF LACTIC ACID: CPT

## 2024-11-06 PROCEDURE — 80100014 HC HEMODIALYSIS 1:1

## 2024-11-06 PROCEDURE — 94799 UNLISTED PULMONARY SVC/PX: CPT

## 2024-11-06 PROCEDURE — 36415 COLL VENOUS BLD VENIPUNCTURE: CPT

## 2024-11-06 PROCEDURE — 85610 PROTHROMBIN TIME: CPT | Performed by: EMERGENCY MEDICINE

## 2024-11-06 RX ORDER — MUPIROCIN 20 MG/G
OINTMENT TOPICAL 2 TIMES DAILY
Status: COMPLETED | OUTPATIENT
Start: 2024-11-06 | End: 2024-11-11

## 2024-11-06 RX ORDER — ACETAMINOPHEN 325 MG/1
650 TABLET ORAL EVERY 4 HOURS PRN
Status: DISCONTINUED | OUTPATIENT
Start: 2024-11-06 | End: 2024-11-12

## 2024-11-06 RX ORDER — GLUCAGON 1 MG
1 KIT INJECTION
Status: DISCONTINUED | OUTPATIENT
Start: 2024-11-06 | End: 2024-11-13 | Stop reason: HOSPADM

## 2024-11-06 RX ORDER — AZTREONAM 1 G/1
1000 INJECTION, POWDER, LYOPHILIZED, FOR SOLUTION INTRAMUSCULAR; INTRAVENOUS
Status: DISCONTINUED | OUTPATIENT
Start: 2024-11-06 | End: 2024-11-07

## 2024-11-06 RX ORDER — HEPARIN SODIUM 1000 [USP'U]/ML
4000 INJECTION, SOLUTION INTRAVENOUS; SUBCUTANEOUS ONCE
Status: COMPLETED | OUTPATIENT
Start: 2024-11-06 | End: 2024-11-06

## 2024-11-06 RX ORDER — SODIUM CHLORIDE 9 MG/ML
INJECTION, SOLUTION INTRAVENOUS
Status: DISCONTINUED | OUTPATIENT
Start: 2024-11-06 | End: 2024-11-13 | Stop reason: HOSPADM

## 2024-11-06 RX ORDER — INSULIN ASPART 100 [IU]/ML
0-5 INJECTION, SOLUTION INTRAVENOUS; SUBCUTANEOUS EVERY 6 HOURS PRN
Status: DISCONTINUED | OUTPATIENT
Start: 2024-11-06 | End: 2024-11-07

## 2024-11-06 RX ORDER — SODIUM CHLORIDE 0.9 % (FLUSH) 0.9 %
10 SYRINGE (ML) INJECTION
Status: DISCONTINUED | OUTPATIENT
Start: 2024-11-06 | End: 2024-11-13 | Stop reason: HOSPADM

## 2024-11-06 RX ORDER — CARVEDILOL 3.12 MG/1
3.12 TABLET ORAL 2 TIMES DAILY
Status: DISCONTINUED | OUTPATIENT
Start: 2024-11-06 | End: 2024-11-13 | Stop reason: HOSPADM

## 2024-11-06 RX ORDER — HEPARIN SODIUM 5000 [USP'U]/ML
5000 INJECTION, SOLUTION INTRAVENOUS; SUBCUTANEOUS EVERY 8 HOURS
Status: DISCONTINUED | OUTPATIENT
Start: 2024-11-06 | End: 2024-11-06

## 2024-11-06 RX ORDER — AMIODARONE HYDROCHLORIDE 200 MG/1
200 TABLET ORAL DAILY
Status: DISCONTINUED | OUTPATIENT
Start: 2024-11-07 | End: 2024-11-07

## 2024-11-06 RX ORDER — MEMANTINE HYDROCHLORIDE 5 MG/1
5 TABLET ORAL DAILY
Status: DISCONTINUED | OUTPATIENT
Start: 2024-11-07 | End: 2024-11-13 | Stop reason: HOSPADM

## 2024-11-06 RX ORDER — AMIODARONE HYDROCHLORIDE 200 MG/1
1 TABLET ORAL DAILY
COMMUNITY

## 2024-11-06 RX ORDER — IPRATROPIUM BROMIDE AND ALBUTEROL SULFATE 2.5; .5 MG/3ML; MG/3ML
3 SOLUTION RESPIRATORY (INHALATION) 2 TIMES DAILY
Status: DISCONTINUED | OUTPATIENT
Start: 2024-11-06 | End: 2024-11-09

## 2024-11-06 RX ORDER — HEPARIN SODIUM 5000 [USP'U]/ML
5000 INJECTION, SOLUTION INTRAVENOUS; SUBCUTANEOUS EVERY 8 HOURS
Status: DISCONTINUED | OUTPATIENT
Start: 2024-11-06 | End: 2024-11-07

## 2024-11-06 RX ADMIN — MUPIROCIN: 20 OINTMENT TOPICAL at 08:11

## 2024-11-06 RX ADMIN — IPRATROPIUM BROMIDE AND ALBUTEROL SULFATE 3 ML: .5; 3 SOLUTION RESPIRATORY (INHALATION) at 09:11

## 2024-11-06 RX ADMIN — AZTREONAM 1000 MG: 1 INJECTION, POWDER, LYOPHILIZED, FOR SOLUTION INTRAMUSCULAR; INTRAVENOUS at 08:11

## 2024-11-06 RX ADMIN — DEXTROSE MONOHYDRATE 250 ML: 100 INJECTION, SOLUTION INTRAVENOUS at 02:11

## 2024-11-06 RX ADMIN — HEPARIN SODIUM 5000 UNITS: 5000 INJECTION, SOLUTION INTRAVENOUS; SUBCUTANEOUS at 10:11

## 2024-11-06 RX ADMIN — SODIUM CHLORIDE 1000 ML: 9 INJECTION, SOLUTION INTRAVENOUS at 10:11

## 2024-11-06 RX ADMIN — VANCOMYCIN HYDROCHLORIDE 2500 MG: 500 INJECTION, POWDER, LYOPHILIZED, FOR SOLUTION INTRAVENOUS at 11:11

## 2024-11-06 RX ADMIN — HEPARIN SODIUM 4000 UNITS: 1000 INJECTION, SOLUTION INTRAVENOUS; SUBCUTANEOUS at 10:11

## 2024-11-06 NOTE — Clinical Note
Diagnosis: Fall [500194]   Reason for IP Medical Treatment  (Clinical interventions that can only be accomplished in the IP setting? ) :: treatment

## 2024-11-06 NOTE — ASSESSMENT & PLAN NOTE
11/6/24:  -Dialyze as ordered   -Consult Nephrology  -Avoid nephrotoxic agents  -Monitor I&O  -CXR in AM  -Daily CMP

## 2024-11-06 NOTE — PLAN OF CARE
Problem: Adult Inpatient Plan of Care  Goal: Plan of Care Review  Outcome: Progressing  Goal: Patient-Specific Goal (Individualized)  Outcome: Progressing  Goal: Absence of Hospital-Acquired Illness or Injury  Outcome: Progressing  Goal: Optimal Comfort and Wellbeing  Outcome: Progressing  Goal: Readiness for Transition of Care  Outcome: Progressing     Problem: Infection  Goal: Absence of Infection Signs and Symptoms  Outcome: Progressing  Intervention: Prevent or Manage Infection  Flowsheets (Taken 11/6/2024 0603)  Infection Management: aseptic technique maintained     Problem: Wound  Goal: Optimal Coping  Outcome: Progressing  Goal: Optimal Functional Ability  Outcome: Progressing  Goal: Absence of Infection Signs and Symptoms  Outcome: Progressing  Goal: Improved Oral Intake  Outcome: Progressing  Goal: Optimal Pain Control and Function  Outcome: Progressing  Goal: Skin Health and Integrity  Outcome: Progressing  Goal: Optimal Wound Healing  Outcome: Progressing

## 2024-11-06 NOTE — ASSESSMENT & PLAN NOTE
Patient with Hypercapnic and Hypoxic Respiratory failure which is Acute on chronic.  he is not on home oxygen. Supplemental oxygen was provided and noted- Oxygen Concentration (%):  [30-40] 30    .   Signs/symptoms of respiratory failure include- respiratory distress and lethargy. Contributing diagnoses includes - CHF and COPD Labs and images were reviewed. Patient Has recent ABG, which has been reviewed. Will treat underlying causes and adjust management of respiratory failure as follows. Continue Bipap & monitor ABGs & pulse oximetry. Wean as tolerated.

## 2024-11-06 NOTE — H&P
[facility]  Critical Care Medicine  History & Physical    Patient Name: Yvan Rollins  MRN: [id number]  Admission Date: 11/6/2024  Hospital Length of Stay: 0 days  Code Status: Full Code  Attending Physician: Phillip Whelan MD   Primary Care Provider: Eugene Funez MD   Principal Problem: Acute respiratory failure with hypoxia and hypercarbia    Subjective:     HPI:  64 year old  male presented to the ED by ambulance after a fall at the nursing home. Patient is a poor historian due to being lethargic. It was reported that he fell at the nursing home, but no details were given about the fall. The patient reported on arrival to the ED that he thinks he may have struck his head and he has right knee pain. On my assessment, patient is more lethargic and only opens eyes to physical & verbal stimuli. Bipap is in use currently. PMHX is notable for end-stage renal disease with a right subclavian tunneled HD catheter for hemodialysis, pacemaker placement, CHF with an EF of 25-30%, COPD, Diabetes, GERD, Hepatitis C, Neuropathy, and hypotension which he takes midodrine for. Review of the external nursing home records show age-related cognitive decline due to prior CVA.     ED workup revealed Sodium 132, potassium 4.6, BUN/CR 42/7.6, magnesium 2.5, and troponin 76.1. ABGs 7.28, CO2 57, PO2 46, HCO3 26.8, and O2 saturation 75%. Placed on Bipap. CXR showed heart failure with small left pleural effusion. CT brain was negative for acute hemorrhage or infarction. Knee xray showed mild tricompartmental degenerative osteoarthrosis with a large suprapatellar effusion. Critical Care service will admit to the ICU for further monitoring and treatment.     Hospital/ICU Course:  No notes on file     Past Medical History:   Diagnosis Date    Cardiac arrest     CHF (congestive heart failure)     EF 25-30%    COPD (chronic obstructive pulmonary disease)     Coronary artery disease      Diabetes     GERD (gastroesophageal reflux disease)     Hepatitis C     Hypotension     Requiring Midodrine    Neuropathy     Substance abuse        Past Surgical History:   Procedure Laterality Date    arm surgery Right     INCISION AND DRAINAGE OF HEMATOMA Right 9/20/2023    Procedure: INCISION AND DRAINAGE, HEMATOMA;  Surgeon: Lexis Campbell MD;  Location: [facility];  Service: General;  Laterality: Right;    INSERTION OF PERMANENT PACEMAKER N/A 08/15/2018    IRRIGATION AND DEBRIDEMENT Left 9/20/2023    Procedure: IRRIGATION AND DEBRIDEMENT;  Surgeon: Lexis Campbell MD;  Location: [facility] OR;  Service: General;  Laterality: Left;  LUE    JOINT REPLACEMENT Right     Knee surgery    LUMBAR SPINE SURGERY         Review of patient's allergies indicates:   Allergen Reactions    Ceftriaxone Swelling    Lisinopril Swelling and Rash     Facial swelling   Facial swelling         Family History    None       Tobacco Use    Smoking status: Former     Current packs/day: 0.50     Types: Cigarettes    Smokeless tobacco: Former     Types: Snuff, Chew   Substance and Sexual Activity    Alcohol use: Not Currently    Drug use: Not Currently    Sexual activity: Not Currently      Review of Systems   Cardiovascular:  Positive for leg swelling (2+ pitting edema noted to bilateral lower extremities).   Skin:  Positive for wound (Wounds noted to bilateral lower extremities).   All other systems reviewed and are negative.  Objective:     Vital Signs (Most Recent):  Temp: 97.6 °F (36.4 °C) (11/06/24 1639)  Pulse: 93 (11/06/24 1632)  Resp: 13 (11/06/24 1646)  BP: (!) 87/63 (11/06/24 1646)  SpO2: 96 % (11/06/24 1646) Vital Signs (24h Range):  Temp:  [94.9 °F (34.9 °C)-97.6 °F (36.4 °C)] 97.6 °F (36.4 °C)  Pulse:  [] 93  Resp:  [10-19] 13  SpO2:  [70 %-96 %] 96 %  BP: ()/(56-99) 87/63   Weight: 125.3 kg (276 lb 4.8 oz)  Body mass index is 37.47 kg/m².      Intake/Output Summary (Last 24 hours) at  11/6/2024 1710  Last data filed at 11/6/2024 1555  Gross per 24 hour   Intake 250.97 ml   Output --   Net 250.97 ml          Physical Exam  Vitals and nursing note reviewed.   Constitutional:       Appearance: He is ill-appearing.      Comments: Opens eyes to verbal & tactile stimuli. Does not follow commands.   HENT:      Head: Normocephalic.      Mouth/Throat:      Mouth: Mucous membranes are dry.   Eyes:      Pupils: Pupils are equal, round, and reactive to light.   Cardiovascular:      Rate and Rhythm: Normal rate and regular rhythm.      Pulses: Normal pulses.      Heart sounds: Normal heart sounds.   Pulmonary:      Breath sounds: Rhonchi present.      Comments: Bipap in use. Respirations are unlabored. Bilateral rhonchi noted.  Abdominal:      General: Bowel sounds are normal.      Palpations: Abdomen is soft.   Musculoskeletal:         General: Swelling (Generalized edema noted to bilateral upper extremities) present.      Right lower leg: Edema (2+ pitting edema) present.      Left lower leg: Edema (2+ pitting edema) present.   Skin:     General: Skin is warm and dry.      Capillary Refill: Capillary refill takes 2 to 3 seconds.      Comments: Multiple wounds noted to bilateral lower extremities.    Neurological:      Mental Status: He is lethargic.      GCS: GCS eye subscore is 3. GCS verbal subscore is 3. GCS motor subscore is 5.   Psychiatric:      Comments: Patient is lethargic and unable to state his name. Opens eyes to verbal and tactile stimuli, but does not follow commands.          Vents:  Oxygen Concentration (%): (S) 30 (15/5) (11/06/24 1422)  Lines/Drains/Airways       Central Venous Catheter Line  Duration                  Hemodialysis Catheter 09/19/23 0020 right subclavian 414 days              Drain  Duration                  Hemodialysis AV Graft 09/19/23 0022 Right forearm 414 days              Peripheral Intravenous Line  Duration                  External Jugular IV 11/06/24 1422 <1 day                   Significant Labs:    CBC/Anemia Profile:  Recent Labs   Lab 11/06/24  1058 11/06/24  1333 11/06/24  1345 11/06/24  1508 11/06/24  1552   WBC 7.43  --   --   --   --    HGB 14.3  --   --   --   --    HCT 44.7   < > 43 43 39   PLT 62*  --   --   --   --    .0*  --   --   --   --    RDW 15.9*  --   --   --   --     < > = values in this interval not displayed.        Chemistries:  Recent Labs   Lab 11/06/24  1058   *   K 4.6   CL 97*   CO2 27   BUN 42*   CREATININE 7.64*   CALCIUM 8.5   ALBUMIN 2.8*   PROT 7.3   BILITOT 2.5*   ALKPHOS 123*   ALT 34   AST 13*   MG 2.5*       Recent Lab Results  (Last 5 results in the past 24 hours)        11/06/24  1721   11/06/24  1552   11/06/24  1508   11/06/24  1505   11/06/24  1450        POC CO2   28.4   28.5           Ammonia       30         POC Base Excess   0.0   -1.0           POC Glucose 87         97       POC HCO3   26.8   26.8           POC Hematocrit   39   43           POC Ionized Calcium   1.05   1.05           POC Lactate   0.7   1.2           POC PCO2   52   57           POC PH   7.32   7.28           POC PO2   79   46           POC Potassium   3.9   4.1           POC SATURATED O2   94   75           POC Sodium   125   129           POCT Glucose   121   75           Troponin I High Sensitivity       60.0                                Significant Imaging: I have reviewed all pertinent imaging results/findings within the past 24 hours.  I have reviewed and interpreted all pertinent imaging results/findings within the past 24 hours.  Assessment/Plan:     Pulmonary  * Acute respiratory failure with hypoxia and hypercarbia  Patient with Hypercapnic and Hypoxic Respiratory failure which is Acute on chronic.  he is not on home oxygen. Supplemental oxygen was provided and noted- Oxygen Concentration (%):  [30-40] 30    .   Signs/symptoms of respiratory failure include- respiratory distress and lethargy. Contributing diagnoses includes - CHF and  "COPD Labs and images were reviewed. Patient Has recent ABG, which has been reviewed. Will treat underlying causes and adjust management of respiratory failure as follows. Continue Bipap & monitor ABGs & pulse oximetry. Wean as tolerated.    Cardiac/Vascular  Chronic combined systolic and diastolic congestive heart failure  Patient has Combined Systolic and Diastolic heart failure that is Acute on chronic. On presentation their CHF was decompensated. Evidence of decompensated CHF on presentation includes: edema, elevated JVD, and shortness of breath. The etiology of their decompensation is likely medication non-compliance and    Most recent BNP and echo results are listed below.  No results for input(s): "BNP" in the last 72 hours.  Latest ECHO  Results for orders placed during the hospital encounter of 09/19/23    Echo    Interpretation Summary    Left Ventricle: The left ventricle is moderately dilated. Moderately increased wall thickness. Septal motion is consistent with pacing. There is moderately reduced systolic function. Ejection fraction by visual approximation is 30%. There is diastolic dysfunction.    Left Atrium: Left atrium is mildly dilated.    Right Ventricle: Mild right ventricular enlargement.    Right Atrium: Right atrium is mildly dilated.    Aortic Valve: The aortic valve is a trileaflet valve. There is mild aortic valve sclerosis. Mildly calcified cusps. There is mild aortic regurgitation with an eccentrically directed jet.    Tricuspid Valve: There is mild regurgitation.    IVC/SVC: Normal venous pressure at 3 mmHg.    Pericardium: There is a trivial effusion.    Current Heart Failure Medications       Plan  - Monitor strict I&Os and daily weights.    - Place on telemetry  - Low sodium diet  - Place on fluid restriction of 2 L.   - Cardiology has not been consulted  - The patient's volume status is stable but not at their baseline as indicated by edema, elevated JVD, and shortness of " breath  -ECHO  -Monitor oxygen levels  -CXR in AM          Renal/  Chronic kidney disease with end stage renal failure on dialysis  11/6/24:  -Dialyze as ordered   -Consult Nephrology  -Avoid nephrotoxic agents  -Monitor I&O  -CXR in AM  -Daily CMP    Orthopedic  Venous stasis ulcers of both lower extremities  11/6/24:  -Wound care consult  -Keep wounds clean and dry        Critical Care Daily Checklist:    A: Awake: RASS Goal/Actual Goal:    Actual:     B: Spontaneous Breathing Trial Performed?     C: SAT & SBT Coordinated?  N/A                      D: Delirium: CAM-ICU     E: Early Mobility Performed? No   F: Feeding Goal:    Status:     Current Diet Order   Procedures    Diet NPO      AS: Analgesia/Sedation N/A   T: Thromboembolic Prophylaxis Heparin   H: HOB > 300 Yes   U: Stress Ulcer Prophylaxis (if needed) N/A   G: Glucose Control Yes   B: Bowel Function     I: Indwelling Catheter (Lines & Hanna) Necessity No   D: De-escalation of Antimicrobials/Pharmacotherapies N/A    Plan for the day/ETD Bedrest    Code Status:  Family/Goals of Care: Full Code  Return to baseline     Critical Care Time: 45 minutes  Critical secondary to Patient has a condition that poses threat to life and bodily function: Acute hypoxic/hypercapnic respiratory failure requiring Bipap.    Critical care was time spent personally by me on the following activities: development of treatment plan with patient or surrogate and bedside caregivers, discussions with consultants, evaluation of patient's response to treatment, examination of patient, ordering and performing treatments and interventions, ordering and review of laboratory studies, ordering and review of radiographic studies, pulse oximetry, re-evaluation of patient's condition. This critical care time did not overlap with that of any other provider or involve time for any procedures.     ADRIANA LANDAVERDE, NP  Critical Care Medicine  [facility]

## 2024-11-06 NOTE — ED PROVIDER NOTES
Encounter Date: 11/6/2024       History   No chief complaint on file.    Patient arrives by ambulance after a fall at the nursing home.  Patient was a poor historian.  He was said to fall at the nursing home and no details were given about the fall.  However patient says he thinks he may have struck his head and he has right knee pain.  Patient has end-stage renal disease also has a ICD or pacemaker in place.  Review of the external records nursing home records show patient has age-related cognitive decline in prior CVA.  It was noted in his paperwork that he was prescribed 2 mg of Ativan every 6 hours as needed for extreme agitation.  Patient also is on Eliquis.        Review of patient's allergies indicates:   Allergen Reactions    Ceftriaxone Swelling    Lisinopril Swelling and Rash     Facial swelling   Facial swelling       Past Medical History:   Diagnosis Date    Cardiac arrest     CHF (congestive heart failure)     EF 25-30%    COPD (chronic obstructive pulmonary disease)     Coronary artery disease     Diabetes     GERD (gastroesophageal reflux disease)     Hepatitis C     Hypotension     Requiring Midodrine    Neuropathy     Substance abuse      Past Surgical History:   Procedure Laterality Date    arm surgery Right     INCISION AND DRAINAGE OF HEMATOMA Right 9/20/2023    Procedure: INCISION AND DRAINAGE, HEMATOMA;  Surgeon: Lexis Campbell MD;  Location: [facility];  Service: General;  Laterality: Right;    INSERTION OF PERMANENT PACEMAKER N/A 08/15/2018    IRRIGATION AND DEBRIDEMENT Left 9/20/2023    Procedure: IRRIGATION AND DEBRIDEMENT;  Surgeon: Lexis Campbell MD;  Location: [facility];  Service: General;  Laterality: Left;  LUE    JOINT REPLACEMENT Right     Knee surgery    LUMBAR SPINE SURGERY       No family history on file.  Social History     Tobacco Use    Smoking status: Former     Current packs/day: 0.50     Types: Cigarettes    Smokeless tobacco: Former     Types: Snuff, Chew    Substance Use Topics    Alcohol use: Not Currently    Drug use: Not Currently     Review of Systems   Constitutional:  Negative for fever.   HENT:  Negative for sore throat.    Respiratory:  Negative for shortness of breath.    Cardiovascular:  Positive for leg swelling. Negative for chest pain.   Gastrointestinal:  Negative for nausea.   Genitourinary:  Negative for dysuria.   Musculoskeletal:  Negative for back pain.   Skin:  Negative for rash.   Neurological:  Positive for headaches. Negative for weakness.   Hematological:  Does not bruise/bleed easily.   Psychiatric/Behavioral:  Positive for behavioral problems, confusion and decreased concentration.        Physical Exam     Initial Vitals   BP Pulse Resp Temp SpO2   -- -- -- -- --      MAP       --         Physical Exam    Nursing note and vitals reviewed.  Constitutional: He appears well-developed and well-nourished.   HENT:   Head: Normocephalic and atraumatic.   Eyes: EOM are normal. Pupils are equal, round, and reactive to light.   Neck: Neck supple. No thyromegaly present. No JVD present.   Normal range of motion.  Cardiovascular:  Normal rate, regular rhythm, normal heart sounds and intact distal pulses.           No murmur heard.  Pulmonary/Chest: Breath sounds normal. No stridor. No respiratory distress. He has no wheezes.   Abdominal: Abdomen is soft. Bowel sounds are normal. He exhibits no distension. There is no abdominal tenderness.   Musculoskeletal:         General: Edema present. No tenderness. Normal range of motion.      Cervical back: Normal range of motion and neck supple.      Comments: Swelling bilateral extremities.  Weeping edema bilateral no asymmetric swelling.  No hip tenderness.  Patient does have some mild tenderness right knee.    No midline tenderness of spine     Lymphadenopathy:     He has no cervical adenopathy.   Neurological: He is alert. He has normal strength. No cranial nerve deficit or sensory deficit. GCS score is 15.  GCS eye subscore is 4. GCS verbal subscore is 5. GCS motor subscore is 6.   Oriented to person.  Awake and alert.  Conversational but poor historian   Skin: Skin is warm and dry. Capillary refill takes less than 2 seconds. No rash noted.   Psychiatric: He has a normal mood and affect.         Medical Screening Exam   See Full Note    ED Course   Procedures  Labs Reviewed   CBC W/ AUTO DIFFERENTIAL    Narrative:     The following orders were created for panel order CBC auto differential.  Procedure                               Abnormality         Status                     ---------                               -----------         ------                     CBC with Differential[[id number]]                                                        Please view results for these tests on the individual orders.   COMPREHENSIVE METABOLIC PANEL   APTT   PROTIME-INR   MAGNESIUM   TROPONIN I   CBC WITH DIFFERENTIAL          Imaging Results              CT Head Without Contrast (In process)                      CT Cervical Spine Without Contrast (In process)                      Medications - No data to display  Medical Decision Making                                    Clinical Impression:   Final diagnoses:  [W19.XXXA] Fall

## 2024-11-06 NOTE — SUBJECTIVE & OBJECTIVE
Past Medical History:   Diagnosis Date    Cardiac arrest     CHF (congestive heart failure)     EF 25-30%    COPD (chronic obstructive pulmonary disease)     Coronary artery disease     Diabetes     GERD (gastroesophageal reflux disease)     Hepatitis C     Hypotension     Requiring Midodrine    Neuropathy     Substance abuse        Past Surgical History:   Procedure Laterality Date    arm surgery Right     INCISION AND DRAINAGE OF HEMATOMA Right 9/20/2023    Procedure: INCISION AND DRAINAGE, HEMATOMA;  Surgeon: Lexis Campbell MD;  Location: [facility] [place];  Service: General;  Laterality: Right;    INSERTION OF PERMANENT PACEMAKER N/A 08/15/2018    IRRIGATION AND DEBRIDEMENT Left 9/20/2023    Procedure: IRRIGATION AND DEBRIDEMENT;  Surgeon: Lexis Campbell MD;  Location: [facility] OR;  Service: General;  Laterality: Left;  LUE    JOINT REPLACEMENT Right     Knee surgery    LUMBAR SPINE SURGERY         Review of patient's allergies indicates:   Allergen Reactions    Ceftriaxone Swelling    Lisinopril Swelling and Rash     Facial swelling   Facial swelling         Family History    None       Tobacco Use    Smoking status: Former     Current packs/day: 0.50     Types: Cigarettes    Smokeless tobacco: Former     Types: Snuff, Chew   Substance and Sexual Activity    Alcohol use: Not Currently    Drug use: Not Currently    Sexual activity: Not Currently      Review of Systems   Cardiovascular:  Positive for leg swelling (2+ pitting edema noted to bilateral lower extremities).   Skin:  Positive for wound (Wounds noted to bilateral lower extremities).   All other systems reviewed and are negative.  Objective:     Vital Signs (Most Recent):  Temp: 97.6 °F (36.4 °C) (11/06/24 1639)  Pulse: 93 (11/06/24 1632)  Resp: 13 (11/06/24 1646)  BP: (!) 87/63 (11/06/24 1646)  SpO2: 96 % (11/06/24 1646) Vital Signs (24h Range):  Temp:  [94.9 °F (34.9 °C)-97.6 °F (36.4 °C)] 97.6 °F (36.4 °C)  Pulse:  [] 93  Resp:  [10-19]  13  SpO2:  [70 %-96 %] 96 %  BP: ()/(56-99) 87/63   Weight: 125.3 kg (276 lb 4.8 oz)  Body mass index is 37.47 kg/m².      Intake/Output Summary (Last 24 hours) at 11/6/2024 1710  Last data filed at 11/6/2024 1555  Gross per 24 hour   Intake 250.97 ml   Output --   Net 250.97 ml          Physical Exam  Vitals and nursing note reviewed.   Constitutional:       Appearance: He is ill-appearing.      Comments: Opens eyes to verbal & tactile stimuli. Does not follow commands.   HENT:      Head: Normocephalic.      Mouth/Throat:      Mouth: Mucous membranes are dry.   Eyes:      Pupils: Pupils are equal, round, and reactive to light.   Cardiovascular:      Rate and Rhythm: Normal rate and regular rhythm.      Pulses: Normal pulses.      Heart sounds: Normal heart sounds.   Pulmonary:      Breath sounds: Rhonchi present.      Comments: Bipap in use. Respirations are unlabored. Bilateral rhonchi noted.  Abdominal:      General: Bowel sounds are normal.      Palpations: Abdomen is soft.   Musculoskeletal:         General: Swelling (Generalized edema noted to bilateral upper extremities) present.      Right lower leg: Edema (2+ pitting edema) present.      Left lower leg: Edema (2+ pitting edema) present.   Skin:     General: Skin is warm and dry.      Capillary Refill: Capillary refill takes 2 to 3 seconds.      Comments: Multiple wounds noted to bilateral lower extremities.    Neurological:      Mental Status: He is lethargic.      GCS: GCS eye subscore is 3. GCS verbal subscore is 3. GCS motor subscore is 5.   Psychiatric:      Comments: Patient is lethargic and unable to state his name. Opens eyes to verbal and tactile stimuli, but does not follow commands.          Vents:  Oxygen Concentration (%): (S) 30 (15/5) (11/06/24 1422)  Lines/Drains/Airways       Central Venous Catheter Line  Duration                  Hemodialysis Catheter 09/19/23 0020 right subclavian 414 days              Drain  Duration                   Hemodialysis AV Graft 09/19/23 0022 Right forearm 414 days              Peripheral Intravenous Line  Duration                  External Jugular IV 11/06/24 1422 <1 day                  Significant Labs:    CBC/Anemia Profile:  Recent Labs   Lab 11/06/24  1058 11/06/24  1333 11/06/24  1345 11/06/24  1508 11/06/24  1552   WBC 7.43  --   --   --   --    HGB 14.3  --   --   --   --    HCT 44.7   < > 43 43 39   PLT 62*  --   --   --   --    .0*  --   --   --   --    RDW 15.9*  --   --   --   --     < > = values in this interval not displayed.        Chemistries:  Recent Labs   Lab 11/06/24  1058   *   K 4.6   CL 97*   CO2 27   BUN 42*   CREATININE 7.64*   CALCIUM 8.5   ALBUMIN 2.8*   PROT 7.3   BILITOT 2.5*   ALKPHOS 123*   ALT 34   AST 13*   MG 2.5*       Recent Lab Results  (Last 5 results in the past 24 hours)        11/06/24  1721   11/06/24  1552   11/06/24  1508   11/06/24  1505   11/06/24  1450        POC CO2   28.4   28.5           Ammonia       30         POC Base Excess   0.0   -1.0           POC Glucose 87         97       POC HCO3   26.8   26.8           POC Hematocrit   39   43           POC Ionized Calcium   1.05   1.05           POC Lactate   0.7   1.2           POC PCO2   52   57           POC PH   7.32   7.28           POC PO2   79   46           POC Potassium   3.9   4.1           POC SATURATED O2   94   75           POC Sodium   125   129           POCT Glucose   121   75           Troponin I High Sensitivity       60.0                                Significant Imaging: I have reviewed all pertinent imaging results/findings within the past 24 hours.  I have reviewed and interpreted all pertinent imaging results/findings within the past 24 hours.

## 2024-11-06 NOTE — PHARMACY MED REC
"Admission Medication History     The home medication history was taken by Rina Ramachandran.    You may go to "Admission" then "Reconcile Home Medications" tabs to review and/or act upon these items.     The home medication list has been updated by the Pharmacy department.   Please read ALL comments highlighted in yellow.   Please address this information as you see fit.    Feel free to contact us if you have any questions or require assistance.  Medications Updated:  Haloperidol 5 mg updated to 10 mg  Olanzapine 2.5 mg updated to twice daily  Sevelamer Carbonate 800 mg updated to 1600 mg twice daily  Medication Added:  Amiodarone 200 mg  From 11/01-11/14, taking 400 mg once daily  Starting 11/15, taking 200 mg once daily      Medications listed below were obtained from: [employer] home  (Not in a hospital admission)        Current Outpatient Medications on File Prior to Encounter   Medication Sig Dispense Refill Last Dose/Taking    albuterol-ipratropium (DUO-NEB) 2.5 mg-0.5 mg/3 mL nebulizer solution Take 3 mLs by nebulization 2 (two) times a day.   11/6/2024    amiodarone (PACERONE) 200 MG Tab Take 1 tablet by mouth once daily.   11/6/2024    apixaban (ELIQUIS) 5 mg Tab Take 5 mg by mouth 2 (two) times daily.   11/6/2024    atorvastatin (LIPITOR) 40 MG tablet Take 80 mg by mouth every evening.   11/6/2024    carvediloL (COREG) 3.125 MG tablet Take 3.125 mg by mouth 2 (two) times daily.   11/6/2024    haloperidoL (HALDOL) 5 MG tablet Take 10 mg by mouth every 6 (six) hours as needed (pt had dose given 2300).   11/6/2024    hydrOXYzine pamoate (VISTARIL) 25 MG Cap Take 25 mg by mouth 2 (two) times daily.   11/6/2024    lactulose (CHRONULAC) 10 gram/15 mL solution SMARTSIG:15 Milliliter(s) By Mouth Daily PRN   11/6/2024    LORazepam (ATIVAN) 2 MG Tab Take 2 mg by mouth as needed (given IM not PO).   Past Week    melatonin (MELATIN) 3 mg tablet Take 6 mg by mouth as needed (pt was given dose at " 5800).   11/5/2024 Bedtime    memantine (NAMENDA) 5 MG Tab Take 5 mg by mouth once daily.   11/6/2024    OLANZapine (ZYPREXA) 2.5 MG tablet Take 1 tablet by mouth 2 (two) times daily.   11/6/2024    sevelamer carbonate (RENVELA) 800 mg Tab Take 1,600 mg by mouth 3 (three) times daily.   11/6/2024    sucroferric oxyhydroxide (VELPHORO) 500 mg Chew Take 500 mg by mouth 3 (three) times daily.   11/6/2024    vortioxetine (TRINTELLIX) 5 mg Tab Take 5 mg by mouth Daily.   11/6/2024         Potential issues to be addressed PRIOR TO DISCHARGE  No issues identified.    Rina Ramachandran  Medication Access Specialist  EXT. [phone]    .

## 2024-11-06 NOTE — HPI
64 year old  male presented to the ED by ambulance after a fall at the nursing home. Patient is a poor historian due to being lethargic. It was reported that he fell at the nursing home, but no details were given about the fall. The patient reported on arrival to the ED that he thinks he may have struck his head and he has right knee pain. On my assessment, patient is more lethargic and only opens eyes to physical & verbal stimuli. Bipap is in use currently. PMHX is notable for end-stage renal disease with a right subclavian tunneled HD catheter for hemodialysis, pacemaker placement, CHF with an EF of 25-30%, COPD, Diabetes, GERD, Hepatitis C, Neuropathy, and hypotension which he takes midodrine for. Review of the external nursing home records show age-related cognitive decline due to prior CVA.     ED workup revealed Sodium 132, potassium 4.6, BUN/CR 42/7.6, magnesium 2.5, and troponin 76.1. ABGs 7.28, CO2 57, PO2 46, HCO3 26.8, and O2 saturation 75%. Placed on Bipap. CXR showed heart failure with small left pleural effusion. CT brain was negative for acute hemorrhage or infarction. Knee xray showed mild tricompartmental degenerative osteoarthrosis with a large suprapatellar effusion. Critical Care service will admit to the ICU for further monitoring and treatment.

## 2024-11-06 NOTE — ASSESSMENT & PLAN NOTE
"Patient has Combined Systolic and Diastolic heart failure that is Acute on chronic. On presentation their CHF was decompensated. Evidence of decompensated CHF on presentation includes: edema, elevated JVD, and shortness of breath. The etiology of their decompensation is likely medication non-compliance and    Most recent BNP and echo results are listed below.  No results for input(s): "BNP" in the last 72 hours.  Latest ECHO  Results for orders placed during the hospital encounter of 09/19/23    Echo    Interpretation Summary    Left Ventricle: The left ventricle is moderately dilated. Moderately increased wall thickness. Septal motion is consistent with pacing. There is moderately reduced systolic function. Ejection fraction by visual approximation is 30%. There is diastolic dysfunction.    Left Atrium: Left atrium is mildly dilated.    Right Ventricle: Mild right ventricular enlargement.    Right Atrium: Right atrium is mildly dilated.    Aortic Valve: The aortic valve is a trileaflet valve. There is mild aortic valve sclerosis. Mildly calcified cusps. There is mild aortic regurgitation with an eccentrically directed jet.    Tricuspid Valve: There is mild regurgitation.    IVC/SVC: Normal venous pressure at 3 mmHg.    Pericardium: There is a trivial effusion.    Current Heart Failure Medications       Plan  - Monitor strict I&Os and daily weights.    - Place on telemetry  - Low sodium diet  - Place on fluid restriction of 2 L.   - Cardiology has not been consulted  - The patient's volume status is stable but not at their baseline as indicated by edema, elevated JVD, and shortness of breath  -ECHO  -Monitor oxygen levels  -CXR in AM        "

## 2024-11-07 LAB
ALBUMIN SERPL BCP-MCNC: 2.5 G/DL (ref 3.5–5)
ALBUMIN/GLOB SERPL: 0.8 {RATIO}
ALP SERPL-CCNC: 84 U/L (ref 45–115)
ALT SERPL W P-5'-P-CCNC: 28 U/L (ref 16–61)
ANION GAP SERPL CALCULATED.3IONS-SCNC: 12 MMOL/L (ref 7–16)
AORTIC ROOT ANNULUS: 3.86 CM
AORTIC VALVE CUSP SEPERATION: 2.2 CM
APICAL FOUR CHAMBER EJECTION FRACTION: 24 %
AST SERPL W P-5'-P-CCNC: 31 U/L (ref 15–37)
AV INDEX (PROSTH): 0.57
AV MEAN GRADIENT: 3 MMHG
AV PEAK GRADIENT: 4.8 MMHG
AV REGURGITATION PRESSURE HALF TIME: 277.38 MS
AV VALVE AREA BY VELOCITY RATIO: 2.6 CM²
AV VALVE AREA: 2.4 CM²
AV VELOCITY RATIO: 0.64
BILIRUB SERPL-MCNC: 2.5 MG/DL (ref ?–1.2)
BSA FOR ECHO PROCEDURE: 2.52 M2
BUN SERPL-MCNC: 28 MG/DL (ref 7–18)
BUN/CREAT SERPL: 5 (ref 6–20)
CALCIUM SERPL-MCNC: 8.2 MG/DL (ref 8.5–10.1)
CHLORIDE SERPL-SCNC: 100 MMOL/L (ref 98–107)
CO2 SERPL-SCNC: 26 MMOL/L (ref 21–32)
CREAT SERPL-MCNC: 5.68 MG/DL (ref 0.7–1.3)
CV ECHO LV RWT: 0.51 CM
DOP CALC AO PEAK VEL: 1.1 M/S
DOP CALC AO VTI: 18.1 CM
DOP CALC LVOT AREA: 4.2 CM2
DOP CALC LVOT DIAMETER: 2.3 CM
DOP CALC LVOT PEAK VEL: 0.7 M/S
DOP CALC LVOT STROKE VOLUME: 43.2 CM3
DOP CALCLVOT PEAK VEL VTI: 10.4 CM
ECHO LV POSTERIOR WALL: 2 CM (ref 0.6–1.1)
EGFR (NO RACE VARIABLE) (RUSH/TITUS): 11 ML/MIN/1.73M2
EJECTION FRACTION: 25 %
FRACTIONAL SHORTENING: 5.1 % (ref 28–44)
GLOBULIN SER-MCNC: 3 G/DL (ref 2–4)
GLUCOSE SERPL-MCNC: 101 MG/DL (ref 70–105)
GLUCOSE SERPL-MCNC: 111 MG/DL (ref 70–105)
GLUCOSE SERPL-MCNC: 141 MG/DL (ref 70–105)
GLUCOSE SERPL-MCNC: 63 MG/DL (ref 74–106)
GLUCOSE SERPL-MCNC: 81 MG/DL (ref 70–105)
GLUCOSE SERPL-MCNC: 85 MG/DL (ref 70–105)
HCO3 UR-SCNC: 28.3 MMOL/L (ref 21–28)
INTERVENTRICULAR SEPTUM: 1.5 CM (ref 0.6–1.1)
IVC DIAMETER: 3.68 CM
LEFT ATRIUM AREA SYSTOLIC (APICAL 4 CHAMBER): 26.14 CM2
LEFT ATRIUM SIZE: 4.81 CM
LEFT INTERNAL DIMENSION IN SYSTOLE: 7.4 CM (ref 2.1–4)
LEFT VENTRICLE DIASTOLIC VOLUME INDEX: 137.3 ML/M2
LEFT VENTRICLE DIASTOLIC VOLUME: 329.51 ML
LEFT VENTRICLE END DIASTOLIC VOLUME APICAL 4 CHAMBER: 273.84 ML
LEFT VENTRICLE END SYSTOLIC VOLUME APICAL 4 CHAMBER: 69.92 ML
LEFT VENTRICLE MASS INDEX: 335.9 G/M2
LEFT VENTRICLE SYSTOLIC VOLUME INDEX: 121.6 ML/M2
LEFT VENTRICLE SYSTOLIC VOLUME: 291.88 ML
LEFT VENTRICULAR INTERNAL DIMENSION IN DIASTOLE: 7.8 CM (ref 3.5–6)
LEFT VENTRICULAR MASS: 806.3 G
LVED V (TEICH): 329.51 ML
LVES V (TEICH): 291.88 ML
LVOT MG: 1.35 MMHG
LVOT MV: 0.55 CM/S
MAGNESIUM SERPL-MCNC: 2.3 MG/DL (ref 1.7–2.3)
NT-PROBNP SERPL-MCNC: ABNORMAL PG/ML (ref 1–125)
OHS CV RV/LV RATIO: 0.82 CM
PCO2 BLDA: 49 MMHG (ref 35–48)
PH SMN: 7.37 [PH] (ref 7.35–7.45)
PHOSPHATE SERPL-MCNC: 2.9 MG/DL (ref 2.5–4.5)
PISA AR MAX VEL: 2.89 M/S
PISA MRMAX VEL: 4.02 M/S
PISA TR MAX VEL: 3.35 M/S
PO2 BLDA: 77 MMHG (ref 83–108)
POC BASE EXCESS: 2.3 MMOL/L (ref -2–3)
POC SATURATED O2: 95 % (ref 95–98)
POTASSIUM SERPL-SCNC: 4.2 MMOL/L (ref 3.5–5.1)
PROT SERPL-MCNC: 5.5 G/DL (ref 6.4–8.2)
PV PEAK GRADIENT: 1 MMHG
PV PEAK VELOCITY: 0.59 M/S
RA MAJOR: 6.52 CM
RA PRESSURE ESTIMATED: 15 MMHG
RIGHT VENTRICLE DIASTOLIC BASEL DIMENSION: 6.4 CM
RIGHT VENTRICLE DIASTOLIC LENGTH: 7 CM
RIGHT VENTRICLE DIASTOLIC MID DIMENSION: 5.1 CM
RIGHT VENTRICULAR LENGTH IN DIASTOLE (APICAL 4-CHAMBER VIEW): 6.97 CM
RV MID DIAMA: 5.11 CM
RV TB RVSP: 18 MMHG
SODIUM SERPL-SCNC: 134 MMOL/L (ref 136–145)
TDI LATERAL: 0.07 M/S
TDI SEPTAL: 0.04 M/S
TDI: 0.06 M/S
TR MAX PG: 45 MMHG
TRICUSPID ANNULAR PLANE SYSTOLIC EXCURSION: 1.94 CM
TV REST PULMONARY ARTERY PRESSURE: 60 MMHG
Z-SCORE OF LEFT VENTRICULAR DIMENSION IN END DIASTOLE: -3.38
Z-SCORE OF LEFT VENTRICULAR DIMENSION IN END SYSTOLE: 0.97

## 2024-11-07 PROCEDURE — 97162 PT EVAL MOD COMPLEX 30 MIN: CPT

## 2024-11-07 PROCEDURE — 99233 SBSQ HOSP IP/OBS HIGH 50: CPT | Mod: ,,, | Performed by: INTERNAL MEDICINE

## 2024-11-07 PROCEDURE — 25000242 PHARM REV CODE 250 ALT 637 W/ HCPCS

## 2024-11-07 PROCEDURE — 82962 GLUCOSE BLOOD TEST: CPT

## 2024-11-07 PROCEDURE — 63600175 PHARM REV CODE 636 W HCPCS

## 2024-11-07 PROCEDURE — 27000221 HC OXYGEN, UP TO 24 HOURS

## 2024-11-07 PROCEDURE — 36600 WITHDRAWAL OF ARTERIAL BLOOD: CPT

## 2024-11-07 PROCEDURE — 94660 CPAP INITIATION&MGMT: CPT

## 2024-11-07 PROCEDURE — 94640 AIRWAY INHALATION TREATMENT: CPT

## 2024-11-07 PROCEDURE — 82803 BLOOD GASES ANY COMBINATION: CPT

## 2024-11-07 PROCEDURE — 36415 COLL VENOUS BLD VENIPUNCTURE: CPT

## 2024-11-07 PROCEDURE — 25000003 PHARM REV CODE 250: Performed by: INTERNAL MEDICINE

## 2024-11-07 PROCEDURE — 20000000 HC ICU ROOM

## 2024-11-07 PROCEDURE — 25000003 PHARM REV CODE 250

## 2024-11-07 PROCEDURE — 94761 N-INVAS EAR/PLS OXIMETRY MLT: CPT

## 2024-11-07 PROCEDURE — 99900035 HC TECH TIME PER 15 MIN (STAT)

## 2024-11-07 PROCEDURE — 84100 ASSAY OF PHOSPHORUS: CPT

## 2024-11-07 PROCEDURE — 80053 COMPREHEN METABOLIC PANEL: CPT

## 2024-11-07 PROCEDURE — 83735 ASSAY OF MAGNESIUM: CPT

## 2024-11-07 RX ORDER — AMIODARONE HYDROCHLORIDE 200 MG/1
200 TABLET ORAL ONCE
Status: COMPLETED | OUTPATIENT
Start: 2024-11-07 | End: 2024-11-07

## 2024-11-07 RX ORDER — NOREPINEPHRINE BITARTRATE/D5W 4MG/250ML
0-.2 PLASTIC BAG, INJECTION (ML) INTRAVENOUS CONTINUOUS
Status: DISCONTINUED | OUTPATIENT
Start: 2024-11-07 | End: 2024-11-09

## 2024-11-07 RX ORDER — AMIODARONE HYDROCHLORIDE 200 MG/1
200 TABLET ORAL DAILY
Status: DISCONTINUED | OUTPATIENT
Start: 2024-11-15 | End: 2024-11-09

## 2024-11-07 RX ORDER — INSULIN ASPART 100 [IU]/ML
0-5 INJECTION, SOLUTION INTRAVENOUS; SUBCUTANEOUS
Status: DISCONTINUED | OUTPATIENT
Start: 2024-11-07 | End: 2024-11-13 | Stop reason: HOSPADM

## 2024-11-07 RX ORDER — NOREPINEPHRINE BITARTRATE/D5W 4MG/250ML
0-.2 PLASTIC BAG, INJECTION (ML) INTRAVENOUS CONTINUOUS
Status: DISCONTINUED | OUTPATIENT
Start: 2024-11-07 | End: 2024-11-07

## 2024-11-07 RX ORDER — AMIODARONE HYDROCHLORIDE 200 MG/1
400 TABLET ORAL DAILY
Status: DISCONTINUED | OUTPATIENT
Start: 2024-11-08 | End: 2024-11-09

## 2024-11-07 RX ORDER — AMIODARONE HYDROCHLORIDE 200 MG/1
200 TABLET ORAL ONCE
Status: DISCONTINUED | OUTPATIENT
Start: 2024-11-07 | End: 2024-11-07

## 2024-11-07 RX ORDER — AZTREONAM 1 G/1
2000 INJECTION, POWDER, LYOPHILIZED, FOR SOLUTION INTRAMUSCULAR; INTRAVENOUS
Status: DISCONTINUED | OUTPATIENT
Start: 2024-11-08 | End: 2024-11-11

## 2024-11-07 RX ORDER — LORAZEPAM 1 MG/1
2 TABLET ORAL
Status: DISCONTINUED | OUTPATIENT
Start: 2024-11-07 | End: 2024-11-09

## 2024-11-07 RX ORDER — OLANZAPINE 2.5 MG/1
2.5 TABLET ORAL DAILY
Status: DISCONTINUED | OUTPATIENT
Start: 2024-11-07 | End: 2024-11-09

## 2024-11-07 RX ORDER — HALOPERIDOL 5 MG/1
5 TABLET ORAL 2 TIMES DAILY
Status: DISCONTINUED | OUTPATIENT
Start: 2024-11-07 | End: 2024-11-11

## 2024-11-07 RX ADMIN — AMIODARONE HYDROCHLORIDE 200 MG: 200 TABLET ORAL at 12:11

## 2024-11-07 RX ADMIN — OLANZAPINE 2.5 MG: 2.5 TABLET, FILM COATED ORAL at 09:11

## 2024-11-07 RX ADMIN — IPRATROPIUM BROMIDE AND ALBUTEROL SULFATE 3 ML: .5; 3 SOLUTION RESPIRATORY (INHALATION) at 07:11

## 2024-11-07 RX ADMIN — HALOPERIDOL 5 MG: 5 TABLET ORAL at 09:11

## 2024-11-07 RX ADMIN — SODIUM CHLORIDE 500 ML: 9 INJECTION, SOLUTION INTRAVENOUS at 04:11

## 2024-11-07 RX ADMIN — CARVEDILOL 3.12 MG: 3.12 TABLET, FILM COATED ORAL at 09:11

## 2024-11-07 RX ADMIN — HEPARIN SODIUM 5000 UNITS: 5000 INJECTION, SOLUTION INTRAVENOUS; SUBCUTANEOUS at 05:11

## 2024-11-07 RX ADMIN — MEMANTINE 5 MG: 5 TABLET ORAL at 09:11

## 2024-11-07 RX ADMIN — NOREPINEPHRINE BITARTRATE 0.02 MCG/KG/MIN: 4 INJECTION, SOLUTION INTRAVENOUS at 04:11

## 2024-11-07 RX ADMIN — MUPIROCIN: 20 OINTMENT TOPICAL at 09:11

## 2024-11-07 RX ADMIN — LORAZEPAM 2 MG: 1 TABLET ORAL at 04:11

## 2024-11-07 RX ADMIN — AMIODARONE HYDROCHLORIDE 200 MG: 200 TABLET ORAL at 09:11

## 2024-11-07 RX ADMIN — AZTREONAM 1000 MG: 1 INJECTION, POWDER, LYOPHILIZED, FOR SOLUTION INTRAMUSCULAR; INTRAVENOUS at 06:11

## 2024-11-07 RX ADMIN — NOREPINEPHRINE BITARTRATE 0.04 MCG/KG/MIN: 4 INJECTION, SOLUTION INTRAVENOUS at 06:11

## 2024-11-07 NOTE — CARE UPDATE
11/07/24 0746   Patient Assessment/Suction   Level of Consciousness (AVPU) alert   Respiratory Effort Normal;Unlabored   Expansion/Accessory Muscles/Retractions no retractions;no use of accessory muscles   All Lung Fields Breath Sounds Anterior:;clear   Cough Frequency no cough   Skin Integrity   $ Wound Care Tech Time 15 min   Area Observed Left;Right;Behind ear   Skin Appearance without discoloration   Barrier used? Liquid Filled Cushion   PRE-TX-O2   Device (Oxygen Therapy) venturi mask   $ Is the patient on Low Flow Oxygen? Yes   Flow (L/min) (Oxygen Therapy) 15   Oxygen Concentration (%) 50   SpO2 97 %   Pulse Oximetry Type Continuous   $ Pulse Oximetry - Multiple Charge Pulse Oximetry - Multiple   Pulse 92   Resp 17   Aerosol Therapy   $ Aerosol Therapy Charges Aerosol Treatment   Daily Review of Necessity (SVN) completed   Respiratory Treatment Status (SVN) given   Treatment Route (SVN) mask   Patient Position HOB elevated   Post Treatment Assessment (SVN) breath sounds unchanged   Signs of Intolerance (SVN) none   Breath Sounds Post-Respiratory Treatment   Throughout All Fields Post-Treatment All Fields   Throughout All Fields Post-Treatment Anterior:;no change   Post-treatment Heart Rate (beats/min) 99   Post-treatment Resp Rate (breaths/min) 17   Ready to Wean/Extubation Screen   FIO2<=50 (chart decimal) 0.5   Respiratory Evaluation   $ Care Plan Tech Time 15 min     Done by RT student Anjelica CONTRERAS

## 2024-11-07 NOTE — PROGRESS NOTES
Pharmacist Renal Dose Adjustment Note    Yvan Rollins is a 64 y.o. male being treated with the medication Azactam    Patient Data:    Vital Signs (Most Recent):  Temp: 97.8 °F (36.6 °C) (11/07/24 0730)  Pulse: 97 (11/07/24 0830)  Resp: 15 (11/07/24 0830)  BP: 104/80 (11/07/24 0830)  SpO2: 95 % (11/07/24 0815) Vital Signs (72h Range):  Temp:  [94.9 °F (34.9 °C)-97.8 °F (36.6 °C)]   Pulse:  []   Resp:  [10-24]   BP: ()/()   SpO2:  [70 %-100 %]      Recent Labs   Lab 11/03/24  0500 11/06/24  1058 11/07/24  0426   CREATININE 7.69* 7.64* 5.68*     Serum creatinine: 5.68 mg/dL (H) 11/07/24 0426  Estimated creatinine clearance: 18.1 mL/min (A)    Medication:Azactam dose: 1gm frequency q12hrs will be changed to medication:Azactam dose:2gm frequency:q24hrs    Pharmacist's Name: Porter Medina  Pharmacist's Extension: [phone]

## 2024-11-07 NOTE — ASSESSMENT & PLAN NOTE
Uncertain of the etiology currently getting broad-spectrum antibiotics were going to support with Levophed and watch carefully

## 2024-11-07 NOTE — PLAN OF CARE
Problem: Adult Inpatient Plan of Care  Goal: Absence of Hospital-Acquired Illness or Injury  Outcome: Progressing     Problem: Wound  Goal: Optimal Functional Ability  Outcome: Progressing     Problem: Wound  Goal: Optimal Pain Control and Function  Outcome: Progressing     Problem: Hemodialysis  Goal: Safe, Effective Therapy Delivery  Outcome: Progressing

## 2024-11-07 NOTE — HPI
This patient with history of ESRD, debility, CHF with EF of 20% is a nursing home resident.  He is status post a fall hitting his head.  The patient was in respiratory distress in the emergency department but was able to respond to bipap ventilation.  The patient missed dialysis earlier this week.  He is volume overloaded.  Nephrology has been consulted for renal issues.  Serum potassium is 4.6.  Serum BUN is 42 and creatinine is 7.6.

## 2024-11-07 NOTE — ASSESSMENT & PLAN NOTE
Patient was on BiPAP we can go to oxygen this morning.  Use BiPAP p.r.n..  Dialysis with removal volume helped his respiratory distress and failure

## 2024-11-07 NOTE — PLAN OF CARE
Problem: Physical Therapy  Goal: Physical Therapy Goal  Description: Short term goals:  1. Sit to stand transfer with Modified [place]  2. Bed to chair transfer with Modified [place] using Rolling Walker  3. Gait  x 100 feet with Modified [place] using Rolling Walker.     Long term goals:  Pt will return to prior living situation with modified independence for all mobility    Outcome: Progressing

## 2024-11-07 NOTE — NURSING
DIALYSIS TREATMENT SUMMARY       Note: Consult with the attending physician for patient treatment orders, this document is not a physician order.      Patient Information   Patient Yvan Rollins   Date of Birth August 25, 1960   Chart Number [id number]   Location [facility]   Location MRN [id number]   Gender Male   SSN (last 4)      Treatment Information   Treatment Type Hemodialysis   Treatment Id [id number]   Start Time November 06, 2024 19:30   End Time November 06, 2024 22:30   Acutal Duration 03:00     Treatment Balances   Total Saline Administered 500   Net Fluid Balance 2000    Hemodialysis Orders   Therapy Standard   Orders Verified Time 11/06/2024 19:00    Date Verified 11/06/2024   Duration 3:00   Isolated UF/Bypass No   BFR (mL) 500   DFR X2 BFR   Dialyzer Type OPTIFLUX 160NR   UF Order UF Goal Ordered   UF Goal Ordered (mL) 3000   Crit-Line used No   Heparin Initial Units Bolus No   Heparin IV Maintenance Bolus No   Heparin IV Infusion No   Potassium (mEq/L) 3   Calcium (mEq/L) 2.5   Bicarb (mg/dL) 33   Sodium (mEq/L) 137   Additional Orders UF off for symptomatic hypotension and/or drop in systolic pressure of 20 or >, Heparin 2000u/2ml to each HD cath lumen post tx   Clinician Yessy Archibald RN    Dialysis Access   Access Type Central Venous Catheter   Central Venous Catheter   Access Type Catheter - Tunneled   Access Location Chest Wall - R   Current/New [facility] Patient Yes   Catheter Care Completed per Policy Yes   Dressing Dry and Intact on Arrival Yes   Dressing Changed Yes   Type of Dressing Film Biopatch/CHG   Dressing Changed By [facility] Staff   Type of HD Caps Not Listed   HD Caps Changed Yes   CVC Line Education Provided No - Decreased LOC      Vitals   Pre-Treatment Vitals   Time Is BP being recorded? Pre BP Map BP Method Pulse RR Temp How was Weight Obtained? Pre Weight Previous Dry Weight Previous Post Weight Metric Target Fluid Removal (mL) Dialysate Confirmed Clinician     11/06/2024 19:15 BP/Map 125/85 (98) Noninvasive 88 15 97.6 How Obtained: Bed Scale 122   Kgs 3500 Yes Yessy Archibald RN    Comments:       Post Treatment Vitals   Time Is BP being recorded? BP Map Pulse RR Temp How was Weight Obtained? Post Weight Metric BVP UF Goal Ordered NSS Given Intra-Procedure Total Machine UF Removed (mL) Crit-Line Ending Profile Crit-Line Refill Crit-Line Ending HCT Crit-Line Max BV% Clinician    11/06/2024 22:40 BP/Map 90/60 (70) 100 14 96.6 How Obtained: Bed scale 119 Kgs  3000 0 2500     Yessy Archibald RN    Comments:       Safety checks include: access uncovered and secured, Hemaclip secured for all central line access, machine checks performed, and alarm limits confirmed.     Labs   Hepatitis   HBsAG Lab Result HBsAG Lab Result HBsAG Draw Date Transient Antigenemia(MD Diagnosis Only) Anti-HBs Lab Result Anti-HBs Lab Value Anti-HBs Draw Date Documented By Documented Date Hepatitis Status Hepatitis Status   Negative  11/04/2024  Negative  11/04/2024 Casey Benites 11/06/2024  Susceptible   Notes:       Pre-Treatment Hepatitis Precautions Copy of hepatitis results verified in hospital EMR Yes Signing   Patient tx at bedside 1:1 Yes Hepatitis Information Entered By Casey Benites RN Signed By Casey Benites RN     Pre-Treatment Handoff   Staff Report Received Yes   Report Given by Primary Nurse Darien Caballero   Time 19:19     Patient Arrival   Patient ID Verified Date of Birth    MRN   Patient Consent to treatment verified Yes   Blood Transfusion Consent Verified N/A     Treatment Comments   Treatment Notes Tolerated without difficulty.     Post-Treatment Handoff   Report Given to Primary Nurse Darien Caballero   Time Report Given 22:51   Report Given By Yessy Archibald RN    Machine Validation   Time 17:36   Date 11/6/2024   Auto Alarm Test Passed Yes   Machine Serial # [id number]   Portable RO Yes   RO Serial# [id number]   Residual Bleach Negative Yes   Was a manufactured mix used? No    Machine Log Completed Yes   Total Chlorine (less than 0.1)? Yes   Total Chlorine Log Completed Yes   Bicarb BiBag   Bibag Size 650   Machine Temp 37   Machine Conductivity 14   Meter Type N/A   Meter Conductivity    Independent Conductivity 13.9   pH Status Pass Pass   pH    TCD Value 13.7   TCD Alarm with +/- 0.5 Yes   NVL enabled validated 100 asymmetric Yes   Safety check complete Casey Benites, RN   Second Verification Performed? No   Second Verification Performed By    Reason Not Verified No other staff members located at the hospital      Serum Lab Values   Time BUN Creatinine Na K (mEq/L) Cl CO2 Ca (mEq/L) Phos Mg (mg/dL) Alb (g/dL) Glucose Hgb Hct WBC Plt PT aPTT INR Other Clinician    11/06/2024 10:58 42 7.64 132 4.6 97 27 8.5     14.3 44.7 7.43      Casey Benites, RN    Comments:         Facility Information Location ICU Diagnosis   Facility Information Room # 3 Diagnosis Acute respiratory failure   Admission Date 11/06/2024 Stat Treatment No Isolation Information   Ordering MD [place] Patient Type Chronic dialysis patient with diagnosis of ESRD Isolation Required? N   Account/Finance Number [id number] Patient Chronic Unit [facility] Completed by   Admission Status InPatient Code Status Full Code General Tx information Entered by Yessy Archibald RN     Start Treatment Time Out Confirmed by LEYDI Archibald Correct access site verified Yes   Treatment Initiation Connections Secured Time Out Completed 19:26 Treatment Start Date 11/06/2024    Saline line double clamped Correct patient verified Yes Treatment Start Time 19:30    Hemaclip Applied Correct procedure verified Yes Patient/Family questions and concerns addressed Yes     Pre Focused Assessment Notes O2 via CPAP. LOC   Access Edema LOC Obtunded   Signs and Symptoms of Infection? No Location Generalized    Pain Screening Notes 2-3+  Anuric   Does the patient have pain? No Cardiac Completed by   Respiratory Heart Rhythm Irregular Pre Treatment Focused  Assessment Completed By Yessy Archibald RN   Lung Sounds Diminished Telemetry Yes Time 19:21   Location Bilateral Skin Signing   Position Anterior Skin Warm Signed By Yessy Archibald RN   Respiratory Efforts Unlabored  Dry      Education Family Education Provided? N/A Patient Education Introduced By   Patient Education Caregiver Education Provided? Yes Patient Education Introduced By Yessy Archibald RN   Patient Educated? No Focus or Topic Hemodialysis treatment review    Reason Altered LOC Method Knowledge / Understanding Assessed Teach back      Post-Treatment HD machine external disinfection completed per policy Yes Completed by   Post Treatment Delay PRO external disinfection completed per policy Yes Post Treatment Form Completed By Yessy Archibald RN   Delay N Post Treatment General Information    Machine Disinfection Requirement Notes Post cath care per protocol. 3 L net fluid removed.      Post Focused Assessment Lung Sounds Diminished  Dry   Changes from Pre Focused Assessment Location Bilateral LOC   Changes from Pre Treatment Focused Assessment? No Position Anterior LOC Obtunded   Access Respiratory Efforts Unlabored    Cath Packed with Heparin Notes O2 via CPAP.  Anuric   Access Port(ml) 2 Edema Completed by   Return Port(ml) 2 Location Generalized Post Treatment Focused Assessment Completed by Yessy Archibald RN   Catheter clamped and capped Yes Notes 2-3+ Date 11/06/2024   Access Flow Good Cardiac Time 22:45   Dialyzer Clearance Streaked Heart Rhythm Irregular Signing   Pain Screening Telemetry Yes Signed By Yessy Archibald RN   Does the patient have pain? No Skin    Respiratory Skin Warm

## 2024-11-07 NOTE — PROGRESS NOTES
[facility]  Critical Care Medicine  Progress Note    Patient Name: Yvan Rollins  MRN: [id number]  Admission Date: 11/6/2024  Hospital Length of Stay: 1 days  Code Status: Full Code  Attending Provider: Phillip Whelan MD  Primary Care Provider: Eugene Funez MD   Principal Problem: Acute respiratory failure with hypoxia and hypercarbia    Subjective:     HPI:  64 year old  male presented to the ED by ambulance after a fall at the nursing home. Patient is a poor historian due to being lethargic. It was reported that he fell at the nursing home, but no details were given about the fall. The patient reported on arrival to the ED that he thinks he may have struck his head and he has right knee pain. On my assessment, patient is more lethargic and only opens eyes to physical & verbal stimuli. Bipap is in use currently. PMHX is notable for end-stage renal disease with a right subclavian tunneled HD catheter for hemodialysis, pacemaker placement, CHF with an EF of 25-30%, COPD, Diabetes, GERD, Hepatitis C, Neuropathy, and hypotension which he takes midodrine for. Review of the external nursing home records show age-related cognitive decline due to prior CVA.     ED workup revealed Sodium 132, potassium 4.6, BUN/CR 42/7.6, magnesium 2.5, and troponin 76.1. ABGs 7.28, CO2 57, PO2 46, HCO3 26.8, and O2 saturation 75%. Placed on Bipap. CXR showed heart failure with small left pleural effusion. CT brain was negative for acute hemorrhage or infarction. Knee xray showed mild tricompartmental degenerative osteoarthrosis with a large suprapatellar effusion. Critical Care service will admit to the ICU for further monitoring and treatment.     Hospital/ICU Course:  No notes on file    Interval History/Significant Events:  Patient without complaints    Review of Systems  Objective:     Vital Signs (Most Recent):  Temp: 97.4 °F (36.3 °C) (11/07/24 0312)  Pulse: 77 (11/07/24 0600)  Resp:  11 (11/07/24 0600)  BP: (!) 71/48 (11/07/24 0600)  SpO2: (!) 92 % (11/07/24 0600) Vital Signs (24h Range):  Temp:  [94.9 °F (34.9 °C)-97.6 °F (36.4 °C)] 97.4 °F (36.3 °C)  Pulse:  [] 77  Resp:  [10-21] 11  SpO2:  [70 %-100 %] 92 %  BP: ()/() 71/48   Weight: 120.4 kg (265 lb 6.9 oz)  Body mass index is 36 kg/m².      Intake/Output Summary (Last 24 hours) at 11/7/2024 0614  Last data filed at 11/7/2024 0229  Gross per 24 hour   Intake 2285.4 ml   Output 3500 ml   Net -1214.6 ml          Physical Exam  Vitals reviewed.   Constitutional:       Appearance: Normal appearance.      Interventions: He is not intubated.  HENT:      Head: Normocephalic and atraumatic.      Nose: Nose normal.      Mouth/Throat:      Mouth: Mucous membranes are dry.      Pharynx: Oropharynx is clear.   Eyes:      Extraocular Movements: Extraocular movements intact.      Conjunctiva/sclera: Conjunctivae normal.      Pupils: Pupils are equal, round, and reactive to light.   Cardiovascular:      Rate and Rhythm: Normal rate.      Heart sounds: Normal heart sounds. No murmur heard.  Pulmonary:      Effort: Pulmonary effort is normal. He is not intubated.      Breath sounds: Normal breath sounds.   Abdominal:      General: Abdomen is flat. Bowel sounds are normal.      Palpations: Abdomen is soft.   Musculoskeletal:         General: Normal range of motion.      Cervical back: Normal range of motion and neck supple.      Right lower leg: No edema.      Left lower leg: No edema.   Skin:     General: Skin is warm and dry.      Capillary Refill: Capillary refill takes less than 2 seconds.   Neurological:      General: No focal deficit present.      Mental Status: He is alert and oriented to person, place, and time.   Psychiatric:         Mood and Affect: Mood normal.         Behavior: Behavior normal.            Vents:  Oxygen Concentration (%): 50 (11/07/24 0520)  Lines/Drains/Airways       Central Venous Catheter Line  Duration                   Hemodialysis Catheter 09/19/23 0020 right subclavian 415 days              Drain  Duration                  Hemodialysis AV Graft 09/19/23 0022 Right forearm 415 days              Peripheral Intravenous Line  Duration                  External Jugular IV 11/06/24 1422 <1 day         Peripheral IV - Single Lumen 11/06/24 2030 20 G Left;Posterior Forearm <1 day                  Significant Labs:    CBC/Anemia Profile:  Recent Labs   Lab 11/06/24  1058 11/06/24  1333 11/06/24  1508 11/06/24  1552 11/06/24  1731   WBC 7.43  --   --   --  5.98   HGB 14.3  --   --   --  12.2*   HCT 44.7   < > 43 39 38.8*   PLT 62*  --   --   --  55*   .0*  --   --   --  103.5*   RDW 15.9*  --   --   --  15.8*    < > = values in this interval not displayed.        Chemistries:  Recent Labs   Lab 11/06/24  1058   *   K 4.6   CL 97*   CO2 27   BUN 42*   CREATININE 7.64*   CALCIUM 8.5   ALBUMIN 2.8*   PROT 7.3   BILITOT 2.5*   ALKPHOS 123*   ALT 34   AST 13*   MG 2.5*       Recent Lab Results  (Last 5 results in the past 24 hours)        11/07/24  0523   11/07/24  0501   11/06/24  2351   11/06/24 2015 11/06/24  1731        Baso #         0.03       Basophil %         0.5       Differential Method         Auto       Eos #         0.04       Eos %         0.7       Hematocrit         38.8       Hemoglobin         12.2       Immature Grans (Abs)         0.02       Immature Granulocytes         0.3       Lymph #         0.56       Lymph %         9.4       MCH         32.5       MCHC         31.4       MCV         103.5       Mono #         0.40       Mono %         6.7       MPV         11.9       Neutrophils, Abs         4.93       Neutrophils Relative         82.4       nRBC         0.0       NUCLEATED RBC ABSOLUTE         0.00       Platelet Count         55       POC Base Excess   2.3             POC Glucose 85     81   73         POC HCO3   28.3             POC PCO2   49             POC PH   7.37             POC  "PO2   77             POC SATURATED O2   95             RBC         3.75       RDW         15.8       WBC         5.98                              Significant Imaging:  I have reviewed all pertinent imaging results/findings within the past 24 hours.    ABG  Recent Labs   Lab 11/07/24  0501   PH 7.37   PO2 77*   PCO2 49*   HCO3 28.3*     Assessment/Plan:     Pulmonary  * Acute respiratory failure with hypoxia and hypercarbia  Patient was on BiPAP we can go to oxygen this morning.  Use BiPAP p.r.n..  Dialysis with removal volume helped his respiratory distress and failure    Cardiac/Vascular  Chronic combined systolic and diastolic congestive heart failure  Patient has Combined Systolic and Diastolic heart failure that is Acute on chronic. On presentation their CHF was decompensated. Evidence of decompensated CHF on presentation includes: edema, elevated JVD, and shortness of breath. The etiology of their decompensation is likely medication non-compliance and    Most recent BNP and echo results are listed below.  No results for input(s): "BNP" in the last 72 hours.  Latest ECHO  Results for orders placed during the hospital encounter of 09/19/23    Echo    Interpretation Summary    Left Ventricle: The left ventricle is moderately dilated. Moderately increased wall thickness. Septal motion is consistent with pacing. There is moderately reduced systolic function. Ejection fraction by visual approximation is 30%. There is diastolic dysfunction.    Left Atrium: Left atrium is mildly dilated.    Right Ventricle: Mild right ventricular enlargement.    Right Atrium: Right atrium is mildly dilated.    Aortic Valve: The aortic valve is a trileaflet valve. There is mild aortic valve sclerosis. Mildly calcified cusps. There is mild aortic regurgitation with an eccentrically directed jet.    Tricuspid Valve: There is mild regurgitation.    IVC/SVC: Normal venous pressure at 3 mmHg.    Pericardium: There is a trivial " effusion.    Current Heart Failure Medications       Plan  - Monitor strict I&Os and daily weights.    - Place on telemetry  - Low sodium diet  - Place on fluid restriction of 2 L.   - Cardiology has not been consulted  - The patient's volume status is stable but not at their baseline as indicated by edema, elevated JVD, and shortness of breath  -ECHO  -Monitor oxygen levels  -CXR in AM          Hypotension  Uncertain of the etiology currently getting broad-spectrum antibiotics were going to support with Levophed and watch carefully    Renal/  Chronic kidney disease with end stage renal failure on dialysis  Dialysis per renal service, appreciate Dr. Navarrete help    Orthopedic  Venous stasis ulcers of both lower extremities  11/6/24:  -Wound care consult  -Keep wounds clean and dry             Phillip Whelan MD  Critical Care Medicine  [facility]

## 2024-11-07 NOTE — SUBJECTIVE & OBJECTIVE
Interval History/Significant Events:  Patient without complaints    Review of Systems  Objective:     Vital Signs (Most Recent):  Temp: 97.4 °F (36.3 °C) (11/07/24 0312)  Pulse: 77 (11/07/24 0600)  Resp: 11 (11/07/24 0600)  BP: (!) 71/48 (11/07/24 0600)  SpO2: (!) 92 % (11/07/24 0600) Vital Signs (24h Range):  Temp:  [94.9 °F (34.9 °C)-97.6 °F (36.4 °C)] 97.4 °F (36.3 °C)  Pulse:  [] 77  Resp:  [10-21] 11  SpO2:  [70 %-100 %] 92 %  BP: ()/() 71/48   Weight: 120.4 kg (265 lb 6.9 oz)  Body mass index is 36 kg/m².      Intake/Output Summary (Last 24 hours) at 11/7/2024 0614  Last data filed at 11/7/2024 0229  Gross per 24 hour   Intake 2285.4 ml   Output 3500 ml   Net -1214.6 ml          Physical Exam  Vitals reviewed.   Constitutional:       Appearance: Normal appearance.      Interventions: He is not intubated.  HENT:      Head: Normocephalic and atraumatic.      Nose: Nose normal.      Mouth/Throat:      Mouth: Mucous membranes are dry.      Pharynx: Oropharynx is clear.   Eyes:      Extraocular Movements: Extraocular movements intact.      Conjunctiva/sclera: Conjunctivae normal.      Pupils: Pupils are equal, round, and reactive to light.   Cardiovascular:      Rate and Rhythm: Normal rate.      Heart sounds: Normal heart sounds. No murmur heard.  Pulmonary:      Effort: Pulmonary effort is normal. He is not intubated.      Breath sounds: Normal breath sounds.   Abdominal:      General: Abdomen is flat. Bowel sounds are normal.      Palpations: Abdomen is soft.   Musculoskeletal:         General: Normal range of motion.      Cervical back: Normal range of motion and neck supple.      Right lower leg: No edema.      Left lower leg: No edema.   Skin:     General: Skin is warm and dry.      Capillary Refill: Capillary refill takes less than 2 seconds.   Neurological:      General: No focal deficit present.      Mental Status: He is alert and oriented to person, place, and time.   Psychiatric:          Mood and Affect: Mood normal.         Behavior: Behavior normal.            Vents:  Oxygen Concentration (%): 50 (11/07/24 0520)  Lines/Drains/Airways       Central Venous Catheter Line  Duration                  Hemodialysis Catheter 09/19/23 0020 right subclavian 415 days              Drain  Duration                  Hemodialysis AV Graft 09/19/23 0022 Right forearm 415 days              Peripheral Intravenous Line  Duration                  External Jugular IV 11/06/24 1422 <1 day         Peripheral IV - Single Lumen 11/06/24 2030 20 G Left;Posterior Forearm <1 day                  Significant Labs:    CBC/Anemia Profile:  Recent Labs   Lab 11/06/24  1058 11/06/24  1333 11/06/24  1508 11/06/24  1552 11/06/24  1731   WBC 7.43  --   --   --  5.98   HGB 14.3  --   --   --  12.2*   HCT 44.7   < > 43 39 38.8*   PLT 62*  --   --   --  55*   .0*  --   --   --  103.5*   RDW 15.9*  --   --   --  15.8*    < > = values in this interval not displayed.        Chemistries:  Recent Labs   Lab 11/06/24  1058   *   K 4.6   CL 97*   CO2 27   BUN 42*   CREATININE 7.64*   CALCIUM 8.5   ALBUMIN 2.8*   PROT 7.3   BILITOT 2.5*   ALKPHOS 123*   ALT 34   AST 13*   MG 2.5*       Recent Lab Results  (Last 5 results in the past 24 hours)        11/07/24  0523   11/07/24  0501   11/06/24  2351   11/06/24  2015   11/06/24  1731        Baso #         0.03       Basophil %         0.5       Differential Method         Auto       Eos #         0.04       Eos %         0.7       Hematocrit         38.8       Hemoglobin         12.2       Immature Grans (Abs)         0.02       Immature Granulocytes         0.3       Lymph #         0.56       Lymph %         9.4       MCH         32.5       MCHC         31.4       MCV         103.5       Mono #         0.40       Mono %         6.7       MPV         11.9       Neutrophils, Abs         4.93       Neutrophils Relative         82.4       nRBC         0.0       NUCLEATED RBC ABSOLUTE          0.00       Platelet Count         55       POC Base Excess   2.3             POC Glucose 85     81   73         POC HCO3   28.3             POC PCO2   49             POC PH   7.37             POC PO2   77             POC SATURATED O2   95             RBC         3.75       RDW         15.8       WBC         5.98                              Significant Imaging:  I have reviewed all pertinent imaging results/findings within the past 24 hours.

## 2024-11-07 NOTE — PLAN OF CARE
Problem: Noninvasive Ventilation Acute  Goal: Effective Unassisted Ventilation and Oxygenation  Outcome: Progressing     Problem: Gas Exchange Impaired  Goal: Optimal Gas Exchange  Outcome: Progressing     Problem: Breathing Pattern Ineffective  Goal: Effective Breathing Pattern  Outcome: Progressing

## 2024-11-07 NOTE — CONSULTS
[facility]  Nephrology  Consult Note    Patient Name: Yvan Rollins  MRN: [id number]  Admission Date: 11/6/2024  Hospital Length of Stay: 0 days  Attending Provider: Phillip Whelan MD   Primary Care Physician: Eugene Funez MD  Principal Problem:Acute respiratory failure with hypoxia and hypercarbia    Consults  Subjective:     HPI: This patient with history of ESRD, debility, CHF with EF of 20% is a nursing home resident.  He is status post a fall hitting his head.  The patient was in respiratory distress in the emergency department but was able to respond to bipap ventilation.  The patient missed dialysis earlier this week.  He is volume overloaded.  Nephrology has been consulted for renal issues.  Serum potassium is 4.6.  Serum BUN is 42 and creatinine is 7.6.    Past Medical History:   Diagnosis Date    Cardiac arrest     CHF (congestive heart failure)     EF 25-30%    COPD (chronic obstructive pulmonary disease)     Coronary artery disease     Diabetes     GERD (gastroesophageal reflux disease)     Hepatitis C     Hypotension     Requiring Midodrine    Neuropathy     Substance abuse        Past Surgical History:   Procedure Laterality Date    arm surgery Right     INCISION AND DRAINAGE OF HEMATOMA Right 9/20/2023    Procedure: INCISION AND DRAINAGE, HEMATOMA;  Surgeon: Lexis Campbell MD;  Location: [facility];  Service: General;  Laterality: Right;    INSERTION OF PERMANENT PACEMAKER N/A 08/15/2018    IRRIGATION AND DEBRIDEMENT Left 9/20/2023    Procedure: IRRIGATION AND DEBRIDEMENT;  Surgeon: Lexis Campbell MD;  Location: [facility];  Service: General;  Laterality: Left;  LUE    JOINT REPLACEMENT Right     Knee surgery    LUMBAR SPINE SURGERY         Review of patient's allergies indicates:   Allergen Reactions    Ceftriaxone Swelling    Lisinopril Swelling and Rash     Facial swelling   Facial swelling       Current Facility-Administered Medications   Medication  Frequency    0.9%  NaCl infusion PRN    acetaminophen tablet 650 mg Q4H PRN    albuterol-ipratropium 2.5 mg-0.5 mg/3 mL nebulizer solution 3 mL BID    [START ON 11/7/2024] amiodarone tablet 200 mg Daily    aztreonam injection 1,000 mg Q12H    carvediloL tablet 3.125 mg BID    dextrose 10% bolus 125 mL 125 mL PRN    dextrose 10% bolus 250 mL 250 mL PRN    glucagon (human recombinant) injection 1 mg PRN    heparin (porcine) injection 4,000 Units Once    heparin (porcine) injection 5,000 Units Q8H    insulin aspart U-100 injection 0-5 Units Q6H PRN    [START ON 11/7/2024] memantine tablet 5 mg Daily    mupirocin 2 % ointment BID    sodium chloride 0.9% flush 10 mL PRN    vancomycin (VANCOCIN) 2,500 mg in D5W 500 mL IVPB Once    vancomycin - pharmacy to dose pharmacy to manage frequency     Family History    None       Tobacco Use    Smoking status: Former     Current packs/day: 0.50     Types: Cigarettes    Smokeless tobacco: Former     Types: Snuff, Chew   Substance and Sexual Activity    Alcohol use: Not Currently    Drug use: Not Currently    Sexual activity: Not Currently     Review of Systems   Unable to perform ROS: Severe respiratory distress     Objective:     Vital Signs (Most Recent):  Temp: 97.6 °F (36.4 °C) (11/06/24 1639)  Pulse: 82 (11/06/24 2000)  Resp: 14 (11/06/24 2000)  BP: 117/72 (11/06/24 2000)  SpO2: 100 % (11/06/24 2000) Vital Signs (24h Range):  Temp:  [94.9 °F (34.9 °C)-97.6 °F (36.4 °C)] 97.6 °F (36.4 °C)  Pulse:  [] 82  Resp:  [10-20] 14  SpO2:  [70 %-100 %] 100 %  BP: ()/(34-99) 117/72     Weight: 125.3 kg (276 lb 4.8 oz) (11/06/24 0952)  Body mass index is 37.47 kg/m².  Body surface area is 2.52 meters squared.    I/O last 3 completed shifts:  In: 251 [IV Piggyback:251]  Out: -      Physical Exam  Vitals reviewed.   Constitutional:       Appearance: He is ill-appearing.   HENT:      Head: Normocephalic and atraumatic.   Eyes:      Pupils: Pupils are equal, round, and reactive to  light.   Cardiovascular:      Rate and Rhythm: Regular rhythm.   Pulmonary:      Breath sounds: Rales present.   Abdominal:      Palpations: Abdomen is soft.   Musculoskeletal:      Cervical back: Neck supple.      Right lower leg: Edema present.      Left lower leg: Edema present.   Skin:     Comments: Lower extremity wounds bilaterally.          Significant Labs:  BMP:   Recent Labs   Lab 11/06/24  1058   GLU 75   *   K 4.6   CL 97*   CO2 27   BUN 42*   CREATININE 7.64*   CALCIUM 8.5   MG 2.5*     CBC:   Recent Labs   Lab 11/06/24  1731   WBC 5.98   RBC 3.75*   HGB 12.2*   HCT 38.8*   PLT 55*   .5*   MCH 32.5*   MCHC 31.4*       Significant Imaging:  Labs: Reviewed  Assessment/Plan:     Renal/  Chronic kidney disease with end stage renal failure on dialysis  Will continue with dialysis for this patient.      GI  Chronic hepatitis C  Chronic condition        Thank you for your consult. I will follow-up with patient. Please contact us if you have any additional questions.    Roberto Navarrete Jr, MD  Nephrology  [facility] ICU

## 2024-11-07 NOTE — PLAN OF CARE
Problem: Adult Inpatient Plan of Care  Goal: Plan of Care Review  Outcome: Progressing  Goal: Patient-Specific Goal (Individualized)  Outcome: Progressing  Goal: Absence of Hospital-Acquired Illness or Injury  Outcome: Progressing  Goal: Optimal Comfort and Wellbeing  Outcome: Progressing  Goal: Readiness for Transition of Care  Outcome: Progressing     Problem: Infection  Goal: Absence of Infection Signs and Symptoms  Outcome: Progressing  Intervention: Prevent or Manage Infection  Flowsheets (Taken 11/7/2024 4614)  Fever Reduction/Comfort Measures:   aerosol temperature decreased   lightweight bedding   lightweight clothing  Isolation Precautions:   precautions maintained   protective     Problem: Wound  Goal: Optimal Coping  Outcome: Progressing  Goal: Optimal Functional Ability  Outcome: Progressing  Goal: Absence of Infection Signs and Symptoms  Outcome: Progressing  Goal: Improved Oral Intake  Outcome: Progressing  Goal: Optimal Pain Control and Function  Outcome: Progressing  Goal: Skin Health and Integrity  Outcome: Progressing  Goal: Optimal Wound Healing  Outcome: Progressing     Problem: Skin Injury Risk Increased  Goal: Skin Health and Integrity  Outcome: Progressing     Problem: Hemodialysis  Goal: Safe, Effective Therapy Delivery  Outcome: Progressing  Goal: Effective Tissue Perfusion  Outcome: Progressing  Goal: Absence of Infection Signs and Symptoms  Outcome: Progressing     Problem: Noninvasive Ventilation Acute  Goal: Effective Unassisted Ventilation and Oxygenation  Outcome: Progressing     Problem: Gas Exchange Impaired  Goal: Optimal Gas Exchange  Outcome: Progressing     Problem: Breathing Pattern Ineffective  Goal: Effective Breathing Pattern  Outcome: Progressing

## 2024-11-07 NOTE — DIALYSIS ROUNDING
Nephrology Department            NAME: Yvan Rollins   YOB: 1960  MRN: [id number]  NOTE DATE: 11/06/2024       The patient is seen on dialysis.  Striving to remove 3 liters to help with respiratory status.    Patient complains:  None.      REVIEW OF SYSTEMS:  he reports no shortness of breath, nausea or vomiting. No acute changes.    VITALS:  height is 6' (1.829 m) and weight is 125.3 kg (276 lb 4.8 oz). His axillary temperature is 97.8 °F (36.6 °C). His blood pressure is 113/78 and his pulse is 117 (abnormal). His respiration is 14 and oxygen saturation is 81% (abnormal).  Hemodialysis documentation flowsheet reviewed with dialysis nurse, including weight and vitals.    Moderate distress.  Respiration labored. Lungs bilateral crackles.  Heart regular, no rub.  Abdomen soft, nontender, positive bowl sounds  2 plus edema.    Recent Labs   Lab 11/01/24  1026 11/03/24  0500 11/06/24  1058   * 131* 132*   K 4.7 4.3 4.6   CL 98 95* 97*   CO2 27 28 27   BUN 41* 44* 42*   CREATININE 7.24* 7.69* 7.64*   CALCIUM 8.8 8.9 8.5     Recent Labs   Lab 11/03/24  0500 11/06/24  1058 11/06/24  1333 11/06/24  1508 11/06/24  1552 11/06/24  1731   WBC 8.15 7.43  --   --   --  5.98   HGB 13.8 14.3  --   --   --  12.2*   HCT 41.6 44.7   < > 43 39 38.8*   PLT 66* 62*  --   --   --  55*    < > = values in this interval not displayed.       ASSESSMENT/PLAN:  Continue with scheduled hemodialysis for this patient.    Roberto Navarrete Jr, MD

## 2024-11-07 NOTE — PLAN OF CARE
Jean called from [facility]. Patient was there for skilled care. They will be glad to accept him back but will be new admit. Cm will follow.    Received call from patients sister gail. She  was concerned about dc planning as patient was discharged from [facility]. She did ask about nursing homes closer to their home in [place], [place]. There are no nursing homes in the [place] area. Told her what jean said about them readmitting patient. She voiced understanding.

## 2024-11-07 NOTE — PROGRESS NOTES
"Pharmacokinetic Initial Assessment: IV Vancomycin    Assessment/Plan:    Initiate intravenous vancomycin 2500 mg x1 dose.  Desired empiric serum trough concentration is 15 to 20 mcg/mL  Draw vancomycin random level on 11/11 at 0400.  Pharmacy will continue to follow and monitor vancomycin.         Patient brief summary:  Yvan Rollins is a 64 y.o. male initiated on antimicrobial therapy with IV Vancomycin for treatment of suspected bacteremia    Drug Allergies:   Review of patient's allergies indicates:   Allergen Reactions    Ceftriaxone Swelling    Lisinopril Swelling and Rash     Facial swelling   Facial swelling         Actual Body Weight:   125.3kg    Renal Function:   Estimated Creatinine Clearance: 13.7 mL/min (A) (based on SCr of 7.64 mg/dL (H)).,     Dialysis Method (if applicable):  intermittent HD    CBC (last 72 hours):  Recent Labs   Lab Result Units 11/06/24  1058 11/06/24  1731   WBC K/uL 7.43 5.98   Hemoglobin g/dL 14.3 12.2*   Hematocrit % 44.7 38.8*   Platelet Count K/uL 62* 55*   Lymphocytes % % 8.3* 9.4*   Monocytes % % 6.5* 6.7*   Eosinophils % % 0.8* 0.7*   Basophils % % 0.4 0.5   Diff Type  Auto Auto       Metabolic Panel (last 72 hours):  Recent Labs   Lab Result Units 11/06/24  1058   Sodium mmol/L 132*   Potassium mmol/L 4.6   Chloride mmol/L 97*   CO2 mmol/L 27   Glucose mg/dL 75   BUN mg/dL 42*   Creatinine mg/dL 7.64*   Albumin g/dL 2.8*   Bilirubin, Total mg/dL 2.5*   Alk Phos U/L 123*   AST U/L 13*   ALT U/L 34   Magnesium mg/dL 2.5*       Drug levels (last 3 results):  No results for input(s): "VANCOMYCINRA", "VANCORANDOM", "VANCOMYCINPE", "VANCOPEAK", "VANCOMYCINTR", "VANCOTROUGH" in the last 72 hours.    Microbiologic Results:  Microbiology Results (last 7 days)       Procedure Component Value Units Date/Time    Blood culture #2 [[id number]] Collected: 11/06/24 1501    Order Status: Sent Specimen: Blood Updated: 11/06/24 1509    Blood culture #1 [[id number]] Collected: 11/06/24 " 1455    Order Status: Sent Specimen: Blood Updated: 11/06/24 1459          Please contact pharmacy at extension [phone] with any questions regarding this assessment.     Thank you for the consult,   Rakan RuckerD

## 2024-11-07 NOTE — PLAN OF CARE
[facility]  Initial Discharge Assessment       Primary Care Provider: Eugene Funez MD    Admission Diagnosis: Fall [W19.XXXA]    Admission Date: 11/6/2024  Expected Discharge Date:     Transition of Care Barriers: None    Payor: MEDICARE / Plan: MEDICARE PART A & B / Product Type: Government /     Extended Emergency Contact Information  Primary Emergency Contact: Herbert Macedo  Mobile Phone: [phone]  Relation: Sister    Discharge Plan A: Return to nursing home  Discharge Plan B: Skilled Nursing Facility      [facility]  [address] [place] [address]  Phone: [phone] Fax: [phone]    [facility] - [address]  [address]  [place] [address]  Phone: [phone] Fax: [phone]      Initial Assessment (most recent)       Adult Discharge Assessment - 11/07/24 1000          Discharge Assessment    Assessment Type Discharge Planning Assessment     Confirmed/corrected address, phone number and insurance Yes     Confirmed Demographics Correct on Facesheet     Source of Information patient     Communicated SANDRA with patient/caregiver Date not available/Unable to determine     People in Home facility resident     Facility Arrived From: [facility]     Do you expect to return to your current living situation? Yes     Do you have help at home or someone to help you manage your care at home? Yes     Who are your caregiver(s) and their phone number(s)? facility     Prior to hospitilization cognitive status: Unable to Assess     Home Layout Able to live on 1st floor     Equipment Currently Used at Home wheelchair     Readmission within 30 days? No     Patient currently being followed by outpatient case management? No     Do you currently have service(s) that help you manage your care at home? No     Do you take prescription medications? Yes     Do you have prescription coverage? Yes     Coverage  medicare a b     Do you have any problems affording any of your prescribed medications? No     How do you get to doctors appointments? family or friend will provide     Are you on dialysis? No     Do you take coumadin? No     Discharge Plan A Return to nursing home     Discharge Plan B Skilled Nursing Facility     DME Needed Upon Discharge  none     Discharge Plan discussed with: Patient     Transition of Care Barriers None                      Spoke with pt in room. Pt lives at [facility] and rehab, choice obtained to return at [place]. Continue monitoring labs with TX PRN. Remains on O2@2L nc. Reviewed chart, 0 dc needs noted. Packet started.  Will follow consults.

## 2024-11-07 NOTE — PLAN OF CARE
Problem: Noninvasive Ventilation Acute  Goal: Effective Unassisted Ventilation and Oxygenation  Outcome: Progressing     Problem: Gas Exchange Impaired  Goal: Optimal Gas Exchange  Outcome: Progressing

## 2024-11-08 LAB
ALBUMIN SERPL BCP-MCNC: 2.4 G/DL (ref 3.5–5)
ALBUMIN/GLOB SERPL: 0.8 {RATIO}
ALP SERPL-CCNC: 114 U/L (ref 45–115)
ALT SERPL W P-5'-P-CCNC: 25 U/L (ref 16–61)
ANION GAP SERPL CALCULATED.3IONS-SCNC: 12 MMOL/L (ref 7–16)
AST SERPL W P-5'-P-CCNC: 37 U/L (ref 15–37)
BILIRUB SERPL-MCNC: 2.1 MG/DL (ref ?–1.2)
BUN SERPL-MCNC: 39 MG/DL (ref 7–18)
BUN/CREAT SERPL: 6 (ref 6–20)
CALCIUM SERPL-MCNC: 7.9 MG/DL (ref 8.5–10.1)
CHLORIDE SERPL-SCNC: 102 MMOL/L (ref 98–107)
CO2 SERPL-SCNC: 23 MMOL/L (ref 21–32)
CREAT SERPL-MCNC: 6.74 MG/DL (ref 0.7–1.3)
EGFR (NO RACE VARIABLE) (RUSH/TITUS): 9 ML/MIN/1.73M2
GLOBULIN SER-MCNC: 3.2 G/DL (ref 2–4)
GLUCOSE SERPL-MCNC: 167 MG/DL (ref 70–105)
GLUCOSE SERPL-MCNC: 73 MG/DL (ref 74–106)
GLUCOSE SERPL-MCNC: 87 MG/DL (ref 70–105)
GLUCOSE SERPL-MCNC: 89 MG/DL (ref 70–105)
GLUCOSE SERPL-MCNC: 92 MG/DL (ref 70–105)
MAGNESIUM SERPL-MCNC: 2.2 MG/DL (ref 1.7–2.3)
PHOSPHATE SERPL-MCNC: 3.6 MG/DL (ref 2.5–4.5)
POTASSIUM SERPL-SCNC: 4.9 MMOL/L (ref 3.5–5.1)
PROT SERPL-MCNC: 5.6 G/DL (ref 6.4–8.2)
SODIUM SERPL-SCNC: 132 MMOL/L (ref 136–145)

## 2024-11-08 PROCEDURE — 25000003 PHARM REV CODE 250

## 2024-11-08 PROCEDURE — 27000221 HC OXYGEN, UP TO 24 HOURS

## 2024-11-08 PROCEDURE — 99233 SBSQ HOSP IP/OBS HIGH 50: CPT | Mod: ,,, | Performed by: INTERNAL MEDICINE

## 2024-11-08 PROCEDURE — 25000003 PHARM REV CODE 250: Performed by: INTERNAL MEDICINE

## 2024-11-08 PROCEDURE — 20000000 HC ICU ROOM

## 2024-11-08 PROCEDURE — P9047 ALBUMIN (HUMAN), 25%, 50ML: HCPCS | Mod: JZ,JG | Performed by: INTERNAL MEDICINE

## 2024-11-08 PROCEDURE — 80100014 HC HEMODIALYSIS 1:1

## 2024-11-08 PROCEDURE — 97110 THERAPEUTIC EXERCISES: CPT

## 2024-11-08 PROCEDURE — 83735 ASSAY OF MAGNESIUM: CPT

## 2024-11-08 PROCEDURE — 99900035 HC TECH TIME PER 15 MIN (STAT)

## 2024-11-08 PROCEDURE — 25000242 PHARM REV CODE 250 ALT 637 W/ HCPCS

## 2024-11-08 PROCEDURE — 94640 AIRWAY INHALATION TREATMENT: CPT

## 2024-11-08 PROCEDURE — 97530 THERAPEUTIC ACTIVITIES: CPT

## 2024-11-08 PROCEDURE — 63600175 PHARM REV CODE 636 W HCPCS: Mod: JZ,JG | Performed by: INTERNAL MEDICINE

## 2024-11-08 PROCEDURE — 82962 GLUCOSE BLOOD TEST: CPT

## 2024-11-08 PROCEDURE — 84100 ASSAY OF PHOSPHORUS: CPT

## 2024-11-08 PROCEDURE — 36415 COLL VENOUS BLD VENIPUNCTURE: CPT

## 2024-11-08 PROCEDURE — 80053 COMPREHEN METABOLIC PANEL: CPT

## 2024-11-08 RX ORDER — ALBUMIN HUMAN 250 G/1000ML
25 SOLUTION INTRAVENOUS ONCE
Status: COMPLETED | OUTPATIENT
Start: 2024-11-08 | End: 2024-11-08

## 2024-11-08 RX ORDER — HEPARIN SODIUM 1000 [USP'U]/ML
4000 INJECTION, SOLUTION INTRAVENOUS; SUBCUTANEOUS
Status: DISCONTINUED | OUTPATIENT
Start: 2024-11-08 | End: 2024-11-13 | Stop reason: HOSPADM

## 2024-11-08 RX ADMIN — MUPIROCIN: 20 OINTMENT TOPICAL at 09:11

## 2024-11-08 RX ADMIN — ALBUMIN (HUMAN) 25 G: 5 SOLUTION INTRAVENOUS at 01:11

## 2024-11-08 RX ADMIN — HALOPERIDOL 5 MG: 5 TABLET ORAL at 08:11

## 2024-11-08 RX ADMIN — HALOPERIDOL 5 MG: 5 TABLET ORAL at 09:11

## 2024-11-08 RX ADMIN — IPRATROPIUM BROMIDE AND ALBUTEROL SULFATE 3 ML: .5; 3 SOLUTION RESPIRATORY (INHALATION) at 07:11

## 2024-11-08 RX ADMIN — OLANZAPINE 2.5 MG: 2.5 TABLET, FILM COATED ORAL at 08:11

## 2024-11-08 RX ADMIN — LORAZEPAM 2 MG: 1 TABLET ORAL at 10:11

## 2024-11-08 RX ADMIN — HEPARIN SODIUM 4000 UNITS: 1000 INJECTION, SOLUTION INTRAVENOUS; SUBCUTANEOUS at 03:11

## 2024-11-08 RX ADMIN — CARVEDILOL 3.12 MG: 3.12 TABLET, FILM COATED ORAL at 08:11

## 2024-11-08 RX ADMIN — MUPIROCIN: 20 OINTMENT TOPICAL at 08:11

## 2024-11-08 RX ADMIN — MEMANTINE 5 MG: 5 TABLET ORAL at 08:11

## 2024-11-08 RX ADMIN — AZTREONAM 2000 MG: 1 INJECTION, POWDER, LYOPHILIZED, FOR SOLUTION INTRAMUSCULAR; INTRAVENOUS at 06:11

## 2024-11-08 RX ADMIN — CARVEDILOL 3.12 MG: 3.12 TABLET, FILM COATED ORAL at 09:11

## 2024-11-08 RX ADMIN — AMIODARONE HYDROCHLORIDE 400 MG: 200 TABLET ORAL at 08:11

## 2024-11-08 NOTE — PT/OT/SLP PROGRESS
"Physical Therapy Treatment    Patient Name:  Yvan Rollins   MRN:  [id number]    Recommendations:     Discharge Recommendations: Low Intensity Therapy  Discharge Equipment Recommendations: to be determined by next level of care  Barriers to discharge: Inaccessible home and Decreased caregiver support    Assessment:     Yvan Rollins is a 64 y.o. male admitted with a medical diagnosis of Acute respiratory failure with hypoxia and hypercarbia.  He presents with the following impairments/functional limitations: impaired functional mobility, gait instability, decreased safety awareness, impaired cognition Pt was not as alert as yesterday. Was unable to replicate gait distance performed on evaluation. Required more assistance with bed mobility.    Rehab Prognosis: Fair; patient would benefit from acute skilled PT services to address these deficits and reach maximum level of function.    Recent Surgery: * No surgery found *      Plan:     During this hospitalization, patient to be seen 5 x/week to address the identified rehab impairments via gait training, therapeutic activities, therapeutic exercises and progress toward the following goals:    Plan of Care Expires:  12/07/24    Subjective     Chief Complaint: Acute respiratory failure  Patient/Family Comments/goals: "I don't feel confident today"  Pain/Comfort:  Pain Rating 1: 0/10  Pain Rating Post-Intervention 1: 0/10      Objective:     Communicated with ONOFRE Moss RN prior to session.  Patient found HOB elevated with peripheral IV, oxygen, blood pressure cuff, pulse ox (continuous), telemetry upon PT entry to room.     General Precautions: Standard, fall  Orthopedic Precautions: N/A  Braces: N/A  Respiratory Status: Nasal cannula, flow 2 L/min     Functional Mobility:  Bed Mobility:     Rolling Right: minimum assistance  Scooting: minimum assistance  Supine to Sit: minimum assistance  Sit to Supine: contact guard assistance  Transfers:     Sit to Stand:  " contact guard assistance with gait belt  Balance: fair      AM-PAC 6 CLICK MOBILITY  Turning over in bed (including adjusting bedclothes, sheets and blankets)?: 3  Sitting down on and standing up from a chair with arms (e.g., wheelchair, bedside commode, etc.): 3  Moving from lying on back to sitting on the side of the bed?: 3  Moving to and from a bed to a chair (including a wheelchair)?: 3  Need to walk in hospital room?: 3  Climbing 3-5 steps with a railing?: 1  Basic Mobility Total Score: 16       Treatment & Education:  Toe raises 2x15, LAQ 2x10, sit to stand x3, sat edge of bed 10 minutes.    Patient left HOB elevated with all lines intact, call button in reach, and bed alarm on..    GOALS:   Multidisciplinary Problems       Physical Therapy Goals          Problem: Physical Therapy    Goal Priority Disciplines Outcome Interventions   Physical Therapy Goal     PT, PT/OT Progressing    Description: Short term goals:  1. Sit to stand transfer with Modified [place]  2. Bed to chair transfer with Modified [place] using Rolling Walker  3. Gait  x 100 feet with Modified [place] using Rolling Walker.     Long term goals:  Pt will return to prior living situation with modified independence for all mobility                         Time Tracking:     PT Received On: 11/08/24  PT Start Time: 0903     PT Stop Time: 0927  PT Total Time (min): 24 min     Billable Minutes: Therapeutic Activity 12 and Therapeutic Exercise 12    Treatment Type: Treatment  PT/PTA: PT     Number of PTA visits since last PT visit: 0     11/08/2024

## 2024-11-08 NOTE — SUBJECTIVE & OBJECTIVE
Interval History: The patient is resting comfortably.  He is on nasal cannula.  Plan for dialysis on tomorrow.    Review of patient's allergies indicates:   Allergen Reactions    Ceftriaxone Swelling    Lisinopril Swelling and Rash     Facial swelling   Facial swelling       Current Facility-Administered Medications   Medication Frequency    0.9%  NaCl infusion PRN    acetaminophen tablet 650 mg Q4H PRN    albuterol-ipratropium 2.5 mg-0.5 mg/3 mL nebulizer solution 3 mL BID    [START ON 11/15/2024] amiodarone tablet 200 mg Daily    [START ON 11/8/2024] amiodarone tablet 400 mg Daily    [START ON 11/8/2024] aztreonam injection 2,000 mg Q24H    carvediloL tablet 3.125 mg BID    dextrose 10% bolus 125 mL 125 mL PRN    dextrose 10% bolus 250 mL 250 mL PRN    glucagon (human recombinant) injection 1 mg PRN    haloperidoL tablet 5 mg BID    insulin aspart U-100 injection 0-5 Units QID (AC + HS) PRN    LORazepam tablet 2 mg PRN    memantine tablet 5 mg Daily    mupirocin 2 % ointment BID    NORepinephrine 4 mg in dextrose 5% 250 mL infusion (premix) Continuous    OLANZapine tablet 2.5 mg Daily    sodium chloride 0.9% flush 10 mL PRN    vancomycin - pharmacy to dose pharmacy to manage frequency       Objective:     Vital Signs (Most Recent):  Temp: 97.8 °F (36.6 °C) (11/07/24 1904)  Pulse: (!) 112 (11/07/24 2000)  Resp: 17 (11/07/24 2000)  BP: 90/66 (11/07/24 2000)  SpO2: (!) 79 % (11/07/24 2000) Vital Signs (24h Range):  Temp:  [96.6 °F (35.9 °C)-97.8 °F (36.6 °C)] 97.8 °F (36.6 °C)  Pulse:  [] 112  Resp:  [10-31] 17  SpO2:  [79 %-100 %] 79 %  BP: ()/() 90/66     Weight: 120.4 kg (265 lb 6.9 oz) (11/07/24 0312)  Body mass index is 36 kg/m².  Body surface area is 2.47 meters squared.    I/O last 3 completed shifts:  In: 2520.3 [I.V.:1272.4; Other:500; IV Piggyback:747.9]  Out: 3500 [Other:3500]     Physical Exam  Vitals reviewed.   Constitutional:       Appearance: Normal appearance. He is obese.   HENT:       Head: Normocephalic and atraumatic.   Eyes:      Pupils: Pupils are equal, round, and reactive to light.   Cardiovascular:      Rate and Rhythm: Normal rate and regular rhythm.   Pulmonary:      Effort: Pulmonary effort is normal.   Abdominal:      Palpations: Abdomen is soft.   Musculoskeletal:      Cervical back: Neck supple.      Right lower leg: Edema present.      Left lower leg: Edema present.   Neurological:      Mental Status: He is alert.   Psychiatric:         Mood and Affect: Mood normal.          Significant Labs:  BMP:   Recent Labs   Lab 11/07/24  0426   GLU 63*   *   K 4.2      CO2 26   BUN 28*   CREATININE 5.68*   CALCIUM 8.2*   MG 2.3     CBC:   Recent Labs   Lab 11/06/24  1731   WBC 5.98   RBC 3.75*   HGB 12.2*   HCT 38.8*   PLT 55*   .5*   MCH 32.5*   MCHC 31.4*        Significant Imaging:  Labs: Reviewed

## 2024-11-08 NOTE — PROGRESS NOTES
[facility]  Nephrology  Progress Note    Patient Name: Yvan Rollins  MRN: [id number]  Admission Date: 11/6/2024  Hospital Length of Stay: 1 days  Attending Provider: Phillip Whelan MD   Primary Care Physician: Eugene Funez MD  Principal Problem:Acute respiratory failure with hypoxia and hypercarbia    Subjective:     HPI: This patient with history of ESRD, debility, CHF with EF of 20% is a nursing home resident.  He is status post a fall hitting his head.  The patient was in respiratory distress in the emergency department but was able to respond to bipap ventilation.  The patient missed dialysis earlier this week.  He is volume overloaded.  Nephrology has been consulted for renal issues.  Serum potassium is 4.6.  Serum BUN is 42 and creatinine is 7.6.    Interval History: The patient is resting comfortably.  He is on nasal cannula.  Plan for dialysis on tomorrow.    Review of patient's allergies indicates:   Allergen Reactions    Ceftriaxone Swelling    Lisinopril Swelling and Rash     Facial swelling   Facial swelling       Current Facility-Administered Medications   Medication Frequency    0.9%  NaCl infusion PRN    acetaminophen tablet 650 mg Q4H PRN    albuterol-ipratropium 2.5 mg-0.5 mg/3 mL nebulizer solution 3 mL BID    [START ON 11/15/2024] amiodarone tablet 200 mg Daily    [START ON 11/8/2024] amiodarone tablet 400 mg Daily    [START ON 11/8/2024] aztreonam injection 2,000 mg Q24H    carvediloL tablet 3.125 mg BID    dextrose 10% bolus 125 mL 125 mL PRN    dextrose 10% bolus 250 mL 250 mL PRN    glucagon (human recombinant) injection 1 mg PRN    haloperidoL tablet 5 mg BID    insulin aspart U-100 injection 0-5 Units QID (AC + HS) PRN    LORazepam tablet 2 mg PRN    memantine tablet 5 mg Daily    mupirocin 2 % ointment BID    NORepinephrine 4 mg in dextrose 5% 250 mL infusion (premix) Continuous    OLANZapine tablet 2.5 mg Daily    sodium chloride 0.9% flush 10 mL PRN     vancomycin - pharmacy to dose pharmacy to manage frequency       Objective:     Vital Signs (Most Recent):  Temp: 97.8 °F (36.6 °C) (11/07/24 1904)  Pulse: (!) 112 (11/07/24 2000)  Resp: 17 (11/07/24 2000)  BP: 90/66 (11/07/24 2000)  SpO2: (!) 79 % (11/07/24 2000) Vital Signs (24h Range):  Temp:  [96.6 °F (35.9 °C)-97.8 °F (36.6 °C)] 97.8 °F (36.6 °C)  Pulse:  [] 112  Resp:  [10-31] 17  SpO2:  [79 %-100 %] 79 %  BP: ()/() 90/66     Weight: 120.4 kg (265 lb 6.9 oz) (11/07/24 0312)  Body mass index is 36 kg/m².  Body surface area is 2.47 meters squared.    I/O last 3 completed shifts:  In: 2520.3 [I.V.:1272.4; Other:500; IV Piggyback:747.9]  Out: 3500 [Other:3500]     Physical Exam  Vitals reviewed.   Constitutional:       Appearance: Normal appearance. He is obese.   HENT:      Head: Normocephalic and atraumatic.   Eyes:      Pupils: Pupils are equal, round, and reactive to light.   Cardiovascular:      Rate and Rhythm: Normal rate and regular rhythm.   Pulmonary:      Effort: Pulmonary effort is normal.   Abdominal:      Palpations: Abdomen is soft.   Musculoskeletal:      Cervical back: Neck supple.      Right lower leg: Edema present.      Left lower leg: Edema present.   Neurological:      Mental Status: He is alert.   Psychiatric:         Mood and Affect: Mood normal.          Significant Labs:  BMP:   Recent Labs   Lab 11/07/24  0426   GLU 63*   *   K 4.2      CO2 26   BUN 28*   CREATININE 5.68*   CALCIUM 8.2*   MG 2.3     CBC:   Recent Labs   Lab 11/06/24  1731   WBC 5.98   RBC 3.75*   HGB 12.2*   HCT 38.8*   PLT 55*   .5*   MCH 32.5*   MCHC 31.4*        Significant Imaging:  Labs: Reviewed  Assessment/Plan:     Renal/  Chronic kidney disease with end stage renal failure on dialysis  Will continue with dialysis for this patient.    Continue with scheduled hemodialysis.        Thank you for your consult. I will follow-up with patient. Please contact us if you have any  additional questions.    Roberto Navarrete Jr, MD  Nephrology  [facility]

## 2024-11-08 NOTE — PLAN OF CARE
Problem: Adult Inpatient Plan of Care  Goal: Plan of Care Review  Outcome: Progressing  Goal: Patient-Specific Goal (Individualized)  Outcome: Progressing  Goal: Absence of Hospital-Acquired Illness or Injury  Outcome: Progressing  Goal: Optimal Comfort and Wellbeing  Outcome: Progressing  Goal: Readiness for Transition of Care  Outcome: Progressing     Problem: Infection  Goal: Absence of Infection Signs and Symptoms  Outcome: Progressing     Problem: Wound  Goal: Optimal Coping  Outcome: Progressing  Goal: Optimal Functional Ability  Outcome: Progressing  Goal: Absence of Infection Signs and Symptoms  Outcome: Progressing  Goal: Improved Oral Intake  Outcome: Progressing  Goal: Optimal Pain Control and Function  Outcome: Progressing  Goal: Skin Health and Integrity  Outcome: Progressing  Goal: Optimal Wound Healing  Outcome: Progressing     Problem: Skin Injury Risk Increased  Goal: Skin Health and Integrity  Outcome: Progressing     Problem: Hemodialysis  Goal: Safe, Effective Therapy Delivery  Outcome: Progressing  Goal: Effective Tissue Perfusion  Outcome: Progressing  Goal: Absence of Infection Signs and Symptoms  Outcome: Progressing     Problem: Noninvasive Ventilation Acute  Goal: Effective Unassisted Ventilation and Oxygenation  Outcome: Progressing     Problem: Gas Exchange Impaired  Goal: Optimal Gas Exchange  Outcome: Progressing     Problem: Breathing Pattern Ineffective  Goal: Effective Breathing Pattern  Outcome: Progressing

## 2024-11-08 NOTE — PLAN OF CARE
Problem: Adult Inpatient Plan of Care  Goal: Plan of Care Review  Outcome: Progressing     Problem: Adult Inpatient Plan of Care  Goal: Patient-Specific Goal (Individualized)  Outcome: Progressing     Problem: Adult Inpatient Plan of Care  Goal: Absence of Hospital-Acquired Illness or Injury  Outcome: Progressing     Problem: Infection  Goal: Absence of Infection Signs and Symptoms  Outcome: Progressing

## 2024-11-08 NOTE — PROGRESS NOTES
Nephrology Department            NAME: Yvan Rollins   YOB: 1960  MRN: [id number]  NOTE DATE: 11/08/2024       Seen on dialysis in the ICU.  His blood pressure is low.  Will give 25% albumin today on dialysis.    Patient complains:  None.      REVIEW OF SYSTEMS:  he reports no shortness of breath, nausea or vomiting. No acute changes.    VITALS:  height is 6' (1.829 m) and weight is 119.3 kg (263 lb 0.1 oz). His oral temperature is 97.5 °F (36.4 °C). His blood pressure is 82/51 (abnormal) and his pulse is 85. His respiration is 17 and oxygen saturation is 100%.  Hemodialysis documentation flowsheet reviewed with dialysis nurse, including weight and vitals.    Resting comfortably, NAD.  Respiration unlabored. Lungs clear.  Heart regular, no rub.  Abdomen soft, nontender, positive bowl sounds  No edema.    Recent Labs   Lab 11/06/24  1058 11/07/24  0426 11/08/24  0416   * 134* 132*   K 4.6 4.2 4.9   CL 97* 100 102   CO2 27 26 23   BUN 42* 28* 39*   CREATININE 7.64* 5.68* 6.74*   CALCIUM 8.5 8.2* 7.9*   PHOS  --  2.9 3.6     Recent Labs   Lab 11/03/24  0500 11/06/24  1058 11/06/24  1333 11/06/24  1508 11/06/24  1552 11/06/24  1731   WBC 8.15 7.43  --   --   --  5.98   HGB 13.8 14.3  --   --   --  12.2*   HCT 41.6 44.7   < > 43 39 38.8*   PLT 66* 62*  --   --   --  55*    < > = values in this interval not displayed.       ASSESSMENT/PLAN:  Continue with scheduled hemodialysis for this patient.    Roberto Navarrete Jr, MD

## 2024-11-08 NOTE — SUBJECTIVE & OBJECTIVE
Interval History/Significant Events:  Patient without complaints    Review of Systems  Objective:     Vital Signs (Most Recent):  Temp: 98 °F (36.7 °C) (11/08/24 0315)  Pulse: 93 (11/08/24 0345)  Resp: 15 (11/08/24 0345)  BP: (!) 79/59 (11/08/24 0345)  SpO2: (!) 93 % (11/08/24 0345) Vital Signs (24h Range):  Temp:  [97.5 °F (36.4 °C)-98.2 °F (36.8 °C)] 98 °F (36.7 °C)  Pulse:  [] 93  Resp:  [11-31] 15  SpO2:  [79 %-100 %] 93 %  BP: ()/(50-82) 79/59   Weight: 119.3 kg (263 lb 0.1 oz)  Body mass index is 35.67 kg/m².      Intake/Output Summary (Last 24 hours) at 11/8/2024 0615  Last data filed at 11/7/2024 1900  Gross per 24 hour   Intake 234.89 ml   Output --   Net 234.89 ml          Physical Exam  Vitals reviewed.   Constitutional:       Appearance: Normal appearance.      Interventions: He is not intubated.  HENT:      Head: Normocephalic and atraumatic.      Nose: Nose normal.      Mouth/Throat:      Mouth: Mucous membranes are dry.      Pharynx: Oropharynx is clear.   Eyes:      Extraocular Movements: Extraocular movements intact.      Conjunctiva/sclera: Conjunctivae normal.      Pupils: Pupils are equal, round, and reactive to light.   Cardiovascular:      Rate and Rhythm: Normal rate.      Heart sounds: Normal heart sounds. No murmur heard.  Pulmonary:      Effort: Pulmonary effort is normal. He is not intubated.      Breath sounds: Normal breath sounds.   Abdominal:      General: Abdomen is flat. Bowel sounds are normal.      Palpations: Abdomen is soft.   Musculoskeletal:         General: Normal range of motion.      Cervical back: Normal range of motion and neck supple.      Right lower leg: No edema.      Left lower leg: No edema.   Skin:     General: Skin is warm and dry.      Capillary Refill: Capillary refill takes less than 2 seconds.   Neurological:      General: No focal deficit present.      Mental Status: He is alert and oriented to person, place, and time.   Psychiatric:         Mood  and Affect: Mood normal.         Behavior: Behavior normal.            Vents:  Oxygen Concentration (%): 28 (11/07/24 1905)  Lines/Drains/Airways       Central Venous Catheter Line  Duration                  Hemodialysis Catheter 09/19/23 0020 right subclavian 416 days              Drain  Duration                  Hemodialysis AV Graft 09/19/23 0022 Right forearm 416 days              Peripheral Intravenous Line  Duration                  Peripheral IV - Single Lumen 11/06/24 2030 20 G Left;Posterior Forearm 1 day                  Significant Labs:    CBC/Anemia Profile:  Recent Labs   Lab 11/06/24  1058 11/06/24  1333 11/06/24  1508 11/06/24  1552 11/06/24  1731   WBC 7.43  --   --   --  5.98   HGB 14.3  --   --   --  12.2*   HCT 44.7   < > 43 39 38.8*   PLT 62*  --   --   --  55*   .0*  --   --   --  103.5*   RDW 15.9*  --   --   --  15.8*    < > = values in this interval not displayed.        Chemistries:  Recent Labs   Lab 11/06/24  1058 11/07/24  0426   * 134*   K 4.6 4.2   CL 97* 100   CO2 27 26   BUN 42* 28*   CREATININE 7.64* 5.68*   CALCIUM 8.5 8.2*   ALBUMIN 2.8* 2.5*   PROT 7.3 5.5*   BILITOT 2.5* 2.5*   ALKPHOS 123* 84   ALT 34 28   AST 13* 31   MG 2.5* 2.3   PHOS  --  2.9       Recent Lab Results  (Last 5 results in the past 24 hours)        11/08/24  0524   11/07/24 2008 11/07/24  1626   11/07/24  1102   11/07/24  1034        A4C EF         24       Ao root annulus         3.86       Ao peak sadiq         1.1       Ao VTI         18.1       AR Max Sadiq         2.89       AV valve area         2.4       TRAVIS by Velocity Ratio         2.6       AORTIC VALVE CUSP SEPERATION         2.20       AV mean gradient         3.0       AV index (prosthetic)         0.57       AV peak gradient         4.8       AV regurgitation pressure 1/2 time         [id number]       AV Velocity Ratio         0.64       BSA         2.52       Left Ventricle Relative Wall Thickness         0.51       EF          25       FS         5.1       IVC diameter         3.68       IVSd         1.5       LA area A4C         26.14       LA size         4.81       LVOT area         4.2       LV EDV BP         329.51       LV Diastolic Volume Index         137.30       Left Ventricular End Diastolic Volume by Teichholz Method         329.51       LV EDV A4C         273.84       Left Ventricular End Systolic Volume by Teichholz Method         291.88       LV ESV A4C         69.92       LVIDd         7.8       LVIDs         7.4       LV mass         806.3       LV Mass Index         335.9       Left Ventricular Outflow Tract Mean Gradient         1.35       Left Ventricular Outflow Tract Mean Velocity         0.55       LVOT diameter         2.3       LVOT peak sadiq         0.7       LVOT stroke volume         43.2       LVOT peak VTI         10.4       LV ESV BP         291.88       LV Systolic Volume Index         121.6       Mean e'         0.06       Mr max sadiq         4.02       POC Glucose 92   111   141   101         PV peak gradient         1       PV PEAK VELOCITY         0.59       PW         2.0       RA Major Axis         6.52       Est. RA pres         15       RV mid diameter         5.11       RV TB RVSP         18       RV/LV Ratio         0.82       RV- carmen basal diam         6.4       RV-carmen length         7.0       RV-carmen mid d         5.1       RV Basal Diameter         6.97       TAPSE         1.94       TDI SEPTAL         0.04       TDI LATERAL         0.07       Triscuspid Valve Regurgitation Peak Gradient         45       TR Max Sadiq         3.35       TV resting pulmonary artery pressure         60       ZLVIDD         -3.38       ZLVIDS         0.97                              Significant Imaging:  I have reviewed all pertinent imaging results/findings within the past 24 hours.

## 2024-11-08 NOTE — PROGRESS NOTES
[facility]  Critical Care Medicine  Progress Note    Patient Name: Yvan Rollins  MRN: [id number]  Admission Date: 11/6/2024  Hospital Length of Stay: 2 days  Code Status: Full Code  Attending Provider: Phillip Whelan MD  Primary Care Provider: Eugene Funez MD   Principal Problem: Acute respiratory failure with hypoxia and hypercarbia    Subjective:     HPI:  64 year old  male presented to the ED by ambulance after a fall at the nursing home. Patient is a poor historian due to being lethargic. It was reported that he fell at the nursing home, but no details were given about the fall. The patient reported on arrival to the ED that he thinks he may have struck his head and he has right knee pain. On my assessment, patient is more lethargic and only opens eyes to physical & verbal stimuli. Bipap is in use currently. PMHX is notable for end-stage renal disease with a right subclavian tunneled HD catheter for hemodialysis, pacemaker placement, CHF with an EF of 25-30%, COPD, Diabetes, GERD, Hepatitis C, Neuropathy, and hypotension which he takes midodrine for. Review of the external nursing home records show age-related cognitive decline due to prior CVA.     ED workup revealed Sodium 132, potassium 4.6, BUN/CR 42/7.6, magnesium 2.5, and troponin 76.1. ABGs 7.28, CO2 57, PO2 46, HCO3 26.8, and O2 saturation 75%. Placed on Bipap. CXR showed heart failure with small left pleural effusion. CT brain was negative for acute hemorrhage or infarction. Knee xray showed mild tricompartmental degenerative osteoarthrosis with a large suprapatellar effusion. Critical Care service will admit to the ICU for further monitoring and treatment.     Hospital/ICU Course:  No notes on file    Interval History/Significant Events:  Patient without complaints    Review of Systems  Objective:     Vital Signs (Most Recent):  Temp: 98 °F (36.7 °C) (11/08/24 0315)  Pulse: 93 (11/08/24 0345)  Resp: 15  (11/08/24 0345)  BP: (!) 79/59 (11/08/24 0345)  SpO2: (!) 93 % (11/08/24 0345) Vital Signs (24h Range):  Temp:  [97.5 °F (36.4 °C)-98.2 °F (36.8 °C)] 98 °F (36.7 °C)  Pulse:  [] 93  Resp:  [11-31] 15  SpO2:  [79 %-100 %] 93 %  BP: ()/(50-82) 79/59   Weight: 119.3 kg (263 lb 0.1 oz)  Body mass index is 35.67 kg/m².      Intake/Output Summary (Last 24 hours) at 11/8/2024 0615  Last data filed at 11/7/2024 1900  Gross per 24 hour   Intake 234.89 ml   Output --   Net 234.89 ml          Physical Exam  Vitals reviewed.   Constitutional:       Appearance: Normal appearance.      Interventions: He is not intubated.  HENT:      Head: Normocephalic and atraumatic.      Nose: Nose normal.      Mouth/Throat:      Mouth: Mucous membranes are dry.      Pharynx: Oropharynx is clear.   Eyes:      Extraocular Movements: Extraocular movements intact.      Conjunctiva/sclera: Conjunctivae normal.      Pupils: Pupils are equal, round, and reactive to light.   Cardiovascular:      Rate and Rhythm: Normal rate.      Heart sounds: Normal heart sounds. No murmur heard.  Pulmonary:      Effort: Pulmonary effort is normal. He is not intubated.      Breath sounds: Normal breath sounds.   Abdominal:      General: Abdomen is flat. Bowel sounds are normal.      Palpations: Abdomen is soft.   Musculoskeletal:         General: Normal range of motion.      Cervical back: Normal range of motion and neck supple.      Right lower leg: No edema.      Left lower leg: No edema.   Skin:     General: Skin is warm and dry.      Capillary Refill: Capillary refill takes less than 2 seconds.   Neurological:      General: No focal deficit present.      Mental Status: He is alert and oriented to person, place, and time.   Psychiatric:         Mood and Affect: Mood normal.         Behavior: Behavior normal.            Vents:  Oxygen Concentration (%): 28 (11/07/24 1905)  Lines/Drains/Airways       Central Venous Catheter Line  Duration                   Hemodialysis Catheter 09/19/23 0020 right subclavian 416 days              Drain  Duration                  Hemodialysis AV Graft 09/19/23 0022 Right forearm 416 days              Peripheral Intravenous Line  Duration                  Peripheral IV - Single Lumen 11/06/24 2030 20 G Left;Posterior Forearm 1 day                  Significant Labs:    CBC/Anemia Profile:  Recent Labs   Lab 11/06/24  1058 11/06/24  1333 11/06/24  1508 11/06/24  1552 11/06/24  1731   WBC 7.43  --   --   --  5.98   HGB 14.3  --   --   --  12.2*   HCT 44.7   < > 43 39 38.8*   PLT 62*  --   --   --  55*   .0*  --   --   --  103.5*   RDW 15.9*  --   --   --  15.8*    < > = values in this interval not displayed.        Chemistries:  Recent Labs   Lab 11/06/24  1058 11/07/24  0426   * 134*   K 4.6 4.2   CL 97* 100   CO2 27 26   BUN 42* 28*   CREATININE 7.64* 5.68*   CALCIUM 8.5 8.2*   ALBUMIN 2.8* 2.5*   PROT 7.3 5.5*   BILITOT 2.5* 2.5*   ALKPHOS 123* 84   ALT 34 28   AST 13* 31   MG 2.5* 2.3   PHOS  --  2.9       Recent Lab Results  (Last 5 results in the past 24 hours)        11/08/24  0524   11/07/24  2008   11/07/24  1626   11/07/24  1102   11/07/24  1034        A4C EF         24       Ao root annulus         3.86       Ao peak sadiq         1.1       Ao VTI         18.1       AR Max Sadiq         2.89       AV valve area         2.4       TRAVIS by Velocity Ratio         2.6       AORTIC VALVE CUSP SEPERATION         2.20       AV mean gradient         3.0       AV index (prosthetic)         0.57       AV peak gradient         4.8       AV regurgitation pressure 1/2 time         [id number]       AV Velocity Ratio         0.64       BSA         2.52       Left Ventricle Relative Wall Thickness         0.51       EF         25       FS         5.1       IVC diameter         3.68       IVSd         1.5       LA area A4C         26.14       LA size         4.81       LVOT area         4.2       LV EDV BP         329.51        LV Diastolic Volume Index         137.30       Left Ventricular End Diastolic Volume by Teichholz Method         329.51       LV EDV A4C         273.84       Left Ventricular End Systolic Volume by Teichholz Method         291.88       LV ESV A4C         69.92       LVIDd         7.8       LVIDs         7.4       LV mass         806.3       LV Mass Index         335.9       Left Ventricular Outflow Tract Mean Gradient         1.35       Left Ventricular Outflow Tract Mean Velocity         0.55       LVOT diameter         2.3       LVOT peak sadiq         0.7       LVOT stroke volume         43.2       LVOT peak VTI         10.4       LV ESV BP         291.88       LV Systolic Volume Index         121.6       Mean e'         0.06       Mr max sadiq         4.02       POC Glucose 92   111   141   101         PV peak gradient         1       PV PEAK VELOCITY         0.59       PW         2.0       RA Major Axis         6.52       Est. RA pres         15       RV mid diameter         5.11       RV TB RVSP         18       RV/LV Ratio         0.82       RV- carmen basal diam         6.4       RV-carmen length         7.0       RV-carmen mid d         5.1       RV Basal Diameter         6.97       TAPSE         1.94       TDI SEPTAL         0.04       TDI LATERAL         0.07       Triscuspid Valve Regurgitation Peak Gradient         45       TR Max Sadiq         3.35       TV resting pulmonary artery pressure         60       ZLVIDD         -3.38       ZLVIDS         0.97                              Significant Imaging:  I have reviewed all pertinent imaging results/findings within the past 24 hours.    ABG  Recent Labs   Lab 11/07/24  0501   PH 7.37   PO2 77*   PCO2 49*   HCO3 28.3*     Assessment/Plan:     Pulmonary  * Acute respiratory failure with hypoxia and hypercarbia  Were BiPAP at night and oxygen during the day this  no longer the problem    Cardiac/Vascular  Chronic combined systolic and diastolic congestive heart  "failure  Patient has Combined Systolic and Diastolic heart failure that is Acute on chronic. On presentation their CHF was decompensated. Evidence of decompensated CHF on presentation includes: edema, elevated JVD, and shortness of breath. The etiology of their decompensation is likely medication non-compliance and    Most recent BNP and echo results are listed below.  No results for input(s): "BNP" in the last 72 hours.  Latest ECHO  Results for orders placed during the hospital encounter of 09/19/23    Echo    Interpretation Summary    Left Ventricle: The left ventricle is moderately dilated. Moderately increased wall thickness. Septal motion is consistent with pacing. There is moderately reduced systolic function. Ejection fraction by visual approximation is 30%. There is diastolic dysfunction.    Left Atrium: Left atrium is mildly dilated.    Right Ventricle: Mild right ventricular enlargement.    Right Atrium: Right atrium is mildly dilated.    Aortic Valve: The aortic valve is a trileaflet valve. There is mild aortic valve sclerosis. Mildly calcified cusps. There is mild aortic regurgitation with an eccentrically directed jet.    Tricuspid Valve: There is mild regurgitation.    IVC/SVC: Normal venous pressure at 3 mmHg.    Pericardium: There is a trivial effusion.    Current Heart Failure Medications       Plan  - Monitor strict I&Os and daily weights.    - Place on telemetry  - Low sodium diet  - Place on fluid restriction of 2 L.   - Cardiology has not been consulted  - The patient's volume status is stable but not at their baseline as indicated by edema, elevated JVD, and shortness of breath  -ECHO  -Monitor oxygen levels  -CXR in AM          Hypotension  Uncertain of the etiology currently getting broad-spectrum antibiotics were going to support with Levophed and watch carefully    Renal/  Chronic kidney disease with end stage renal failure on dialysis  Dialysis per renal service, appreciate Dr. Navarrete " help    Orthopedic  Venous stasis ulcers of both lower extremities  11/6/24:  -Wound care consult  -Keep wounds clean and dry        Patient has chronic mental health issues we restarted home meds     Phillip Whelan MD  Critical Care Medicine  [facility]

## 2024-11-09 PROBLEM — R45.1 AGITATION: Status: ACTIVE | Noted: 2024-11-09

## 2024-11-09 LAB
ALBUMIN SERPL BCP-MCNC: 2.8 G/DL (ref 3.5–5)
ALBUMIN/GLOB SERPL: 0.9 {RATIO}
ALP SERPL-CCNC: 104 U/L (ref 45–115)
ALT SERPL W P-5'-P-CCNC: 27 U/L (ref 16–61)
ANION GAP SERPL CALCULATED.3IONS-SCNC: 12 MMOL/L (ref 7–16)
AST SERPL W P-5'-P-CCNC: 29 U/L (ref 15–37)
BILIRUB SERPL-MCNC: 2.6 MG/DL (ref ?–1.2)
BUN SERPL-MCNC: 33 MG/DL (ref 7–18)
BUN/CREAT SERPL: 6 (ref 6–20)
CALCIUM SERPL-MCNC: 8.2 MG/DL (ref 8.5–10.1)
CHLORIDE SERPL-SCNC: 102 MMOL/L (ref 98–107)
CO2 SERPL-SCNC: 24 MMOL/L (ref 21–32)
CREAT SERPL-MCNC: 5.89 MG/DL (ref 0.7–1.3)
EGFR (NO RACE VARIABLE) (RUSH/TITUS): 10 ML/MIN/1.73M2
GLOBULIN SER-MCNC: 3.2 G/DL (ref 2–4)
GLUCOSE SERPL-MCNC: 189 MG/DL (ref 70–105)
GLUCOSE SERPL-MCNC: 42 MG/DL (ref 70–105)
GLUCOSE SERPL-MCNC: 62 MG/DL (ref 70–105)
GLUCOSE SERPL-MCNC: 67 MG/DL (ref 70–105)
GLUCOSE SERPL-MCNC: 72 MG/DL (ref 70–105)
GLUCOSE SERPL-MCNC: 80 MG/DL (ref 74–106)
GLUCOSE SERPL-MCNC: 81 MG/DL (ref 70–105)
GLUCOSE SERPL-MCNC: 83 MG/DL (ref 70–105)
HCO3 UR-SCNC: 23.7 MMOL/L (ref 21–28)
HCO3 UR-SCNC: 24.3 MMOL/L (ref 18–23)
MAGNESIUM SERPL-MCNC: 2.1 MG/DL (ref 1.7–2.3)
PCO2 BLDA: 44 MMHG (ref 35–48)
PCO2 BLDA: 45 MMHG
PH SMN: 7.34 [PH] (ref 7.35–7.45)
PH SMN: 7.34 [PH] (ref 7.35–7.45)
PHOSPHATE SERPL-MCNC: 3.3 MG/DL (ref 2.5–4.5)
PO2 BLDA: 59 MMHG (ref 83–108)
PO2 BLDA: 70 MMHG (ref 83–108)
POC A-ADO2: 84 MMHG
POC BASE EXCESS: -1.7 MMOL/L
POC BASE EXCESS: -2.2 MMOL/L (ref -2–3)
POC SATURATED O2: 92 % (ref 95–98)
POC SATURATED O2: 93 % (ref 95–98)
POTASSIUM SERPL-SCNC: 4.6 MMOL/L (ref 3.5–5.1)
PROT SERPL-MCNC: 6 G/DL (ref 6.4–8.2)
SODIUM SERPL-SCNC: 133 MMOL/L (ref 136–145)

## 2024-11-09 PROCEDURE — 82962 GLUCOSE BLOOD TEST: CPT

## 2024-11-09 PROCEDURE — 84100 ASSAY OF PHOSPHORUS: CPT

## 2024-11-09 PROCEDURE — A6212 FOAM DRG <=16 SQ IN W/BORDER: HCPCS

## 2024-11-09 PROCEDURE — 99900035 HC TECH TIME PER 15 MIN (STAT)

## 2024-11-09 PROCEDURE — 63600175 PHARM REV CODE 636 W HCPCS: Mod: JZ,JG

## 2024-11-09 PROCEDURE — 25000003 PHARM REV CODE 250: Performed by: INTERNAL MEDICINE

## 2024-11-09 PROCEDURE — 94660 CPAP INITIATION&MGMT: CPT

## 2024-11-09 PROCEDURE — 80053 COMPREHEN METABOLIC PANEL: CPT

## 2024-11-09 PROCEDURE — 94640 AIRWAY INHALATION TREATMENT: CPT

## 2024-11-09 PROCEDURE — 99233 SBSQ HOSP IP/OBS HIGH 50: CPT | Mod: ,,, | Performed by: INTERNAL MEDICINE

## 2024-11-09 PROCEDURE — 25000242 PHARM REV CODE 250 ALT 637 W/ HCPCS: Performed by: INTERNAL MEDICINE

## 2024-11-09 PROCEDURE — 25000003 PHARM REV CODE 250: Performed by: EMERGENCY MEDICINE

## 2024-11-09 PROCEDURE — 63600175 PHARM REV CODE 636 W HCPCS: Mod: JZ,JG | Performed by: INTERNAL MEDICINE

## 2024-11-09 PROCEDURE — 83735 ASSAY OF MAGNESIUM: CPT

## 2024-11-09 PROCEDURE — 25000242 PHARM REV CODE 250 ALT 637 W/ HCPCS

## 2024-11-09 PROCEDURE — 27000190 HC CPAP FULL FACE MASK W/VALVE

## 2024-11-09 PROCEDURE — 11000001 HC ACUTE MED/SURG PRIVATE ROOM

## 2024-11-09 PROCEDURE — 36415 COLL VENOUS BLD VENIPUNCTURE: CPT

## 2024-11-09 PROCEDURE — 27000221 HC OXYGEN, UP TO 24 HOURS

## 2024-11-09 PROCEDURE — 36600 WITHDRAWAL OF ARTERIAL BLOOD: CPT

## 2024-11-09 PROCEDURE — 25000003 PHARM REV CODE 250

## 2024-11-09 PROCEDURE — 82803 BLOOD GASES ANY COMBINATION: CPT

## 2024-11-09 PROCEDURE — 94761 N-INVAS EAR/PLS OXIMETRY MLT: CPT

## 2024-11-09 PROCEDURE — 94799 UNLISTED PULMONARY SVC/PX: CPT

## 2024-11-09 RX ORDER — LORAZEPAM 1 MG/1
2 TABLET ORAL EVERY 6 HOURS PRN
Status: DISCONTINUED | OUTPATIENT
Start: 2024-11-09 | End: 2024-11-13 | Stop reason: HOSPADM

## 2024-11-09 RX ORDER — AMIODARONE HYDROCHLORIDE 200 MG/1
200 TABLET ORAL DAILY
Status: DISCONTINUED | OUTPATIENT
Start: 2024-11-09 | End: 2024-11-13 | Stop reason: HOSPADM

## 2024-11-09 RX ORDER — OLANZAPINE 5 MG/1
5 TABLET ORAL DAILY
Status: DISCONTINUED | OUTPATIENT
Start: 2024-11-09 | End: 2024-11-13 | Stop reason: HOSPADM

## 2024-11-09 RX ORDER — IPRATROPIUM BROMIDE AND ALBUTEROL SULFATE 2.5; .5 MG/3ML; MG/3ML
3 SOLUTION RESPIRATORY (INHALATION) 2 TIMES DAILY
Status: DISCONTINUED | OUTPATIENT
Start: 2024-11-09 | End: 2024-11-13 | Stop reason: HOSPADM

## 2024-11-09 RX ADMIN — GLUCAGON 1 MG: 1 INJECTION, POWDER, LYOPHILIZED, FOR SOLUTION INTRAMUSCULAR; INTRAVENOUS at 03:11

## 2024-11-09 RX ADMIN — DEXTROSE MONOHYDRATE 125 ML: 10 INJECTION, SOLUTION INTRAVENOUS at 04:11

## 2024-11-09 RX ADMIN — LORAZEPAM 2 MG: 1 TABLET ORAL at 09:11

## 2024-11-09 RX ADMIN — IPRATROPIUM BROMIDE AND ALBUTEROL SULFATE 3 ML: .5; 3 SOLUTION RESPIRATORY (INHALATION) at 07:11

## 2024-11-09 RX ADMIN — DEXTROSE MONOHYDRATE 125 ML: 10 INJECTION, SOLUTION INTRAVENOUS at 07:11

## 2024-11-09 RX ADMIN — MUPIROCIN: 20 OINTMENT TOPICAL at 08:11

## 2024-11-09 RX ADMIN — OLANZAPINE 5 MG: 5 TABLET, FILM COATED ORAL at 08:11

## 2024-11-09 RX ADMIN — AZTREONAM 2000 MG: 1 INJECTION, POWDER, LYOPHILIZED, FOR SOLUTION INTRAMUSCULAR; INTRAVENOUS at 05:11

## 2024-11-09 RX ADMIN — HALOPERIDOL 5 MG: 5 TABLET ORAL at 08:11

## 2024-11-09 RX ADMIN — LORAZEPAM 2 MG: 1 TABLET ORAL at 12:11

## 2024-11-09 RX ADMIN — HALOPERIDOL 5 MG: 5 TABLET ORAL at 09:11

## 2024-11-09 RX ADMIN — MEMANTINE 5 MG: 5 TABLET ORAL at 08:11

## 2024-11-09 RX ADMIN — AMIODARONE HYDROCHLORIDE 200 MG: 200 TABLET ORAL at 08:11

## 2024-11-09 RX ADMIN — CARVEDILOL 3.12 MG: 3.12 TABLET, FILM COATED ORAL at 08:11

## 2024-11-09 RX ADMIN — MUPIROCIN: 20 OINTMENT TOPICAL at 09:11

## 2024-11-09 NOTE — PLAN OF CARE
Problem: Adult Inpatient Plan of Care  Goal: Absence of Hospital-Acquired Illness or Injury  Outcome: Progressing  Intervention: Prevent Skin Injury  Flowsheets (Taken 11/9/2024 0108)  Body Position:   turned   right   weight shifting  Skin Protection:   incontinence pads utilized   silicone foam dressing in place   transparent dressing maintained  Device Skin Pressure Protection:   absorbent pad utilized/changed   adhesive use limited   positioning supports utilized   pressure points protected  Intervention: Prevent and Manage VTE (Venous Thromboembolism) Risk  Flowsheets (Taken 11/9/2024 0108)  VTE Prevention/Management:   dorsiflexion/plantar flexion performed   ROM (active) performed  Intervention: Prevent Infection  Flowsheets (Taken 11/9/2024 0108)  Infection Prevention:   hand hygiene promoted   single patient room provided  Goal: Optimal Comfort and Wellbeing  Outcome: Progressing  Intervention: Monitor Pain and Promote Comfort  Flowsheets (Taken 11/9/2024 0108)  Pain Management Interventions:   care clustered   medication offered   pillow support provided   position adjusted   quiet environment facilitated

## 2024-11-09 NOTE — NURSING
1320:  Report called to Phan CRAWFORD.  1335:  Pt transported to [facility] via bed with assistance.   Bedside report with Phan CRAWFORD,   TJ.

## 2024-11-09 NOTE — PLAN OF CARE
Problem: Gas Exchange Impaired  Goal: Optimal Gas Exchange  Outcome: Progressing     Problem: Airway Clearance Ineffective  Goal: Effective Airway Clearance  11/9/2024 0712 by Kaley Hood, RRT  Outcome: Progressing  11/9/2024 0712 by Kaley Hood, RRT  Outcome: Progressing

## 2024-11-09 NOTE — PROGRESS NOTES
[facility]  Nephrology  Progress Note    Patient Name: Yvan Rollins  MRN: [id number]  Admission Date: 11/6/2024  Hospital Length of Stay: 3 days  Attending Provider: Phillip Whelan MD   Primary Care Physician: Eugene Funez MD  Principal Problem:Acute respiratory failure with hypoxia and hypercarbia    Consults  Subjective:     Interval History:  Patient is seen in follow-up of his end-stage renal disease.  He dialyzed yesterday and tolerated it well.  Today's lab shows a potassium of 4.6.  He appears weak but has no specific complaints.  Chest is clear.    Review of patient's allergies indicates:   Allergen Reactions    Ceftriaxone Swelling    Lisinopril Swelling and Rash     Facial swelling   Facial swelling       Current Facility-Administered Medications   Medication Frequency    0.9%  NaCl infusion PRN    acetaminophen tablet 650 mg Q4H PRN    albuterol-ipratropium 2.5 mg-0.5 mg/3 mL nebulizer solution 3 mL BID    amiodarone tablet 200 mg Daily    aztreonam injection 2,000 mg Q24H    carvediloL tablet 3.125 mg BID    dextrose 10% bolus 125 mL 125 mL PRN    dextrose 10% bolus 250 mL 250 mL PRN    glucagon (human recombinant) injection 1 mg PRN    haloperidoL tablet 5 mg BID    heparin (porcine) injection 4,000 Units PRN    insulin aspart U-100 injection 0-5 Units QID (AC + HS) PRN    LORazepam tablet 2 mg PRN    memantine tablet 5 mg Daily    mupirocin 2 % ointment BID    OLANZapine tablet 5 mg Daily    sodium chloride 0.9% flush 10 mL PRN       Objective:     Vital Signs (Most Recent):  Temp: 98.4 °F (36.9 °C) (11/09/24 0326)  Pulse: 99 (11/09/24 0709)  Resp: 20 (11/09/24 0709)  BP: 107/71 (11/09/24 0600)  SpO2: 98 % (11/09/24 0709) Vital Signs (24h Range):  Temp:  [97.5 °F (36.4 °C)-98.4 °F (36.9 °C)] 98.4 °F (36.9 °C)  Pulse:  [] 99  Resp:  [0-33] 20  SpO2:  [81 %-100 %] 98 %  BP: ()/() 107/71     Weight: 118.6 kg (261 lb 7.5 oz) (11/09/24 0245)  Body mass  index is 35.46 kg/m².  Body surface area is 2.45 meters squared.    I/O last 3 completed shifts:  In: 1074.2 [P.O.:960; I.V.:114.2]  Out: 1200 [Other:1200]    Physical Exam clear chest.  Generally appears weak    Significant Labs:sureBMP:   Recent Labs   Lab 11/09/24  0541   GLU 80   *   K 4.6      CO2 24   BUN 33*   CREATININE 5.89*   CALCIUM 8.2*   MG 2.1     CBC:   Recent Labs   Lab 11/06/24  1731   WBC 5.98   RBC 3.75*   HGB 12.2*   HCT 38.8*   PLT 55*   .5*   MCH 32.5*   MCHC 31.4*     All labs within the past 24 hours have been reviewed.    Significant Imaging:  Labs: Reviewed    Assessment/Plan:     Active Diagnoses:    Diagnosis Date Noted POA    PRINCIPAL PROBLEM:  Acute respiratory failure with hypoxia and hypercarbia [J96.01, J96.02] 11/06/2024 Yes    Agitation [R45.1] 11/09/2024 Yes    Venous stasis ulcers of both lower extremities [I83.019, I83.029, L97.919, L97.929] 11/06/2024 Yes    Chronic kidney disease with end stage renal failure on dialysis [N18.6, Z99.2] 09/19/2023 Not Applicable    Chronic combined systolic and diastolic congestive heart failure [I50.42] 09/19/2023 Yes    Chronic hepatitis C [B18.2] 09/19/2023 Yes    Hypotension [I95.9] 09/19/2023 Yes      Problems Resolved During this Admission:       Continue with his regular scheduled hemodialysis.    Thank you for your consult. I will follow-up with patient. Please contact us if you have any additional questions.    Alfred Valdez MD  Nephrology  [facility]

## 2024-11-09 NOTE — ASSESSMENT & PLAN NOTE
Patient is on antipsychotics secondary to some underlying mental disorder that I do not know the exact diagnosis.  We will continue his antipsychotics I increased his dose o of olanzipine today he is ready for transfer to the floor

## 2024-11-09 NOTE — CARE UPDATE
11/09/2024:  Patient was transferred to [facility] at approximately 1335 today.  Was notified by Dr. Whelan to go and check on patient due to the floor staff reporting that he was lethargic and  blood glucose was low.  Upon checking on patient, he does awaken and responds appropriately however he does appear sleepy.  Blood glucose was checked when patient was found to be lethargic, & it was 44, he was given an infusion of D10 W, and the recheck was 80.  We will check ABGs, and monitor patient's status.  We will move back to ICU if appropriate.

## 2024-11-09 NOTE — NURSING
Patient received from ICU nurse via hospital bed.patient resting with no distress or discomfort noted at this time. Will continue to monitor

## 2024-11-09 NOTE — PROGRESS NOTES
[facility]  Critical Care Medicine  Progress Note    Patient Name: Yvan Rollins  MRN: [id number]  Admission Date: 11/6/2024  Hospital Length of Stay: 3 days  Code Status: Full Code  Attending Provider: Phillip Whelan MD  Primary Care Provider: Eugene Funez MD   Principal Problem: Acute respiratory failure with hypoxia and hypercarbia    Subjective:     HPI:  64 year old  male presented to the ED by ambulance after a fall at the nursing home. Patient is a poor historian due to being lethargic. It was reported that he fell at the nursing home, but no details were given about the fall. The patient reported on arrival to the ED that he thinks he may have struck his head and he has right knee pain. On my assessment, patient is more lethargic and only opens eyes to physical & verbal stimuli. Bipap is in use currently. PMHX is notable for end-stage renal disease with a right subclavian tunneled HD catheter for hemodialysis, pacemaker placement, CHF with an EF of 25-30%, COPD, Diabetes, GERD, Hepatitis C, Neuropathy, and hypotension which he takes midodrine for. Review of the external nursing home records show age-related cognitive decline due to prior CVA.     ED workup revealed Sodium 132, potassium 4.6, BUN/CR 42/7.6, magnesium 2.5, and troponin 76.1. ABGs 7.28, CO2 57, PO2 46, HCO3 26.8, and O2 saturation 75%. Placed on Bipap. CXR showed heart failure with small left pleural effusion. CT brain was negative for acute hemorrhage or infarction. Knee xray showed mild tricompartmental degenerative osteoarthrosis with a large suprapatellar effusion. Critical Care service will admit to the ICU for further monitoring and treatment.     Hospital/ICU Course:  No notes on file    Interval History/Significant Events:  Patient without complaints    Review of Systems  Objective:     Vital Signs (Most Recent):  Temp: 98.4 °F (36.9 °C) (11/09/24 0326)  Pulse: 104 (11/09/24 0545)  Resp:  20 (11/09/24 0600)  BP: 107/71 (11/09/24 0600)  SpO2: 97 % (11/09/24 0545) Vital Signs (24h Range):  Temp:  [97.5 °F (36.4 °C)-98.4 °F (36.9 °C)] 98.4 °F (36.9 °C)  Pulse:  [] 104  Resp:  [0-33] 20  SpO2:  [81 %-100 %] 97 %  BP: ()/() 107/71   Weight: 118.6 kg (261 lb 7.5 oz)  Body mass index is 35.46 kg/m².      Intake/Output Summary (Last 24 hours) at 11/9/2024 0616  Last data filed at 11/9/2024 0607  Gross per 24 hour   Intake 1074.16 ml   Output 1200 ml   Net -125.84 ml          Physical Exam  Vitals reviewed.   Constitutional:       Appearance: Normal appearance.      Interventions: He is not intubated.  HENT:      Head: Normocephalic and atraumatic.      Nose: Nose normal.      Mouth/Throat:      Mouth: Mucous membranes are dry.      Pharynx: Oropharynx is clear.   Eyes:      Extraocular Movements: Extraocular movements intact.      Conjunctiva/sclera: Conjunctivae normal.      Pupils: Pupils are equal, round, and reactive to light.   Cardiovascular:      Rate and Rhythm: Normal rate.      Heart sounds: Normal heart sounds. No murmur heard.  Pulmonary:      Effort: Pulmonary effort is normal. He is not intubated.      Breath sounds: Normal breath sounds.   Abdominal:      General: Abdomen is flat. Bowel sounds are normal.      Palpations: Abdomen is soft.   Musculoskeletal:         General: Normal range of motion.      Cervical back: Normal range of motion and neck supple.      Right lower leg: No edema.      Left lower leg: No edema.   Skin:     General: Skin is warm and dry.      Capillary Refill: Capillary refill takes less than 2 seconds.   Neurological:      General: No focal deficit present.      Mental Status: He is alert and oriented to person, place, and time.   Psychiatric:         Mood and Affect: Mood normal.         Behavior: Behavior normal.            Vents:  Oxygen Concentration (%): 28 (11/08/24 0704)  Lines/Drains/Airways       Central Venous Catheter Line  Duration           "        Hemodialysis Catheter 09/19/23 0020 right subclavian 417 days              Drain  Duration                  Hemodialysis AV Graft 09/19/23 0022 Right forearm 417 days              Peripheral Intravenous Line  Duration                  Peripheral IV - Single Lumen 11/06/24 2030 20 G Left;Posterior Forearm 2 days         Peripheral IV - Double Lumen 11/08/24 2000 20 G;22 G Anterior;Left Upper Arm <1 day                  Significant Labs:    CBC/Anemia Profile:  No results for input(s): "WBC", "HGB", "HCT", "PLT", "MCV", "RDW", "IRON", "FERRITIN", "RETIC", "FOLATE", "[id number]", "OCCULTBLOOD" in the last 48 hours.     Chemistries:  Recent Labs   Lab 11/08/24  0416   *   K 4.9      CO2 23   BUN 39*   CREATININE 6.74*   CALCIUM 7.9*   ALBUMIN 2.4*   PROT 5.6*   BILITOT 2.1*   ALKPHOS 114   ALT 25   AST 37   MG 2.2   PHOS 3.6       Recent Lab Results         11/09/24  0532   11/08/24 2008 11/08/24  1641   11/08/24  1212        POC Glucose 67   89   87   167               Significant Imaging:  I have reviewed all pertinent imaging results/findings within the past 24 hours.    ABG  Recent Labs   Lab 11/07/24  0501   PH 7.37   PO2 77*   PCO2 49*   HCO3 28.3*     Assessment/Plan:     Pulmonary  * Acute respiratory failure with hypoxia and hypercarbia  Refusing BiPAP at night    Cardiac/Vascular  Chronic combined systolic and diastolic congestive heart failure  Stable    Hypotension  Currently off of Levophed    Renal/  Chronic kidney disease with end stage renal failure on dialysis  Dialysis per renal service, appreciate Dr. Navarrete help    Orthopedic  Venous stasis ulcers of both lower extremities  11/6/24:  -Wound care consult  -Keep wounds clean and dry    Other  Agitation  Patient is on antipsychotics secondary to some underlying mental disorder that I do not know the exact diagnosis.  We will continue his antipsychotics I increased his dose o of olanzipine today he is ready for transfer to the " floor          Phillip Whelan MD  Critical Care Medicine  [facility]   Consent (Nose)/Introductory Paragraph: The rationale for Mohs was explained to the patient and consent was obtained. The risks, benefits and alternatives to therapy were discussed in detail. Specifically, the risks of nasal deformity, changes in the flow of air through the nose, infection, scarring, bleeding, prolonged wound healing, incomplete removal, allergy to anesthesia, nerve injury and recurrence were addressed. Prior to the procedure, the treatment site was clearly identified and confirmed by the patient. All components of Universal Protocol/PAUSE Rule completed.

## 2024-11-09 NOTE — SUBJECTIVE & OBJECTIVE
Interval History/Significant Events:  Patient without complaints    Review of Systems  Objective:     Vital Signs (Most Recent):  Temp: 98.4 °F (36.9 °C) (11/09/24 0326)  Pulse: 104 (11/09/24 0545)  Resp: 20 (11/09/24 0600)  BP: 107/71 (11/09/24 0600)  SpO2: 97 % (11/09/24 0545) Vital Signs (24h Range):  Temp:  [97.5 °F (36.4 °C)-98.4 °F (36.9 °C)] 98.4 °F (36.9 °C)  Pulse:  [] 104  Resp:  [0-33] 20  SpO2:  [81 %-100 %] 97 %  BP: ()/() 107/71   Weight: 118.6 kg (261 lb 7.5 oz)  Body mass index is 35.46 kg/m².      Intake/Output Summary (Last 24 hours) at 11/9/2024 0616  Last data filed at 11/9/2024 0607  Gross per 24 hour   Intake 1074.16 ml   Output 1200 ml   Net -125.84 ml          Physical Exam  Vitals reviewed.   Constitutional:       Appearance: Normal appearance.      Interventions: He is not intubated.  HENT:      Head: Normocephalic and atraumatic.      Nose: Nose normal.      Mouth/Throat:      Mouth: Mucous membranes are dry.      Pharynx: Oropharynx is clear.   Eyes:      Extraocular Movements: Extraocular movements intact.      Conjunctiva/sclera: Conjunctivae normal.      Pupils: Pupils are equal, round, and reactive to light.   Cardiovascular:      Rate and Rhythm: Normal rate.      Heart sounds: Normal heart sounds. No murmur heard.  Pulmonary:      Effort: Pulmonary effort is normal. He is not intubated.      Breath sounds: Normal breath sounds.   Abdominal:      General: Abdomen is flat. Bowel sounds are normal.      Palpations: Abdomen is soft.   Musculoskeletal:         General: Normal range of motion.      Cervical back: Normal range of motion and neck supple.      Right lower leg: No edema.      Left lower leg: No edema.   Skin:     General: Skin is warm and dry.      Capillary Refill: Capillary refill takes less than 2 seconds.   Neurological:      General: No focal deficit present.      Mental Status: He is alert and oriented to person, place, and time.   Psychiatric:          "Mood and Affect: Mood normal.         Behavior: Behavior normal.            Vents:  Oxygen Concentration (%): 28 (11/08/24 0704)  Lines/Drains/Airways       Central Venous Catheter Line  Duration                  Hemodialysis Catheter 09/19/23 0020 right subclavian 417 days              Drain  Duration                  Hemodialysis AV Graft 09/19/23 0022 Right forearm 417 days              Peripheral Intravenous Line  Duration                  Peripheral IV - Single Lumen 11/06/24 2030 20 G Left;Posterior Forearm 2 days         Peripheral IV - Double Lumen 11/08/24 2000 20 G;22 G Anterior;Left Upper Arm <1 day                  Significant Labs:    CBC/Anemia Profile:  No results for input(s): "WBC", "HGB", "HCT", "PLT", "MCV", "RDW", "IRON", "FERRITIN", "RETIC", "FOLATE", "[id number]", "OCCULTBLOOD" in the last 48 hours.     Chemistries:  Recent Labs   Lab 11/08/24  0416   *   K 4.9      CO2 23   BUN 39*   CREATININE 6.74*   CALCIUM 7.9*   ALBUMIN 2.4*   PROT 5.6*   BILITOT 2.1*   ALKPHOS 114   ALT 25   AST 37   MG 2.2   PHOS 3.6       Recent Lab Results         11/09/24  0532   11/08/24  2008   11/08/24  1641   11/08/24  1212        POC Glucose 67   89   87   167               Significant Imaging:  I have reviewed all pertinent imaging results/findings within the past 24 hours.  "

## 2024-11-10 PROBLEM — I95.9 HYPOTENSION: Status: RESOLVED | Noted: 2023-09-19 | Resolved: 2024-11-10

## 2024-11-10 PROBLEM — E66.9 OBESITY (BMI 30-39.9): Status: ACTIVE | Noted: 2024-11-10

## 2024-11-10 LAB
ALBUMIN SERPL BCP-MCNC: 2.4 G/DL (ref 3.5–5)
ALBUMIN/GLOB SERPL: 0.7 {RATIO}
ALP SERPL-CCNC: 106 U/L (ref 45–115)
ALT SERPL W P-5'-P-CCNC: 25 U/L (ref 16–61)
ANION GAP SERPL CALCULATED.3IONS-SCNC: 15 MMOL/L (ref 7–16)
AST SERPL W P-5'-P-CCNC: 33 U/L (ref 15–37)
BASOPHILS # BLD AUTO: 0.04 K/UL (ref 0–0.2)
BASOPHILS NFR BLD AUTO: 0.5 % (ref 0–1)
BILIRUB SERPL-MCNC: 2 MG/DL (ref ?–1.2)
BUN SERPL-MCNC: 47 MG/DL (ref 7–18)
BUN/CREAT SERPL: 7 (ref 6–20)
CALCIUM SERPL-MCNC: 8.5 MG/DL (ref 8.5–10.1)
CHLORIDE SERPL-SCNC: 96 MMOL/L (ref 98–107)
CO2 SERPL-SCNC: 24 MMOL/L (ref 21–32)
CREAT SERPL-MCNC: 7.09 MG/DL (ref 0.7–1.3)
DIFFERENTIAL METHOD BLD: ABNORMAL
EGFR (NO RACE VARIABLE) (RUSH/TITUS): 8 ML/MIN/1.73M2
EOSINOPHIL # BLD AUTO: 0.03 K/UL (ref 0–0.5)
EOSINOPHIL NFR BLD AUTO: 0.4 % (ref 1–4)
ERYTHROCYTE [DISTWIDTH] IN BLOOD BY AUTOMATED COUNT: 15.7 % (ref 11.5–14.5)
GLOBULIN SER-MCNC: 3.5 G/DL (ref 2–4)
GLUCOSE SERPL-MCNC: 103 MG/DL (ref 70–105)
GLUCOSE SERPL-MCNC: 114 MG/DL (ref 70–105)
GLUCOSE SERPL-MCNC: 117 MG/DL (ref 70–105)
GLUCOSE SERPL-MCNC: 122 MG/DL (ref 70–105)
GLUCOSE SERPL-MCNC: 93 MG/DL (ref 74–106)
HCT VFR BLD AUTO: 43.5 % (ref 40–54)
HGB BLD-MCNC: 13.8 G/DL (ref 13.5–18)
IMM GRANULOCYTES # BLD AUTO: 0.04 K/UL (ref 0–0.04)
IMM GRANULOCYTES NFR BLD: 0.5 % (ref 0–0.4)
LYMPHOCYTES # BLD AUTO: 0.33 K/UL (ref 1–4.8)
LYMPHOCYTES NFR BLD AUTO: 4.5 % (ref 27–41)
MAGNESIUM SERPL-MCNC: 2.2 MG/DL (ref 1.7–2.3)
MCH RBC QN AUTO: 32.9 PG (ref 27–31)
MCHC RBC AUTO-ENTMCNC: 31.7 G/DL (ref 32–36)
MCV RBC AUTO: 103.6 FL (ref 80–96)
MONOCYTES # BLD AUTO: 0.21 K/UL (ref 0–0.8)
MONOCYTES NFR BLD AUTO: 2.9 % (ref 2–6)
MPC BLD CALC-MCNC: 13 FL (ref 9.4–12.4)
NEUTROPHILS # BLD AUTO: 6.7 K/UL (ref 1.8–7.7)
NEUTROPHILS NFR BLD AUTO: 91.2 % (ref 53–65)
NRBC # BLD AUTO: 0 X10E3/UL
NRBC, AUTO (.00): 0 %
OHS QRS DURATION: 140 MS
OHS QTC CALCULATION: 458 MS
PHOSPHATE SERPL-MCNC: 4.8 MG/DL (ref 2.5–4.5)
PLATELET # BLD AUTO: 71 K/UL (ref 150–400)
POTASSIUM SERPL-SCNC: 5.8 MMOL/L (ref 3.5–5.1)
PROT SERPL-MCNC: 5.9 G/DL (ref 6.4–8.2)
RBC # BLD AUTO: 4.2 M/UL (ref 4.6–6.2)
SODIUM SERPL-SCNC: 129 MMOL/L (ref 136–145)
WBC # BLD AUTO: 7.35 K/UL (ref 4.5–11)

## 2024-11-10 PROCEDURE — 36415 COLL VENOUS BLD VENIPUNCTURE: CPT

## 2024-11-10 PROCEDURE — 25000003 PHARM REV CODE 250: Performed by: INTERNAL MEDICINE

## 2024-11-10 PROCEDURE — 80053 COMPREHEN METABOLIC PANEL: CPT | Performed by: INTERNAL MEDICINE

## 2024-11-10 PROCEDURE — 63600175 PHARM REV CODE 636 W HCPCS: Performed by: HOSPITALIST

## 2024-11-10 PROCEDURE — 99900035 HC TECH TIME PER 15 MIN (STAT)

## 2024-11-10 PROCEDURE — 83735 ASSAY OF MAGNESIUM: CPT | Performed by: INTERNAL MEDICINE

## 2024-11-10 PROCEDURE — 99233 SBSQ HOSP IP/OBS HIGH 50: CPT | Mod: GC,,, | Performed by: HOSPITALIST

## 2024-11-10 PROCEDURE — 94660 CPAP INITIATION&MGMT: CPT

## 2024-11-10 PROCEDURE — 84100 ASSAY OF PHOSPHORUS: CPT | Performed by: INTERNAL MEDICINE

## 2024-11-10 PROCEDURE — 82962 GLUCOSE BLOOD TEST: CPT

## 2024-11-10 PROCEDURE — 11000001 HC ACUTE MED/SURG PRIVATE ROOM

## 2024-11-10 PROCEDURE — 63600175 PHARM REV CODE 636 W HCPCS: Performed by: INTERNAL MEDICINE

## 2024-11-10 PROCEDURE — 94640 AIRWAY INHALATION TREATMENT: CPT

## 2024-11-10 PROCEDURE — 27000190 HC CPAP FULL FACE MASK W/VALVE

## 2024-11-10 PROCEDURE — 99900031 HC PATIENT EDUCATION (STAT)

## 2024-11-10 PROCEDURE — 25000242 PHARM REV CODE 250 ALT 637 W/ HCPCS: Performed by: INTERNAL MEDICINE

## 2024-11-10 PROCEDURE — 25000003 PHARM REV CODE 250

## 2024-11-10 PROCEDURE — 63600175 PHARM REV CODE 636 W HCPCS: Mod: JZ,JG | Performed by: INTERNAL MEDICINE

## 2024-11-10 PROCEDURE — 85025 COMPLETE CBC W/AUTO DIFF WBC: CPT

## 2024-11-10 PROCEDURE — 27000939

## 2024-11-10 PROCEDURE — 94761 N-INVAS EAR/PLS OXIMETRY MLT: CPT

## 2024-11-10 PROCEDURE — 27000221 HC OXYGEN, UP TO 24 HOURS

## 2024-11-10 RX ORDER — PREDNISONE 20 MG/1
20 TABLET ORAL DAILY
Status: DISCONTINUED | OUTPATIENT
Start: 2024-11-10 | End: 2024-11-13 | Stop reason: HOSPADM

## 2024-11-10 RX ORDER — ZIPRASIDONE MESYLATE 20 MG/ML
20 INJECTION, POWDER, LYOPHILIZED, FOR SOLUTION INTRAMUSCULAR ONCE
Status: COMPLETED | OUTPATIENT
Start: 2024-11-10 | End: 2024-11-10

## 2024-11-10 RX ADMIN — ZIPRASIDONE MESYLATE 20 MG: 20 INJECTION, POWDER, LYOPHILIZED, FOR SOLUTION INTRAMUSCULAR at 11:11

## 2024-11-10 RX ADMIN — HALOPERIDOL 5 MG: 5 TABLET ORAL at 08:11

## 2024-11-10 RX ADMIN — MUPIROCIN: 20 OINTMENT TOPICAL at 08:11

## 2024-11-10 RX ADMIN — IPRATROPIUM BROMIDE AND ALBUTEROL SULFATE 3 ML: .5; 3 SOLUTION RESPIRATORY (INHALATION) at 08:11

## 2024-11-10 RX ADMIN — LORAZEPAM 2 MG: 1 TABLET ORAL at 11:11

## 2024-11-10 RX ADMIN — LORAZEPAM 2 MG: 1 TABLET ORAL at 10:11

## 2024-11-10 RX ADMIN — MEMANTINE 5 MG: 5 TABLET ORAL at 08:11

## 2024-11-10 RX ADMIN — IPRATROPIUM BROMIDE AND ALBUTEROL SULFATE 3 ML: .5; 3 SOLUTION RESPIRATORY (INHALATION) at 07:11

## 2024-11-10 RX ADMIN — CARVEDILOL 3.12 MG: 3.12 TABLET, FILM COATED ORAL at 08:11

## 2024-11-10 RX ADMIN — AMIODARONE HYDROCHLORIDE 200 MG: 200 TABLET ORAL at 08:11

## 2024-11-10 RX ADMIN — PREDNISONE 20 MG: 20 TABLET ORAL at 05:11

## 2024-11-10 RX ADMIN — OLANZAPINE 5 MG: 5 TABLET, FILM COATED ORAL at 08:11

## 2024-11-10 RX ADMIN — AZTREONAM 2000 MG: 1 INJECTION, POWDER, LYOPHILIZED, FOR SOLUTION INTRAMUSCULAR; INTRAVENOUS at 05:11

## 2024-11-10 NOTE — PLAN OF CARE
Problem: Noninvasive Ventilation Acute  Goal: Effective Unassisted Ventilation and Oxygenation  Outcome: Progressing     Problem: Gas Exchange Impaired  Goal: Optimal Gas Exchange  Outcome: Progressing     Problem: Breathing Pattern Ineffective  Goal: Effective Breathing Pattern  Outcome: Progressing     Problem: Airway Clearance Ineffective  Goal: Effective Airway Clearance  Outcome: Progressing

## 2024-11-10 NOTE — RESPIRATORY THERAPY
PT LETHARGIC PLACED  ON BIPAP.    11/09/24 1924   Patient Assessment/Suction   Level of Consciousness (AVPU) responds to pain   Respiratory Effort Unlabored   Expansion/Accessory Muscles/Retractions no retractions;no use of accessory muscles   Rhythm/Pattern, Respiratory unlabored   Cough Frequency no cough   Skin Integrity   $ Wound Care Tech Time 15 min   Area Observed Left;Right;Cheek;Bridge of nose   Skin Appearance without discoloration   Barrier used? Liquid Filled Cushion   PRE-TX-O2   Device (Oxygen Therapy) BIPAP   $ Is the patient on Low Flow Oxygen? Yes   Oxygen Concentration (%) 30   SpO2 100 %   Pulse Oximetry Type Intermittent   Resp (!) 25   Ready to Wean/Extubation Screen   FIO2<=50 (chart decimal) 0.3   Preset CPAP/BiPAP Settings   Mode Of Delivery BiPAP S/T   $ CPAP/BiPAP Daily Charge 1   CPAP/BIPAP charged w/in last 24 h YES   $ Initial CPAP/BiPAP Setup? No   $ Is patient using? Yes   Size of Mask Large   Sized Appropriately? Yes   Equipment Type Vision   Airway Device Type large full face mask   Ipap 15   EPAP (cm H2O) 5   Pressure Support (cm H2O) 10   Set Rate (Breaths/Min) 22   ITime (sec) 1   Rise Time (sec) 0.4   Patient CPAP/BiPAP Settings   FiO2 Auto Set yes   Timed Inspiration (Sec) 1   IPAP Rise Time (sec) 0.4   RR Total (Breaths/Min) 25   Tidal Volume (mL) 119   VE Minute Ventilation (L/min) 31 L/min   Peak Inspiratory Pressure (cm H2O) 15   TiTOT (%) 36   Total Leak (L/Min) 50   Patient Trigger - ST Mode Only (%) 16   CPAP/BiPAP Backup Settings   IPAP Backup 15 cmH2O   EPAP Backup 5 cmH2O   Backup Rate 22 breaths per minute (bpm)   FIO2 Backup 30 %   ITIME Backup 1 seconds   Rise Time Backup 0.4 seconds   CPAP/BiPAP Alarms   High Pressure (cm H2O) 50   Low Pressure (cm H2O) 4   Low Pressure Delay (Sec) 20   Minute Ventilation (L/Min) 2   High RR (breaths/min) 50   Low RR (breaths/min) 4   Apnea (Sec) 20

## 2024-11-10 NOTE — PROGRESS NOTES
[facility]  [facility] Medicine  Progress Note    Patient Name: Yvan Rollins  MRN: [id number]  Patient Class: IP- Inpatient   Admission Date: 11/6/2024  Length of Stay: 4 days  Attending Physician: Saul De Paz MD  Primary Care Provider: Eugene Funez MD        Subjective:     Principal Problem:Acute respiratory failure with hypoxia and hypercarbia        HPI:  64 year old  male presented to the ED by ambulance after a fall at the nursing home. Patient is a poor historian due to being lethargic. It was reported that he fell at the nursing home, but no details were given about the fall. The patient reported on arrival to the ED that he thinks he may have struck his head and he has right knee pain. On my assessment, patient is more lethargic and only opens eyes to physical & verbal stimuli. Bipap is in use currently. PMHX is notable for end-stage renal disease with a right subclavian tunneled HD catheter for hemodialysis, pacemaker placement, CHF with an EF of 25-30%, COPD, Diabetes, GERD, Hepatitis C, Neuropathy, and hypotension which he takes midodrine for. Review of the external nursing home records show age-related cognitive decline due to prior CVA.      ED workup revealed Sodium 132, potassium 4.6, BUN/CR 42/7.6, magnesium 2.5, and troponin 76.1. ABGs 7.28, CO2 57, PO2 46, HCO3 26.8, and O2 saturation 75%. Placed on Bipap. CXR showed heart failure with small left pleural effusion. CT brain was negative for acute hemorrhage or infarction. Knee xray showed mild tricompartmental degenerative osteoarthrosis with a large suprapatellar effusion. Critical Care service will admit to the ICU for further monitoring and treatment.     Overview/Hospital Course:  No notes on file    Interval History: Step down from ICU. Patient seen resting comfortably this morning in bed. Patient on Bipap, he is doing well. Will switch Bipap to Qhs. Continue breathing treatments and  antibiotics.    Review of Systems   Constitutional:  Negative for appetite change, chills and diaphoresis.   Respiratory:  Positive for shortness of breath. Negative for choking and chest tightness.    Cardiovascular:  Negative for chest pain and palpitations.   Gastrointestinal:  Negative for abdominal distention and abdominal pain.   All other systems reviewed and are negative.    Objective:     Vital Signs (Most Recent):  Temp: 97.7 °F (36.5 °C) (11/10/24 0719)  Pulse: 101 (11/10/24 1038)  Resp: 20 (11/10/24 1038)  BP: 104/83 (11/10/24 0719)  SpO2: 96 % (11/10/24 1038) Vital Signs (24h Range):  Temp:  [97.2 °F (36.2 °C)-98.2 °F (36.8 °C)] 97.7 °F (36.5 °C)  Pulse:  [] 101  Resp:  [14-25] 20  SpO2:  [84 %-100 %] 96 %  BP: ()/(57-83) 104/83     Weight: 117.4 kg (258 lb 13.1 oz)  Body mass index is 35.1 kg/m².  No intake or output data in the 24 hours ending 11/10/24 1206      Physical Exam  Vitals reviewed.   Constitutional:       General: He is awake.      Appearance: Normal appearance. He is well-developed. He is obese. He is ill-appearing. He is not toxic-appearing.      Interventions: He is not intubated.  HENT:      Head: Normocephalic and atraumatic.      Nose: Nose normal.   Eyes:      Conjunctiva/sclera: Conjunctivae normal.   Cardiovascular:      Rate and Rhythm: Normal rate.      Heart sounds: Normal heart sounds. No murmur heard.  Pulmonary:      Effort: Pulmonary effort is normal. He is not intubated.      Breath sounds: Normal breath sounds.   Abdominal:      General: Abdomen is flat. Bowel sounds are normal.      Palpations: Abdomen is soft.   Musculoskeletal:      Cervical back: Normal range of motion and neck supple.      Right lower leg: No edema.      Left lower leg: No edema.   Skin:     General: Skin is warm and dry.      Capillary Refill: Capillary refill takes less than 2 seconds.   Neurological:      General: No focal deficit present.      Mental Status: He is alert and oriented  "to person, place, and time.   Psychiatric:         Mood and Affect: Mood normal.         Behavior: Behavior normal. Behavior is cooperative.             Significant Labs: All pertinent labs within the past 24 hours have been reviewed.    Significant Imaging: I have reviewed all pertinent imaging results/findings within the past 24 hours.    Assessment/Plan:      * Acute respiratory failure with hypoxia and hypercarbia  Patient with Hypoxic Respiratory failure which is Acute on chronic.  he is not on home oxygen. Supplemental oxygen was provided and noted- Oxygen Concentration (%):  [28-30] 30    .   Signs/symptoms of respiratory failure include- tachypnea, increased work of breathing, and lethargy. Contributing diagnoses includes - CHF and COPD Labs and images were reviewed. Patient Has recent ABG, which has been reviewed. Will treat underlying causes and adjust management of respiratory failure as follows- Bipap Qhs, antibiotics and breathing treatments    Chronic kidney disease with end stage renal failure on dialysis  Creatine stable for now. BMP reviewed- noted Estimated Creatinine Clearance: 17.2 mL/min (A) (based on SCr of 5.89 mg/dL (H)). according to latest data. Based on current GFR, CKD stage is end stage.  Monitor UOP and serial BMP and adjust therapy as needed. Renally dose meds. Avoid nephrotoxic medications and procedures.    Nephrology consulted, appreciate assistance    Chronic combined systolic and diastolic congestive heart failure  Patient has Systolic (HFrEF) heart failure that is Acute on chronic. On presentation their CHF was well compensated. Most recent BNP and echo results are listed below.  No results for input(s): "BNP" in the last 72 hours.  Latest ECHO  Results for orders placed during the hospital encounter of 11/06/24    Echo    Interpretation Summary    Left Ventricle: The left ventricle is severely dilated. Moderately increased wall thickness. There is concentric hypertrophy. " Regional wall motion abnormalities present. Septal motion is consistent with pacing. If RV pacing burden (%) high on interrogation, consider addition of LV lead and upgrade to Bi-V CRT-D. There is severely reduced systolic function. Ejection fraction is approximately 25%.    Right Ventricle: Severe right ventricular enlargement. Systolic function is severely reduced.    Left Atrium: Left atrium is moderately dilated.    Right Atrium: Right atrium is severely dilated.    Aortic Valve: The aortic valve is a trileaflet valve. Mildly calcified cusps. There is mild to moderate aortic regurgitation with an eccentrically directed jet.    Mitral Valve: There is mild bileaflet sclerosis. Mildly thickened leaflets. There is mild to moderate regurgitation with an eccentric jet.    Tricuspid Valve: There is severe regurgitation with an eccentrically directed jet.    Pulmonary Artery: The estimated pulmonary artery systolic pressure is 60 mmHg.    IVC/SVC: Elevated venous pressure at 15 mmHg.    Pericardium: Left pleural effusion.    Current Heart Failure Medications  carvediloL tablet 3.125 mg, 2 times daily, Oral    Plan  - Monitor strict I&Os and daily weights.    - Place on telemetry  - Low sodium diet  - Place on fluid restriction of 1.5 L.   - Cardiology has not been consulted  - The patient's volume status is at their baseline  - Continue scheduled dialysis to keep fluid level low    Agitation  Patient reportedly had some agitation in the ICU  PRN Lorazepam 2 mg  Currently doing well      Venous stasis ulcers of both lower extremities  Well controlled  Wound care was consulted  Keep wounds clean and dry         VTE Risk Mitigation (From admission, onward)           Ordered     heparin (porcine) injection 4,000 Units  As needed (PRN)         11/08/24 1508     IP VTE HIGH RISK PATIENT  Once         11/06/24 1634     Place sequential compression device  Until discontinued         11/06/24 1634                    Discharge  Planning   SANDRA:      Code Status: Full Code   Is the patient medically ready for discharge?:     Reason for patient still in hospital (select all that apply): Patient new problem, Patient trending condition, Laboratory test, Treatment, PT / OT recommendations, and Pending disposition  Discharge Plan A: Return to nursing home                  Tenzin Miller MD  Department of Hospital Medicine   [facility]

## 2024-11-10 NOTE — PLAN OF CARE
Problem: Adult Inpatient Plan of Care  Goal: Plan of Care Review  Outcome: Progressing  Goal: Patient-Specific Goal (Individualized)  Outcome: Progressing  Goal: Absence of Hospital-Acquired Illness or Injury  Outcome: Progressing  Goal: Optimal Comfort and Wellbeing  Outcome: Progressing  Goal: Readiness for Transition of Care  Outcome: Progressing     Problem: Infection  Goal: Absence of Infection Signs and Symptoms  Outcome: Progressing     Problem: Wound  Goal: Optimal Coping  Outcome: Progressing  Goal: Optimal Functional Ability  Outcome: Progressing  Goal: Absence of Infection Signs and Symptoms  Outcome: Progressing  Goal: Improved Oral Intake  Outcome: Progressing  Goal: Optimal Pain Control and Function  Outcome: Progressing  Goal: Skin Health and Integrity  Outcome: Progressing  Goal: Optimal Wound Healing  Outcome: Progressing     Problem: Skin Injury Risk Increased  Goal: Skin Health and Integrity  Outcome: Progressing     Problem: Hemodialysis  Goal: Safe, Effective Therapy Delivery  Outcome: Progressing  Goal: Effective Tissue Perfusion  Outcome: Progressing  Goal: Absence of Infection Signs and Symptoms  Outcome: Progressing     Problem: Noninvasive Ventilation Acute  Goal: Effective Unassisted Ventilation and Oxygenation  Outcome: Progressing     Problem: Gas Exchange Impaired  Goal: Optimal Gas Exchange  Outcome: Progressing     Problem: Breathing Pattern Ineffective  Goal: Effective Breathing Pattern  Outcome: Progressing     Problem: Fall Injury Risk  Goal: Absence of Fall and Fall-Related Injury  Outcome: Progressing     Problem: Restraint, Nonviolent  Goal: Absence of Harm or Injury  Outcome: Progressing     Problem: Airway Clearance Ineffective  Goal: Effective Airway Clearance  Outcome: Progressing

## 2024-11-10 NOTE — PROGRESS NOTES
[facility]  Nephrology  Progress Note    Patient Name: Yvan Rollins  MRN: [id number]  Admission Date: 11/6/2024  Hospital Length of Stay: 4 days  Attending Provider: Saul De Paz MD   Primary Care Physician: Eugene Funez MD  Principal Problem:Acute respiratory failure with hypoxia and hypercarbia    Consults  Subjective:     Interval History:  Patient is seen in follow-up of his end-stage renal disease.  He is now in a regular room and he is sleeping today using BiPAP.  Chest is clear.  He is somewhat difficult to arouse.  He has no significant edema of his legs    Review of patient's allergies indicates:   Allergen Reactions    Ceftriaxone Swelling    Lisinopril Swelling and Rash     Facial swelling   Facial swelling       Current Facility-Administered Medications   Medication Frequency    0.9%  NaCl infusion PRN    acetaminophen tablet 650 mg Q4H PRN    albuterol-ipratropium 2.5 mg-0.5 mg/3 mL nebulizer solution 3 mL BID    amiodarone tablet 200 mg Daily    aztreonam injection 2,000 mg Q24H    carvediloL tablet 3.125 mg BID    dextrose 10% bolus 125 mL 125 mL PRN    dextrose 10% bolus 250 mL 250 mL PRN    glucagon (human recombinant) injection 1 mg PRN    haloperidoL tablet 5 mg BID    heparin (porcine) injection 4,000 Units PRN    insulin aspart U-100 injection 0-5 Units QID (AC + HS) PRN    LORazepam tablet 2 mg Q6H PRN    memantine tablet 5 mg Daily    mupirocin 2 % ointment BID    OLANZapine tablet 5 mg Daily    sodium chloride 0.9% flush 10 mL PRN       Objective:     Vital Signs (Most Recent):  Temp: 97.7 °F (36.5 °C) (11/10/24 0719)  Pulse: (!) 116 (11/10/24 0731)  Resp: 20 (11/10/24 0731)  BP: 104/83 (11/10/24 0719)  SpO2: (!) 93 % (11/10/24 0731) Vital Signs (24h Range):  Temp:  [97.2 °F (36.2 °C)-98.4 °F (36.9 °C)] 97.7 °F (36.5 °C)  Pulse:  [] 116  Resp:  [14-33] 20  SpO2:  [77 %-100 %] 93 %  BP: ()/(57-93) 104/83     Weight: 117.4 kg (258 lb 13.1 oz)  (11/10/24 0530)  Body mass index is 35.1 kg/m².  Body surface area is 2.44 meters squared.    I/O last 3 completed shifts:  In: 700 [P.O.:700]  Out: -     Physical Exam sleepy using BiPAP respirations even and unlabored    Significant Labs:sureBMP:   Recent Labs   Lab 11/09/24  0541   GLU 80   *   K 4.6      CO2 24   BUN 33*   CREATININE 5.89*   CALCIUM 8.2*   MG 2.1     CBC:   Recent Labs   Lab 11/06/24  1731   WBC 5.98   RBC 3.75*   HGB 12.2*   HCT 38.8*   PLT 55*   .5*   MCH 32.5*   MCHC 31.4*     All labs within the past 24 hours have been reviewed.    Significant Imaging:  Labs: Reviewed    Assessment/Plan:     Active Diagnoses:    Diagnosis Date Noted POA    PRINCIPAL PROBLEM:  Acute respiratory failure with hypoxia and hypercarbia [J96.01, J96.02] 11/06/2024 Yes    Agitation [R45.1] 11/09/2024 Yes    Venous stasis ulcers of both lower extremities [I83.019, I83.029, L97.919, L97.929] 11/06/2024 Yes    Chronic kidney disease with end stage renal failure on dialysis [N18.6, Z99.2] 09/19/2023 Not Applicable    Chronic combined systolic and diastolic congestive heart failure [I50.42] 09/19/2023 Yes    Chronic hepatitis C [B18.2] 09/19/2023 Yes    Hypotension [I95.9] 09/19/2023 Yes      Problems Resolved During this Admission:       We will continue with his dialysis as scheduled    Thank you for your consult. I will follow-up with patient. Please contact us if you have any additional questions.    Alfred Valdez MD  Nephrology  [facility]

## 2024-11-10 NOTE — RESPIRATORY THERAPY
NOTIFIED  DR. VAZQUEZ AND DR. PALMER OF ABG RESULTS   11/09/24 1935   Patient Assessment/Suction   Level of Consciousness (AVPU) responds to pain   Respiratory Effort Unlabored   Expansion/Accessory Muscles/Retractions no retractions;no use of accessory muscles   All Lung Fields Breath Sounds Anterior:;equal bilaterally;coarse   Rhythm/Pattern, Respiratory unlabored   Cough Frequency no cough   PRE-TX-O2   Device (Oxygen Therapy) BIPAP   Oxygen Concentration (%) 30   SpO2 100 %   Pulse Oximetry Type Intermittent   Ready to Wean/Extubation Screen   FIO2<=50 (chart decimal) 0.3   Blood Gas Puncture   Blood Gas Type arterial   Arterial Site right;radial artery   Collateral Circulation Verified Albino's Test   Site Preparation alcohol   Pressure Held yes;number of minutes  (5)   Hematoma Present no   Sample Obtained/Sent to Lab yes;number of attempts  (1)   Oxygen Amount FiO2 (specify);LPM (specify)  (2 LPM/ 30% FIO2)   Number of Attempts for ABG? 1   Unsuccessful ABG Attempts  0   Attempted By? MYRNA DASILVA   Labs   $ Was an ABG obtained? Arterial Puncture   $ Labs Tech Time 15 min

## 2024-11-10 NOTE — NURSING
"Groin temp= 98.2 and patient told me to "get off my neck."    19:44- Unable to get a temperature on him because he is too cold.  Notified Dr. Vann and Dr. Rose and Dr. Vann called and told me to try to get a temperature in the groin and if can't get it there then get a rectal temp.    19:27- Dr. Vann came and assessed patient.  D10 stopped.  Glucose= 101 and patient is now responding to sternal rubs.  He is now on the Bipap.    19:11- Received patient in bed asleep and difficult to arouse.  Performed several sternal rubs and patient would not arouse.  Got charge Nurse and checked glucose.  Glucose= 62. Started PRN Dextrose 10%.  "

## 2024-11-10 NOTE — ASSESSMENT & PLAN NOTE
Patient with Hypoxic Respiratory failure which is Acute on chronic.  he is not on home oxygen. Supplemental oxygen was provided and noted- Oxygen Concentration (%):  [28-30] 30    .   Signs/symptoms of respiratory failure include- tachypnea, increased work of breathing, and lethargy. Contributing diagnoses includes - CHF and COPD Labs and images were reviewed. Patient Has recent ABG, which has been reviewed. Will treat underlying causes and adjust management of respiratory failure as follows- Bipap Qhs, antibiotics and breathing treatments

## 2024-11-10 NOTE — SUBJECTIVE & OBJECTIVE
Interval History: Step down from ICU. Patient seen resting comfortably this morning in bed. Patient on Bipap, he is doing well. Will switch Bipap to Qhs. Continue breathing treatments and antibiotics.    Review of Systems   Constitutional:  Negative for appetite change, chills and diaphoresis.   Respiratory:  Positive for shortness of breath. Negative for choking and chest tightness.    Cardiovascular:  Negative for chest pain and palpitations.   Gastrointestinal:  Negative for abdominal distention and abdominal pain.   All other systems reviewed and are negative.    Objective:     Vital Signs (Most Recent):  Temp: 97.7 °F (36.5 °C) (11/10/24 0719)  Pulse: 101 (11/10/24 1038)  Resp: 20 (11/10/24 1038)  BP: 104/83 (11/10/24 0719)  SpO2: 96 % (11/10/24 1038) Vital Signs (24h Range):  Temp:  [97.2 °F (36.2 °C)-98.2 °F (36.8 °C)] 97.7 °F (36.5 °C)  Pulse:  [] 101  Resp:  [14-25] 20  SpO2:  [84 %-100 %] 96 %  BP: ()/(57-83) 104/83     Weight: 117.4 kg (258 lb 13.1 oz)  Body mass index is 35.1 kg/m².  No intake or output data in the 24 hours ending 11/10/24 1206      Physical Exam  Vitals reviewed.   Constitutional:       General: He is awake.      Appearance: Normal appearance. He is well-developed. He is obese. He is ill-appearing. He is not toxic-appearing.      Interventions: He is not intubated.  HENT:      Head: Normocephalic and atraumatic.      Nose: Nose normal.   Eyes:      Conjunctiva/sclera: Conjunctivae normal.   Cardiovascular:      Rate and Rhythm: Normal rate.      Heart sounds: Normal heart sounds. No murmur heard.  Pulmonary:      Effort: Pulmonary effort is normal. He is not intubated.      Breath sounds: Normal breath sounds.   Abdominal:      General: Abdomen is flat. Bowel sounds are normal.      Palpations: Abdomen is soft.   Musculoskeletal:      Cervical back: Normal range of motion and neck supple.      Right lower leg: No edema.      Left lower leg: No edema.   Skin:     General:  Skin is warm and dry.      Capillary Refill: Capillary refill takes less than 2 seconds.   Neurological:      General: No focal deficit present.      Mental Status: He is alert and oriented to person, place, and time.   Psychiatric:         Mood and Affect: Mood normal.         Behavior: Behavior normal. Behavior is cooperative.             Significant Labs: All pertinent labs within the past 24 hours have been reviewed.    Significant Imaging: I have reviewed all pertinent imaging results/findings within the past 24 hours.

## 2024-11-10 NOTE — ASSESSMENT & PLAN NOTE
"Patient has Systolic (HFrEF) heart failure that is Acute on chronic. On presentation their CHF was well compensated. Most recent BNP and echo results are listed below.  No results for input(s): "BNP" in the last 72 hours.  Latest ECHO  Results for orders placed during the hospital encounter of 11/06/24    Echo    Interpretation Summary    Left Ventricle: The left ventricle is severely dilated. Moderately increased wall thickness. There is concentric hypertrophy. Regional wall motion abnormalities present. Septal motion is consistent with pacing. If RV pacing burden (%) high on interrogation, consider addition of LV lead and upgrade to Bi-V CRT-D. There is severely reduced systolic function. Ejection fraction is approximately 25%.    Right Ventricle: Severe right ventricular enlargement. Systolic function is severely reduced.    Left Atrium: Left atrium is moderately dilated.    Right Atrium: Right atrium is severely dilated.    Aortic Valve: The aortic valve is a trileaflet valve. Mildly calcified cusps. There is mild to moderate aortic regurgitation with an eccentrically directed jet.    Mitral Valve: There is mild bileaflet sclerosis. Mildly thickened leaflets. There is mild to moderate regurgitation with an eccentric jet.    Tricuspid Valve: There is severe regurgitation with an eccentrically directed jet.    Pulmonary Artery: The estimated pulmonary artery systolic pressure is 60 mmHg.    IVC/SVC: Elevated venous pressure at 15 mmHg.    Pericardium: Left pleural effusion.    Current Heart Failure Medications  carvediloL tablet 3.125 mg, 2 times daily, Oral    Plan  - Monitor strict I&Os and daily weights.    - Place on telemetry  - Low sodium diet  - Place on fluid restriction of 1.5 L.   - Cardiology has not been consulted  - The patient's volume status is at their baseline  - Continue scheduled dialysis to keep fluid level low  "

## 2024-11-10 NOTE — ASSESSMENT & PLAN NOTE
Creatine stable for now. BMP reviewed- noted Estimated Creatinine Clearance: 17.2 mL/min (A) (based on SCr of 5.89 mg/dL (H)). according to latest data. Based on current GFR, CKD stage is end stage.  Monitor UOP and serial BMP and adjust therapy as needed. Renally dose meds. Avoid nephrotoxic medications and procedures.    Nephrology consulted, appreciate assistance

## 2024-11-11 PROBLEM — J18.9 PNEUMONIA: Status: ACTIVE | Noted: 2024-11-11

## 2024-11-11 LAB
ALBUMIN SERPL BCP-MCNC: 2.4 G/DL (ref 3.5–5)
ALBUMIN/GLOB SERPL: 0.7 {RATIO}
ALP SERPL-CCNC: 108 U/L (ref 45–115)
ALT SERPL W P-5'-P-CCNC: 22 U/L (ref 16–61)
ANION GAP SERPL CALCULATED.3IONS-SCNC: 17 MMOL/L (ref 7–16)
AST SERPL W P-5'-P-CCNC: 29 U/L (ref 15–37)
BILIRUB SERPL-MCNC: 2 MG/DL (ref ?–1.2)
BUN SERPL-MCNC: 47 MG/DL (ref 7–18)
BUN/CREAT SERPL: 7 (ref 6–20)
CALCIUM SERPL-MCNC: 8.4 MG/DL (ref 8.5–10.1)
CHLORIDE SERPL-SCNC: 97 MMOL/L (ref 98–107)
CO2 SERPL-SCNC: 20 MMOL/L (ref 21–32)
CREAT SERPL-MCNC: 7.02 MG/DL (ref 0.7–1.3)
EGFR (NO RACE VARIABLE) (RUSH/TITUS): 8 ML/MIN/1.73M2
GLOBULIN SER-MCNC: 3.4 G/DL (ref 2–4)
GLUCOSE SERPL-MCNC: 100 MG/DL (ref 70–105)
GLUCOSE SERPL-MCNC: 101 MG/DL (ref 70–105)
GLUCOSE SERPL-MCNC: 102 MG/DL (ref 70–105)
GLUCOSE SERPL-MCNC: 116 MG/DL (ref 70–105)
GLUCOSE SERPL-MCNC: 89 MG/DL (ref 70–105)
GLUCOSE SERPL-MCNC: 94 MG/DL (ref 74–106)
MAGNESIUM SERPL-MCNC: 2.1 MG/DL (ref 1.7–2.3)
PHOSPHATE SERPL-MCNC: 4.5 MG/DL (ref 2.5–4.5)
POTASSIUM SERPL-SCNC: 6 MMOL/L (ref 3.5–5.1)
PROT SERPL-MCNC: 5.8 G/DL (ref 6.4–8.2)
SODIUM SERPL-SCNC: 128 MMOL/L (ref 136–145)

## 2024-11-11 PROCEDURE — 25000003 PHARM REV CODE 250: Performed by: HOSPITALIST

## 2024-11-11 PROCEDURE — 83735 ASSAY OF MAGNESIUM: CPT | Performed by: INTERNAL MEDICINE

## 2024-11-11 PROCEDURE — 27000221 HC OXYGEN, UP TO 24 HOURS

## 2024-11-11 PROCEDURE — 25000242 PHARM REV CODE 250 ALT 637 W/ HCPCS: Performed by: INTERNAL MEDICINE

## 2024-11-11 PROCEDURE — 80053 COMPREHEN METABOLIC PANEL: CPT | Performed by: INTERNAL MEDICINE

## 2024-11-11 PROCEDURE — 82962 GLUCOSE BLOOD TEST: CPT

## 2024-11-11 PROCEDURE — 97166 OT EVAL MOD COMPLEX 45 MIN: CPT

## 2024-11-11 PROCEDURE — 94761 N-INVAS EAR/PLS OXIMETRY MLT: CPT

## 2024-11-11 PROCEDURE — 63600175 PHARM REV CODE 636 W HCPCS: Performed by: HOSPITALIST

## 2024-11-11 PROCEDURE — 25000003 PHARM REV CODE 250: Performed by: INTERNAL MEDICINE

## 2024-11-11 PROCEDURE — 99900031 HC PATIENT EDUCATION (STAT)

## 2024-11-11 PROCEDURE — 94640 AIRWAY INHALATION TREATMENT: CPT

## 2024-11-11 PROCEDURE — 84100 ASSAY OF PHOSPHORUS: CPT | Performed by: INTERNAL MEDICINE

## 2024-11-11 PROCEDURE — 94660 CPAP INITIATION&MGMT: CPT

## 2024-11-11 PROCEDURE — 90935 HEMODIALYSIS ONE EVALUATION: CPT

## 2024-11-11 PROCEDURE — 36415 COLL VENOUS BLD VENIPUNCTURE: CPT | Performed by: INTERNAL MEDICINE

## 2024-11-11 PROCEDURE — 99900035 HC TECH TIME PER 15 MIN (STAT)

## 2024-11-11 PROCEDURE — 25000003 PHARM REV CODE 250

## 2024-11-11 PROCEDURE — 97110 THERAPEUTIC EXERCISES: CPT

## 2024-11-11 PROCEDURE — 11000001 HC ACUTE MED/SURG PRIVATE ROOM

## 2024-11-11 PROCEDURE — 97116 GAIT TRAINING THERAPY: CPT

## 2024-11-11 PROCEDURE — 63600175 PHARM REV CODE 636 W HCPCS: Performed by: INTERNAL MEDICINE

## 2024-11-11 RX ORDER — LEVOFLOXACIN 250 MG/1
250 TABLET ORAL EVERY OTHER DAY
Status: DISCONTINUED | OUTPATIENT
Start: 2024-11-12 | End: 2024-11-13 | Stop reason: HOSPADM

## 2024-11-11 RX ORDER — LEVOFLOXACIN 500 MG/1
500 TABLET, FILM COATED ORAL ONCE
Status: COMPLETED | OUTPATIENT
Start: 2024-11-11 | End: 2024-11-11

## 2024-11-11 RX ORDER — SODIUM CHLORIDE 9 MG/ML
INJECTION, SOLUTION INTRAVENOUS
Status: CANCELLED | OUTPATIENT
Start: 2024-11-11

## 2024-11-11 RX ADMIN — IPRATROPIUM BROMIDE AND ALBUTEROL SULFATE 3 ML: .5; 3 SOLUTION RESPIRATORY (INHALATION) at 07:11

## 2024-11-11 RX ADMIN — PREDNISONE 20 MG: 20 TABLET ORAL at 01:11

## 2024-11-11 RX ADMIN — AMIODARONE HYDROCHLORIDE 200 MG: 200 TABLET ORAL at 01:11

## 2024-11-11 RX ADMIN — MUPIROCIN: 20 OINTMENT TOPICAL at 01:11

## 2024-11-11 RX ADMIN — LEVOFLOXACIN 500 MG: 500 TABLET, FILM COATED ORAL at 01:11

## 2024-11-11 RX ADMIN — HEPARIN SODIUM 4000 UNITS: 1000 INJECTION, SOLUTION INTRAVENOUS; SUBCUTANEOUS at 10:11

## 2024-11-11 RX ADMIN — MEMANTINE 5 MG: 5 TABLET ORAL at 01:11

## 2024-11-11 RX ADMIN — OLANZAPINE 5 MG: 5 TABLET, FILM COATED ORAL at 01:11

## 2024-11-11 RX ADMIN — SODIUM CHLORIDE: 9 INJECTION, SOLUTION INTRAVENOUS at 09:11

## 2024-11-11 NOTE — PT/OT/SLP PROGRESS
Physical Therapy Treatment    Patient Name:  Yvan Rollins   MRN:  [id number]    Recommendations:     Discharge Recommendations: Moderate Intensity Therapy  Discharge Equipment Recommendations: to be determined by next level of care  Barriers to discharge: Inaccessible home and Decreased caregiver support    Assessment:     Yvan Rollins is a 64 y.o. male admitted with a medical diagnosis of Acute respiratory failure with hypoxia and hypercarbia.  He presents with the following impairments/functional limitations: impaired functional mobility, gait instability, decreased safety awareness, impaired cognition Pt willing to participate in treatment. Demonstrated the ability to perform therapeutic activity and bed mobility with minimal assistance. Became short of breath after ambulating 90ft. Still does not appear safe to return home    Rehab Prognosis: Fair; patient would benefit from acute skilled PT services to address these deficits and reach maximum level of function.    Recent Surgery: * No surgery found *      Plan:     During this hospitalization, patient to be seen 5 x/week to address the identified rehab impairments via gait training, therapeutic activities, therapeutic exercises and progress toward the following goals:    Plan of Care Expires:  12/07/24    Subjective     Chief Complaint: Acute respiratory failure  Patient/Family Comments/goals: Pt said that he was ready to move.  Pain/Comfort:  Pain Rating 1: 0/10  Pain Rating Post-Intervention 1: 0/10      Objective:     Communicated with HEDY Sanderson RN prior to session.  Patient found HOB elevated with telemetry, peripheral IV, oxygen upon PT entry to room.     General Precautions: Standard, fall  Orthopedic Precautions: N/A  Braces: N/A  Respiratory Status: Nasal cannula, flow 2.5 L/min     Functional Mobility:  Bed Mobility:     Scooting: minimum assistance  Supine to Sit: minimum assistance  Sit to Supine: minimum assistance  Transfers:     Sit to  Stand:  minimum assistance with gait belt  Gait: Ambulated 90ft x 1 trial, contact guard assistance with rolling walker, demonstrated decreased step length and speed and step to pattern  Balance: good sitting, fair standing      AM-PAC 6 CLICK MOBILITY  Turning over in bed (including adjusting bedclothes, sheets and blankets)?: 3  Sitting down on and standing up from a chair with arms (e.g., wheelchair, bedside commode, etc.): 3  Moving from lying on back to sitting on the side of the bed?: 3  Moving to and from a bed to a chair (including a wheelchair)?: 3  Need to walk in hospital room?: 3  Climbing 3-5 steps with a railing?: 2  Basic Mobility Total Score: 17       Treatment & Education:  Pt performed bilateral LE: bed level exercises: ankle pumps, heel slides, and hip abduction, seated exercises: long arc quads, and standing exercises: marches and toe taps  x 30 each      Patient left HOB elevated with all lines intact and call button in reach..    GOALS:   Multidisciplinary Problems       Physical Therapy Goals          Problem: Physical Therapy    Goal Priority Disciplines Outcome Interventions   Physical Therapy Goal     PT, PT/OT Progressing    Description: Short term goals:  1. Sit to stand transfer with Modified [place]  2. Bed to chair transfer with Modified [place] using Rolling Walker  3. Gait  x 100 feet with Modified [place] using Rolling Walker.     Long term goals:  Pt will return to prior living situation with modified independence for all mobility                         Time Tracking:     PT Received On: 11/11/24  PT Start Time: 1419     PT Stop Time: 1452  PT Total Time (min): 33 min     Billable Minutes: Gait Training 17 and Therapeutic Exercise 16    Treatment Type: Treatment  PT/PTA: PT     Number of PTA visits since last PT visit: 0     11/11/2024

## 2024-11-11 NOTE — NURSING
Patient is still on O2 via nasal cannula and he has an order for night time and daytime nap Bipap use so I called Respiratory and asked her can she come and put the patient on his Bipap and she said she was busy right now and it will be a little while before she can come up and put him on it.

## 2024-11-11 NOTE — RESPIRATORY THERAPY
2339 11/10/2024 Called to patients room to put on bipap per prn orders, Woke patient up and placed patient on bipap 15/5 30% .No distress noted at this time will continue to monitor

## 2024-11-11 NOTE — ASSESSMENT & PLAN NOTE
Creatine stable for now. BMP reviewed- noted Estimated Creatinine Clearance: 14.5 mL/min (A) (based on SCr of 7.02 mg/dL (H)). according to latest data. Based on current GFR, CKD stage is end stage.  Monitor UOP and serial BMP and adjust therapy as needed. Renally dose meds. Avoid nephrotoxic medications and procedures.    Nephrology consulted, appreciate assistance  Dialysis [facility]

## 2024-11-11 NOTE — NURSING
Patient is hollering out repeatedly and trying to get up and saying he needs medical attention and he is very confused and uncooperative. I gave him his scheduled Haldol @ 20:47 and then I gave him PRN Ativan @ 2224 because he was hollering out loudly repeatedly and nothing has calmed him down.  Contacted Dr. Rose via secure chat and got an order for one time dose of Ziprasidone 20mg IM.  Given in right deltoid.

## 2024-11-11 NOTE — PROGRESS NOTES
[facility]  [facility] Medicine  Progress Note    Patient Name: Yvan Rollins  MRN: [id number]  Patient Class: IP- Inpatient   Admission Date: 11/6/2024  Length of Stay: 5 days  Attending Physician: Saul De Paz MD  Primary Care Provider: Eugene Funez MD        Subjective:     Principal Problem:Acute respiratory failure with hypoxia and hypercarbia        HPI:  64 year old  male presented to the ED by ambulance after a fall at the nursing home. Patient is a poor historian due to being lethargic. It was reported that he fell at the nursing home, but no details were given about the fall. The patient reported on arrival to the ED that he thinks he may have struck his head and he has right knee pain. On my assessment, patient is more lethargic and only opens eyes to physical & verbal stimuli. Bipap is in use currently. PMHX is notable for end-stage renal disease with a right subclavian tunneled HD catheter for hemodialysis, pacemaker placement, CHF with an EF of 25-30%, COPD, Diabetes, GERD, Hepatitis C, Neuropathy, and hypotension which he takes midodrine for. Review of the external nursing home records show age-related cognitive decline due to prior CVA.      ED workup revealed Sodium 132, potassium 4.6, BUN/CR 42/7.6, magnesium 2.5, and troponin 76.1. ABGs 7.28, CO2 57, PO2 46, HCO3 26.8, and O2 saturation 75%. Placed on Bipap. CXR showed heart failure with small left pleural effusion. CT brain was negative for acute hemorrhage or infarction. Knee xray showed mild tricompartmental degenerative osteoarthrosis with a large suprapatellar effusion. Critical Care service will admit to the ICU for further monitoring and treatment.     Overview/Hospital Course:  No notes on file    Interval History: Patient seen today resting comforably in bed and during dialysis. No acute over night events. Respiratory Therapy saw patient earlier, switching from Bipap today nasal canula.  In the transition between SpO2 was checked at 94%. Patient still lethargic, will stop scheduled Haldol. Will see PT/OT recommendations, depending on evaluation will plan towards discharge in the coming days.    Review of Systems   Constitutional:  Positive for fatigue. Negative for appetite change, chills and diaphoresis.   Respiratory:  Positive for shortness of breath. Negative for choking and chest tightness.    Cardiovascular:  Negative for chest pain and palpitations.   Gastrointestinal:  Negative for abdominal distention and abdominal pain.   All other systems reviewed and are negative.    Objective:     Vital Signs (Most Recent):  Temp: 97.6 °F (36.4 °C) (11/11/24 0429)  Pulse: 103 (11/11/24 0755)  Resp: 20 (11/11/24 0755)  BP: 99/71 (11/11/24 0755)  SpO2: 95 % (11/11/24 0732) Vital Signs (24h Range):  Temp:  [97.4 °F (36.3 °C)-97.6 °F (36.4 °C)] 97.6 °F (36.4 °C)  Pulse:  [] 103  Resp:  [12-20] 20  SpO2:  [88 %-97 %] 95 %  BP: ()/(65-87) 99/71     Weight: 117.8 kg (259 lb 11.2 oz)  Body mass index is 35.22 kg/m².  No intake or output data in the 24 hours ending 11/11/24 0806      Physical Exam  Vitals and nursing note reviewed.   Constitutional:       General: He is sleeping. He is not in acute distress.     Appearance: Normal appearance. He is well-developed. He is obese. He is ill-appearing. He is not toxic-appearing.      Interventions: He is not intubated.Nasal cannula in place.   HENT:      Head: Normocephalic and atraumatic.      Nose: Nose normal.   Eyes:      Conjunctiva/sclera: Conjunctivae normal.   Cardiovascular:      Rate and Rhythm: Tachycardia present.      Heart sounds: Normal heart sounds. No murmur heard.  Pulmonary:      Effort: Pulmonary effort is normal. He is not intubated.      Breath sounds: Normal breath sounds.   Abdominal:      General: Abdomen is flat. Bowel sounds are normal.      Palpations: Abdomen is soft.   Musculoskeletal:      Cervical back: Normal range of  motion and neck supple.      Right lower leg: No edema.      Left lower leg: No edema.   Skin:     General: Skin is warm and dry.      Capillary Refill: Capillary refill takes less than 2 seconds.   Neurological:      Mental Status: He is lethargic.   Psychiatric:         Mood and Affect: Mood normal.         Behavior: Behavior normal.             Significant Labs: All pertinent labs within the past 24 hours have been reviewed.    Significant Imaging: I have reviewed all pertinent imaging results/findings within the past 24 hours.    Assessment/Plan:      * Acute respiratory failure with hypoxia and hypercarbia  Patient with Hypoxic Respiratory failure which is Acute on chronic.  he is not on home oxygen. Supplemental oxygen was provided and noted- Oxygen Concentration (%):  [32] 32    .   Signs/symptoms of respiratory failure include- tachypnea, increased work of breathing, and lethargy. Contributing diagnoses includes - CHF and COPD Labs and images were reviewed. Patient Has recent ABG, which has been reviewed. Will treat underlying causes and adjust management of respiratory failure as follows- Bipap Qhs, antibiotics and breathing treatments    Chronic kidney disease with end stage renal failure on dialysis  Creatine stable for now. BMP reviewed- noted Estimated Creatinine Clearance: 14.5 mL/min (A) (based on SCr of 7.02 mg/dL (H)). according to latest data. Based on current GFR, CKD stage is end stage.  Monitor UOP and serial BMP and adjust therapy as needed. Renally dose meds. Avoid nephrotoxic medications and procedures.    Nephrology consulted, appreciate assistance  Dialysis F    Obesity (BMI 30-39.9)  Body mass index is 35.1 kg/m². Morbid obesity complicates all aspects of disease management from diagnostic modalities to treatment. Weight loss encouraged and health benefits explained to patient.         Chronic combined systolic and diastolic congestive heart failure  Patient has Systolic (HFrEF) heart  "failure that is Acute on chronic. On presentation their CHF was well compensated. Most recent BNP and echo results are listed below.  No results for input(s): "BNP" in the last 72 hours.  Latest ECHO  Results for orders placed during the hospital encounter of 11/06/24    Echo    Interpretation Summary    Left Ventricle: The left ventricle is severely dilated. Moderately increased wall thickness. There is concentric hypertrophy. Regional wall motion abnormalities present. Septal motion is consistent with pacing. If RV pacing burden (%) high on interrogation, consider addition of LV lead and upgrade to Bi-V CRT-D. There is severely reduced systolic function. Ejection fraction is approximately 25%.    Right Ventricle: Severe right ventricular enlargement. Systolic function is severely reduced.    Left Atrium: Left atrium is moderately dilated.    Right Atrium: Right atrium is severely dilated.    Aortic Valve: The aortic valve is a trileaflet valve. Mildly calcified cusps. There is mild to moderate aortic regurgitation with an eccentrically directed jet.    Mitral Valve: There is mild bileaflet sclerosis. Mildly thickened leaflets. There is mild to moderate regurgitation with an eccentric jet.    Tricuspid Valve: There is severe regurgitation with an eccentrically directed jet.    Pulmonary Artery: The estimated pulmonary artery systolic pressure is 60 mmHg.    IVC/SVC: Elevated venous pressure at 15 mmHg.    Pericardium: Left pleural effusion.    Current Heart Failure Medications  carvediloL tablet 3.125 mg, 2 times daily, Oral    Plan  - Monitor strict I&Os and daily weights.    - Place on telemetry  - Low sodium diet  - Place on fluid restriction of 1.5 L.   - Cardiology has not been consulted  - The patient's volume status is at their baseline  - Continue scheduled dialysis to keep fluid level low    Agitation  Patient reportedly had some agitation in the ICU  Haloperidol discontinued 11/11/2024 due to lethargy "   PRN Lorazepam 2 mg  Currently doing well      Venous stasis ulcers of both lower extremities  Well controlled  Wound care was consulted  Keep wounds clean and dry         VTE Risk Mitigation (From admission, onward)           Ordered     heparin (porcine) injection 4,000 Units  As needed (PRN)         11/08/24 1508     IP VTE HIGH RISK PATIENT  Once         11/06/24 1634     Place sequential compression device  Until discontinued         11/06/24 1634                    Discharge Planning   SANDRA:      Code Status: Full Code   Is the patient medically ready for discharge?:     Reason for patient still in hospital (select all that apply): Patient trending condition, Laboratory test, Treatment, Consult recommendations, PT / OT recommendations, and Pending disposition  Discharge Plan A: Return to nursing home                  Tenzin Miller MD  Department of Hospital Medicine   [facility]

## 2024-11-11 NOTE — ASSESSMENT & PLAN NOTE
Patient with Hypoxic Respiratory failure which is Acute on chronic.  he is not on home oxygen. Supplemental oxygen was provided and noted- Oxygen Concentration (%):  [32] 32    .   Signs/symptoms of respiratory failure include- tachypnea, increased work of breathing, and lethargy. Contributing diagnoses includes - CHF and COPD Labs and images were reviewed. Patient Has recent ABG, which has been reviewed. Will treat underlying causes and adjust management of respiratory failure as follows- Bipap Qhs, antibiotics and breathing treatments

## 2024-11-11 NOTE — SUBJECTIVE & OBJECTIVE
Interval History: The patient is resting.  He is not as alert today.  No fevers.  Will continue with scheduled hemodialysis for this patient.    Review of patient's allergies indicates:   Allergen Reactions    Ceftriaxone Swelling    Lisinopril Swelling and Rash     Facial swelling   Facial swelling       Current Facility-Administered Medications   Medication Frequency    0.9%  NaCl infusion PRN    acetaminophen tablet 650 mg Q4H PRN    albuterol-ipratropium 2.5 mg-0.5 mg/3 mL nebulizer solution 3 mL BID    amiodarone tablet 200 mg Daily    aztreonam injection 2,000 mg Q24H    carvediloL tablet 3.125 mg BID    dextrose 10% bolus 125 mL 125 mL PRN    dextrose 10% bolus 250 mL 250 mL PRN    glucagon (human recombinant) injection 1 mg PRN    haloperidoL tablet 5 mg BID    heparin (porcine) injection 4,000 Units PRN    insulin aspart U-100 injection 0-5 Units QID (AC + HS) PRN    LORazepam tablet 2 mg Q6H PRN    memantine tablet 5 mg Daily    mupirocin 2 % ointment BID    OLANZapine tablet 5 mg Daily    predniSONE tablet 20 mg Daily    sodium chloride 0.9% flush 10 mL PRN       Objective:     Vital Signs (Most Recent):  Temp: 98.1 °F (36.7 °C) (11/11/24 0755)  Pulse: 103 (11/11/24 0755)  Resp: 20 (11/11/24 0755)  BP: 99/71 (11/11/24 0755)  SpO2: 95 % (11/11/24 0732) Vital Signs (24h Range):  Temp:  [97.4 °F (36.3 °C)-98.1 °F (36.7 °C)] 98.1 °F (36.7 °C)  Pulse:  [] 103  Resp:  [12-20] 20  SpO2:  [88 %-97 %] 95 %  BP: ()/(65-87) 99/71     Weight: 117.8 kg (259 lb 11.2 oz) (11/11/24 0530)  Body mass index is 35.22 kg/m².  Body surface area is 2.45 meters squared.    No intake/output data recorded.     Physical Exam  Vitals reviewed.   Constitutional:       Appearance: Normal appearance.   HENT:      Head: Normocephalic and atraumatic.   Eyes:      Pupils: Pupils are equal, round, and reactive to light.   Cardiovascular:      Rate and Rhythm: Normal rate and regular rhythm.   Pulmonary:      Effort: Pulmonary  effort is normal.      Breath sounds: Normal breath sounds.   Abdominal:      Palpations: Abdomen is soft.   Musculoskeletal:      Cervical back: Neck supple.   Skin:     General: Skin is warm.   Neurological:      Mental Status: He is alert.          Significant Labs:  BMP:   Recent Labs   Lab 11/11/24  0436   GLU 94   *   K 6.0*   CL 97*   CO2 20*   BUN 47*   CREATININE 7.02*   CALCIUM 8.4*   MG 2.1     CBC:   Recent Labs   Lab 11/10/24  2154   WBC 7.35   RBC 4.20*   HGB 13.8   HCT 43.5   PLT 71*   .6*   MCH 32.9*   MCHC 31.7*        Significant Imaging:  Labs: Reviewed

## 2024-11-11 NOTE — ASSESSMENT & PLAN NOTE
"Patient has Systolic (HFrEF) heart failure that is Acute on chronic. On presentation their CHF was well compensated. Most recent BNP and echo results are listed below.  No results for input(s): "BNP" in the last 72 hours.  Latest ECHO  Results for orders placed during the hospital encounter of 11/06/24    Echo    Interpretation Summary    Left Ventricle: The left ventricle is severely dilated. Moderately increased wall thickness. There is concentric hypertrophy. Regional wall motion abnormalities present. Septal motion is consistent with pacing. If RV pacing burden (%) high on interrogation, consider addition of LV lead and upgrade to Bi-V CRT-D. There is severely reduced systolic function. Ejection fraction is approximately 25%.    Right Ventricle: Severe right ventricular enlargement. Systolic function is severely reduced.    Left Atrium: Left atrium is moderately dilated.    Right Atrium: Right atrium is severely dilated.    Aortic Valve: The aortic valve is a trileaflet valve. Mildly calcified cusps. There is mild to moderate aortic regurgitation with an eccentrically directed jet.    Mitral Valve: There is mild bileaflet sclerosis. Mildly thickened leaflets. There is mild to moderate regurgitation with an eccentric jet.    Tricuspid Valve: There is severe regurgitation with an eccentrically directed jet.    Pulmonary Artery: The estimated pulmonary artery systolic pressure is 60 mmHg.    IVC/SVC: Elevated venous pressure at 15 mmHg.    Pericardium: Left pleural effusion.    Current Heart Failure Medications  carvediloL tablet 3.125 mg, 2 times daily, Oral    Plan  - Monitor strict I&Os and daily weights.    - Place on telemetry  - Low sodium diet  - Place on fluid restriction of 1.5 L.   - Cardiology has not been consulted  - The patient's volume status is at their baseline  - Continue scheduled dialysis to keep fluid level low  "

## 2024-11-11 NOTE — NURSING
[facility]  DIALYSIS TREATMENT SUMMARY      Note: Consult with the attending physician for patient treatment orders, this document is not a physician order.      Patient Information   Patient Yvan Rollins   Date of Birth August 25, 1960   Chart Number [id number]   Location [facility]   Location MRN [id number]   Gender Male   SSN (last 4)      Treatment Information   Treatment Type Hemodialysis   Treatment Id [id number]   Start Time November 11, 2024 09:20   End Time November 11, 2024 12:20   Acutal Duration 03:00     Treatment Balances   Total Saline Administered 500   Net Fluid Balance 2000    Hemodialysis Orders   Therapy Standard   Orders Verified Time 11/11/2024 09:15    Date Verified 11/11/2024   Duration 3:00   Isolated UF/Bypass No   BFR (mL) 400   DFR X2 BFR   DFR (mL) 800   Dialyzer Type OPTIFLUX 160NR   UF Order UF Goal Ordered   UF Goal Ordered (mL) 2000   As Tolerated Yes   Crit-Line used No   Heparin Initial Units Bolus No   Heparin IV Maintenance Bolus No   Heparin IV Infusion No   Potassium (mEq/L) 2.0   Calcium (mEq/L) 2.5   Bicarb (mg/dL) 33   Sodium (mEq/L) 137   Additional Orders 1. Turn UF off if pt beco.es symptomatic with drop in SBP>20mmhg   Clinician Nicolette Chamberlain, TY    Dialysis Access   Access Type Central Venous Catheter   Central Venous Catheter   Access Type Catheter - Tunneled   Access Location Chest Wall - R   1st Use Catheter Verified by Previous Use   Catheter Care Completed per Policy Yes   Dressing Dry and Intact on Arrival Yes   Dressing Changed Yes   Type of Dressing Film Biopatch/CHG   Dressing Changed By [facility] Staff   Type of HD Caps Not Listed   HD Caps Changed Yes   CVC Line Education Provided Yes - Infection Prevention      Vitals   Pre-Treatment Vitals   Time Is BP being recorded? Pre BP Map BP Method Pulse RR Temp How was Weight Obtained? Pre Weight Previous Dry Weight Previous Post Weight Metric Target Fluid Removal (mL) Dialysate Confirmed  Clinician    11/11/2024 09:15 BP/Map 165/60 (95) Noninvasive 82 18 98 How Obtained: Bed Scale 117.8   Kgs 2500 Yes Nicolette Chamberlain RN    Comments: Pt avery tx well      Post Treatment Vitals   Time Is BP being recorded? BP Map Pulse RR Temp How was Weight Obtained? Post Weight Metric BVP UF Goal Ordered NSS Given Intra-Procedure Total Machine UF Removed (mL) Crit-Line Ending Profile Crit-Line Refill Crit-Line Ending HCT Crit-Line Max BV% Clinician    11/11/2024 12:25 BP/Map 105/54 (71) 73 18 98 How Obtained: Bed scale 115.8 Kgs 52 2000 0 2500     Nicolette Chamberlain, RN    Comments: pt stable, avery tx well      Safety checks include: access uncovered and secured, Hemaclip secured for all central line access, machine checks performed, and alarm limits confirmed.     Labs   Hepatitis   HBsAG Lab Result HBsAG Lab Result HBsAG Draw Date Transient Antigenemia(MD Diagnosis Only) Anti-HBs Lab Result Anti-HBs Lab Value Anti-HBs Draw Date Documented By Documented Date Hepatitis Status Hepatitis Status   Negative  11/04/2024  Negative  11/04/2024 Nicolette Chamberlain 11/11/2024  Susceptible   Notes:       Pre-Treatment Hepatitis Precautions Patient tx outside buffer zone Yes Signing   Patient tx at bedside 1:1 Yes Copy of hepatitis results verified in hospital EMR Yes Signed By Nicolette Chamberlain RN   Patient tx with immune pts only Yes Hepatitis Information Entered By Nicolette Chamberlain RN      Pre-Treatment Handoff   Staff Report Received Yes   Report Given by Primary Nurse Candelaria Sanderson   Time 08:45     Patient Arrival   Patient ID Verified Date of Birth    MRN    Full Name   Patient Consent to treatment verified Yes   Blood Transfusion Consent Verified N/A     Treatment Comments   Treatment Notes Pt avery tx well     Post-Treatment Handoff   Report Given to Primary Nurse    Time Report Given    Report Given By     Machine Validation   Time 09:15   Date 11/11/2024   Auto Alarm Test Passed Yes   Machine Serial # [id number]   Portable RO Yes   RO  Serial# [id number]   Residual Bleach Negative Yes   Was a manufactured mix used? Yes   Machine Log Completed Yes   Total Chlorine (less than 0.1)? Yes   Total Chlorine Log Completed Yes   Bicarb BiBag   Bibag Size 650   Machine Temp 37   Machine Conductivity 13.8   Meter Type N/A   Meter Conductivity    Independent Conductivity 13.6   pH Status Pass Pass   pH    TCD Value    TCD Alarm with +/- 0.5    NVL enabled validated 100 asymmetric Yes   Safety check complete Nicolette Chamberlain RN   Second Verification Performed? Yes   Second Verification Performed By Nicolette Chamberlain RN   Reason Not Verified       Serum Lab Values   Time BUN Creatinine Na K (mEq/L) Cl CO2 Ca (mEq/L) Phos Mg (mg/dL) Alb (g/dL) Glucose Hgb Hct WBC Plt PT aPTT INR Other Clinician    11/11/2024 04:36 47 7.02  6  20      13.8 43.5 7.35      Nicolette Chamberlain RN    Comments:         Facility Information Location Dialysis Suite Multi Isolation Information   Facility Information Patient Type Chronic dialysis patient with diagnosis of ESRD Isolation Required? N   Admission Date 11/06/2024 Patient Chronic Unit [facility] Completed by   Ordering MD Dr. Navarrete Code Status Full Code General Tx information Entered by Nicolette Chamberlain RN   Account/Finance Number [id number] Diagnosis    Admission Status InPatient Diagnosis Resp Failure      Start Treatment Time Out Confirmed by Sommer Correct access site verified Yes   Treatment Initiation Connections Secured Time Out Completed 09:18 Treatment Start Date 11/11/2024    Saline line double clamped Correct patient verified Yes Treatment Start Time 09:20    Hemaclip Applied Correct procedure verified Yes Patient/Family questions and concerns addressed Yes     Pre Focused Assessment Respiratory Efforts Unlabored LOC   Access Edema LOC Alert and Oriented x3   Signs and Symptoms of Infection? No Location Generalized    Pain Screening Cardiac  Anuric   Does the patient have pain? No Heart Rhythm Regular Completed by    Respiratory Telemetry No Pre Treatment Focused Assessment Completed By Nicolette Chamberlain RN   Lung Sounds Clear Skin Time 09:15   Location Bilateral Skin Cool Signing   Position Anterior  Dry Signed By Nicolette Chamberlain RN     Education  Treatment Options Patient Education Introduced By   Patient Education Method Knowledge / Understanding Assessed Teach back Patient Education Introduced By Nicolette Chamberlain RN   Patient Educated? Yes Family Education Provided? N/A    Focus or Topic Infection Prevention Caregiver Education Provided? N/A      Post-Treatment HD machine external disinfection completed per policy Yes Completed by   Post Treatment Delay PRO external disinfection completed per policy Yes Post Treatment Form Completed By Nicolette Chamberlain RN   Delay N Post Treatment General Information    Machine Disinfection Requirement Notes Pt stable post tx. Post cath care complete      Post Focused Assessment Lung Sounds Clear LOC   Changes from Pre Focused Assessment Location Bilateral LOC Alert and Oriented x3   Changes from Pre Treatment Focused Assessment? No Position Anterior    Access Respiratory Efforts Unlabored  Anuric   Cath Packed with Heparin Edema Completed by   Access Port(ml) 2 Location Generalized Post Treatment Focused Assessment Completed by Nicolette Chamberlain RN   Return Port(ml) 2 Cardiac Date 11/11/2024   Catheter clamped and capped Yes Heart Rhythm Regular Time 12:25   Access Flow Good Telemetry No Signing   Pain Screening Skin Signed By Nicolette Chamberlain RN   Does the patient have pain? No Skin Cool    Respiratory  Dry

## 2024-11-11 NOTE — PT/OT/SLP EVAL
Occupational Therapy   Evaluation    Name: Yvan Rollins  MRN: [id number]  Admitting Diagnosis: Acute respiratory failure with hypoxia and hypercarbia  Recent Surgery: * No surgery found *      Recommendations:     Discharge Recommendations: Moderate Intensity Therapy  Discharge Equipment Recommendations:  to be determined by next level of care  Barriers to discharge:  None    Assessment:     Yvan Rollins is a 64 y.o. male with a medical diagnosis of Acute respiratory failure with hypoxia and hypercarbia.  He presents with nop complaints. Pt agreed to OT treatmen. Performance deficits affecting function: weakness, impaired endurance, impaired self care skills, impaired functional mobility, decreased safety awareness.      Rehab Prognosis: Good; patient would benefit from acute skilled OT services to address these deficits and reach maximum level of function.       Plan:     Patient to be seen 5 x/week to address the above listed problems via self-care/home management, therapeutic activities, therapeutic exercises  Plan of Care Expires: 12/16/24  Plan of Care Reviewed with: patient    Subjective     Chief Complaint: Acute Respiratory Failure with Hypoxia and Hypercarbia  Patient/Family Comments/goals: To return to NH    Occupational Profile:  Living Environment: Pt is currently a NH resident x approximately 3 months  Previous level of function: Pt reports being able to bathe and dress with some assistance from staff  Roles and Routines: Assisted as needed by NH staff  Equipment Used at Home: hospital bed, walker, rolling, wheelchair  Assistance upon Discharge: Staff    Pain/Comfort:  Pain Rating 1: 0/10  Pain Rating Post-Intervention 1: 0/10    Patients cultural, spiritual, Jainism conflicts given the current situation: no    Objective:     Communicated with: TY Sanderson prior to session.  Patient found HOB elevated with oxygen, peripheral IV, telemetry upon OT entry to room.    General Precautions:  Standard, fall  Orthopedic Precautions: N/A  Braces: N/A  Respiratory Status: Nasal cannula, flow 2 L/min    Occupational Performance:    Bed Mobility:    Patient completed Rolling/Turning to Left with  stand by assistance  Patient completed Supine to Sit with stand by assistance  Patient completed Sit to Supine with minimum assistance and with LE management    Functional Mobility/Transfers:  Patient completed Sit <> Stand Transfer with contact guard assistance  with  gait belt to stand to RW   Functional Mobility: CGA with RW    Activities of Daily Living:  Upper Body Dressing: minimum assistance donning gown    Cognitive/Visual Perceptual:  Cognitive/Psychosocial Skills:     -       Oriented to: Person and Situation   -       Follows Commands/attention:Follows one-step commands  -       Communication: clear/fluent  Visual/Perceptual:      -Intact      Physical Exam:  Balance:    -       (S) with EOB sitting, CGA with standing balance/mobility  Skin integrity: Dry scar on (R) UE  Edema:  Mild on (L)  Sensation:    -       Intact  Motor Planning:    -       WFL  Dominant hand:    -       Right  Upper Extremity Range of Motion:     -       Right Upper Extremity: WFL  -       Left Upper Extremity: WFL  Upper Extremity Strength:    -       Right Upper Extremity: WFL  -       Left Upper Extremity: WFL   Strength:    -       Right Upper Extremity: WFL  -       Left Upper Extremity: WFL    AMPAC 6 Click ADL:  AMPAC Total Score: 16    Treatment & Education:  OT evaluation completed. See eval for details. Pt presents with decline in status due to  being admitted for Respiratory Failure. Tx plan to focus on increasing (I) with self care and mobility.  Pt educated on OT role/POC.   Importance of OOB activity with staff assistance.  Importance of sitting up in the chair throughout the day as tolerated, especially for meals   Safety during functional t/f and mobility with use of RW  Importance of assisting with self-care  activities   All questions/concerns answered within OT scope of practice     Patient left HOB elevated with all lines intact, call button in reach, and nurse notified    GOALS:   Multidisciplinary Problems       Occupational Therapy Goals          Problem: Occupational Therapy    Goal Priority Disciplines Outcome Interventions   Occupational Therapy Goal     OT, PT/OT Progressing    Description: STG:  Pt will perform grooming with self setup  Pt will bathe with min a  Pt will perform UE dressing with (I)  Pt will perform LE dressing with I/Mod I  Pt will transfer bed/chair/bsc with Mod I  Pt will perform standing task x 2 min with (S)   Pt will tolerate 20 minutes of tx without fatigue      LT.Restore to max I with self care and mobility.                          History:     Past Medical History:   Diagnosis Date    Cardiac arrest     CHF (congestive heart failure)     EF 25-30%    COPD (chronic obstructive pulmonary disease)     Coronary artery disease     Diabetes     GERD (gastroesophageal reflux disease)     Hepatitis C     Hypotension     Requiring Midodrine    Neuropathy     Substance abuse          Past Surgical History:   Procedure Laterality Date    arm surgery Right     INCISION AND DRAINAGE OF HEMATOMA Right 2023    Procedure: INCISION AND DRAINAGE, HEMATOMA;  Surgeon: Lexis Campbell MD;  Location: [facility];  Service: General;  Laterality: Right;    INSERTION OF PERMANENT PACEMAKER N/A 08/15/2018    IRRIGATION AND DEBRIDEMENT Left 2023    Procedure: IRRIGATION AND DEBRIDEMENT;  Surgeon: Lexis Campbell MD;  Location: [facility];  Service: General;  Laterality: Left;  LUE    JOINT REPLACEMENT Right     Knee surgery    LUMBAR SPINE SURGERY         Time Tracking:     OT Date of Treatment: 24  OT Start Time: 1526  OT Stop Time: 1553  OT Total Time (min): 27 min    Billable Minutes:Evaluation 2024

## 2024-11-11 NOTE — PLAN OF CARE
CM spoke with pt's sister about swb options. They would like for a referral to be sent to [facility]. CM sent referral to [facility] swb and [facility]. CM will await acceptance.

## 2024-11-11 NOTE — ASSESSMENT & PLAN NOTE
Patient has a diagnosis of pneumonia. The cause of the pneumonia is suspected to be bacterial in etiology but organism is not known. The pneumonia is stable. The patient has the following signs/symptoms of pneumonia: cough, sputum production, and shortness of breath. The patient does have a current oxygen requirement and the patient does not have a home oxygen requirement. I have reviewed the pertinent imaging. The following cultures have been collected: Blood cultures and Sputum culture The culture results are listed below.     Current antimicrobial regimen consists of the antibiotics listed below. Will monitor patient closely and ABG tomorrow.   Patient is at a SNF and can have oxygen there.  If need persists, he can have home oxygen.      Antibiotics (From admission, onward)      Start     Stop Route Frequency Ordered    11/12/24 0900  levoFLOXacin tablet 250 mg         -- Oral Every other day 11/11/24 1336    11/11/24 1445  levoFLOXacin tablet 500 mg         -- Oral Once 11/11/24 1336    11/06/24 2100  mupirocin 2 % ointment         11/11/24 2059 Nasl 2 times daily 11/06/24 1635            Microbiology Results (last 7 days)       Procedure Component Value Units Date/Time    Blood culture #1 [[id number]] Collected: 11/06/24 1455    Order Status: Completed Specimen: Blood Updated: 11/11/24 0704     Culture, Blood No Growth At 72 Hours    Blood culture #2 [[id number]] Collected: 11/06/24 1505    Order Status: Completed Specimen: Blood Updated: 11/11/24 0704     Culture, Blood No Growth At 72 Hours

## 2024-11-11 NOTE — PROGRESS NOTES
[facility]  Nephrology  Progress Note    Patient Name: Yvan Rollins  MRN: [id number]  Admission Date: 11/6/2024  Hospital Length of Stay: 5 days  Attending Provider: Saul De Paz MD   Primary Care Physician: Eugene Funez MD  Principal Problem:Acute respiratory failure with hypoxia and hypercarbia    Subjective:     HPI: This patient with history of ESRD, debility, CHF with EF of 20% is a nursing home resident.  He is status post a fall hitting his head.  The patient was in respiratory distress in the emergency department but was able to respond to bipap ventilation.  The patient missed dialysis earlier this week.  He is volume overloaded.  Nephrology has been consulted for renal issues.  Serum potassium is 4.6.  Serum BUN is 42 and creatinine is 7.6.    Interval History: The patient is resting.  He is not as alert today.  No fevers.  Will continue with scheduled hemodialysis for this patient.    Review of patient's allergies indicates:   Allergen Reactions    Ceftriaxone Swelling    Lisinopril Swelling and Rash     Facial swelling   Facial swelling       Current Facility-Administered Medications   Medication Frequency    0.9%  NaCl infusion PRN    acetaminophen tablet 650 mg Q4H PRN    albuterol-ipratropium 2.5 mg-0.5 mg/3 mL nebulizer solution 3 mL BID    amiodarone tablet 200 mg Daily    aztreonam injection 2,000 mg Q24H    carvediloL tablet 3.125 mg BID    dextrose 10% bolus 125 mL 125 mL PRN    dextrose 10% bolus 250 mL 250 mL PRN    glucagon (human recombinant) injection 1 mg PRN    haloperidoL tablet 5 mg BID    heparin (porcine) injection 4,000 Units PRN    insulin aspart U-100 injection 0-5 Units QID (AC + HS) PRN    LORazepam tablet 2 mg Q6H PRN    memantine tablet 5 mg Daily    mupirocin 2 % ointment BID    OLANZapine tablet 5 mg Daily    predniSONE tablet 20 mg Daily    sodium chloride 0.9% flush 10 mL PRN       Objective:     Vital Signs (Most Recent):  Temp: 98.1 °F  (36.7 °C) (11/11/24 0755)  Pulse: 103 (11/11/24 0755)  Resp: 20 (11/11/24 0755)  BP: 99/71 (11/11/24 0755)  SpO2: 95 % (11/11/24 0732) Vital Signs (24h Range):  Temp:  [97.4 °F (36.3 °C)-98.1 °F (36.7 °C)] 98.1 °F (36.7 °C)  Pulse:  [] 103  Resp:  [12-20] 20  SpO2:  [88 %-97 %] 95 %  BP: ()/(65-87) 99/71     Weight: 117.8 kg (259 lb 11.2 oz) (11/11/24 0530)  Body mass index is 35.22 kg/m².  Body surface area is 2.45 meters squared.    No intake/output data recorded.     Physical Exam  Vitals reviewed.   Constitutional:       Appearance: Normal appearance.   HENT:      Head: Normocephalic and atraumatic.   Eyes:      Pupils: Pupils are equal, round, and reactive to light.   Cardiovascular:      Rate and Rhythm: Normal rate and regular rhythm.   Pulmonary:      Effort: Pulmonary effort is normal.      Breath sounds: Normal breath sounds.   Abdominal:      Palpations: Abdomen is soft.   Musculoskeletal:      Cervical back: Neck supple.   Skin:     General: Skin is warm.   Neurological:      Mental Status: He is alert.          Significant Labs:  BMP:   Recent Labs   Lab 11/11/24  0436   GLU 94   *   K 6.0*   CL 97*   CO2 20*   BUN 47*   CREATININE 7.02*   CALCIUM 8.4*   MG 2.1     CBC:   Recent Labs   Lab 11/10/24  2154   WBC 7.35   RBC 4.20*   HGB 13.8   HCT 43.5   PLT 71*   .6*   MCH 32.9*   MCHC 31.7*        Significant Imaging:  Labs: Reviewed  Assessment/Plan:     Renal/  Chronic kidney disease with end stage renal failure on dialysis  Will continue with dialysis for this patient.    Continue with scheduled hemodialysis.    GI  Chronic hepatitis C  Chronic condition        Thank you for your consult. I will follow-up with patient. Please contact us if you have any additional questions.    Roberto Navarrete Jr, MD  Nephrology  [facility]

## 2024-11-11 NOTE — ASSESSMENT & PLAN NOTE
Patient reportedly had some agitation in the ICU  Haloperidol discontinued 11/11/2024 due to lethargy   PRN Lorazepam 2 mg  Currently doing well

## 2024-11-11 NOTE — NURSING
Pt in bed hollering out help.  Went in room and patient is asking for medicine.  I told him I already gave his PM meds and offered him PRN Ativan to calm him down and he said ok.

## 2024-11-11 NOTE — SUBJECTIVE & OBJECTIVE
Interval History: Patient seen today resting comforably in bed and during dialysis. No acute over night events. Respiratory Therapy saw patient earlier, switching from Bipap today nasal canula. In the transition between SpO2 was checked at 94%. Patient still lethargic, will stop scheduled Haldol. Will see PT/OT recommendations, depending on evaluation will plan towards discharge in the coming days.    Review of Systems   Constitutional:  Positive for fatigue. Negative for appetite change, chills and diaphoresis.   Respiratory:  Positive for shortness of breath. Negative for choking and chest tightness.    Cardiovascular:  Negative for chest pain and palpitations.   Gastrointestinal:  Negative for abdominal distention and abdominal pain.   All other systems reviewed and are negative.    Objective:     Vital Signs (Most Recent):  Temp: 97.6 °F (36.4 °C) (11/11/24 0429)  Pulse: 103 (11/11/24 0755)  Resp: 20 (11/11/24 0755)  BP: 99/71 (11/11/24 0755)  SpO2: 95 % (11/11/24 0732) Vital Signs (24h Range):  Temp:  [97.4 °F (36.3 °C)-97.6 °F (36.4 °C)] 97.6 °F (36.4 °C)  Pulse:  [] 103  Resp:  [12-20] 20  SpO2:  [88 %-97 %] 95 %  BP: ()/(65-87) 99/71     Weight: 117.8 kg (259 lb 11.2 oz)  Body mass index is 35.22 kg/m².  No intake or output data in the 24 hours ending 11/11/24 0806      Physical Exam  Vitals and nursing note reviewed.   Constitutional:       General: He is sleeping. He is not in acute distress.     Appearance: Normal appearance. He is well-developed. He is obese. He is ill-appearing. He is not toxic-appearing.      Interventions: He is not intubated.Nasal cannula in place.   HENT:      Head: Normocephalic and atraumatic.      Nose: Nose normal.   Eyes:      Conjunctiva/sclera: Conjunctivae normal.   Cardiovascular:      Rate and Rhythm: Tachycardia present.      Heart sounds: Normal heart sounds. No murmur heard.  Pulmonary:      Effort: Pulmonary effort is normal. He is not intubated.       Breath sounds: Normal breath sounds.   Abdominal:      General: Abdomen is flat. Bowel sounds are normal.      Palpations: Abdomen is soft.   Musculoskeletal:      Cervical back: Normal range of motion and neck supple.      Right lower leg: No edema.      Left lower leg: No edema.   Skin:     General: Skin is warm and dry.      Capillary Refill: Capillary refill takes less than 2 seconds.   Neurological:      Mental Status: He is lethargic.   Psychiatric:         Mood and Affect: Mood normal.         Behavior: Behavior normal.             Significant Labs: All pertinent labs within the past 24 hours have been reviewed.    Significant Imaging: I have reviewed all pertinent imaging results/findings within the past 24 hours.

## 2024-11-11 NOTE — PLAN OF CARE
Problem: Occupational Therapy  Goal: Occupational Therapy Goal  Description: STG:  Pt will perform grooming with self setup  Pt will bathe with min a  Pt will perform UE dressing with (I)  Pt will perform LE dressing with I/Mod I  Pt will transfer bed/chair/bsc with Mod I  Pt will perform standing task x 2 min with (S)   Pt will tolerate 20 minutes of tx without fatigue      LT.Restore to max I with self care and mobility.     Outcome: Progressing

## 2024-11-12 LAB
ALBUMIN SERPL BCP-MCNC: 2.2 G/DL (ref 3.5–5)
ALBUMIN/GLOB SERPL: 0.6 {RATIO}
ALP SERPL-CCNC: 98 U/L (ref 45–115)
ALT SERPL W P-5'-P-CCNC: 21 U/L (ref 16–61)
ANION GAP SERPL CALCULATED.3IONS-SCNC: 13 MMOL/L (ref 7–16)
AST SERPL W P-5'-P-CCNC: 26 U/L (ref 15–37)
BACTERIA BLD CULT: NORMAL
BACTERIA BLD CULT: NORMAL
BILIRUB SERPL-MCNC: 1.7 MG/DL (ref ?–1.2)
BUN SERPL-MCNC: 46 MG/DL (ref 7–18)
BUN/CREAT SERPL: 7 (ref 6–20)
CALCIUM SERPL-MCNC: 8.5 MG/DL (ref 8.5–10.1)
CHLORIDE SERPL-SCNC: 101 MMOL/L (ref 98–107)
CO2 SERPL-SCNC: 22 MMOL/L (ref 21–32)
CREAT SERPL-MCNC: 6.61 MG/DL (ref 0.7–1.3)
EGFR (NO RACE VARIABLE) (RUSH/TITUS): 9 ML/MIN/1.73M2
GLOBULIN SER-MCNC: 3.4 G/DL (ref 2–4)
GLUCOSE SERPL-MCNC: 100 MG/DL (ref 74–106)
GLUCOSE SERPL-MCNC: 140 MG/DL (ref 70–105)
GLUCOSE SERPL-MCNC: 155 MG/DL (ref 70–105)
GLUCOSE SERPL-MCNC: 75 MG/DL (ref 70–105)
GLUCOSE SERPL-MCNC: 98 MG/DL (ref 70–105)
MAGNESIUM SERPL-MCNC: 2.3 MG/DL (ref 1.7–2.3)
PHOSPHATE SERPL-MCNC: 5.2 MG/DL (ref 2.5–4.5)
POTASSIUM SERPL-SCNC: 5.7 MMOL/L (ref 3.5–5.1)
PROT SERPL-MCNC: 5.6 G/DL (ref 6.4–8.2)
SODIUM SERPL-SCNC: 130 MMOL/L (ref 136–145)

## 2024-11-12 PROCEDURE — 25000003 PHARM REV CODE 250: Performed by: INTERNAL MEDICINE

## 2024-11-12 PROCEDURE — 97110 THERAPEUTIC EXERCISES: CPT

## 2024-11-12 PROCEDURE — 25000003 PHARM REV CODE 250

## 2024-11-12 PROCEDURE — 11000001 HC ACUTE MED/SURG PRIVATE ROOM

## 2024-11-12 PROCEDURE — 63600175 PHARM REV CODE 636 W HCPCS: Performed by: HOSPITALIST

## 2024-11-12 PROCEDURE — 99900031 HC PATIENT EDUCATION (STAT)

## 2024-11-12 PROCEDURE — 94660 CPAP INITIATION&MGMT: CPT

## 2024-11-12 PROCEDURE — 80053 COMPREHEN METABOLIC PANEL: CPT | Performed by: INTERNAL MEDICINE

## 2024-11-12 PROCEDURE — 36415 COLL VENOUS BLD VENIPUNCTURE: CPT | Performed by: INTERNAL MEDICINE

## 2024-11-12 PROCEDURE — 97535 SELF CARE MNGMENT TRAINING: CPT

## 2024-11-12 PROCEDURE — 25000242 PHARM REV CODE 250 ALT 637 W/ HCPCS: Performed by: INTERNAL MEDICINE

## 2024-11-12 PROCEDURE — 84100 ASSAY OF PHOSPHORUS: CPT | Performed by: INTERNAL MEDICINE

## 2024-11-12 PROCEDURE — 99900035 HC TECH TIME PER 15 MIN (STAT)

## 2024-11-12 PROCEDURE — 25000003 PHARM REV CODE 250: Performed by: HOSPITALIST

## 2024-11-12 PROCEDURE — 97116 GAIT TRAINING THERAPY: CPT

## 2024-11-12 PROCEDURE — 94761 N-INVAS EAR/PLS OXIMETRY MLT: CPT

## 2024-11-12 PROCEDURE — 94640 AIRWAY INHALATION TREATMENT: CPT

## 2024-11-12 PROCEDURE — 82962 GLUCOSE BLOOD TEST: CPT

## 2024-11-12 PROCEDURE — 27000221 HC OXYGEN, UP TO 24 HOURS

## 2024-11-12 PROCEDURE — 83735 ASSAY OF MAGNESIUM: CPT | Performed by: INTERNAL MEDICINE

## 2024-11-12 RX ORDER — BISACODYL 5 MG
10 TABLET, DELAYED RELEASE (ENTERIC COATED) ORAL DAILY PRN
Status: DISCONTINUED | OUTPATIENT
Start: 2024-11-12 | End: 2024-11-13 | Stop reason: HOSPADM

## 2024-11-12 RX ORDER — ONDANSETRON HYDROCHLORIDE 2 MG/ML
8 INJECTION, SOLUTION INTRAVENOUS EVERY 6 HOURS PRN
Status: DISCONTINUED | OUTPATIENT
Start: 2024-11-12 | End: 2024-11-13 | Stop reason: HOSPADM

## 2024-11-12 RX ORDER — GUAIFENESIN AND DEXTROMETHORPHAN HYDROBROMIDE 10; 100 MG/5ML; MG/5ML
10 SYRUP ORAL EVERY 6 HOURS PRN
Status: DISCONTINUED | OUTPATIENT
Start: 2024-11-12 | End: 2024-11-13 | Stop reason: HOSPADM

## 2024-11-12 RX ORDER — TALC
6 POWDER (GRAM) TOPICAL NIGHTLY PRN
Status: DISCONTINUED | OUTPATIENT
Start: 2024-11-12 | End: 2024-11-13 | Stop reason: HOSPADM

## 2024-11-12 RX ORDER — TRAZODONE HYDROCHLORIDE 50 MG/1
50 TABLET ORAL NIGHTLY PRN
Status: DISCONTINUED | OUTPATIENT
Start: 2024-11-12 | End: 2024-11-13 | Stop reason: HOSPADM

## 2024-11-12 RX ORDER — ACETAMINOPHEN 500 MG
1000 TABLET ORAL EVERY 6 HOURS PRN
Status: DISCONTINUED | OUTPATIENT
Start: 2024-11-12 | End: 2024-11-13 | Stop reason: HOSPADM

## 2024-11-12 RX ORDER — SIMETHICONE 80 MG
1 TABLET,CHEWABLE ORAL 3 TIMES DAILY PRN
Status: DISCONTINUED | OUTPATIENT
Start: 2024-11-12 | End: 2024-11-13 | Stop reason: HOSPADM

## 2024-11-12 RX ADMIN — CARVEDILOL 3.12 MG: 3.12 TABLET, FILM COATED ORAL at 08:11

## 2024-11-12 RX ADMIN — PREDNISONE 20 MG: 20 TABLET ORAL at 09:11

## 2024-11-12 RX ADMIN — ACETAMINOPHEN 1000 MG: 500 TABLET ORAL at 08:11

## 2024-11-12 RX ADMIN — AMIODARONE HYDROCHLORIDE 200 MG: 200 TABLET ORAL at 09:11

## 2024-11-12 RX ADMIN — LORAZEPAM 2 MG: 1 TABLET ORAL at 08:11

## 2024-11-12 RX ADMIN — LEVOFLOXACIN 250 MG: 250 TABLET, FILM COATED ORAL at 09:11

## 2024-11-12 RX ADMIN — IPRATROPIUM BROMIDE AND ALBUTEROL SULFATE 3 ML: .5; 3 SOLUTION RESPIRATORY (INHALATION) at 07:11

## 2024-11-12 RX ADMIN — CARVEDILOL 3.12 MG: 3.12 TABLET, FILM COATED ORAL at 09:11

## 2024-11-12 RX ADMIN — TRAZODONE HYDROCHLORIDE 50 MG: 50 TABLET ORAL at 08:11

## 2024-11-12 RX ADMIN — OLANZAPINE 5 MG: 5 TABLET, FILM COATED ORAL at 09:11

## 2024-11-12 RX ADMIN — LORAZEPAM 2 MG: 1 TABLET ORAL at 01:11

## 2024-11-12 RX ADMIN — MEMANTINE 5 MG: 5 TABLET ORAL at 09:11

## 2024-11-12 RX ADMIN — Medication 6 MG: at 08:11

## 2024-11-12 NOTE — PROGRESS NOTES
[facility]  [facility] Medicine  Progress Note    Patient Name: Yvan Rollins  MRN: [id number]  Patient Class: IP- Inpatient   Admission Date: 11/6/2024  Length of Stay: 6 days  Attending Physician: Saul De Paz MD  Primary Care Provider: Eugene Funez MD        Subjective:     Principal Problem:Acute respiratory failure with hypoxia and hypercarbia        HPI:  64 year old  male presented to the ED by ambulance after a fall at the nursing home. Patient is a poor historian due to being lethargic. It was reported that he fell at the nursing home, but no details were given about the fall. The patient reported on arrival to the ED that he thinks he may have struck his head and he has right knee pain. On my assessment, patient is more lethargic and only opens eyes to physical & verbal stimuli. Bipap is in use currently. PMHX is notable for end-stage renal disease with a right subclavian tunneled HD catheter for hemodialysis, pacemaker placement, CHF with an EF of 25-30%, COPD, Diabetes, GERD, Hepatitis C, Neuropathy, and hypotension which he takes midodrine for. Review of the external nursing home records show age-related cognitive decline due to prior CVA.      ED workup revealed Sodium 132, potassium 4.6, BUN/CR 42/7.6, magnesium 2.5, and troponin 76.1. ABGs 7.28, CO2 57, PO2 46, HCO3 26.8, and O2 saturation 75%. Placed on Bipap. CXR showed heart failure with small left pleural effusion. CT brain was negative for acute hemorrhage or infarction. Knee xray showed mild tricompartmental degenerative osteoarthrosis with a large suprapatellar effusion. Critical Care service will admit to the ICU for further monitoring and treatment.     Overview/Hospital Course:  Patient is a 64 year old male who presented to [facility] after a fall with reported lethargy. Sodium 132, potassium 4.6, BUN/CR 42/7.6, magnesium 2.5, and troponin 76.1. ABGs 7.28, CO2 57, PO2 46, HCO3 26.8, and  O2 saturation 75%. Placed on Bipap. Workup with many radiographic images were done including X-ray right knee - Mild tricompartmental degenerative osteoarthrosis with a large suprapatellar effusion. Chondrocalcinosis. Poorly defined sclerotic lesion within the proximal tibia.  This may represent a bone infarction or enchondroma. CXR - Findings consistent with congestive heart failure with suspected small left pleural effusion. Right IJ catheter. Pacemaker. Head CT w/o contrast - Cortical atrophy with extensive periventricular deep white matter change consistent with advanced chronic small vessel ischemic disease. CT C-spine w/o contrast - Mild to moderate multilevel degenerative disc disease resulting in mild-to-moderate central spinal canal stenosis with bilateral neural foraminal narrowing. No acute fracture or subluxation.  Patient was admitted to ICU for treatment and close monitoring. Blood cultures x2 showed no growth at 5 days. Patient continued his MWF dialysis schedule. US lower extremity bilateral doppler showed No evidence of deep venous thrombosis in either lower extremity.    Interval History: Patient seen resting comfortably in bed. No acute events overnight. Haldol stopped yesterday, patient much more alert today. Patient awaiting placement at [facility], discussed with case management. Possibly discharge as soon as patient is accepted.     Review of Systems   Constitutional:  Positive for fatigue. Negative for appetite change, chills and diaphoresis.   Respiratory:  Positive for shortness of breath. Negative for choking and chest tightness.    Cardiovascular:  Negative for chest pain and palpitations.   Gastrointestinal:  Negative for abdominal distention and abdominal pain.   All other systems reviewed and are negative.    Objective:     Vital Signs (Most Recent):  Temp: 97.4 °F (36.3 °C) (11/12/24 0716)  Pulse: 84 (11/12/24 1141)  Resp: 20 (11/12/24 1141)  BP: 104/68 (11/12/24 1141)  SpO2: 98 %  (11/12/24 1141) Vital Signs (24h Range):  Temp:  [97.4 °F (36.3 °C)] 97.4 °F (36.3 °C)  Pulse:  [60-98] 84  Resp:  [16-22] 20  SpO2:  [92 %-100 %] 98 %  BP: ()/(52-87) 104/68     Weight: 117.8 kg (259 lb 11.2 oz)  Body mass index is 35.22 kg/m².    Intake/Output Summary (Last 24 hours) at 11/12/2024 1206  Last data filed at 11/11/2024 1225  Gross per 24 hour   Intake 0 ml   Output 2500 ml   Net -2500 ml         Physical Exam  Vitals and nursing note reviewed.   Constitutional:       General: He is sleeping. He is not in acute distress.     Appearance: Normal appearance. He is well-developed. He is obese. He is ill-appearing. He is not toxic-appearing.      Interventions: He is not intubated.Nasal cannula in place.   HENT:      Head: Normocephalic and atraumatic.      Nose: Nose normal.   Eyes:      Conjunctiva/sclera: Conjunctivae normal.   Cardiovascular:      Rate and Rhythm: Normal rate.      Heart sounds: Normal heart sounds. No murmur heard.  Pulmonary:      Effort: Pulmonary effort is normal. He is not intubated.      Breath sounds: Normal breath sounds.   Abdominal:      General: Abdomen is flat. Bowel sounds are normal.      Palpations: Abdomen is soft.   Musculoskeletal:      Cervical back: Normal range of motion and neck supple.      Right lower leg: No edema.      Left lower leg: No edema.   Skin:     General: Skin is warm and dry.      Capillary Refill: Capillary refill takes less than 2 seconds.   Neurological:      Mental Status: He is lethargic.   Psychiatric:         Mood and Affect: Mood normal.         Behavior: Behavior normal.             Significant Labs: All pertinent labs within the past 24 hours have been reviewed.    Significant Imaging: I have reviewed all pertinent imaging results/findings within the past 24 hours.    Assessment/Plan:      * Acute respiratory failure with hypoxia and hypercarbia  Patient with Hypoxic Respiratory failure which is Acute on chronic.  he is not on home  oxygen. Supplemental oxygen was provided and noted- Oxygen Concentration (%):  [32-35] 32    .   Signs/symptoms of respiratory failure include- tachypnea, increased work of breathing, and lethargy. Contributing diagnoses includes - CHF and COPD Labs and images were reviewed. Patient Has recent ABG, which has been reviewed. Will treat underlying causes and adjust management of respiratory failure as follows- Bipap Qhs, antibiotics and breathing treatments    Chronic kidney disease with end stage renal failure on dialysis  Creatine stable for now. BMP reviewed- noted Estimated Creatinine Clearance: 15.4 mL/min (A) (based on SCr of 6.61 mg/dL (H)). according to latest data. Based on current GFR, CKD stage is end stage.  Monitor UOP and serial BMP and adjust therapy as needed. Renally dose meds. Avoid nephrotoxic medications and procedures.    Nephrology consulted, appreciate assistance  Dialysis MWF    Pneumonia  Patient has a diagnosis of pneumonia. The cause of the pneumonia is suspected to be bacterial in etiology but organism is not known. The pneumonia is stable. The patient has the following signs/symptoms of pneumonia: cough, sputum production, and shortness of breath. The patient does have a current oxygen requirement and the patient does not have a home oxygen requirement. I have reviewed the pertinent imaging. The following cultures have been collected: Blood cultures and Sputum culture The culture results are listed below.     Current antimicrobial regimen consists of the antibiotics listed below. Will monitor patient closely and ABG tomorrow.   Patient is at a SNF and can have oxygen there.  If need persists, he can have home oxygen.      Antibiotics (From admission, onward)      Start     Stop Route Frequency Ordered    11/12/24 0900  levoFLOXacin tablet 250 mg         -- Oral Every other day 11/11/24 1336    11/11/24 1445  levoFLOXacin tablet 500 mg         -- Oral Once 11/11/24 1336    11/06/24 2100   "mupirocin 2 % ointment         11/11/24 2059 Nasl 2 times daily 11/06/24 1635            Microbiology Results (last 7 days)       Procedure Component Value Units Date/Time    Blood culture #1 [[id number]] Collected: 11/06/24 1455    Order Status: Completed Specimen: Blood Updated: 11/11/24 0704     Culture, Blood No Growth At 72 Hours    Blood culture #2 [[id number]] Collected: 11/06/24 1505    Order Status: Completed Specimen: Blood Updated: 11/11/24 0704     Culture, Blood No Growth At 72 Hours            Chronic combined systolic and diastolic congestive heart failure  Patient has Systolic (HFrEF) heart failure that is Acute on chronic. On presentation their CHF was well compensated. Most recent BNP and echo results are listed below.  No results for input(s): "BNP" in the last 72 hours.  Latest ECHO  Results for orders placed during the hospital encounter of 11/06/24    Echo    Interpretation Summary    Left Ventricle: The left ventricle is severely dilated. Moderately increased wall thickness. There is concentric hypertrophy. Regional wall motion abnormalities present. Septal motion is consistent with pacing. If RV pacing burden (%) high on interrogation, consider addition of LV lead and upgrade to Bi-V CRT-D. There is severely reduced systolic function. Ejection fraction is approximately 25%.    Right Ventricle: Severe right ventricular enlargement. Systolic function is severely reduced.    Left Atrium: Left atrium is moderately dilated.    Right Atrium: Right atrium is severely dilated.    Aortic Valve: The aortic valve is a trileaflet valve. Mildly calcified cusps. There is mild to moderate aortic regurgitation with an eccentrically directed jet.    Mitral Valve: There is mild bileaflet sclerosis. Mildly thickened leaflets. There is mild to moderate regurgitation with an eccentric jet.    Tricuspid Valve: There is severe regurgitation with an eccentrically directed jet.    Pulmonary Artery: The estimated " pulmonary artery systolic pressure is 60 mmHg.    IVC/SVC: Elevated venous pressure at 15 mmHg.    Pericardium: Left pleural effusion.    Current Heart Failure Medications  carvediloL tablet 3.125 mg, 2 times daily, Oral    Plan  - Monitor strict I&Os and daily weights.    - Place on telemetry  - Low sodium diet  - Place on fluid restriction of 1.5 L.   - Cardiology has not been consulted  - The patient's volume status is at their baseline  - Continue scheduled dialysis to keep fluid level low    Obesity (BMI 30-39.9)  Body mass index is 35.22 kg/m². Morbid obesity complicates all aspects of disease management from diagnostic modalities to treatment. Weight loss encouraged and health benefits explained to patient.         Agitation  Patient reportedly had some agitation in the ICU  Haloperidol discontinued 11/11/2024 due to lethargy   PRN Lorazepam 2 mg  Currently doing well      Venous stasis ulcers of both lower extremities  Well controlled  Wound care was consulted  Keep wounds clean and dry         VTE Risk Mitigation (From admission, onward)           Ordered     heparin (porcine) injection 4,000 Units  As needed (PRN)         11/08/24 1508     IP VTE HIGH RISK PATIENT  Once         11/06/24 1634     Place sequential compression device  Until discontinued         11/06/24 1634                    Discharge Planning   SANDRA:      Code Status: Full Code   Is the patient medically ready for discharge?:     Reason for patient still in hospital (select all that apply): Laboratory test, Treatment, PT / OT recommendations, and Pending disposition  Discharge Plan A: Return to nursing home                  Tenzin Miller MD  Department of Hospital Medicine   [facility]

## 2024-11-12 NOTE — PLAN OF CARE
Problem: Noninvasive Ventilation Acute  Goal: Effective Unassisted Ventilation and Oxygenation  Outcome: Progressing     Problem: Gas Exchange Impaired  Goal: Optimal Gas Exchange  Outcome: Progressing     Problem: Fall Injury Risk  Goal: Absence of Fall and Fall-Related Injury  Outcome: Progressing     Problem: Airway Clearance Ineffective  Goal: Effective Airway Clearance  Outcome: Progressing

## 2024-11-12 NOTE — ASSESSMENT & PLAN NOTE
Will continue with dialysis for this patient.    Continue with scheduled hemodialysis.  11/12/2024.  Continue with dialysis for this patient.

## 2024-11-12 NOTE — PT/OT/SLP PROGRESS
Physical Therapy Treatment    Patient Name:  Yvan Rollins   MRN:  [id number]    Recommendations:     Discharge Recommendations: Moderate Intensity Therapy  Discharge Equipment Recommendations: to be determined by next level of care  Barriers to discharge: Inaccessible home and Decreased caregiver support    Assessment:     Yvan Rollins is a 64 y.o. male admitted with a medical diagnosis of Acute respiratory failure with hypoxia and hypercarbia.  He presents with the following impairments/functional limitations: impaired functional mobility, gait instability, decreased safety awareness, impaired cognition Required verbal and tactile stimulation to wake up at the beginning and during treatment. Demonstrated weakness with sit to stand on first trial but improved throughout treatment. He is ambulating with rolling walker but  Still does not appear safe to return home.    Rehab Prognosis: Fair; patient would benefit from acute skilled PT services to address these deficits and reach maximum level of function.    Recent Surgery: * No surgery found *      Plan:     During this hospitalization, patient to be seen 5 x/week to address the identified rehab impairments via gait training, therapeutic activities, therapeutic exercises and progress toward the following goals:    Plan of Care Expires:  12/07/24    Subjective     Chief Complaint: Acute respiratory failure  Patient/Family Comments/goals: Pt stated multiple times that he felt tired.  Pain/Comfort:  Pain Rating 1: 0/10  Pain Rating Post-Intervention 1: 0/10      Objective:     Communicated with HEDY Sanderson RN prior to session.  Patient found up in chair with oxygen, peripheral IV, telemetry upon PT entry to room.     General Precautions: Standard, fall  Orthopedic Precautions: N/A  Braces: N/A  Respiratory Status: Nasal cannula, flow 2.5 L/min     Functional Mobility:  Transfers:     Sit to Stand:  minimum assistance with rolling walker and gait belt  Gait: 90ft x  2 trials, contact guard assist with rolling walker, demonstrated decreased step length on the RLE and decreased speed, Mild path deviation to right  Balance: good      AM-PAC 6 CLICK MOBILITY  Turning over in bed (including adjusting bedclothes, sheets and blankets)?: 4  Sitting down on and standing up from a chair with arms (e.g., wheelchair, bedside commode, etc.): 3  Moving from lying on back to sitting on the side of the bed?: 3  Moving to and from a bed to a chair (including a wheelchair)?: 3  Need to walk in hospital room?: 3  Climbing 3-5 steps with a railing?: 2  Basic Mobility Total Score: 18       Treatment & Education:  Pt performed bilateral LE: bed level exercises: ankle pumps and heel slides and seated exercises: ankle pumps, long arc quads, and marches x 30 each  Pt performed sit to stand x 5, required minimal assistance initially but contact guard assistance near the end  Ambulation x 2 trials      Patient left up in chair with all lines intact and call button in reach..    GOALS:   Multidisciplinary Problems       Physical Therapy Goals          Problem: Physical Therapy    Goal Priority Disciplines Outcome Interventions   Physical Therapy Goal     PT, PT/OT Progressing    Description: Short term goals:  1. Sit to stand transfer with Modified [place]  2. Bed to chair transfer with Modified [place] using Rolling Walker  3. Gait  x 100 feet with Modified [place] using Rolling Walker.     Long term goals:  Pt will return to prior living situation with modified independence for all mobility                         Time Tracking:     PT Received On: 11/12/24  PT Start Time: 1050     PT Stop Time: 1120  PT Total Time (min): 30 min     Billable Minutes: Gait Training 15 and Therapeutic Exercise 15    Treatment Type: Treatment  PT/PTA: PT     Number of PTA visits since last PT visit: 0     11/12/2024

## 2024-11-12 NOTE — SUBJECTIVE & OBJECTIVE
Interval History: The patient is sitting up in his chair.  He is much more engaging today.  No other changes.    Review of patient's allergies indicates:   Allergen Reactions    Ceftriaxone Swelling    Lisinopril Swelling and Rash     Facial swelling   Facial swelling       Current Facility-Administered Medications   Medication Frequency    0.9%  NaCl infusion PRN    acetaminophen tablet 650 mg Q4H PRN    albuterol-ipratropium 2.5 mg-0.5 mg/3 mL nebulizer solution 3 mL BID    amiodarone tablet 200 mg Daily    carvediloL tablet 3.125 mg BID    dextrose 10% bolus 125 mL 125 mL PRN    dextrose 10% bolus 250 mL 250 mL PRN    glucagon (human recombinant) injection 1 mg PRN    heparin (porcine) injection 4,000 Units PRN    insulin aspart U-100 injection 0-5 Units QID (AC + HS) PRN    levoFLOXacin tablet 250 mg Every other day    LORazepam tablet 2 mg Q6H PRN    memantine tablet 5 mg Daily    OLANZapine tablet 5 mg Daily    predniSONE tablet 20 mg Daily    sodium chloride 0.9% flush 10 mL PRN       Objective:     Vital Signs (Most Recent):  Temp: 97.4 °F (36.3 °C) (11/12/24 0716)  Pulse: 96 (11/12/24 1555)  Resp: 18 (11/12/24 1555)  BP: 102/72 (11/12/24 1555)  SpO2: (!) 81 % (11/12/24 1555) Vital Signs (24h Range):  Temp:  [97.4 °F (36.3 °C)] 97.4 °F (36.3 °C)  Pulse:  [83-98] 96  Resp:  [16-22] 18  SpO2:  [81 %-100 %] 81 %  BP: ()/(68-87) 102/72     Weight: 117.8 kg (259 lb 11.2 oz) (11/11/24 0530)  Body mass index is 35.22 kg/m².  Body surface area is 2.45 meters squared.    I/O last 3 completed shifts:  In: 0   Out: 2500 [Other:2500]     Physical Exam  Vitals reviewed.   Constitutional:       Appearance: Normal appearance.   HENT:      Head: Normocephalic and atraumatic.   Eyes:      Pupils: Pupils are equal, round, and reactive to light.   Cardiovascular:      Rate and Rhythm: Regular rhythm.      Pulses: Normal pulses.      Heart sounds: Normal heart sounds.   Pulmonary:      Effort: Pulmonary effort is normal.       Breath sounds: Normal breath sounds.   Abdominal:      Palpations: Abdomen is soft.   Musculoskeletal:         General: Normal range of motion.      Cervical back: Neck supple.   Neurological:      Mental Status: He is alert and oriented to person, place, and time.   Psychiatric:         Mood and Affect: Mood normal.          Significant Labs:  BMP:   Recent Labs   Lab 11/12/24  0530      *   K 5.7*      CO2 22   BUN 46*   CREATININE 6.61*   CALCIUM 8.5   MG 2.3     CBC:   Recent Labs   Lab 11/10/24  2154   WBC 7.35   RBC 4.20*   HGB 13.8   HCT 43.5   PLT 71*   .6*   MCH 32.9*   MCHC 31.7*        Significant Imaging:  Labs: Reviewed

## 2024-11-12 NOTE — HOSPITAL COURSE
Patient is a 64 year old male who presented to [facility] after a fall with reported lethargy. Sodium 132, potassium 4.6, BUN/CR 42/7.6, magnesium 2.5, and troponin 76.1. ABGs 7.28, CO2 57, PO2 46, HCO3 26.8, and O2 saturation 75%. Placed on Bipap. Workup with many radiographic images were done including X-ray right knee - Mild tricompartmental degenerative osteoarthrosis with a large suprapatellar effusion. Chondrocalcinosis. Poorly defined sclerotic lesion within the proximal tibia.  This may represent a bone infarction or enchondroma. CXR - Findings consistent with congestive heart failure with suspected small left pleural effusion. Right IJ catheter. Pacemaker. Head CT w/o contrast - Cortical atrophy with extensive periventricular deep white matter change consistent with advanced chronic small vessel ischemic disease. CT C-spine w/o contrast - Mild to moderate multilevel degenerative disc disease resulting in mild-to-moderate central spinal canal stenosis with bilateral neural foraminal narrowing. No acute fracture or subluxation.  Patient was admitted to ICU for treatment and close monitoring. Blood cultures x2 showed no growth at 5 days. Patient continued his MWF dialysis schedule. US lower extremity bilateral doppler showed No evidence of deep venous thrombosis in either lower extremity. Patient has received maximum benefit of hospitalization and will be discharged to [facility].

## 2024-11-12 NOTE — PROGRESS NOTES
[facility]  Nephrology  Progress Note    Patient Name: Yvan Rollins  MRN: [id number]  Admission Date: 11/6/2024  Hospital Length of Stay: 6 days  Attending Provider: Saul De Paz MD   Primary Care Physician: Eugene Funez MD  Principal Problem:Acute respiratory failure with hypoxia and hypercarbia    Subjective:     HPI: This patient with history of ESRD, debility, CHF with EF of 20% is a nursing home resident.  He is status post a fall hitting his head.  The patient was in respiratory distress in the emergency department but was able to respond to bipap ventilation.  The patient missed dialysis earlier this week.  He is volume overloaded.  Nephrology has been consulted for renal issues.  Serum potassium is 4.6.  Serum BUN is 42 and creatinine is 7.6.    Interval History: The patient is sitting up in his chair.  He is much more engaging today.  No other changes.    Review of patient's allergies indicates:   Allergen Reactions    Ceftriaxone Swelling    Lisinopril Swelling and Rash     Facial swelling   Facial swelling       Current Facility-Administered Medications   Medication Frequency    0.9%  NaCl infusion PRN    acetaminophen tablet 650 mg Q4H PRN    albuterol-ipratropium 2.5 mg-0.5 mg/3 mL nebulizer solution 3 mL BID    amiodarone tablet 200 mg Daily    carvediloL tablet 3.125 mg BID    dextrose 10% bolus 125 mL 125 mL PRN    dextrose 10% bolus 250 mL 250 mL PRN    glucagon (human recombinant) injection 1 mg PRN    heparin (porcine) injection 4,000 Units PRN    insulin aspart U-100 injection 0-5 Units QID (AC + HS) PRN    levoFLOXacin tablet 250 mg Every other day    LORazepam tablet 2 mg Q6H PRN    memantine tablet 5 mg Daily    OLANZapine tablet 5 mg Daily    predniSONE tablet 20 mg Daily    sodium chloride 0.9% flush 10 mL PRN       Objective:     Vital Signs (Most Recent):  Temp: 97.4 °F (36.3 °C) (11/12/24 0716)  Pulse: 96 (11/12/24 1555)  Resp: 18 (11/12/24 1555)  BP:  102/72 (11/12/24 1555)  SpO2: (!) 81 % (11/12/24 1555) Vital Signs (24h Range):  Temp:  [97.4 °F (36.3 °C)] 97.4 °F (36.3 °C)  Pulse:  [83-98] 96  Resp:  [16-22] 18  SpO2:  [81 %-100 %] 81 %  BP: ()/(68-87) 102/72     Weight: 117.8 kg (259 lb 11.2 oz) (11/11/24 0530)  Body mass index is 35.22 kg/m².  Body surface area is 2.45 meters squared.    I/O last 3 completed shifts:  In: 0   Out: 2500 [Other:2500]     Physical Exam  Vitals reviewed.   Constitutional:       Appearance: Normal appearance.   HENT:      Head: Normocephalic and atraumatic.   Eyes:      Pupils: Pupils are equal, round, and reactive to light.   Cardiovascular:      Rate and Rhythm: Regular rhythm.      Pulses: Normal pulses.      Heart sounds: Normal heart sounds.   Pulmonary:      Effort: Pulmonary effort is normal.      Breath sounds: Normal breath sounds.   Abdominal:      Palpations: Abdomen is soft.   Musculoskeletal:         General: Normal range of motion.      Cervical back: Neck supple.   Neurological:      Mental Status: He is alert and oriented to person, place, and time.   Psychiatric:         Mood and Affect: Mood normal.          Significant Labs:  BMP:   Recent Labs   Lab 11/12/24  0530      *   K 5.7*      CO2 22   BUN 46*   CREATININE 6.61*   CALCIUM 8.5   MG 2.3     CBC:   Recent Labs   Lab 11/10/24  2154   WBC 7.35   RBC 4.20*   HGB 13.8   HCT 43.5   PLT 71*   .6*   MCH 32.9*   MCHC 31.7*        Significant Imaging:  Labs: Reviewed  Assessment/Plan:     Renal/  Chronic kidney disease with end stage renal failure on dialysis  Will continue with dialysis for this patient.    Continue with scheduled hemodialysis.  11/12/2024.  Continue with dialysis for this patient.        Thank you for your consult. I will follow-up with patient. Please contact us if you have any additional questions.    Roberto Navarrete Jr, MD  Nephrology  [facility]

## 2024-11-12 NOTE — ASSESSMENT & PLAN NOTE
Body mass index is 35.22 kg/m². Morbid obesity complicates all aspects of disease management from diagnostic modalities to treatment. Weight loss encouraged and health benefits explained to patient.

## 2024-11-12 NOTE — ASSESSMENT & PLAN NOTE
Patient with Hypoxic Respiratory failure which is Acute on chronic.  he is not on home oxygen. Supplemental oxygen was provided and noted- Oxygen Concentration (%):  [32-35] 32    .   Signs/symptoms of respiratory failure include- tachypnea, increased work of breathing, and lethargy. Contributing diagnoses includes - CHF and COPD Labs and images were reviewed. Patient Has recent ABG, which has been reviewed. Will treat underlying causes and adjust management of respiratory failure as follows- Bipap Qhs, antibiotics and breathing treatments

## 2024-11-12 NOTE — PLAN OF CARE
Problem: Fall Injury Risk  Goal: Absence of Fall and Fall-Related Injury  Outcome: Progressing     Problem: Restraint, Nonviolent  Goal: Absence of Harm or Injury  Outcome: Progressing

## 2024-11-12 NOTE — SUBJECTIVE & OBJECTIVE
Interval History: Patient seen resting comfortably in bed. No acute events overnight. Haldol stopped yesterday, patient much more alert today. Patient awaiting placement at [facility], discussed with case management. Possibly discharge as soon as patient is accepted.     Review of Systems   Constitutional:  Positive for fatigue. Negative for appetite change, chills and diaphoresis.   Respiratory:  Positive for shortness of breath. Negative for choking and chest tightness.    Cardiovascular:  Negative for chest pain and palpitations.   Gastrointestinal:  Negative for abdominal distention and abdominal pain.   All other systems reviewed and are negative.    Objective:     Vital Signs (Most Recent):  Temp: 97.4 °F (36.3 °C) (11/12/24 0716)  Pulse: 84 (11/12/24 1141)  Resp: 20 (11/12/24 1141)  BP: 104/68 (11/12/24 1141)  SpO2: 98 % (11/12/24 1141) Vital Signs (24h Range):  Temp:  [97.4 °F (36.3 °C)] 97.4 °F (36.3 °C)  Pulse:  [60-98] 84  Resp:  [16-22] 20  SpO2:  [92 %-100 %] 98 %  BP: ()/(52-87) 104/68     Weight: 117.8 kg (259 lb 11.2 oz)  Body mass index is 35.22 kg/m².    Intake/Output Summary (Last 24 hours) at 11/12/2024 1206  Last data filed at 11/11/2024 1225  Gross per 24 hour   Intake 0 ml   Output 2500 ml   Net -2500 ml         Physical Exam  Vitals and nursing note reviewed.   Constitutional:       General: He is sleeping. He is not in acute distress.     Appearance: Normal appearance. He is well-developed. He is obese. He is ill-appearing. He is not toxic-appearing.      Interventions: He is not intubated.Nasal cannula in place.   HENT:      Head: Normocephalic and atraumatic.      Nose: Nose normal.   Eyes:      Conjunctiva/sclera: Conjunctivae normal.   Cardiovascular:      Rate and Rhythm: Normal rate.      Heart sounds: Normal heart sounds. No murmur heard.  Pulmonary:      Effort: Pulmonary effort is normal. He is not intubated.      Breath sounds: Normal breath sounds.   Abdominal:      General:  Abdomen is flat. Bowel sounds are normal.      Palpations: Abdomen is soft.   Musculoskeletal:      Cervical back: Normal range of motion and neck supple.      Right lower leg: No edema.      Left lower leg: No edema.   Skin:     General: Skin is warm and dry.      Capillary Refill: Capillary refill takes less than 2 seconds.   Neurological:      Mental Status: He is lethargic.   Psychiatric:         Mood and Affect: Mood normal.         Behavior: Behavior normal.             Significant Labs: All pertinent labs within the past 24 hours have been reviewed.    Significant Imaging: I have reviewed all pertinent imaging results/findings within the past 24 hours.

## 2024-11-12 NOTE — ASSESSMENT & PLAN NOTE
Creatine stable for now. BMP reviewed- noted Estimated Creatinine Clearance: 15.4 mL/min (A) (based on SCr of 6.61 mg/dL (H)). according to latest data. Based on current GFR, CKD stage is end stage.  Monitor UOP and serial BMP and adjust therapy as needed. Renally dose meds. Avoid nephrotoxic medications and procedures.    Nephrology consulted, appreciate assistance  Dialysis [facility]

## 2024-11-12 NOTE — PROGRESS NOTES
11/12/24 1227        Wound 11/12/24 1227 Venous Ulcer Right distal;anterior Leg #2   Date First Assessed/Time First Assessed: 11/12/24 1227   Present on Original Admission: Yes  Primary Wound Type: Venous Ulcer  Side: Right  Orientation: distal;anterior  Location: Leg  Wound Number: #2  Is this injury device related?: No   Drainage Amount Moderate   Drainage Characteristics/Odor Serous   Appearance Pink;Red;Moist;Granulating;Adipose   Tissue loss description Full thickness   Black (%), Wound Tissue Color 0 %   Red (%), Wound Tissue Color 100 %   Yellow (%), Wound Tissue Color 0 %   Periwound Area Moist;Pink   Wound Edges Defined   Wound Length (cm) 1.3 cm   Wound Width (cm) 1.6 cm   Wound Depth (cm) 0.2 cm   Wound Volume (cm^3) 0.416 cm^3   Wound Surface Area (cm^2) 2.08 cm^2   Care Cleansed with:;Antimicrobial agent   Dressing Applied;Silver;Calcium alginate;Silicone;Foam   Periwound Care Moisture barrier applied   Dressing Change Due 11/15/24

## 2024-11-13 VITALS
OXYGEN SATURATION: 92 % | HEIGHT: 72 IN | WEIGHT: 259.69 LBS | TEMPERATURE: 97 F | RESPIRATION RATE: 17 BRPM | BODY MASS INDEX: 35.17 KG/M2 | HEART RATE: 76 BPM | SYSTOLIC BLOOD PRESSURE: 105 MMHG | DIASTOLIC BLOOD PRESSURE: 53 MMHG

## 2024-11-13 LAB
ALBUMIN SERPL BCP-MCNC: 2.5 G/DL (ref 3.4–4.8)
ALBUMIN/GLOB SERPL: 0.9 {RATIO}
ALP SERPL-CCNC: 104 U/L (ref 40–150)
ALT SERPL W P-5'-P-CCNC: 40 U/L (ref 0–55)
ANION GAP SERPL CALCULATED.3IONS-SCNC: 15 MMOL/L (ref 7–16)
AST SERPL W P-5'-P-CCNC: 59 U/L (ref 5–34)
BILIRUB SERPL-MCNC: 1.2 MG/DL
BUN SERPL-MCNC: 52 MG/DL (ref 8–26)
BUN/CREAT SERPL: 8 (ref 6–20)
CALCIUM SERPL-MCNC: 8.2 MG/DL (ref 8.8–10)
CHLORIDE SERPL-SCNC: 98 MMOL/L (ref 98–107)
CO2 SERPL-SCNC: 23 MMOL/L (ref 23–31)
CREAT SERPL-MCNC: 6.13 MG/DL (ref 0.72–1.25)
EGFR (NO RACE VARIABLE) (RUSH/TITUS): 10 ML/MIN/1.73M2
GLOBULIN SER-MCNC: 2.8 G/DL (ref 2–4)
GLUCOSE SERPL-MCNC: 105 MG/DL (ref 82–115)
GLUCOSE SERPL-MCNC: 75 MG/DL (ref 70–105)
MAGNESIUM SERPL-MCNC: 2.1 MG/DL (ref 1.6–2.6)
PHOSPHATE SERPL-MCNC: 2.9 MG/DL (ref 2.3–4.7)
POTASSIUM SERPL-SCNC: 4.8 MMOL/L (ref 3.5–5.1)
PROT SERPL-MCNC: 5.3 G/DL (ref 5.8–7.6)
SODIUM SERPL-SCNC: 131 MMOL/L (ref 136–145)

## 2024-11-13 PROCEDURE — 94761 N-INVAS EAR/PLS OXIMETRY MLT: CPT

## 2024-11-13 PROCEDURE — 36415 COLL VENOUS BLD VENIPUNCTURE: CPT | Performed by: INTERNAL MEDICINE

## 2024-11-13 PROCEDURE — 25000003 PHARM REV CODE 250: Performed by: INTERNAL MEDICINE

## 2024-11-13 PROCEDURE — 99900035 HC TECH TIME PER 15 MIN (STAT)

## 2024-11-13 PROCEDURE — 27000221 HC OXYGEN, UP TO 24 HOURS

## 2024-11-13 PROCEDURE — 36415 COLL VENOUS BLD VENIPUNCTURE: CPT | Performed by: HOSPITALIST

## 2024-11-13 PROCEDURE — 25000003 PHARM REV CODE 250

## 2024-11-13 PROCEDURE — 84100 ASSAY OF PHOSPHORUS: CPT | Performed by: INTERNAL MEDICINE

## 2024-11-13 PROCEDURE — 63600175 PHARM REV CODE 636 W HCPCS: Performed by: HOSPITALIST

## 2024-11-13 PROCEDURE — 83735 ASSAY OF MAGNESIUM: CPT | Performed by: INTERNAL MEDICINE

## 2024-11-13 PROCEDURE — 63600175 PHARM REV CODE 636 W HCPCS: Performed by: INTERNAL MEDICINE

## 2024-11-13 PROCEDURE — 80053 COMPREHEN METABOLIC PANEL: CPT | Performed by: INTERNAL MEDICINE

## 2024-11-13 PROCEDURE — 82962 GLUCOSE BLOOD TEST: CPT

## 2024-11-13 PROCEDURE — 94640 AIRWAY INHALATION TREATMENT: CPT

## 2024-11-13 PROCEDURE — 94660 CPAP INITIATION&MGMT: CPT

## 2024-11-13 PROCEDURE — 90935 HEMODIALYSIS ONE EVALUATION: CPT

## 2024-11-13 PROCEDURE — 25000242 PHARM REV CODE 250 ALT 637 W/ HCPCS: Performed by: INTERNAL MEDICINE

## 2024-11-13 PROCEDURE — 99900031 HC PATIENT EDUCATION (STAT)

## 2024-11-13 RX ORDER — PREDNISONE 20 MG/1
20 TABLET ORAL DAILY
Start: 2024-11-14 | End: 2024-11-16

## 2024-11-13 RX ORDER — LEVOFLOXACIN 250 MG/1
250 TABLET ORAL EVERY OTHER DAY
Start: 2024-11-14 | End: 2024-11-19

## 2024-11-13 RX ADMIN — IPRATROPIUM BROMIDE AND ALBUTEROL SULFATE 3 ML: .5; 3 SOLUTION RESPIRATORY (INHALATION) at 07:11

## 2024-11-13 RX ADMIN — AMIODARONE HYDROCHLORIDE 200 MG: 200 TABLET ORAL at 08:11

## 2024-11-13 RX ADMIN — HEPARIN SODIUM 4000 UNITS: 1000 INJECTION, SOLUTION INTRAVENOUS; SUBCUTANEOUS at 10:11

## 2024-11-13 RX ADMIN — SODIUM CHLORIDE: 9 INJECTION, SOLUTION INTRAVENOUS at 10:11

## 2024-11-13 RX ADMIN — CARVEDILOL 3.12 MG: 3.12 TABLET, FILM COATED ORAL at 08:11

## 2024-11-13 RX ADMIN — OLANZAPINE 5 MG: 5 TABLET, FILM COATED ORAL at 08:11

## 2024-11-13 RX ADMIN — PREDNISONE 20 MG: 20 TABLET ORAL at 08:11

## 2024-11-13 RX ADMIN — MEMANTINE 5 MG: 5 TABLET ORAL at 08:11

## 2024-11-13 NOTE — SUBJECTIVE & OBJECTIVE
Interval History: Patient seen resting comfortably in bed. No acute events overnight. Scheduled for dialysis today. Patient awaiting placement at [facility], discussed with case management. Discharge as soon as patient is accepted.     Review of Systems   Constitutional:  Positive for fatigue. Negative for appetite change, chills and diaphoresis.   Respiratory:  Positive for shortness of breath. Negative for choking and chest tightness.    Cardiovascular:  Negative for chest pain and palpitations.   Gastrointestinal:  Negative for abdominal distention and abdominal pain.   All other systems reviewed and are negative.    Objective:     Vital Signs (Most Recent):  Temp: 97.6 °F (36.4 °C) (11/12/24 2028)  Pulse: 72 (11/13/24 0715)  Resp: 20 (11/13/24 0715)  BP: 102/72 (11/12/24 2028)  SpO2: (!) 93 % (room air) (11/13/24 0715) Vital Signs (24h Range):  Temp:  [97.5 °F (36.4 °C)-97.6 °F (36.4 °C)] 97.6 °F (36.4 °C)  Pulse:  [61-96] 72  Resp:  [16-20] 20  SpO2:  [80 %-99 %] 93 %  BP: (102-104)/(68-72) 102/72     Weight: 117.8 kg (259 lb 11.2 oz)  Body mass index is 35.22 kg/m².  No intake or output data in the 24 hours ending 11/13/24 0739      Physical Exam  Vitals and nursing note reviewed.   Constitutional:       General: He is sleeping. He is not in acute distress.     Appearance: Normal appearance. He is well-developed. He is obese. He is ill-appearing. He is not toxic-appearing.      Interventions: He is not intubated.Nasal cannula in place.   HENT:      Head: Normocephalic and atraumatic.      Nose: Nose normal.   Eyes:      Conjunctiva/sclera: Conjunctivae normal.   Cardiovascular:      Rate and Rhythm: Normal rate.      Heart sounds: Normal heart sounds. No murmur heard.  Pulmonary:      Effort: Pulmonary effort is normal. He is not intubated.      Breath sounds: Normal breath sounds.   Abdominal:      General: Abdomen is flat. Bowel sounds are normal.      Palpations: Abdomen is soft.   Musculoskeletal:       Cervical back: Normal range of motion and neck supple.      Right lower leg: No edema.      Left lower leg: No edema.   Skin:     General: Skin is warm and dry.      Capillary Refill: Capillary refill takes less than 2 seconds.   Neurological:      Mental Status: He is lethargic.   Psychiatric:         Mood and Affect: Mood normal.         Behavior: Behavior normal.             Significant Labs: All pertinent labs within the past 24 hours have been reviewed.    Significant Imaging: I have reviewed all pertinent imaging results/findings within the past 24 hours.

## 2024-11-13 NOTE — NURSING
[facility] INPATIENT SERVICES  DIALYSIS TREATMENT SUMMARY      Note: Consult with the attending physician for patient treatment orders, this document is not a physician order.      Patient Information   Patient Yvan Rollins   Date of Birth August 25, 1960   Chart Number [id number]   Location [facility]   Location MRN [id number]   Gender Male   SSN (last 4)      Treatment Information   Treatment Type Hemodialysis   Treatment Id [id number]   Start Time November 13, 2024 09:30   End Time November 13, 2024 12:30   Acutal Duration 03:00     Treatment Balances   Total Saline Administered 500   Net Fluid Balance -186    Hemodialysis Orders   Therapy Standard   Orders Verified Time 11/13/2024 09:00    Date Verified 11/13/2024   Duration 3:00   Isolated UF/Bypass No   BFR (mL) 400   DFR (mL) 800   Dialyzer Type OPTIFLUX 160NR   UF Order UF Range   UF Range (mL) 1000 - 2000   Crit-Line used No   Heparin Initial Units Bolus No   Heparin IV Maintenance Bolus No   Heparin IV Infusion No   Potassium (mEq/L) 2   Calcium (mEq/L) 2.5   Bicarb (mg/dL) 33   Sodium (mEq/L) 137   Additional Orders 1. Lock HD cath, each port with 2000 units of heparin 2. Turn UF off if pt becomes symptomatic associated with drop in SBP >20mmHg   Clinician Ya Velasquez, TY    Dialysis Access   Access Type Central Venous Catheter   Central Venous Catheter   Access Type Catheter - Tunneled   Access Location Chest Wall - R   Current/New [facility] Patient Yes   1st Use Catheter Verified by Previous Use   Catheter Care Completed per Policy Yes   Dressing Dry and Intact on Arrival Yes   Dressing Changed Yes   Type of Dressing Transparent Polyurethane   Dressing Changed By [facility] Staff   Type of HD Caps Not Listed   HD Caps Changed Yes   CVC Line Education Provided Yes - Infection Prevention      Vitals   Pre-Treatment Vitals   Time Is BP being recorded? Pre BP Map BP Method Pulse RR Temp How was Weight Obtained? Pre Weight Previous Dry Weight  Previous Post Weight Metric Target Fluid Removal (mL) Dialysate Confirmed Clinician    11/13/2024 09:25 BP/Map 107/56 (73) Noninvasive 88 16 97 How Obtained: Reported Floor Weight 117.8   Kgs 2500 Yes Ya Velasquez RN    Comments:       Post Treatment Vitals   Time Is BP being recorded? BP Map Pulse RR Temp How was Weight Obtained? Post Weight Metric BVP UF Goal Ordered NSS Given Intra-Procedure Total Machine UF Removed (mL) Crit-Line Ending Profile Crit-Line Refill Crit-Line Ending HCT Crit-Line Max BV% Clinician    11/13/2024 12:33 BP/Map 105/53 (70) 76 17 97 Unable to Obtain: Floor to Weigh   65.6 1000 0 314     Ya Velasquez RN    Comments: vitals stable      Safety checks include: access uncovered and secured, Hemaclip secured for all central line access, machine checks performed, and alarm limits confirmed.     Labs   Hepatitis   HBsAG Lab Result HBsAG Lab Result HBsAG Draw Date Transient Antigenemia(MD Diagnosis Only) Anti-HBs Lab Result Anti-HBs Lab Value Anti-HBs Draw Date Documented By Documented Date Hepatitis Status Hepatitis Status   Negative  11/04/2024  Negative  11/04/2024 Ya Velasquez 11/13/2024  Susceptible   Notes:       Pre-Treatment Hepatitis Precautions Hard copy verified by nurse and placed in patients hospital chart Yes Signing   Patient tx outside buffer zone Yes Hepatitis Information Entered By Ya Velasquez RN Signed By Ya Velasquez RN     Pre-Treatment Handoff   Staff Report Received Yes   Report Given by Primary Nurse Penelope Hopper   Time 09:05     Patient Arrival   Patient ID Verified Date of Birth    MRN    Full Name   Patient Consent to treatment verified Yes   Blood Transfusion Consent Verified N/A     Treatment Comments   Treatment Notes pt tolerated HD well,. vitals stable. afebrile. catheter care done per p/p. good blood flow     Post-Treatment Handoff   Report Given to Primary Nurse Jeanette Chan   Time Report Given 12:49   Report Given By Ya Velasquez RN     Machine Validation   Time 09:03   Date 11/13/2024   Auto Alarm Test Passed Yes   Machine Serial # [id number]   Portable RO Yes   RO Serial# [id number]   Residual Bleach Negative Yes   Was a manufactured mix used? Yes   Machine Log Completed Yes   Total Chlorine (less than 0.1)? Yes   Total Chlorine Log Completed Yes   Bicarb BiBag   Bibag Size 650   Machine Temp 37   Machine Conductivity 13.8   Meter Type N/A   Meter Conductivity    Independent Conductivity 13.6   pH Status Pass Pass   pH    TCD Value 13.6   TCD Alarm with +/- 0.5 Yes   NVL enabled validated 100 asymmetric Yes   Safety check complete Ya Velasquez RN   Second Verification Performed? No   Second Verification Performed By    Reason Not Verified No other staff member - unable to leave the patient      Serum Lab Values   Time BUN Creatinine Na K (mEq/L) Cl CO2 Ca (mEq/L) Phos Mg (mg/dL) Alb (g/dL) Glucose Hgb Hct WBC Plt PT aPTT INR Other Clinician    11/12/2024 05:30 46 6.61 130 5.7 101 22 8.5 5.2 2.3 2.2 100 13.8 43.5 7.35 71     Ya Velasquez RN    Comments:         Facility Information Location [facility] ACUTE RESPIRATORY FAILURE WITH HYPOXIA AND HYPERCAPNIA   Facility Information Room # 455 Isolation Information   Admission Date 11/06/2024 Patient Type Chronic dialysis patient with diagnosis of ESRD Isolation Required? N   Ordering MD Dr. Roberto Navarrete Patient Chronic Unit [facility] Completed by   Account/Finance Number [id number] Code Status Full Code General Tx information Entered by Ya Velasquez RN   Admission Status InPatient Diagnosis      Start Treatment Time Out Confirmed by CHANDA Velasquez RN Correct access site verified Yes   Treatment Initiation Connections Secured Time Out Completed 09:28 Treatment Start Date 11/13/2024    Saline line double clamped Correct patient verified Yes Treatment Start Time 09:30    Hemaclip Applied Correct procedure verified Yes Patient/Family questions and concerns addressed Yes     Pre  Focused Assessment Edema GI / Bowels   Access Location Generalized GI Soft   Signs and Symptoms of Infection? No Cardiac    Pain Screening Heart Rhythm Regular  Voids   Does the patient have pain? No Telemetry No Completed by   Respiratory Skin Pre Treatment Focused Assessment Completed By Ya Velasquez RN   Lung Sounds Coarse Skin Warm Time 09:20   Location Bilateral  Dry Signing   Position Anterior LOC Signed By Ya Velasquez RN   Respiratory Efforts Unlabored LOC Alert    Notes 3LNC  Confused      Education  Infection Prevention Patient Education Introduced By   Patient Education Method Knowledge / Understanding Assessed Teach back Patient Education Introduced By Ya Velasquez RN   Patient Educated? Yes Family Education Provided? N/A    Focus or Topic Treatment Options Caregiver Education Provided? N/A      Post-Treatment HD machine external disinfection completed per policy Yes Completed by   Post Treatment Delay PRO external disinfection completed per policy Yes Post Treatment Form Completed By Ya Velasquez RN   Delay N Post Treatment General Information    Machine Disinfection Requirement Notes vitals stable    Post Focused Assessment Lung Sounds Coarse LOC   Changes from Pre Focused Assessment Location Bilateral LOC Confused   Changes from Pre Treatment Focused Assessment? No Position Anterior  Alert   Access Respiratory Efforts Unlabored GI / Bowels   Cath Packed with Heparin Notes 3LNC GI Soft   Access Port(ml) 2 Edema    Return Port(ml) 2 Location Generalized  Voids   Catheter clamped and capped Yes Cardiac Completed by   Access Flow Good Heart Rhythm Regular Post Treatment Focused Assessment Completed by Ya Velasquez RN   Dialyzer Clearance Streaked Telemetry No Date 11/13/2024   Pain Screening Skin Time 12:35   Does the patient have pain? No Skin Warm Signing   Respiratory  Dry Signed By Ya Velasquez RN

## 2024-11-13 NOTE — ASSESSMENT & PLAN NOTE
Body mass index is 35.22 kg/m². Morbid obesity complicates all aspects of disease management from diagnostic modalities to treatment. Weight loss encouraged and health benefits explained to patient.     Follow up with PCP

## 2024-11-13 NOTE — ASSESSMENT & PLAN NOTE
Creatine stable for now. BMP reviewed- noted Estimated Creatinine Clearance: 15.4 mL/min (A) (based on SCr of 6.61 mg/dL (H)). according to latest data. Based on current GFR, CKD stage is end stage.  Monitor UOP and serial BMP and adjust therapy as needed. Renally dose meds. Avoid nephrotoxic medications and procedures.    Nephrology consulted, appreciate assistance  Dialysis MWF    Follow up with Nephrology and scheduled dialysis

## 2024-11-13 NOTE — ASSESSMENT & PLAN NOTE
Pt A&Ox4, resting comfortably. Pt denies any pain, CP, SOB. VSS, respirations equal and unlabored. Report given to NARINDER Eduardo. Pending transport to CICU. Pt updated on plan of care, verbalized understanding. Well controlled  Wound care was consulted  Keep wounds clean and dry     Follow up with PCP

## 2024-11-13 NOTE — ASSESSMENT & PLAN NOTE
Patient has a diagnosis of pneumonia. The cause of the pneumonia is suspected to be bacterial in etiology but organism is not known. The pneumonia is stable. The patient has the following signs/symptoms of pneumonia: cough, sputum production, and shortness of breath. The patient does have a current oxygen requirement and the patient does not have a home oxygen requirement. I have reviewed the pertinent imaging. The following cultures have been collected: Blood cultures and Sputum culture The culture results are listed below.     Current antimicrobial regimen consists of the antibiotics listed below. Will monitor patient closely and ABG tomorrow.   Patient is at a SNF and can have oxygen there.  If need persists, he can have home oxygen.      Antibiotics (From admission, onward)    Start     Stop Route Frequency Ordered    11/12/24 0900  levoFLOXacin tablet 250 mg         -- Oral Every other day 11/11/24 1336          Microbiology Results (last 7 days)     Procedure Component Value Units Date/Time    Blood culture #1 [[id number]] Collected: 11/06/24 1455    Order Status: Completed Specimen: Blood Updated: 11/12/24 0654     Culture, Blood No Growth at 5 days    Blood culture #2 [[id number]] Collected: 11/06/24 1505    Order Status: Completed Specimen: Blood Updated: 11/12/24 0654     Culture, Blood No Growth at 5 days

## 2024-11-13 NOTE — NURSING
0904 patient off floor at dialysis.    1400 report given to Lavonne at [facility]

## 2024-11-13 NOTE — PLAN OF CARE
CM faxed progress notes and therapy notes for MD [facility] [place] to review for [facility]. CM called and verified with pt's sister that she would transport pt from [facility] to dialysis from [place]. Verbalized understanding and she agreed to transport.     11:23- Pt is accepted to [facility] for swb, sister aware she will have to transport pt to dialysis. Checking to see if pt will discharge today after dialysis or in am.

## 2024-11-13 NOTE — ASSESSMENT & PLAN NOTE
Patient reportedly had some agitation in the ICU  Haloperidol discontinued 11/11/2024 due to lethargy   PRN Lorazepam 2 mg  Currently doing well    Follow up with PCP

## 2024-11-13 NOTE — ASSESSMENT & PLAN NOTE
"Patient has Systolic (HFrEF) heart failure that is Acute on chronic. On presentation their CHF was well compensated. Most recent BNP and echo results are listed below.  No results for input(s): "BNP" in the last 72 hours.  Latest ECHO  Results for orders placed during the hospital encounter of 11/06/24    Echo    Interpretation Summary    Left Ventricle: The left ventricle is severely dilated. Moderately increased wall thickness. There is concentric hypertrophy. Regional wall motion abnormalities present. Septal motion is consistent with pacing. If RV pacing burden (%) high on interrogation, consider addition of LV lead and upgrade to Bi-V CRT-D. There is severely reduced systolic function. Ejection fraction is approximately 25%.    Right Ventricle: Severe right ventricular enlargement. Systolic function is severely reduced.    Left Atrium: Left atrium is moderately dilated.    Right Atrium: Right atrium is severely dilated.    Aortic Valve: The aortic valve is a trileaflet valve. Mildly calcified cusps. There is mild to moderate aortic regurgitation with an eccentrically directed jet.    Mitral Valve: There is mild bileaflet sclerosis. Mildly thickened leaflets. There is mild to moderate regurgitation with an eccentric jet.    Tricuspid Valve: There is severe regurgitation with an eccentrically directed jet.    Pulmonary Artery: The estimated pulmonary artery systolic pressure is 60 mmHg.    IVC/SVC: Elevated venous pressure at 15 mmHg.    Pericardium: Left pleural effusion.    Current Heart Failure Medications  carvediloL tablet 3.125 mg, 2 times daily, Oral    Plan  - Monitor strict I&Os and daily weights.    - Place on telemetry  - Low sodium diet  - Place on fluid restriction of 1.5 L.   - Cardiology has not been consulted  - The patient's volume status is at their baseline  - Continue scheduled dialysis to keep fluid level low    Follow up with PCP and Cardiology   "

## 2024-11-13 NOTE — PROGRESS NOTES
[facility]  [facility] Medicine  Progress Note    Patient Name: Yvan Rollins  MRN: [id number]  Patient Class: IP- Inpatient   Admission Date: 11/6/2024  Length of Stay: 7 days  Attending Physician: Saul De Paz MD  Primary Care Provider: Eugene Funez MD        Subjective:     Principal Problem:Acute respiratory failure with hypoxia and hypercarbia        HPI:  64 year old  male presented to the ED by ambulance after a fall at the nursing home. Patient is a poor historian due to being lethargic. It was reported that he fell at the nursing home, but no details were given about the fall. The patient reported on arrival to the ED that he thinks he may have struck his head and he has right knee pain. On my assessment, patient is more lethargic and only opens eyes to physical & verbal stimuli. Bipap is in use currently. PMHX is notable for end-stage renal disease with a right subclavian tunneled HD catheter for hemodialysis, pacemaker placement, CHF with an EF of 25-30%, COPD, Diabetes, GERD, Hepatitis C, Neuropathy, and hypotension which he takes midodrine for. Review of the external nursing home records show age-related cognitive decline due to prior CVA.      ED workup revealed Sodium 132, potassium 4.6, BUN/CR 42/7.6, magnesium 2.5, and troponin 76.1. ABGs 7.28, CO2 57, PO2 46, HCO3 26.8, and O2 saturation 75%. Placed on Bipap. CXR showed heart failure with small left pleural effusion. CT brain was negative for acute hemorrhage or infarction. Knee xray showed mild tricompartmental degenerative osteoarthrosis with a large suprapatellar effusion. Critical Care service will admit to the ICU for further monitoring and treatment.     Overview/Hospital Course:  Patient is a 64 year old male who presented to [facility] after a fall with reported lethargy. Sodium 132, potassium 4.6, BUN/CR 42/7.6, magnesium 2.5, and troponin 76.1. ABGs 7.28, CO2 57, PO2 46, HCO3 26.8, and  O2 saturation 75%. Placed on Bipap. Workup with many radiographic images were done including X-ray right knee - Mild tricompartmental degenerative osteoarthrosis with a large suprapatellar effusion. Chondrocalcinosis. Poorly defined sclerotic lesion within the proximal tibia.  This may represent a bone infarction or enchondroma. CXR - Findings consistent with congestive heart failure with suspected small left pleural effusion. Right IJ catheter. Pacemaker. Head CT w/o contrast - Cortical atrophy with extensive periventricular deep white matter change consistent with advanced chronic small vessel ischemic disease. CT C-spine w/o contrast - Mild to moderate multilevel degenerative disc disease resulting in mild-to-moderate central spinal canal stenosis with bilateral neural foraminal narrowing. No acute fracture or subluxation.  Patient was admitted to ICU for treatment and close monitoring. Blood cultures x2 showed no growth at 5 days. Patient continued his F dialysis schedule. US lower extremity bilateral doppler showed No evidence of deep venous thrombosis in either lower extremity.    Interval History: Patient seen resting comfortably in bed. No acute events overnight. Scheduled for dialysis today. Patient awaiting placement at [place], discussed with case management. Discharge as soon as patient is accepted.     Review of Systems   Constitutional:  Positive for fatigue. Negative for appetite change, chills and diaphoresis.   Respiratory:  Positive for shortness of breath. Negative for choking and chest tightness.    Cardiovascular:  Negative for chest pain and palpitations.   Gastrointestinal:  Negative for abdominal distention and abdominal pain.   All other systems reviewed and are negative.    Objective:     Vital Signs (Most Recent):  Temp: 97.6 °F (36.4 °C) (11/12/24 2028)  Pulse: 72 (11/13/24 0715)  Resp: 20 (11/13/24 0715)  BP: 102/72 (11/12/24 2028)  SpO2: (!) 93 % (room air) (11/13/24 0715) Vital Signs  (24h Range):  Temp:  [97.5 °F (36.4 °C)-97.6 °F (36.4 °C)] 97.6 °F (36.4 °C)  Pulse:  [61-96] 72  Resp:  [16-20] 20  SpO2:  [80 %-99 %] 93 %  BP: (102-104)/(68-72) 102/72     Weight: 117.8 kg (259 lb 11.2 oz)  Body mass index is 35.22 kg/m².  No intake or output data in the 24 hours ending 11/13/24 0739      Physical Exam  Vitals and nursing note reviewed.   Constitutional:       General: He is sleeping. He is not in acute distress.     Appearance: Normal appearance. He is well-developed. He is obese. He is ill-appearing. He is not toxic-appearing.      Interventions: He is not intubated.Nasal cannula in place.   HENT:      Head: Normocephalic and atraumatic.      Nose: Nose normal.   Eyes:      Conjunctiva/sclera: Conjunctivae normal.   Cardiovascular:      Rate and Rhythm: Normal rate.      Heart sounds: Normal heart sounds. No murmur heard.  Pulmonary:      Effort: Pulmonary effort is normal. He is not intubated.      Breath sounds: Normal breath sounds.   Abdominal:      General: Abdomen is flat. Bowel sounds are normal.      Palpations: Abdomen is soft.   Musculoskeletal:      Cervical back: Normal range of motion and neck supple.      Right lower leg: No edema.      Left lower leg: No edema.   Skin:     General: Skin is warm and dry.      Capillary Refill: Capillary refill takes less than 2 seconds.   Neurological:      Mental Status: He is lethargic.   Psychiatric:         Mood and Affect: Mood normal.         Behavior: Behavior normal.             Significant Labs: All pertinent labs within the past 24 hours have been reviewed.    Significant Imaging: I have reviewed all pertinent imaging results/findings within the past 24 hours.    Assessment/Plan:      * Acute respiratory failure with hypoxia and hypercarbia  Patient with Hypoxic Respiratory failure which is Acute on chronic.  he is not on home oxygen. Supplemental oxygen was provided and noted- Oxygen Concentration (%):  [32-35] 32    .   Signs/symptoms  of respiratory failure include- tachypnea, increased work of breathing, and lethargy. Contributing diagnoses includes - CHF and COPD Labs and images were reviewed. Patient Has recent ABG, which has been reviewed. Will treat underlying causes and adjust management of respiratory failure as follows- Bipap Qhs, antibiotics and breathing treatments    Chronic kidney disease with end stage renal failure on dialysis  Creatine stable for now. BMP reviewed- noted Estimated Creatinine Clearance: 15.4 mL/min (A) (based on SCr of 6.61 mg/dL (H)). according to latest data. Based on current GFR, CKD stage is end stage.  Monitor UOP and serial BMP and adjust therapy as needed. Renally dose meds. Avoid nephrotoxic medications and procedures.    Nephrology consulted, appreciate assistance  Dialysis MWF    Pneumonia  Patient has a diagnosis of pneumonia. The cause of the pneumonia is suspected to be bacterial in etiology but organism is not known. The pneumonia is stable. The patient has the following signs/symptoms of pneumonia: cough, sputum production, and shortness of breath. The patient does have a current oxygen requirement and the patient does not have a home oxygen requirement. I have reviewed the pertinent imaging. The following cultures have been collected: Blood cultures and Sputum culture The culture results are listed below.     Current antimicrobial regimen consists of the antibiotics listed below. Will monitor patient closely and ABG tomorrow.   Patient is at a SNF and can have oxygen there.  If need persists, he can have home oxygen.      Antibiotics (From admission, onward)      Start     Stop Route Frequency Ordered    11/12/24 0900  levoFLOXacin tablet 250 mg         -- Oral Every other day 11/11/24 1336            Microbiology Results (last 7 days)       Procedure Component Value Units Date/Time    Blood culture #1 [[id number]] Collected: 11/06/24 1455    Order Status: Completed Specimen: Blood Updated: 11/12/24  "0654     Culture, Blood No Growth at 5 days    Blood culture #2 [[id number]] Collected: 11/06/24 1505    Order Status: Completed Specimen: Blood Updated: 11/12/24 0654     Culture, Blood No Growth at 5 days            Chronic combined systolic and diastolic congestive heart failure  Patient has Systolic (HFrEF) heart failure that is Acute on chronic. On presentation their CHF was well compensated. Most recent BNP and echo results are listed below.  No results for input(s): "BNP" in the last 72 hours.  Latest ECHO  Results for orders placed during the hospital encounter of 11/06/24    Echo    Interpretation Summary    Left Ventricle: The left ventricle is severely dilated. Moderately increased wall thickness. There is concentric hypertrophy. Regional wall motion abnormalities present. Septal motion is consistent with pacing. If RV pacing burden (%) high on interrogation, consider addition of LV lead and upgrade to Bi-V CRT-D. There is severely reduced systolic function. Ejection fraction is approximately 25%.    Right Ventricle: Severe right ventricular enlargement. Systolic function is severely reduced.    Left Atrium: Left atrium is moderately dilated.    Right Atrium: Right atrium is severely dilated.    Aortic Valve: The aortic valve is a trileaflet valve. Mildly calcified cusps. There is mild to moderate aortic regurgitation with an eccentrically directed jet.    Mitral Valve: There is mild bileaflet sclerosis. Mildly thickened leaflets. There is mild to moderate regurgitation with an eccentric jet.    Tricuspid Valve: There is severe regurgitation with an eccentrically directed jet.    Pulmonary Artery: The estimated pulmonary artery systolic pressure is 60 mmHg.    IVC/SVC: Elevated venous pressure at 15 mmHg.    Pericardium: Left pleural effusion.    Current Heart Failure Medications  carvediloL tablet 3.125 mg, 2 times daily, Oral    Plan  - Monitor strict I&Os and daily weights.    - Place on telemetry  - " Low sodium diet  - Place on fluid restriction of 1.5 L.   - Cardiology has not been consulted  - The patient's volume status is at their baseline  - Continue scheduled dialysis to keep fluid level low    Obesity (BMI 30-39.9)  Body mass index is 35.22 kg/m². Morbid obesity complicates all aspects of disease management from diagnostic modalities to treatment. Weight loss encouraged and health benefits explained to patient.         Agitation  Patient reportedly had some agitation in the ICU  Haloperidol discontinued 11/11/2024 due to lethargy   PRN Lorazepam 2 mg  Currently doing well      Venous stasis ulcers of both lower extremities  Well controlled  Wound care was consulted  Keep wounds clean and dry         VTE Risk Mitigation (From admission, onward)           Ordered     heparin (porcine) injection 4,000 Units  As needed (PRN)         11/08/24 1508     IP VTE HIGH RISK PATIENT  Once         11/06/24 1634     Place sequential compression device  Until discontinued         11/06/24 1634                    Discharge Planning   SANDRA:      Code Status: Full Code   Is the patient medically ready for discharge?:     Reason for patient still in hospital (select all that apply): Laboratory test, Treatment, Consult recommendations, and Pending disposition  Discharge Plan A: Return to nursing home                  Tenzin Miller MD  Department of Hospital Medicine   [facility]

## 2024-11-13 NOTE — ASSESSMENT & PLAN NOTE
Patient has a diagnosis of pneumonia. The cause of the pneumonia is suspected to be bacterial in etiology but organism is not known. The pneumonia is stable. The patient has the following signs/symptoms of pneumonia: cough, sputum production, and shortness of breath. The patient does have a current oxygen requirement and the patient does not have a home oxygen requirement. I have reviewed the pertinent imaging. The following cultures have been collected: Blood cultures and Sputum culture The culture results are listed below.     Current antimicrobial regimen consists of the antibiotics listed below. Will monitor patient closely and ABG tomorrow.   Patient is at a SNF and can have oxygen there.  If need persists, he can have home oxygen.      Antibiotics (From admission, onward)      Start     Stop Route Frequency Ordered    11/12/24 0900  levoFLOXacin tablet 250 mg         -- Oral Every other day 11/11/24 1336            Microbiology Results (last 7 days)       Procedure Component Value Units Date/Time    Blood culture #1 [[id number]] Collected: 11/06/24 1455    Order Status: Completed Specimen: Blood Updated: 11/12/24 0654     Culture, Blood No Growth at 5 days    Blood culture #2 [[id number]] Collected: 11/06/24 1505    Order Status: Completed Specimen: Blood Updated: 11/12/24 0654     Culture, Blood No Growth at 5 days          Follow up with PCP and Pulmonology

## 2024-11-13 NOTE — PROGRESS NOTES
Nephrology Department            NAME: Yvan Rollins   YOB: 1960  MRN: [id number]  NOTE DATE: 11/13/2024       Seen in Dialysis Unit. He is tolerating the procedure.  He is more alert today.    Patient complains:  None.      REVIEW OF SYSTEMS:  he reports no shortness of breath, nausea or vomiting. No acute changes.    VITALS:  height is 6' (1.829 m) and weight is 117.8 kg (259 lb 11.2 oz). His tympanic temperature is 96.8 °F (36 °C). His blood pressure is 127/75 and his pulse is 111 (abnormal). His respiration is 20 and oxygen saturation is 92% (abnormal).  Hemodialysis documentation flowsheet reviewed with dialysis nurse, including weight and vitals.    Resting comfortably, NAD.  Respiration unlabored. Lungs clear.  Heart regular, no rub.  Abdomen soft, nontender, positive bowl sounds  No edema.    Recent Labs   Lab 11/10/24  2154 11/11/24  0436 11/12/24  0530   * 128* 130*   K 5.8* 6.0* 5.7*   CL 96* 97* 101   CO2 24 20* 22   BUN 47* 47* 46*   CREATININE 7.09* 7.02* 6.61*   CALCIUM 8.5 8.4* 8.5   PHOS 4.8* 4.5 5.2*     Recent Labs   Lab 11/06/24  1058 11/06/24  1333 11/06/24  1552 11/06/24  1731 11/10/24  2154   WBC 7.43  --   --  5.98 7.35   HGB 14.3  --   --  12.2* 13.8   HCT 44.7   < > 39 38.8* 43.5   PLT 62*  --   --  55* 71*    < > = values in this interval not displayed.       ASSESSMENT/PLAN:  Continue with scheduled hemodialysis for this patient.    Roberto Navarrete Jr, MD

## 2024-11-13 NOTE — ASSESSMENT & PLAN NOTE
Patient with Hypoxic Respiratory failure which is Acute on chronic.  he is not on home oxygen. Supplemental oxygen was provided and noted- Oxygen Concentration (%):  [32-35] 32    Signs/symptoms of respiratory failure include- tachypnea, increased work of breathing, and lethargy. Contributing diagnoses includes - CHF and COPD Labs and images were reviewed. Patient Has recent ABG, which has been reviewed. Will treat underlying causes and adjust management of respiratory failure as follows- Bipap Qhs, antibiotics and breathing treatments    Follow up with PCP and Pulmonology

## 2024-11-13 NOTE — PT/OT/SLP PROGRESS
Occupational Therapy   Treatment    Name: Yvan Rollins  MRN: [id number]  Admitting Diagnosis:  Acute respiratory failure with hypoxia and hypercarbia       Recommendations:     Discharge Recommendations: Moderate Intensity Therapy  Discharge Equipment Recommendations:  to be determined by next level of care  Barriers to discharge:  None    Assessment:     Yvan Rollins is a 64 y.o. male with a medical diagnosis of Acute respiratory failure with hypoxia and hypercarbia.  He presents with no complaints. Pt agreed to OT treatment focusing on ADL retraining. Performance deficits affecting function are weakness, impaired endurance, impaired self care skills, impaired functional mobility, decreased safety awareness.     Rehab Prognosis:  Good; patient would benefit from acute skilled OT services to address these deficits and reach maximum level of function.       Plan:     Patient to be seen 5 x/week to address the above listed problems via self-care/home management, therapeutic activities, therapeutic exercises  Plan of Care Expires: 12/16/24  Plan of Care Reviewed with: patient    Subjective     Chief Complaint: Acute Respiratory Failure with Hypoxia and Hypercarbia  Patient/Family Comments/goals: Pt agreed to tx.  Pain/Comfort:  Pain Rating 1: 0/10  Pain Rating Post-Intervention 1: 0/10    Objective:     Communicated with: TY Sanderson prior to session.  Patient found HOB elevated with oxygen, peripheral IV, telemetry upon OT entry to room.    General Precautions: Standard, fall    Orthopedic Precautions:N/A  Braces: N/A  Respiratory Status: Room air     Occupational Performance:     Bed Mobility:    Patient completed Rolling/Turning to Left with  stand by assistance  Patient completed Supine to Sit with contact guard assistance     Functional Mobility/Transfers:  Patient completed Sit <> Stand Transfer with minimum assistance  with  gait belt to stand to RW   Patient completed Bed <> Chair Transfer using  normal mood with appropriate affect Step Transfer technique with contact guard assistance with rolling walker  Functional Mobility: CGA with RW    Activities of Daily Living:  Grooming: Pt washed face and hand (I). Oral hygiene performed with (S) with self setup    Bathing: minimum assistance UE anterior bathing and min a bathing back crossing large towel over shoulders. Pt stood with CGA and performed perineum./buttock  bathing with setup and min/mod a. (S) with bathing legs and (D) with bathing feet  Upper Body Dressing: setup and cueing to joey gown and mod a donning gown as a robe    Lower Body Dressing: dependence donning socks      [facility] 6 Click ADL:      Treatment & Education:  Pt participated well with ADL retraining performed sitting EOB with (S) unsafe activity noted during self care tasks.    Patient left up in chair with all lines intact, call button in reach, tele sitter notified, and tele sitter present    GOALS:   Multidisciplinary Problems       Occupational Therapy Goals          Problem: Occupational Therapy    Goal Priority Disciplines Outcome Interventions   Occupational Therapy Goal     OT, PT/OT Progressing    Description: STG:  Pt will perform grooming with self setup  Pt will bathe with min a  Pt will perform UE dressing with (I)  Pt will perform LE dressing with I/Mod I  Pt will transfer bed/chair/bsc with Mod I  Pt will perform standing task x 2 min with (S)   Pt will tolerate 20 minutes of tx without fatigue      LT.Restore to max I with self care and mobility.                          Time Tracking:     OT Date of Treatment: 24  OT Start Time: 917  OT Stop Time: 1000  OT Total Time (min): 43 min    Billable Minutes:Self Care/Home Management 40    OT/SHERRON: OT          2024

## 2024-11-13 NOTE — DISCHARGE SUMMARY
[facility]  [facility] Medicine  Discharge Summary      Patient Name: Yvan Rollins  MRN: [id number]  [facility]: [id number]  Patient Class: IP- Inpatient  Admission Date: 11/6/2024  Hospital Length of Stay: 7 days  Discharge Date and Time:  11/13/2024 1:17 PM  Attending Physician: Saul De Paz MD   Discharging Provider: Tenzin Miller MD  Primary Care Provider: Eugene Funez MD    Primary Care Team: Networked reference to record PCT     HPI:   64 year old  male presented to the ED by ambulance after a fall at the nursing home. Patient is a poor historian due to being lethargic. It was reported that he fell at the nursing home, but no details were given about the fall. The patient reported on arrival to the ED that he thinks he may have struck his head and he has right knee pain. On my assessment, patient is more lethargic and only opens eyes to physical & verbal stimuli. Bipap is in use currently. PMHX is notable for end-stage renal disease with a right subclavian tunneled HD catheter for hemodialysis, pacemaker placement, CHF with an EF of 25-30%, COPD, Diabetes, GERD, Hepatitis C, Neuropathy, and hypotension which he takes midodrine for. Review of the external nursing home records show age-related cognitive decline due to prior CVA.      ED workup revealed Sodium 132, potassium 4.6, BUN/CR 42/7.6, magnesium 2.5, and troponin 76.1. ABGs 7.28, CO2 57, PO2 46, HCO3 26.8, and O2 saturation 75%. Placed on Bipap. CXR showed heart failure with small left pleural effusion. CT brain was negative for acute hemorrhage or infarction. Knee xray showed mild tricompartmental degenerative osteoarthrosis with a large suprapatellar effusion. Critical Care service will admit to the ICU for further monitoring and treatment.     * No surgery found *      Hospital Course:   Patient is a 64 year old male who presented to [facility] after a fall with reported lethargy. Sodium 132, potassium  4.6, BUN/CR 42/7.6, magnesium 2.5, and troponin 76.1. ABGs 7.28, CO2 57, PO2 46, HCO3 26.8, and O2 saturation 75%. Placed on Bipap. Workup with many radiographic images were done including X-ray right knee - Mild tricompartmental degenerative osteoarthrosis with a large suprapatellar effusion. Chondrocalcinosis. Poorly defined sclerotic lesion within the proximal tibia.  This may represent a bone infarction or enchondroma. CXR - Findings consistent with congestive heart failure with suspected small left pleural effusion. Right IJ catheter. Pacemaker. Head CT w/o contrast - Cortical atrophy with extensive periventricular deep white matter change consistent with advanced chronic small vessel ischemic disease. CT C-spine w/o contrast - Mild to moderate multilevel degenerative disc disease resulting in mild-to-moderate central spinal canal stenosis with bilateral neural foraminal narrowing. No acute fracture or subluxation.  Patient was admitted to ICU for treatment and close monitoring. Blood cultures x2 showed no growth at 5 days. Patient continued his MWF dialysis schedule. US lower extremity bilateral doppler showed No evidence of deep venous thrombosis in either lower extremity. Patient has received maximum benefit of hospitalization and will be discharged to [facility].     Goals of Care Treatment Preferences:  Code Status: Full Code      SDOH Screening:  The patient was unable to be screened for utility difficulties, food insecurity, transport difficulties, housing insecurity, and interpersonal safety, so no concerns could be identified this admission.     Consults:   Consults (From admission, onward)          Status Ordering Provider     Inpatient consult to Nephrology  Once        Provider:  Roberto Navarrete Jr., MD    Acknowledged NEELAM TAYLOR            Psychiatric  Agitation  Patient reportedly had some agitation in the ICU  Haloperidol discontinued 11/11/2024 due to lethargy   PRN Lorazepam 2 mg  Currently  doing well    Follow up with PCP     Pulmonary  * Acute respiratory failure with hypoxia and hypercarbia  Patient with Hypoxic Respiratory failure which is Acute on chronic.  he is not on home oxygen. Supplemental oxygen was provided and noted- Oxygen Concentration (%):  [32-35] 32    Signs/symptoms of respiratory failure include- tachypnea, increased work of breathing, and lethargy. Contributing diagnoses includes - CHF and COPD Labs and images were reviewed. Patient Has recent ABG, which has been reviewed. Will treat underlying causes and adjust management of respiratory failure as follows- Bipap Qhs, antibiotics and breathing treatments    Follow up with PCP and Pulmonology    Pneumonia  Patient has a diagnosis of pneumonia. The cause of the pneumonia is suspected to be bacterial in etiology but organism is not known. The pneumonia is stable. The patient has the following signs/symptoms of pneumonia: cough, sputum production, and shortness of breath. The patient does have a current oxygen requirement and the patient does not have a home oxygen requirement. I have reviewed the pertinent imaging. The following cultures have been collected: Blood cultures and Sputum culture The culture results are listed below.     Current antimicrobial regimen consists of the antibiotics listed below. Will monitor patient closely and ABG tomorrow.   Patient is at a SNF and can have oxygen there.  If need persists, he can have home oxygen.      Antibiotics (From admission, onward)      Start     Stop Route Frequency Ordered    11/12/24 0900  levoFLOXacin tablet 250 mg         -- Oral Every other day 11/11/24 1336            Microbiology Results (last 7 days)       Procedure Component Value Units Date/Time    Blood culture #1 [[id number]] Collected: 11/06/24 1455    Order Status: Completed Specimen: Blood Updated: 11/12/24 0654     Culture, Blood No Growth at 5 days    Blood culture #2 [[id number]] Collected: 11/06/24 1505    Order  "Status: Completed Specimen: Blood Updated: 11/12/24 0654     Culture, Blood No Growth at 5 days          Follow up with PCP and Pulmonology    Cardiac/Vascular  Chronic combined systolic and diastolic congestive heart failure  Patient has Systolic (HFrEF) heart failure that is Acute on chronic. On presentation their CHF was well compensated. Most recent BNP and echo results are listed below.  No results for input(s): "BNP" in the last 72 hours.  Latest ECHO  Results for orders placed during the hospital encounter of 11/06/24    Echo    Interpretation Summary    Left Ventricle: The left ventricle is severely dilated. Moderately increased wall thickness. There is concentric hypertrophy. Regional wall motion abnormalities present. Septal motion is consistent with pacing. If RV pacing burden (%) high on interrogation, consider addition of LV lead and upgrade to Bi-V CRT-D. There is severely reduced systolic function. Ejection fraction is approximately 25%.    Right Ventricle: Severe right ventricular enlargement. Systolic function is severely reduced.    Left Atrium: Left atrium is moderately dilated.    Right Atrium: Right atrium is severely dilated.    Aortic Valve: The aortic valve is a trileaflet valve. Mildly calcified cusps. There is mild to moderate aortic regurgitation with an eccentrically directed jet.    Mitral Valve: There is mild bileaflet sclerosis. Mildly thickened leaflets. There is mild to moderate regurgitation with an eccentric jet.    Tricuspid Valve: There is severe regurgitation with an eccentrically directed jet.    Pulmonary Artery: The estimated pulmonary artery systolic pressure is 60 mmHg.    IVC/SVC: Elevated venous pressure at 15 mmHg.    Pericardium: Left pleural effusion.    Current Heart Failure Medications  carvediloL tablet 3.125 mg, 2 times daily, Oral    Plan  - Monitor strict I&Os and daily weights.    - Place on telemetry  - Low sodium diet  - Place on fluid restriction of 1.5 L. "   - Cardiology has not been consulted  - The patient's volume status is at their baseline  - Continue scheduled dialysis to keep fluid level low    Follow up with PCP and Cardiology     Renal/  Chronic kidney disease with end stage renal failure on dialysis  Creatine stable for now. BMP reviewed- noted Estimated Creatinine Clearance: 15.4 mL/min (A) (based on SCr of 6.61 mg/dL (H)). according to latest data. Based on current GFR, CKD stage is end stage.  Monitor UOP and serial BMP and adjust therapy as needed. Renally dose meds. Avoid nephrotoxic medications and procedures.    Nephrology consulted, appreciate assistance  Dialysis MWF    Follow up with Nephrology and scheduled dialysis     Endocrine  Obesity (BMI 30-39.9)  Body mass index is 35.22 kg/m². Morbid obesity complicates all aspects of disease management from diagnostic modalities to treatment. Weight loss encouraged and health benefits explained to patient.     Follow up with PCP      Orthopedic  Venous stasis ulcers of both lower extremities  Well controlled  Wound care was consulted  Keep wounds clean and dry     Follow up with PCP         Final Active Diagnoses:    Diagnosis Date Noted POA    PRINCIPAL PROBLEM:  Acute respiratory failure with hypoxia and hypercarbia [J96.01, J96.02] 11/06/2024 Yes    Chronic kidney disease with end stage renal failure on dialysis [N18.6, Z99.2] 09/19/2023 Not Applicable    Pneumonia [J18.9] 11/11/2024 Yes    Chronic combined systolic and diastolic congestive heart failure [I50.42] 09/19/2023 Yes    Obesity (BMI 30-39.9) [E66.9] 11/10/2024 Yes    Agitation [R45.1] 11/09/2024 Yes    Venous stasis ulcers of both lower extremities [I83.019, I83.029, L97.919, L97.929] 11/06/2024 Yes    Chronic hepatitis C [B18.2] 09/19/2023 Yes      Problems Resolved During this Admission:    Diagnosis Date Noted Date Resolved POA    Hypotension [I95.9] 09/19/2023 11/10/2024 Yes       Discharged Condition: fair    Disposition:     Follow  "Up:   Contact information for after-discharge care       Destination       [facility] .    Service: Nursing Home  Contact information:  [address] [place] [id number] [phone]                                 Patient Instructions:      ACCEPT - Ambulatory referral/consult to Cardiology   Standing Status: Future   Referral Priority: Routine Referral Type: Consultation   Referral Reason: Specialty Services Required   Requested Specialty: Cardiology   Number of Visits Requested: 1     Diet Cardiac     Activity as tolerated       Significant Diagnostic Studies: Labs: All labs within the past 24 hours have been reviewed  Microbiology: Blood Culture No results found for: "LABBLOO"  Cardiac Graphics: Echocardiogram: 2D echo with color flow doppler: No results found for this or any previous visit. and Transthoracic echo (TTE) complete (Cupid Only):   Results for orders placed or performed during the hospital encounter of 11/06/24   Echo   Result Value Ref Range    BSA 2.52 m2    A4C EF 24 %    LVOT stroke volume 43.2 cm3    LVIDd 7.8 (A) 3.5 - 6.0 cm    LV Systolic Volume 291.88 mL    LV Systolic Volume Index 121.6 mL/m2    LVIDs 7.4 (A) 2.1 - 4.0 cm    LV Diastolic Volume 329.51 mL    LV ESV A4C 69.92 mL    LV Diastolic Volume Index 137.30 mL/m2    LV EDV A4C 273.84 mL    Left Ventricular End Systolic Volume by Teichholz Method 291.88 mL    Left Ventricular End Diastolic Volume by Teichholz Method 329.51 mL    IVS 1.5 (A) 0.6 - 1.1 cm    LVOT diameter 2.3 cm    LVOT area 4.2 cm2    FS 5.1 (A) 28 - 44 %    Left Ventricle Relative Wall Thickness 0.51 cm    PW 2.0 (A) 0.6 - 1.1 cm    LV mass 806.3 g    LV Mass Index 335.9 g/m2    TDI LATERAL 0.07 m/s    TDI SEPTAL 0.04 m/s    TR Max Sadiq 3.35 m/s    LVOT peak sadiq 0.7 m/s    Left Ventricular Outflow Tract Mean Velocity 0.55 cm/s    Left Ventricular Outflow Tract Mean Gradient 1.35 mmHg    RV- carmen basal diam 6.4 cm    " RV-carmen mid d 5.1 cm    RV Basal Diameter 6.97 cm    RV-carmen length 7.0 cm    RV mid diameter 5.11 cm    TAPSE 1.94 cm    RV/LV Ratio 0.82 cm    LA size 4.81 cm    RA Major Axis 6.52 cm    AV regurgitation pressure 1/2 time [id number] ms    AR Max Sadiq 2.89 m/s    AV mean gradient 3.0 mmHg    AV peak gradient 4.8 mmHg    Ao peak sadiq 1.1 m/s    Ao VTI 18.1 cm    LVOT peak VTI 10.4 cm    AV valve area 2.4 cm²    AV Velocity Ratio 0.64     AV index (prosthetic) 0.57     TRAVIS by Velocity Ratio 2.6 cm²    Mr max sadiq 4.02 m/s    Triscuspid Valve Regurgitation Peak Gradient 45 mmHg    PV PEAK VELOCITY 0.59 m/s    PV peak gradient 1 mmHg    Ao root annulus 3.86 cm    IVC diameter 3.68 cm    Mean e' 0.06 m/s    ZLVIDS 0.97     ZLVIDD -3.38     LA area A4C 26.14 cm2    AORTIC VALVE CUSP SEPERATION 2.20 cm    EF 25 %    TV resting pulmonary artery pressure 60 mmHg    RV TB RVSP 18 mmHg    Est. RA pres 15 mmHg    Narrative      Left Ventricle: The left ventricle is severely dilated. Moderately   increased wall thickness. There is concentric hypertrophy. Regional wall   motion abnormalities present. Septal motion is consistent with pacing. If   RV pacing burden (%) high on interrogation, consider addition of LV lead   and upgrade to Bi-V CRT-D. There is severely reduced systolic function.   Ejection fraction is approximately 25%.    Right Ventricle: Severe right ventricular enlargement. Systolic   function is severely reduced.    Left Atrium: Left atrium is moderately dilated.    Right Atrium: Right atrium is severely dilated.    Aortic Valve: The aortic valve is a trileaflet valve. Mildly calcified   cusps. There is mild to moderate aortic regurgitation with an   eccentrically directed jet.    Mitral Valve: There is mild bileaflet sclerosis. Mildly thickened   leaflets. There is mild to moderate regurgitation with an eccentric jet.    Tricuspid Valve: There is severe regurgitation with an eccentrically   directed jet.     Pulmonary Artery: The estimated pulmonary artery systolic pressure is   60 mmHg.    IVC/SVC: Elevated venous pressure at 15 mmHg.    Pericardium: Left pleural effusion.         Pending Diagnostic Studies:       Procedure Component Value Units Date/Time    EXTRA TUBES [[id number]] Collected: 11/13/24 1045    Order Status: Sent Lab Status: In process Updated: 11/13/24 1045    Specimen: Blood, Venous     Narrative:      The following orders were created for panel order EXTRA TUBES.  Procedure                               Abnormality         Status                     ---------                               -----------         ------                     Lavender Top Hold[[id number]]                               In process                 Gold Top Hold[[id number]]                                   In process                   Please view results for these tests on the individual orders.    EXTRA TUBES [[id number]] Collected: 11/06/24 1744    Order Status: Sent Lab Status: In process Updated: 11/06/24 1744    Specimen: Blood, Venous     Narrative:      The following orders were created for panel order EXTRA TUBES.  Procedure                               Abnormality         Status                     ---------                               -----------         ------                     Light Blue Top Hold[[id number]]                             In process                   Please view results for these tests on the individual orders.    EXTRA TUBES [[id number]] Collected: 11/06/24 1743    Order Status: Sent Lab Status: In process Updated: 11/06/24 1743    Specimen: Blood, Venous     Narrative:      The following orders were created for panel order EXTRA TUBES.  Procedure                               Abnormality         Status                     ---------                               -----------         ------                     Light Green Top Hold[[id number]]                            In process                  Lavender Top Hold[[id number]]                               In process                   Please view results for these tests on the individual orders.           Medications:  Reconciled Home Medications:      Medication List        START taking these medications      levoFLOXacin 250 MG tablet  Commonly known as: LEVAQUIN  Take 1 tablet (250 mg total) by mouth every other day. for 3 doses  Start taking on: November 14, 2024     predniSONE 20 MG tablet  Commonly known as: DELTASONE  Take 1 tablet (20 mg total) by mouth once daily. for 2 doses  Start taking on: November 14, 2024            CONTINUE taking these medications      albuterol-ipratropium 2.5 mg-0.5 mg/3 mL nebulizer solution  Commonly known as: DUO-NEB  Take 3 mLs by nebulization 2 (two) times a day.     amiodarone 200 MG Tab  Commonly known as: PACERONE  Take 1 tablet by mouth once daily.     atorvastatin 40 MG tablet  Commonly known as: LIPITOR  Take 80 mg by mouth every evening.     carvediloL 3.125 MG tablet  Commonly known as: COREG  Take 3.125 mg by mouth 2 (two) times daily.     ELIQUIS 5 mg Tab  Generic drug: apixaban  Take 5 mg by mouth 2 (two) times daily.     haloperidoL 5 MG tablet  Commonly known as: HALDOL  Take 10 mg by mouth every 6 (six) hours as needed (pt had dose given 2300).     lactulose 10 gram/15 mL solution  Commonly known as: CHRONULAC  SMARTSIG:15 Milliliter(s) By Mouth Daily PRN     LORazepam 2 MG Tab  Commonly known as: ATIVAN  Take 2 mg by mouth as needed (given IM not PO).     melatonin 3 mg tablet  Commonly known as: MELATIN  Take 6 mg by mouth as needed (pt was given dose at 2300).     memantine 5 MG Tab  Commonly known as: NAMENDA  Take 5 mg by mouth once daily.     OLANZapine 2.5 MG tablet  Commonly known as: ZyPREXA  Take 1 tablet by mouth 2 (two) times daily.     RENVELA 800 mg Tab  Generic drug: sevelamer carbonate  Take 1,600 mg by mouth 3 (three) times daily.     sucroferric oxyhydroxide 500 mg Chew  Commonly known  as: VELPHORO  Take 500 mg by mouth 3 (three) times daily.     vortioxetine 5 mg Tab  Commonly known as: TRINTELLIX  Take 5 mg by mouth Daily.            STOP taking these medications      hydrOXYzine pamoate 25 MG Cap  Commonly known as: VISTARIL              Indwelling Lines/Drains at time of discharge:   Lines/Drains/Airways       Central Venous Catheter Line  Duration                  Hemodialysis Catheter 09/19/23 0020 right subclavian 421 days              Drain  Duration                  Hemodialysis AV Graft 09/19/23 0022 Right forearm 421 days                    Time spent on the discharge of patient: 40 minutes         Tenzin Miller MD  Department of Hospital Medicine  [facility]

## 2024-11-14 NOTE — PLAN OF CARE
[facility]  Discharge Final Note    Primary Care Provider: Eugene Funez MD    Expected Discharge Date: 11/13/2024    Final Discharge Note (most recent)       Final Note - 11/14/24 0820          Final Note    Assessment Type Final Discharge Note     Anticipated Discharge Disposition Skilled Nursing Facility        Post-Acute Status    Post-Acute Authorization Placement     Post-Acute Placement Status Set-up Complete/Auth obtained     Coverage Medicare/ Medicaid     Patient choice form signed by patient/caregiver List with quality metrics by geographic area provided;List from CMS Compare;List from System Post-Acute Care     Discharge Delays None known at this time                     Important Message from Medicare  Important Message from Medicare regarding Discharge Appeal Rights: Given to patient/caregiver, Explained to patient/caregiver, Signed/date by patient/caregiver     Date IMM was signed: 11/13/24  Time IMM was signed: 1002     Follow-up providers       [facility]    [address] [place] [address]   Phone: [phone]       Next Steps: Follow up              After-discharge care                Destination       [facility]   Service: Nursing Home    [address] [place] [address]   Phone: [phone]                             Pt was accepted to [facility] for swb.

## 2025-03-28 NOTE — PT/OT/SLP EVAL
Detail Level: Zone Physical Therapy Evaluation     Patient Name: Yvan Rollins   MRN: [id number]  Recent Surgery: * No surgery found *      Recommendations:     Discharge Recommendations: Low Intensity Therapy   Discharge Equipment Recommendations: to be determined by next level of care   Barriers to discharge: None    Assessment:     Yvan Rollins is a 64 y.o. male admitted with a medical diagnosis of Acute respiratory failure with hypoxia and hypercarbia. He presents with the following impairments/functional limitations: impaired functional mobility, gait instability, decreased safety awareness, impaired cognition. Pt is from NH where he fell recently. He is pleasantly confused and unable to give reliable history. He ambulated well with rolling walker but appears to be unsafe with decision making. Plans to return to NH at d/c    Rehab Prognosis: Good; patient would benefit from acute PT services to address these deficits and reach maximum level of function.    Plan:     During this hospitalization, patient to be seen 5 x/week to address the above listed problems via gait training, therapeutic activities, therapeutic exercises    Plan of Care Expires: 12/07/24    Subjective     Chief Complaint: Acute respiratory failure  Patient Comments/Goals: Pt wants to get out of bed  Pain/Comfort:  Pain Rating 1: 0/10  Pain Rating Post-Intervention 1: 0/10    Social History:  Living Environment: Patient lives in a nursing home   Prior Level of Function: Prior to admission, patient ambulated household distances using rolling walker  Equipment Used at Home: wheelchair, walker, rolling  DME owned (not currently used): none  Assistance Upon Discharge: facility staff    Objective:     Communicated with KAREN Michel RN prior to session. Patient found HOB elevated with peripheral IV, oxygen, telemetry, blood pressure cuff, pulse ox (continuous) upon PT entry to room.    General Precautions: Standard, fall   Orthopedic Precautions: N/A   Braces:  N/A    Respiratory Status: Nasal cannula, flow 2 L/min    Exams:  Cognition: Patient is oriented to Person and Place  RLE ROM: WFL  RLE Strength: WFL  LLE ROM: WFL  LLE Strength: WFL  Gross Motor Coordination: WFL  Skin Integrity/Edema:     -       Edema: Mild BLE    Functional Mobility:  Gait belt applied - Yes  Bed Mobility  Scooting: stand by assistance  Supine to Sit: stand by assistance for trunk management  Transfers  Sit to Stand: contact guard assistance with rolling walker  Bed to Chair: contact guard assistance with rolling walker using Step Transfer  Gait  Patient ambulated 90 ft with rolling walker and contact guard assistance. Patient demonstrates decreased step length. Excessive WB through UE. All lines remained intact throughout ambulation trail.  Balance  Sitting: supervision  Standing: supervision      Therapeutic Activities and Exercises:   Patient educated on role of acute care PT and PT POC, safety while in hospital including calling nurse for mobility, and call light usage      AM-PAC 6 CLICK MOBILITY  Total Score:22    Patient left up in chair with all lines intact and call button in reach.    GOALS:   Multidisciplinary Problems       Physical Therapy Goals          Problem: Physical Therapy    Goal Priority Disciplines Outcome Interventions   Physical Therapy Goal     PT, PT/OT Progressing    Description: Short term goals:  1. Sit to stand transfer with Modified [place]  2. Bed to chair transfer with Modified [place] using Rolling Walker  3. Gait  x 100 feet with Modified [place] using Rolling Walker.     Long term goals:  Pt will return to prior living situation with modified independence for all mobility                         History:     Past Medical History:   Diagnosis Date    Cardiac arrest     CHF (congestive heart failure)     EF 25-30%    COPD (chronic obstructive pulmonary disease)     Coronary artery disease     Diabetes     GERD (gastroesophageal reflux disease)      Hepatitis C     Hypotension     Requiring Midodrine    Neuropathy     Substance abuse        Past Surgical History:   Procedure Laterality Date    arm surgery Right     INCISION AND DRAINAGE OF HEMATOMA Right 9/20/2023    Procedure: INCISION AND DRAINAGE, HEMATOMA;  Surgeon: Lexis Campbell MD;  Location: [facility];  Service: General;  Laterality: Right;    INSERTION OF PERMANENT PACEMAKER N/A 08/15/2018    IRRIGATION AND DEBRIDEMENT Left 9/20/2023    Procedure: IRRIGATION AND DEBRIDEMENT;  Surgeon: Lexis Campbell MD;  Location: [facility];  Service: General;  Laterality: Left;  LUE    JOINT REPLACEMENT Right     Knee surgery    LUMBAR SPINE SURGERY         Time Tracking:     PT Received On: 11/07/24  PT Start Time: 1411  PT Stop Time: 1430  PT Total Time (min): 19 min     Billable Minutes: Evaluation moderate complexity    11/7/2024

## (undated) DEVICE — GLOVE PROTEXIS PI SYN SURG 6.0

## (undated) DEVICE — GLOVE PROTEXIS PI SYN SURG 7

## (undated) DEVICE — TAPE DRAPE STRIP CLSR 4.5X24IN

## (undated) DEVICE — SYR 10CC LUER LOCK

## (undated) DEVICE — ELECTRODE BLADE INSULATED 1 IN

## (undated) DEVICE — SOL NACL IRR 1000ML BTL

## (undated) DEVICE — SUT ETHILON 2-0 FS 18IN BLK

## (undated) DEVICE — GOWN NONREINF SET-IN SLV 2XL

## (undated) DEVICE — Device

## (undated) DEVICE — BANDAGE GAUZE COT STRL 4.5X4.1

## (undated) DEVICE — GAUZE SPONGE 4X4 12PLY

## (undated) DEVICE — GLOVE PROTEXIS PI SYN SURG 6.5

## (undated) DEVICE — GLOVE PROTEXIS PI SYN SURG 7.5

## (undated) DEVICE — SPONGE LAP XRAY DTECT 18X18IN

## (undated) DEVICE — HANDLE MEDIVAC SUC YANK BLBOUS

## (undated) DEVICE — GLOVE 6.0 PROTEXIS PI BLUE